# Patient Record
Sex: MALE | Race: WHITE | NOT HISPANIC OR LATINO | Employment: OTHER | URBAN - METROPOLITAN AREA
[De-identification: names, ages, dates, MRNs, and addresses within clinical notes are randomized per-mention and may not be internally consistent; named-entity substitution may affect disease eponyms.]

---

## 2017-01-31 DIAGNOSIS — I10 ESSENTIAL HYPERTENSION: ICD-10-CM

## 2017-02-07 ENCOUNTER — ANTICOAGULATION VISIT (OUTPATIENT)
Dept: VASCULAR LAB | Facility: MEDICAL CENTER | Age: 72
End: 2017-02-07
Attending: NURSE PRACTITIONER
Payer: MEDICARE

## 2017-02-07 DIAGNOSIS — Z79.01 CHRONIC ANTICOAGULATION: ICD-10-CM

## 2017-02-07 DIAGNOSIS — I48.0 PAROXYSMAL ATRIAL FIBRILLATION (HCC): ICD-10-CM

## 2017-02-07 LAB
INR BLD: 2.7 (ref 0.9–1.2)
INR PPP: 2.7 (ref 2–3.5)

## 2017-02-07 PROCEDURE — 99211 OFF/OP EST MAY X REQ PHY/QHP: CPT | Performed by: NURSE PRACTITIONER

## 2017-02-07 PROCEDURE — 85610 PROTHROMBIN TIME: CPT

## 2017-02-07 NOTE — MR AVS SNAPSHOT
Cheikh Perez   2017 10:00 AM   Anticoagulation Visit   MRN: [id number]    Department:  Vascular Medicine   Dept Phone:  [phone]    Description:  Male : 1945   Provider:  [facility] EXAM 4           Allergies as of 2017     Allergen Noted Reactions    Nkda [No Known Drug Allergy] 2009         You were diagnosed with     Chronic anticoagulation   [768904]       Paroxysmal atrial fibrillation (CMS-HCC)   [075173]         Vital Signs     Smoking Status                   Never Smoker            Basic Information     Date Of Birth Sex Race Ethnicity Preferred Language    1945 Male White Non- English      Your appointments     Mar 13, 2017 10:00 AM   ANNUAL WELLNESS with REY Chappell, [employer]    [facility] [address]    [address]  [address]  [place] [address]   [phone]            2017 10:00 AM   Established Patient with [facility] EXAM 5   [facility]  (--)    [address]  [place] [address]   [phone]              Problem List              ICD-10-CM Priority Class Noted - Resolved    Paroxysmal atrial fibrillation (CMS-HCC) I48.0 High  2013 - Present    Essential hypertension I10 High  2013 - Present    Chronic anticoagulation Z79.01 High  2013 - Present    SHANNAN (obstructive sleep apnea) G47.33   2016 - Present    Controlled type 2 diabetes mellitus with diabetic polyneuropathy, without long-term current use of insulin (CMS-HCC) E11.42   2016 - Present    Dyslipidemia E78.5   2016 - Present    Left foot drop M21.372   2016 - Present      Health Maintenance        Date Due Completion Dates    RETINAL SCREENING 3/17/1963 ---    IMM DTaP/Tdap/Td Vaccine (1 - Tdap) 3/17/1964 ---    COLONOSCOPY 3/17/1995 ---    IMM ZOSTER VACCINE 3/17/2005 ---    A1C SCREENING 2016, 2016, 10/16/2015, 10/6/2014,  4/4/2014, 12/5/2013, 6/6/2013, 10/31/2012, 10/12/2011    FASTING LIPID PROFILE 10/16/2016 10/16/2015, 10/20/2014, 12/5/2013, 10/3/2013, 10/31/2012, 10/12/2011    URINE ACR / MICROALBUMIN 10/16/2016 10/16/2015, 10/20/2014, 12/5/2013, 10/31/2012    SERUM CREATININE 2/25/2017 2/25/2016, 2/23/2016, 10/16/2015, 10/20/2014, 12/18/2013, 12/5/2013, 8/8/2013, 7/10/2013, 6/6/2013, 1/24/2013, 10/31/2012, 10/12/2011, 11/23/2009, 6/6/2006, 12/2/2005    DIABETES MONOFILAMENT / LE EXAM 12/12/2017 12/12/2016            Results     POCT Protime      Component    INR    2.7                        Current Immunizations     13-VALENT PCV PREVNAR 2/26/2016 11:52 AM    Influenza TIV (IM) 10/6/2012    Influenza Vaccine Adult HD 12/12/2016, 10/1/2015    Pneumococcal Vaccine (UF)Historical Data 10/6/2012    Pneumococcal polysaccharide vaccine (PPSV-23) 10/1/2011      Below and/or attached are the medications your provider expects you to take. Review all of your home medications and newly ordered medications with your provider and/or pharmacist. Follow medication instructions as directed by your provider and/or pharmacist. Please keep your medication list with you and share with your provider. Update the information when medications are discontinued, doses are changed, or new medications (including over-the-counter products) are added; and carry medication information at all times in the event of emergency situations     Allergies:  NKDA - (reactions not documented)               Medications  Valid as of: February 07, 2017 - 10:10 AM    Generic Name Brand Name Tablet Size Instructions for use    Alpha-Lipoic Acid   Take 200 mg by mouth 2 Times a Day.        Cholecalciferol   Take  by mouth.        Enalapril Maleate (Tab) VASOTEC 10 MG Take 10 mg by mouth every day.        Lecithin (Cap) Lecithin 1200 MG Take 1 Cap by mouth 2 Times a Day.        MetFORMIN HCl (Tab) GLUCOPHAGE 850 MG Take 850 mg by mouth 2 times a day, with meals. Indications:  Type 2 Diabetes        Metoprolol Tartrate (Tab) LOPRESSOR 25 MG TAKE ONE TABLET BY MOUTH TWICE DAILY        Warfarin Sodium (Tab) COUMADIN 5 MG Take 1 Tab by mouth every day.        .                 Medicines prescribed today were sent to:     [id number] - [place], [place] - [address] [place] [address]    Phone: [phone] Fax: [phone]    Open 24 Hours?: No      Medication refill instructions:       If your prescription bottle indicates you have medication refills left, it is not necessary to call your provider’s office. Please contact your pharmacy and they will refill your medication.    If your prescription bottle indicates you do not have any refills left, you may request refills at any time through one of the following ways: The online SpaceClaim system (except Urgent Care), by calling your provider’s office, or by asking your pharmacy to contact your provider’s office with a refill request. Medication refills are processed only during regular business hours and may not be available until the next business day. Your provider may request additional information or to have a follow-up visit with you prior to refilling your medication.   *Please Note: Medication refills are assigned a new Rx number when refilled electronically. Your pharmacy may indicate that no refills were authorized even though a new prescription for the same medication is available at the pharmacy. Please request the medicine by name with the pharmacy before contacting your provider for a refill.        Warfarin Dosing Calendar   February 2017 Details    Sun Mon Tue Wed Thu Fri Sat        1               2               3               4                 5               6               7   2.7   7.5 mg   See details      8      5 mg         9      7.5 mg         10      5 mg         11      7.5 mg           12      5 mg         13      5 mg         14      7.5 mg         15      5 mg         16      7.5 mg          17      5 mg         18      7.5 mg           19      5 mg         20      5 mg         21      7.5 mg         22      5 mg         23      7.5 mg         24      5 mg         25      7.5 mg           26      5 mg         27      5 mg         28      7.5 mg              Date Details   02/07 This INR check   INR: 2.7               How to take your warfarin dose     To take:  5 mg Take 1 of the 5 mg tablets.    To take:  7.5 mg Take 1.5 of the 5 mg tablets.           Warfarin Dosing Calendar   March 2017 Details    Sun Mon Tue Wed Thu Fri Sat        1      5 mg         2      7.5 mg         3      5 mg         4      7.5 mg           5      5 mg         6      5 mg         7      7.5 mg         8      5 mg         9      7.5 mg         10      5 mg         11      7.5 mg           12      5 mg         13      5 mg         14      7.5 mg         15      5 mg         16      7.5 mg         17      5 mg         18      7.5 mg           19      5 mg         20      5 mg         21      7.5 mg         22      5 mg         23      7.5 mg         24      5 mg         25      7.5 mg           26      5 mg         27      5 mg         28      7.5 mg         29      5 mg         30      7.5 mg         31      5 mg           Date Details   No additional details            How to take your warfarin dose     To take:  5 mg Take 1 of the 5 mg tablets.    To take:  7.5 mg Take 1.5 of the 5 mg tablets.           Warfarin Dosing Calendar   April 2017 Details    Sun Mon Tue Wed Thu Fri Sat           1      7.5 mg           2      5 mg         3      5 mg         4      7.5 mg         5               6               7               8                 9               10               11               12               13               14               15                 16               17               18               19               20               21               22                 23               24               25                 26               27               28               29                 30                      Date Details   No additional details    Date of next INR:  4/4/2017         How to take your warfarin dose     To take:  5 mg Take 1 of the 5 mg tablets.    To take:  7.5 mg Take 1.5 of the 5 mg tablets.              Offerpop Access Code: [email or web address]  Expires: 3/9/2017 10:10 AM    Offerpop  A secure, online tool to manage your health information     [employer]s Offerpop® is a secure, online tool that connects you to your personalized health information from the privacy of your home -- day or night - making it very easy for you to manage your healthcare. Once the activation process is completed, you can even access your medical information using the Offerpop lisset, which is available for free in the Apple Lisset store or Google Play store.     Offerpop provides the following levels of access (as shown below):   My Chart Features   [facility] Primary [facility] Doctor [facility]  Specialists [facility]  Urgent  Care Non-[facility]  Primary Care  Doctor   Email your healthcare team securely and privately 24/7 X X X    Manage appointments: schedule your next appointment; view details of past/upcoming appointments X      Request prescription refills. X      View recent personal medical records, including lab and immunizations X X X X   View health record, including health history, allergies, medications X X X X   Read reports about your outpatient visits, procedures, consult and ER notes X X X X   See your discharge summary, which is a recap of your hospital and/or ER visit that includes your diagnosis, lab results, and care plan. X X       How to register for Offerpop:  1. Go to  [email or web address].  2. Click on the Sign Up Now box, which takes you to the New Member Sign Up page. You will need to provide the following information:  a. Enter your Offerpop Access Code exactly as it appears at the top of this page. (You will not need to  use this code after you’ve completed the sign-up process. If you do not sign up before the expiration date, you must request a new code.)   b. Enter your date of birth.   c. Enter your home email address.   d. Click Submit, and follow the next screen’s instructions.  3. Create a Oris4t ID. This will be your Oris4t login ID and cannot be changed, so think of one that is secure and easy to remember.  4. Create a e-Go aeroplanes password. You can change your password at any time.  5. Enter your Password Reset Question and Answer. This can be used at a later time if you forget your password.   6. Enter your e-mail address. This allows you to receive e-mail notifications when new information is available in e-Go aeroplanes.  7. Click Sign Up. You can now view your health information.    For assistance activating your e-[employer] account, call [phone]

## 2017-02-07 NOTE — PROGRESS NOTES
OP Anticoagulation Service Note    Date: 2/7/2017       Plan:  Pt is therapeutic.  Denies any s/s of bleeding or clotting.  Denies any changes in medications or diet. Will continue warfarin dosing as follows.  Follow up appointment in 8 week(s).       Anticoagulation Summary as of 2/7/2017     INR goal 2.0-3.0   Selected INR 2.7 (2/7/2017)   Maintenance plan 7.5 mg (5 mg x 1.5) on Tue, Thu, Sat; 5 mg (5 mg x 1) all other days   Weekly total 42.5 mg   Plan last modified Ilda [facility], A.P.N. (6/28/2016)   Next INR check 4/4/2017   Target end date Indefinite    Indications   Paroxysmal atrial fibrillation (CMS-HCC) [I48.0]  Chronic anticoagulation [Z79.01]         Anticoagulation Episode Summary     INR check location [facility]    Preferred lab     Send INR reminders to     Comments       Anticoagulation Care Providers     Provider Role Specialty Phone number    Cheikh Turner M.D. Referring Cardiology [phone]    [facility] Anticoagulation Services Responsible  [phone]            LEXY Oliva  [facility]

## 2017-03-06 ENCOUNTER — HOSPITAL ENCOUNTER (OUTPATIENT)
Dept: LAB | Facility: MEDICAL CENTER | Age: 72
End: 2017-03-06
Attending: NURSE PRACTITIONER
Payer: MEDICARE

## 2017-03-06 DIAGNOSIS — E11.42 CONTROLLED TYPE 2 DIABETES MELLITUS WITH DIABETIC POLYNEUROPATHY, WITHOUT LONG-TERM CURRENT USE OF INSULIN (HCC): ICD-10-CM

## 2017-03-06 DIAGNOSIS — E78.5 DYSLIPIDEMIA: ICD-10-CM

## 2017-03-06 DIAGNOSIS — I10 ESSENTIAL HYPERTENSION: ICD-10-CM

## 2017-03-06 LAB
ALBUMIN SERPL BCP-MCNC: 4.2 G/DL (ref 3.2–4.9)
ALBUMIN/GLOB SERPL: 1.3 G/DL
ALP SERPL-CCNC: 64 U/L (ref 30–99)
ALT SERPL-CCNC: 16 U/L (ref 2–50)
ANION GAP SERPL CALC-SCNC: 8 MMOL/L (ref 0–11.9)
AST SERPL-CCNC: 16 U/L (ref 12–45)
BILIRUB SERPL-MCNC: 0.8 MG/DL (ref 0.1–1.5)
BUN SERPL-MCNC: 16 MG/DL (ref 8–22)
CALCIUM SERPL-MCNC: 9.6 MG/DL (ref 8.5–10.5)
CHLORIDE SERPL-SCNC: 103 MMOL/L (ref 96–112)
CHOLEST SERPL-MCNC: 166 MG/DL (ref 100–199)
CO2 SERPL-SCNC: 27 MMOL/L (ref 20–33)
CREAT SERPL-MCNC: 0.78 MG/DL (ref 0.5–1.4)
CREAT UR-MCNC: 88.5 MG/DL
EST. AVERAGE GLUCOSE BLD GHB EST-MCNC: 203 MG/DL
GLOBULIN SER CALC-MCNC: 3.3 G/DL (ref 1.9–3.5)
GLUCOSE SERPL-MCNC: 169 MG/DL (ref 65–99)
HBA1C MFR BLD: 8.7 % (ref 0–5.6)
HDLC SERPL-MCNC: 50 MG/DL
LDLC SERPL CALC-MCNC: 100 MG/DL
MICROALBUMIN UR-MCNC: 2.4 MG/DL
MICROALBUMIN/CREAT UR: 27 MG/G (ref 0–30)
POTASSIUM SERPL-SCNC: 3.9 MMOL/L (ref 3.6–5.5)
PROT SERPL-MCNC: 7.5 G/DL (ref 6–8.2)
SODIUM SERPL-SCNC: 138 MMOL/L (ref 135–145)
TRIGL SERPL-MCNC: 79 MG/DL (ref 0–149)

## 2017-03-06 PROCEDURE — 83036 HEMOGLOBIN GLYCOSYLATED A1C: CPT

## 2017-03-06 PROCEDURE — 80061 LIPID PANEL: CPT

## 2017-03-06 PROCEDURE — 82043 UR ALBUMIN QUANTITATIVE: CPT

## 2017-03-06 PROCEDURE — 36415 COLL VENOUS BLD VENIPUNCTURE: CPT

## 2017-03-06 PROCEDURE — 80053 COMPREHEN METABOLIC PANEL: CPT

## 2017-03-06 PROCEDURE — 82570 ASSAY OF URINE CREATININE: CPT

## 2017-03-08 ENCOUNTER — TELEPHONE (OUTPATIENT)
Dept: MEDICAL GROUP | Facility: MEDICAL CENTER | Age: 72
End: 2017-03-08

## 2017-03-08 NOTE — TELEPHONE ENCOUNTER
Future Appointments       Provider Department Center    3/13/2017 10:00 AM REY Chappell; [facility]  [facility] [address]    4/4/2017 10:00 AM [facility] EXAM 5 [facility]        ANNUAL WELLNESS VISIT PRE-VISIT PLANNING     1.  Reviewed last PCP office visit assessment and plan notes: Yes    2.  If any orders were placed last visit do we have Results/Consult Notes?        •  Labs? Yes 3/17       •  Imaging? Yes other doctor       •  Referrals? No no order    3.  Patient Care Coordination Note was updated with diagnosis information:  No    4.  Patient is due for these Health Maintenance Topics:   Health Maintenance Due   Topic Date Due   • Annual Wellness Visit  1945   • RETINAL SCREENING  03/17/1963   • COLONOSCOPY  03/17/1995   • IMM ZOSTER VACCINE  03/17/2005   • IMM DTaP/Tdap/Td Vaccine (1 - Tdap) 12/02/2009             5.  Immunizations were updated in [facility] using WebIZ?: Yes       •  Web Iz Recommendations:  Shingles, tdap       •  Is patient due for Tdap/Shingles? Yes.  If yes, was patient alerted of copay? Yes    6.  Patient has:       •   Diabetes: labs done           7.  Updated Care Team with DME Companies and all specialists?        •   Gait devices, O2, CPAP, etc: no        •   Eye professional: yes       •   Other specialists (GYN, cardiology, endo, etc): yes    8.  Is patient in need of any refills prior to office visit? No       •    Separate refill encounter created?: N\A    9.  Patient was informed to arrive 15 min prior to their scheduled appointment and bring in their medication bottles? yes    10.  Patient was advised: “This is a free wellness visit. The provider will screen for medical conditions to help you stay healthy. If you have other concerns to address you may be asked to discuss these at a separate visit or there may be an additional fee.”  Yes

## 2017-03-08 NOTE — TELEPHONE ENCOUNTER
Left message for patient to call back regarding AWV pre-visit planning. Please transfer call to [phone].

## 2017-03-13 ENCOUNTER — OFFICE VISIT (OUTPATIENT)
Dept: MEDICAL GROUP | Facility: MEDICAL CENTER | Age: 72
End: 2017-03-13
Payer: MEDICARE

## 2017-03-13 VITALS
HEART RATE: 60 BPM | SYSTOLIC BLOOD PRESSURE: 100 MMHG | DIASTOLIC BLOOD PRESSURE: 70 MMHG | BODY MASS INDEX: 29.05 KG/M2 | WEIGHT: 246.03 LBS | TEMPERATURE: 98.9 F | RESPIRATION RATE: 14 BRPM | OXYGEN SATURATION: 97 % | HEIGHT: 77 IN

## 2017-03-13 DIAGNOSIS — I10 ESSENTIAL HYPERTENSION: ICD-10-CM

## 2017-03-13 DIAGNOSIS — Z79.01 CHRONIC ANTICOAGULATION: ICD-10-CM

## 2017-03-13 DIAGNOSIS — G47.33 OSA (OBSTRUCTIVE SLEEP APNEA): ICD-10-CM

## 2017-03-13 DIAGNOSIS — M21.372 LEFT FOOT DROP: ICD-10-CM

## 2017-03-13 DIAGNOSIS — Z00.00 MEDICARE ANNUAL WELLNESS VISIT, SUBSEQUENT: ICD-10-CM

## 2017-03-13 DIAGNOSIS — E78.5 DYSLIPIDEMIA: ICD-10-CM

## 2017-03-13 DIAGNOSIS — Z91.81 RISK FOR FALLS: ICD-10-CM

## 2017-03-13 DIAGNOSIS — I48.0 PAROXYSMAL ATRIAL FIBRILLATION (HCC): ICD-10-CM

## 2017-03-13 PROCEDURE — G0439 PPPS, SUBSEQ VISIT: HCPCS | Performed by: NURSE PRACTITIONER

## 2017-03-13 PROCEDURE — 3017F COLORECTAL CA SCREEN DOC REV: CPT | Performed by: NURSE PRACTITIONER

## 2017-03-13 PROCEDURE — 3288F FALL RISK ASSESSMENT DOCD: CPT | Mod: 8P | Performed by: NURSE PRACTITIONER

## 2017-03-13 PROCEDURE — G8482 FLU IMMUNIZE ORDER/ADMIN: HCPCS | Performed by: NURSE PRACTITIONER

## 2017-03-13 PROCEDURE — 0518F FALL PLAN OF CARE DOCD: CPT | Mod: 8P | Performed by: NURSE PRACTITIONER

## 2017-03-13 PROCEDURE — G8420 CALC BMI NORM PARAMETERS: HCPCS | Performed by: NURSE PRACTITIONER

## 2017-03-13 PROCEDURE — 1036F TOBACCO NON-USER: CPT | Performed by: NURSE PRACTITIONER

## 2017-03-13 PROCEDURE — G8510 SCR DEP NEG, NO PLAN REQD: HCPCS | Performed by: NURSE PRACTITIONER

## 2017-03-13 PROCEDURE — 1100F PTFALLS ASSESS-DOCD GE2>/YR: CPT | Performed by: NURSE PRACTITIONER

## 2017-03-13 PROCEDURE — 3045F PR MOST RECENT HEMOGLOBIN A1C LEVEL 7.0-9.0%: CPT | Performed by: NURSE PRACTITIONER

## 2017-03-13 PROCEDURE — 4040F PNEUMOC VAC/ADMIN/RCVD: CPT | Performed by: NURSE PRACTITIONER

## 2017-03-13 PROCEDURE — 99213 OFFICE O/P EST LOW 20 MIN: CPT | Mod: 25 | Performed by: NURSE PRACTITIONER

## 2017-03-13 RX ORDER — LOVASTATIN 10 MG/1
10 TABLET ORAL DAILY
Qty: 30 TAB | Refills: 11 | Status: SHIPPED | OUTPATIENT
Start: 2017-03-13 | End: 2018-01-11 | Stop reason: SDUPTHER

## 2017-03-13 ASSESSMENT — PAIN SCALES - GENERAL: PAINLEVEL: NO PAIN

## 2017-03-13 ASSESSMENT — PATIENT HEALTH QUESTIONNAIRE - PHQ9: CLINICAL INTERPRETATION OF PHQ2 SCORE: 0

## 2017-03-13 NOTE — PROGRESS NOTES
Chief Complaint   Patient presents with   • Annual Exam     AWV         HPI:  Cheikh is a 71 y.o. male here for Medicare Annual Wellness Visit    1. Medicare annual wellness visit, subsequent  Screening performed below.    2. Uncontrolled type 2 diabetes mellitus without complication, without long-term current use of insulin (CMS-HCC)  Patient has been using metformin 850 mg twice a day which he came to this office on. His last hemoglobin A1c from year ago was mildly elevated in the low 7 range. It is now at 8.7. He states he is taking the medicine as prescribed but is not exercising as much as he used to. His BMI is slightly elevated at 29. He denies polyuria, polydipsia, or polyphagia.    3. Dyslipidemia  Patient states he has not been on a statin in the past. His LDL is at 100 which is slightly high for a diabetic.    4. Essential hypertension  Blood pressure has remained well controlled on his metoprolol and lisinopril.    5. Paroxysmal atrial fibrillation (CMS-HCC)  Patient goes to the Coumadin clinic which is providing his Coumadin and INR testing. He does have a cardiologist and admits he received a recent letter regarding yearly follow-up. He denies unilateral weakness, chest pain, shortness of breath or dizziness.    6. Chronic anticoagulation  This is being followed through the Coumadin clinic.    7. SHANNAN (obstructive sleep apnea)  Patient continues to not wear his CPAP regularly stating he does not like how it fits and does not want to go to pulmonology.    8. Left foot drop  He states he currently was through therapy and is trying to get better movement of his left foot so he can be more active. He has an appointment with his orthopedic surgeon next week.    9. Risk for falls  Patient at risk for falls but uses a foot brace and cane if needed.      Patient Active Problem List    Diagnosis Date Noted   • Chronic anticoagulation 09/20/2013     Priority: High   • Essential hypertension 07/09/2013      Priority: High   • Paroxysmal atrial fibrillation (CMS-HCC) 06/06/2013     Priority: High   • SHANNAN (obstructive sleep apnea) 12/12/2016   • Controlled type 2 diabetes mellitus with diabetic polyneuropathy, without long-term current use of insulin (CMS-HCC) 12/12/2016   • Dyslipidemia 12/12/2016   • Left foot drop 12/12/2016       Current Outpatient Prescriptions   Medication Sig Dispense Refill   • metoprolol (LOPRESSOR) 25 MG Tab TAKE ONE TABLET BY MOUTH TWICE DAILY 180 Tab 2   • VITAMIN D, CHOLECALCIFEROL, PO Take  by mouth.     • warfarin (COUMADIN) 5 MG Tab Take 1 Tab by mouth every day. 100 Tab 3   • enalapril (VASOTEC) 10 MG TABS Take 10 mg by mouth every day.     • metformin (GLUCOPHAGE) 850 MG TABS Take 850 mg by mouth 2 times a day, with meals. Indications: Type 2 Diabetes     • ALPHA LIPOIC ACID PO Take 200 mg by mouth 2 Times a Day.     • Lecithin 1200 MG CAPS Take 1 Cap by mouth 2 Times a Day.       No current facility-administered medications for this visit.          Current supplements as per medication list.   Chronic narcotic pain medicines: no    Allergies: Nkda    Current social contact/activities: service club, golf     Is patient current with immunizations?  no   If no, due for tdap, shingles    He  reports that he has never smoked. He has never used smokeless tobacco. He reports that he drinks about 6.0 oz of alcohol per week. He reports that he does not use illicit drugs.  Counseling given: Not Answered        DPA/Advanced Directive:  Patient does not have an advanced directive.  If not on file, instructed to bring in a copy to scan into his chart. If no advanced directive exists, a packet and workshop information was provided    ROS:    Gait: Uses a cane   Ostomy: no   Other tubes: no   Amputations: no   Chronic oxygen use no   Last eye exam last year   : Denies incontinence.       Depression Screening    Little interest or pleasure in doing things?  0 - not at all  Feeling down, depressed,  or hopeless?  0 - not at all  Patient Health Questionnaire Score: 0    If depressive symptoms identified deferred to follow up visit unless specifically addressed in assessment and plan.    Screening for Cognitive Impairment    Three Minute Recall (banana, sunrise, fence)  3/3    Draw clock face with all 12 numbers set to the hand to show 10 minutes past 11 o'clock  1 5/5  Cognitive concerns identified deferred for follow up unless specifically addressed in assessment and plan.    Fall Risk Assessment    Has the patient had two or more falls in the last year or any fall with injury in the last year?  Yes    Safety Assessment    Throw rugs on floor.  Yes  Handrails on all stairs.  Yes  Good lighting in all hallways.  Yes  Difficulty hearing.  No  Patient counseled about all safety risks that were identified.    Functional Assessment ADLs    Are there any barriers preventing you from cooking for yourself or meeting nutritional needs?  No.    Are there any barriers preventing you from driving safely or obtaining transportation?  No.    Are there any barriers preventing you from using a telephone or calling for help?  No.    Are there any barriers preventing you from shopping?  No.    Are there any barriers preventing you from taking care of your own finances?  No.    Are there any barriers preventing you from managing your medications?  No.    Are currently engaging any exercise or physical activity?  Yes.  stationary bike 20 mins 3-4 times a week, 10 push ups 3 times a week    Health Maintenance Summary                Annual Wellness Visit Overdue 1945     RETINAL SCREENING Overdue 3/17/1963     COLONOSCOPY Overdue 3/17/1995     IMM ZOSTER VACCINE Overdue 3/17/2005     IMM DTaP/Tdap/Td Vaccine Overdue 12/2/2009      Done 12/1/2009 Imm Admin: TD Vaccine    A1C SCREENING Next Due 9/6/2017      Done 3/6/2017 HEMOGLOBIN A1C (A)     Patient has more history with this topic...    DIABETES MONOFILAMENT / LE EXAM Next  "Due 12/12/2017      Done 12/12/2016 AMB DIABETIC MONOFILAMENT LOWER EXTREMITY EXAM    FASTING LIPID PROFILE Next Due 3/6/2018      Done 3/6/2017 LIPID PROFILE (A)     Patient has more history with this topic...    URINE ACR / MICROALBUMIN Next Due 3/6/2018      Done 3/6/2017 MICROALBUMIN CREAT RATIO URINE     Patient has more history with this topic...    SERUM CREATININE Next Due 3/6/2018      Done 3/6/2017 COMP METABOLIC PANEL (A)     Patient has more history with this topic...          Patient Care Team:  REY Chappell as PCP - General (Family Medicine)  Ion Terrell M.D. as Consulting Physician (Ophthalmology)  Main Batista M.D. as Consulting Physician (Urology)  Bran Brar M.D. as Consulting Physician (Cardiology)  Montrell Eddy M.D. as Consulting Physician (Orthopaedics)    Social History   Substance Use Topics   • Smoking status: Never Smoker    • Smokeless tobacco: Never Used   • Alcohol Use: 6.0 oz/week     10 Glasses of wine per week     Family History   Problem Relation Age of Onset   • Heart Attack Father    • Arrythmia Brother    • Other Mother 92     :natural causes\"     He  has a past medical history of Arthritis; Sleep apnea; Snoring; Diabetes; Hypertension; Arrhythmia (July 2013); Pain ( ); Hypertension; and Paroxysmal atrial fibrillation (CMS-[facility]) (6/6/2013).   Past Surgical History   Procedure Laterality Date   • Lumbar laminectomy diskectomy  7/2006     L1-L5/ residual left foot weaknesss    • Acl repair  12/2005     Right   • Knee arthroplasty total  3/2009     Left   • Knee arthroplasty total  12/1/2009     Performed by GIACOMO PETTIT at SURGERY [facility] ORS   • Recovery  7/11/2013     Performed by Cath-Recovery Surgery at SURGERY SAME DAY [facility] ORS   • Other       Surgery for kidney stones   • Cystoscopy stent placement  1/13/2014     Performed by Main Batista M.D. at SURGERY [facility]   • Ureteroscopy  1/13/2014     Performed by Main Batista M.D. " "at SURGERY [facility]   • Lasertripsy  1/13/2014     Performed by Main Batista M.D. at SURGERY [facility]   • Femur orif Left 2/24/2016     Procedure: DISTAL FEMUR ORIF;  Surgeon: Montrell Eddy M.D.;  Location: SURGERY [facility];  Service:    • Femur orif             Exam:   Gen.: Patient alert, talkative and in no distress.  HEENT: TMs show no erythema, neck supple negative lymphadenopathy or thyromegaly.  Chest: Clear bilaterally to A&P and without wheeze or rhonchi.  Heart: Irregularly irregular. Negative murmur.  Abdomen: Soft, nontender, bowel sounds present, negative for bruit or masses.  Extremities: Warm, without cyanosis or edema, left foot drop.  Skin: Negative for rash or lesions.  Blood pressure 100/70, pulse 60, temperature 37.2 °C (98.9 °F), resp. rate 14, height 1.95 m (6' 4.77\"), weight 111.6 kg (246 lb 0.5 oz), SpO2 97 %. Body mass index is 29.35 kg/(m^2).        Assessment and Plan. The following treatment and monitoring plan is recommended:    1. Medicare annual wellness visit, subsequent  Annual wellness topics discussed, review of chronic medical problems completed    - Initial Wellness Visit - Includes PPPS ()    2. Uncontrolled type 2 diabetes mellitus without complication, without long-term current use of insulin (CMS-[facility])  I discussed options with patient including adding additional diabetic medication but he would prefer to first try going to the top dose of metformin 1000 mg twice a day. He states he is going to be more active with the spring weather and I advised him to try to bring his hemoglobin A1c closer to 7.0. I explained he still needs to watch his carbohydrates.  - metformin (GLUCOPHAGE) 1000 MG tablet; Take 1 Tab by mouth 2 times a day, with meals.  Dispense: 60 Tab; Refill: 11  - HEMOGLOBIN A1C; Future    3. Dyslipidemia  I reviewed with patient that most cardiologist would like to see an LDL closer to 70 because of his heart history and diabetes. I " reviewed with him the pros and cons of statins. I will start him on a low dose of one of the week or statins and recheck cholesterol in 3 months and increase if necessary but he does not have very far to go to get his LDL close to 70. He will stop the medicine if he develops myalgias or other side effects.  - lovastatin (MEVACOR) 10 MG tablet; Take 1 Tab by mouth every day.  Dispense: 30 Tab; Refill: 11  - LIPID PROFILE; Future    4. Essential hypertension  Blood pressure currently well controlled and he is not bradycardic on his beta blocker. I explained about the lisinopril with his diabetes.    5. Paroxysmal atrial fibrillation (CMS-HCC)  Patient to follow-up with cardiology and continue to get his medications through the Coumadin clinic.    6. Chronic anticoagulation  Patient currently on medication.    7. SHANNAN (obstructive sleep apnea)  I again reminded patient he should wear his CPAP at night. If he is having trouble with his mask he should see his [employer] company for new mask.    8. Left foot drop  Patient recently through therapy and now will be following with his foot surgeon.    9. Risk for falls    - Patient identified as fall risk.  Appropriate orders and counseling given.    Services needed: No services needed at this time  Health Care Screening recommendations as per orders if indicated.  Referrals offered: PT/OT/Nutrition counseling/Behavioral Health/Smoking cessation as per orders if indicated.    Discussion today about general wellness and lifestyle habits:    · Prevent falls and reduce trip hazards; Cautioned about securing or removing rugs.  · Have a working fire alarm and carbon monoxide detector;   · Engage in regular physical activity and social activities   ·       Follow-up: No Follow-up on file.

## 2017-03-13 NOTE — MR AVS SNAPSHOT
"        Cheikh Perez   3/13/2017 10:00 AM   Office Visit   MRN: [id number]    Department:  [address] [place] [address]   Dept Phone:  [phone]    Description:  Male : 1945   Provider:  REY Chappell; HEALTH            Reason for Visit     Annual Exam AWV      Allergies as of 3/13/2017     Allergen Noted Reactions    Nkda [No Known Drug Allergy] 2009         You were diagnosed with     Medicare annual wellness visit, subsequent   [980305]       Uncontrolled type 2 diabetes mellitus without complication, without long-term current use of insulin (CMS-HCC)   [7930741]       Dyslipidemia   [418202]       Essential hypertension   [4852182]       Paroxysmal atrial fibrillation (CMS-HCC)   [351418]       Chronic anticoagulation   [379151]       SHANNAN (obstructive sleep apnea)   [211331]       Left foot drop   [183793]         Vital Signs     Blood Pressure Pulse Temperature Respirations Height Weight    100/70 mmHg 60 37.2 °C (98.9 °F) 14 1.95 m (6' 4.77\") 111.6 kg (246 lb 0.5 oz)    Body Mass Index Oxygen Saturation Smoking Status             29.35 kg/m2 97% Never Smoker          Basic Information     Date Of Birth Sex Race Ethnicity Preferred Language    1945 Male White Non- English      Your appointments     2017 10:00 AM   Established Patient with [facility] EXAM 5   [facility]  (--)    [address] [place] [id number]   [phone]              Problem List              ICD-10-CM Priority Class Noted - Resolved    Paroxysmal atrial fibrillation (CMS-HCC) I48.0 High  2013 - Present    Essential hypertension I10 High  2013 - Present    Chronic anticoagulation Z79.01 High  2013 - Present    SHANNAN (obstructive sleep apnea) G47.33   2016 - Present    Dyslipidemia E78.5   2016 - Present    Left foot drop M21.372   2016 - Present    Uncontrolled type 2 diabetes mellitus without " complication, without long-term current use of insulin (CMS-HCC) E11.65   3/13/2017 - Present      Health Maintenance        Date Due Completion Dates    RETINAL SCREENING 3/17/1963 ---    IMM DTaP/Tdap/Td Vaccine (1 - Tdap) 12/2/2009 12/1/2009    IMM ZOSTER VACCINE 3/8/2019 (Originally 3/17/2005) ---    A1C SCREENING 9/6/2017 3/6/2017, 2/27/2016, 1/21/2016, 10/16/2015, 10/6/2014, 4/4/2014, 12/5/2013, 6/6/2013, 10/31/2012, 10/12/2011    DIABETES MONOFILAMENT / LE EXAM 12/12/2017 12/12/2016    FASTING LIPID PROFILE 3/6/2018 3/6/2017, 10/16/2015, 10/20/2014, 12/5/2013, 10/3/2013, 10/31/2012, 10/12/2011    URINE ACR / MICROALBUMIN 3/6/2018 3/6/2017, 10/16/2015, 10/20/2014, 12/5/2013, 10/31/2012    SERUM CREATININE 3/6/2018 3/6/2017, 2/25/2016, 2/23/2016, 10/16/2015, 10/20/2014, 12/18/2013, 12/5/2013, 8/8/2013, 7/10/2013, 6/6/2013, 1/24/2013, 10/31/2012, 10/12/2011, 11/23/2009, 6/6/2006, 12/2/2005    COLONOSCOPY 3/6/2024 3/6/2014 (Done)    Override on 3/6/2014: Done            Current Immunizations     13-VALENT PCV PREVNAR 2/26/2016 11:52 AM    Influenza TIV (IM) 10/6/2012    Influenza Vaccine Adult HD 12/12/2016, 10/1/2015    Pneumococcal Vaccine (UF)Historical Data 10/6/2012    Pneumococcal polysaccharide vaccine (PPSV-23) 10/1/2011    TD Vaccine 12/1/2009      Below and/or attached are the medications your provider expects you to take. Review all of your home medications and newly ordered medications with your provider and/or pharmacist. Follow medication instructions as directed by your provider and/or pharmacist. Please keep your medication list with you and share with your provider. Update the information when medications are discontinued, doses are changed, or new medications (including over-the-counter products) are added; and carry medication information at all times in the event of emergency situations     Allergies:  NKDA - (reactions not documented)               Medications  Valid as of: March 13, 2017 -  10:45 AM    Generic Name Brand Name Tablet Size Instructions for use    Alpha-Lipoic Acid   Take 200 mg by mouth 2 Times a Day.        Cholecalciferol   Take  by mouth.        Enalapril Maleate (Tab) VASOTEC 10 MG Take 10 mg by mouth every day.        Lecithin (Cap) Lecithin 1200 MG Take 1 Cap by mouth 2 Times a Day.        Lovastatin (Tab) MEVACOR 10 MG Take 1 Tab by mouth every day.        MetFORMIN HCl (Tab) GLUCOPHAGE 1000 MG Take 1 Tab by mouth 2 times a day, with meals.        Metoprolol Tartrate (Tab) LOPRESSOR 25 MG TAKE ONE TABLET BY MOUTH TWICE DAILY        Warfarin Sodium (Tab) COUMADIN 5 MG Take 1 Tab by mouth every day.        .                 Medicines prescribed today were sent to:     [id number] - [place], [place] - [address] [address]    Phone: [phone] Fax: [phone]    Open 24 Hours?: No      Medication refill instructions:       If your prescription bottle indicates you have medication refills left, it is not necessary to call your provider’s office. Please contact your pharmacy and they will refill your medication.    If your prescription bottle indicates you do not have any refills left, you may request refills at any time through one of the following ways: The online [employer] system (except Urgent Care), by calling your provider’s office, or by asking your pharmacy to contact your provider’s office with a refill request. Medication refills are processed only during regular business hours and may not be available until the next business day. Your provider may request additional information or to have a follow-up visit with you prior to refilling your medication.   *Please Note: Medication refills are assigned a new Rx number when refilled electronically. Your pharmacy may indicate that no refills were authorized even though a new prescription for the same medication is available at the pharmacy. Please request the medicine by name with the  pharmacy before contacting your provider for a refill.        Your To Do List     Future Labs/Procedures Complete By Expires    HEMOGLOBIN A1C  As directed 3/14/2018    LIPID PROFILE  As directed 3/14/2018         Nanosys Access Code: [email or web address]  Expires: 4/12/2017 10:45 AM    Nanosys  A secure, online tool to manage your health information     [employer]’s Nanosys® is a secure, online tool that connects you to your personalized health information from the privacy of your home -- day or night - making it very easy for you to manage your healthcare. Once the activation process is completed, you can even access your medical information using the Nanosys lisset, which is available for free in the Apple Lisset store or Google Play store.     Nanosys provides the following levels of access (as shown below):   My Chart Features   [facility] Doctor [facility]  Specialists [facility]  Urgent  Care Non-[facility]  Primary Care  Doctor   Email your healthcare team securely and privately 24/7 X X X    Manage appointments: schedule your next appointment; view details of past/upcoming appointments X      Request prescription refills. X      View recent personal medical records, including lab and immunizations X X X X   View health record, including health history, allergies, medications X X X X   Read reports about your outpatient visits, procedures, consult and ER notes X X X X   See your discharge summary, which is a recap of your hospital and/or ER visit that includes your diagnosis, lab results, and care plan. X X       How to register for Nanosys:  1. Go to  [email or web address].  2. Click on the Sign Up Now box, which takes you to the New Member Sign Up page. You will need to provide the following information:  a. Enter your Nanosys Access Code exactly as it appears at the top of this page. (You will not need to use this code after you’ve completed the sign-up process. If you do not sign up before the expiration  date, you must request a new code.)   b. Enter your date of birth.   c. Enter your home email address.   d. Click Submit, and follow the next screen’s instructions.  3. Create a brands4friends ID. This will be your brands4friends login ID and cannot be changed, so think of one that is secure and easy to remember.  4. Create a [employer] password. You can change your password at any time.  5. Enter your Password Reset Question and Answer. This can be used at a later time if you forget your password.   6. Enter your e-mail address. This allows you to receive e-mail notifications when new information is available in brands4friends.  7. Click Sign Up. You can now view your health information.    For assistance activating your brands4friends account, call [phone]

## 2017-03-13 NOTE — Clinical Note
[facility]  MARTI ChappellPDenzelNDenzel  [address]  [place] [address]  Fax: [phone]   Authorization for Release/Disclosure of   Protected Health Information   Name: TOBI PEREZ : 1945 SSN: [id number]   Address: [address] [place] [address]  [place] [address] Phone:    [phone] (home)    I authorize the entity listed below to release/disclose the PHI below to:   [facility]/MARTI ChappellP.IRIS and REY Chappell   Provider or Entity Name:     Address   City, State, Zip   Phone:      Fax:     Reason for request: continuity of care   Information to be released:    [x  ] LAST COLONOSCOPY,  including any PATH REPORT and follow-up  [  ] LAST FIT/COLOGUARD RESULT [  ] LAST DEXA  [  ] LAST MAMMOGRAM  [  ] LAST PAP  [  ] LAST LABS [  ] RETINA EXAM REPORT  [  ] IMMUNIZATION RECORDS  [  ] Release all info      [  ] Check here and initial the line next to each item to release ALL health information INCLUDING  _____ Care and treatment for drug and / or alcohol abuse  _____ HIV testing, infection status, or AIDS  _____ Genetic Testing    DATES OF SERVICE OR TIME PERIOD TO BE DISCLOSED: _____________  I understand and acknowledge that:  * This Authorization may be revoked at any time by you in writing, except if your health information has already been used or disclosed.  * Your health information that will be used or disclosed as a result of you signing this authorization could be re-disclosed by the recipient. If this occurs, your re-disclosed health information may no longer be protected by State or Federal laws.  * You may refuse to sign this Authorization. Your refusal will not affect your ability to obtain treatment.  * This Authorization becomes effective upon signing and will  on (date) __________.      If no date is indicated, this Authorization will  one (1) year from the signature date.    Name: Tobi Perez    Signature:   Date:     3/13/2017          PLEASE FAX REQUESTED RECORDS BACK TO: [phone]

## 2017-04-04 ENCOUNTER — ANTICOAGULATION VISIT (OUTPATIENT)
Dept: VASCULAR LAB | Facility: MEDICAL CENTER | Age: 72
End: 2017-04-04
Attending: INTERNAL MEDICINE
Payer: MEDICARE

## 2017-04-04 DIAGNOSIS — Z79.01 CHRONIC ANTICOAGULATION: ICD-10-CM

## 2017-04-04 DIAGNOSIS — I48.0 PAROXYSMAL ATRIAL FIBRILLATION (HCC): ICD-10-CM

## 2017-04-04 LAB — INR PPP: 2 (ref 2–3.5)

## 2017-04-04 PROCEDURE — 85610 PROTHROMBIN TIME: CPT

## 2017-04-04 PROCEDURE — 99211 OFF/OP EST MAY X REQ PHY/QHP: CPT

## 2017-04-04 NOTE — MR AVS SNAPSHOT
Cheikh Perez   2017 10:00 AM   Anticoagulation Visit   MRN: [id number]    Department:  Vascular Medicine   Dept Phone:  [phone]    Description:  Male : 1945   Provider:  V EXAM 5           Allergies as of 2017     Allergen Noted Reactions    Nkda [No Known Drug Allergy] 2009         You were diagnosed with     Chronic anticoagulation   [101260]       Paroxysmal atrial fibrillation (CMS-[facility])   [733368]         Vital Signs     Smoking Status                   Never Smoker            Basic Information     Date Of Birth Sex Race Ethnicity Preferred Language    1945 Male White Non- English      Your appointments     2017 10:00 AM   Established Patient with IHV EXAM 5   [facility]  (--)    [address]  [place] [address]   [phone]            May 12, 2017  1:15 PM   FOLLOW UP with Cheikh Turner M.D.   [facility] (--)    [address]  [place] [address]   [phone]            May 30, 2017 10:00 AM   Established Patient with IHV EXAM 4   [facility]  (--)    [address]  [place] [address]   [phone]              Problem List              ICD-10-CM Priority Class Noted - Resolved    Paroxysmal atrial fibrillation (CMS-[facility]) I48.0 High  2013 - Present    Essential hypertension I10 High  2013 - Present    Chronic anticoagulation Z79.01 High  2013 - Present    SHANNAN (obstructive sleep apnea) G47.33   2016 - Present    Dyslipidemia E78.5   2016 - Present    Left foot drop M21.372   2016 - Present    Uncontrolled type 2 diabetes mellitus without complication, without long-term current use of insulin (CMS-HCC) E11.65   3/13/2017 - Present    Risk for falls Z91.81   3/13/2017 - Present      Health Maintenance        Date Due Completion Dates       IMM DTaP/Tdap/Td Vaccine (1 - Tdap) 12/2/2009 12/1/2009    IMM ZOSTER VACCINE 3/8/2019 (Originally 3/17/2005) ---    A1C SCREENING 9/6/2017 3/6/2017, 2/27/2016, 1/21/2016, 10/16/2015, 10/6/2014, 4/4/2014, 12/5/2013, 6/6/2013, 10/31/2012, 10/12/2011    DIABETES MONOFILAMENT / LE EXAM 12/12/2017 12/12/2016    RETINAL SCREENING 12/19/2017 12/19/2016    FASTING LIPID PROFILE 3/6/2018 3/6/2017, 10/16/2015, 10/20/2014, 12/5/2013, 10/3/2013, 10/31/2012, 10/12/2011    URINE ACR / MICROALBUMIN 3/6/2018 3/6/2017, 10/16/2015, 10/20/2014, 12/5/2013, 10/31/2012    SERUM CREATININE 3/6/2018 3/6/2017, 2/25/2016, 2/23/2016, 10/16/2015, 10/20/2014, 12/18/2013, 12/5/2013, 8/8/2013, 7/10/2013, 6/6/2013, 1/24/2013, 10/31/2012, 10/12/2011, 11/23/2009, 6/6/2006, 12/2/2005    COLONOSCOPY 3/6/2024 3/6/2014 (Done)    Override on 3/6/2014: Done            Results     POCT Protime      Component    INR    2.0                        Current Immunizations     13-VALENT PCV PREVNAR 2/26/2016 11:52 AM    Influenza TIV (IM) 10/6/2012    Influenza Vaccine Adult HD 12/12/2016, 10/1/2015    Pneumococcal Vaccine (UF)Historical Data 10/6/2012    Pneumococcal polysaccharide vaccine (PPSV-23) 10/1/2011    TD Vaccine 12/1/2009      Below and/or attached are the medications your provider expects you to take. Review all of your home medications and newly ordered medications with your provider and/or pharmacist. Follow medication instructions as directed by your provider and/or pharmacist. Please keep your medication list with you and share with your provider. Update the information when medications are discontinued, doses are changed, or new medications (including over-the-counter products) are added; and carry medication information at all times in the event of emergency situations     Allergies:  NKDA - (reactions not documented)               Medications  Valid as of: April 04, 2017 -  9:56 AM    Generic Name Brand Name Tablet Size Instructions for  use    Alpha-Lipoic Acid   Take 200 mg by mouth 2 Times a Day.        Cholecalciferol   Take  by mouth.        Enalapril Maleate (Tab) VASOTEC 10 MG Take 10 mg by mouth every day.        Lecithin (Cap) Lecithin 1200 MG Take 1 Cap by mouth 2 Times a Day.        Lovastatin (Tab) MEVACOR 10 MG Take 1 Tab by mouth every day.        MetFORMIN HCl (Tab) GLUCOPHAGE 1000 MG Take 1 Tab by mouth 2 times a day, with meals.        Metoprolol Tartrate (Tab) LOPRESSOR 25 MG TAKE ONE TABLET BY MOUTH TWICE DAILY        Warfarin Sodium (Tab) COUMADIN 5 MG Take 1 Tab by mouth every day.        .                 Medicines prescribed today were sent to:     [id number] - [place], [place] - [address] [address]    Phone: [phone] Fax: [phone]    Open 24 Hours?: No      Medication refill instructions:       If your prescription bottle indicates you have medication refills left, it is not necessary to call your provider’s office. Please contact your pharmacy and they will refill your medication.    If your prescription bottle indicates you do not have any refills left, you may request refills at any time through one of the following ways: The online Ium system (except Urgent Care), by calling your provider’s office, or by asking your pharmacy to contact your provider’s office with a refill request. Medication refills are processed only during regular business hours and may not be available until the next business day. Your provider may request additional information or to have a follow-up visit with you prior to refilling your medication.   *Please Note: Medication refills are assigned a new Rx number when refilled electronically. Your pharmacy may indicate that no refills were authorized even though a new prescription for the same medication is available at the pharmacy. Please request the medicine by name with the pharmacy before contacting your provider for a refill.        Warfarin  Dosing Calendar   April 2017 Details    Sun Mon Tue Wed Thu Fri Sat           1                 2               3               4   2.0   7.5 mg   See details      5      5 mg         6      7.5 mg         7      5 mg         8      7.5 mg           9      5 mg         10      5 mg         11      7.5 mg         12      5 mg         13      7.5 mg         14      5 mg         15      7.5 mg           16      5 mg         17      5 mg         18      7.5 mg         19      5 mg         20      7.5 mg         21      5 mg         22      7.5 mg           23      5 mg         24      5 mg         25      7.5 mg         26      5 mg         27      7.5 mg         28      5 mg         29      7.5 mg           30      5 mg                Date Details   04/04 This INR check   INR: 2.0               How to take your warfarin dose     To take:  5 mg Take 1 of the 5 mg tablets.    To take:  7.5 mg Take 1.5 of the 5 mg tablets.           Warfarin Dosing Calendar   May 2017 Details    Sun Mon Tue Wed Thu Fri Sat      1      5 mg         2      7.5 mg         3      5 mg         4      7.5 mg         5      5 mg         6      7.5 mg           7      5 mg         8      5 mg         9      7.5 mg         10      5 mg         11      7.5 mg         12      5 mg         13      7.5 mg           14      5 mg         15      5 mg         16      7.5 mg         17      5 mg         18      7.5 mg         19      5 mg         20      7.5 mg           21      5 mg         22      5 mg         23      7.5 mg         24      5 mg         25      7.5 mg         26      5 mg         27      7.5 mg           28      5 mg         29      5 mg         30      7.5 mg         31      5 mg             Date Details   No additional details            How to take your warfarin dose     To take:  5 mg Take 1 of the 5 mg tablets.    To take:  7.5 mg Take 1.5 of the 5 mg tablets.           Warfarin Dosing Calendar   June 2017 Details    Sun Mon Tue  Wed Thu Fri Sat         1      7.5 mg         2      5 mg         3      7.5 mg           4      5 mg         5      5 mg         6      7.5 mg         7      5 mg         8      7.5 mg         9      5 mg         10      7.5 mg           11      5 mg         12      5 mg         13      7.5 mg         14               15               16               17                 18               19               20               21               22               23               24                 25               26               27               28               29               30                 Date Details   No additional details    Date of next INR:  6/13/2017         How to take your warfarin dose     To take:  5 mg Take 1 of the 5 mg tablets.    To take:  7.5 mg Take 1.5 of the 5 mg tablets.              Activation Life Access Code: [email or web address]  Expires: 4/12/2017 10:45 AM    Activation Life  A secure, online tool to manage your health information     [employer]’s Activation Life® is a secure, online tool that connects you to your personalized health information from the privacy of your home -- day or night - making it very easy for you to manage your healthcare. Once the activation process is completed, you can even access your medical information using the Activation Life lisset, which is available for free in the Apple Lisset store or Google Play store.     Activation Life provides the following levels of access (as shown below):   My Chart Features   [facility] Doctor [facility]  Urgent  Care Non-[facility]  Primary Care  Doctor   Email your healthcare team securely and privately 24/7 X X X    Manage appointments: schedule your next appointment; view details of past/upcoming appointments X      Request prescription refills. X      View recent personal medical records, including lab and immunizations X X X X   View health record, including health history, allergies, medications X X X X   Read reports about your outpatient  visits, procedures, consult and ER notes X X X X   See your discharge summary, which is a recap of your hospital and/or ER visit that includes your diagnosis, lab results, and care plan. X X       How to register for Trunkbow:  1. Go to  [email or web address].  2. Click on the Sign Up Now box, which takes you to the New Member Sign Up page. You will need to provide the following information:  a. Enter your Trunkbow Access Code exactly as it appears at the top of this page. (You will not need to use this code after you’ve completed the sign-up process. If you do not sign up before the expiration date, you must request a new code.)   b. Enter your date of birth.   c. Enter your home email address.   d. Click Submit, and follow the next screen’s instructions.  3. Create a Trunkbow ID. This will be your internetstorest login ID and cannot be changed, so think of one that is secure and easy to remember.  4. Create a Trunkbow password. You can change your password at any time.  5. Enter your Password Reset Question and Answer. This can be used at a later time if you forget your password.   6. Enter your e-mail address. This allows you to receive e-mail notifications when new information is available in Trunkbow.  7. Click Sign Up. You can now view your health information.    For assistance activating your Trunkbow account, call [phone]

## 2017-04-04 NOTE — PROGRESS NOTES
Anticoagulation Summary as of 4/4/2017     INR goal 2.0-3.0   Selected INR 2.0 (4/4/2017)   Maintenance plan 7.5 mg (5 mg x 1.5) on Tue, Thu, Sat; 5 mg (5 mg x 1) all other days   Weekly total 42.5 mg   Plan last modified Ilda Carson A.P.NDenzel (6/28/2016)   Next INR check 6/13/2017   Target end date Indefinite    Indications   Paroxysmal atrial fibrillation (CMS-[facility]) [I48.0]  Chronic anticoagulation [Z79.01]         Anticoagulation Episode Summary     INR check location [facility]    Preferred lab     Send INR reminders to     Comments       Anticoagulation Care Providers     Provider Role Specialty Phone number    Cheikh Turner M.D. Referring Cardiology [phone]    [facility] Responsible  [phone]        Anticoagulation Patient Findings      Cheikh Perez seen in clinic today  INR  therapeutic.    Denies signs/symptoms of bleeding and/or thrombosis.    Denies changes to diet or medications.   Follow up appointment in 7 week(s).    Continue weekly warfarin dose as noted    Myakel Begum, PHARMD

## 2017-04-07 LAB — INR BLD: 2 (ref 0.9–1.2)

## 2017-05-12 ENCOUNTER — OFFICE VISIT (OUTPATIENT)
Dept: CARDIOLOGY | Facility: MEDICAL CENTER | Age: 72
End: 2017-05-12
Payer: MEDICARE

## 2017-05-12 VITALS
OXYGEN SATURATION: 94 % | DIASTOLIC BLOOD PRESSURE: 80 MMHG | SYSTOLIC BLOOD PRESSURE: 130 MMHG | HEART RATE: 84 BPM | HEIGHT: 76 IN | BODY MASS INDEX: 29.47 KG/M2 | WEIGHT: 242 LBS

## 2017-05-12 DIAGNOSIS — E78.5 DYSLIPIDEMIA: ICD-10-CM

## 2017-05-12 DIAGNOSIS — I10 ESSENTIAL HYPERTENSION: ICD-10-CM

## 2017-05-12 DIAGNOSIS — Z79.01 CHRONIC ANTICOAGULATION: ICD-10-CM

## 2017-05-12 DIAGNOSIS — I48.0 PAROXYSMAL ATRIAL FIBRILLATION (HCC): ICD-10-CM

## 2017-05-12 PROCEDURE — 99214 OFFICE O/P EST MOD 30 MIN: CPT | Performed by: INTERNAL MEDICINE

## 2017-05-12 PROCEDURE — 3017F COLORECTAL CA SCREEN DOC REV: CPT | Performed by: INTERNAL MEDICINE

## 2017-05-12 PROCEDURE — 0518F FALL PLAN OF CARE DOCD: CPT | Mod: 8P | Performed by: INTERNAL MEDICINE

## 2017-05-12 PROCEDURE — 4040F PNEUMOC VAC/ADMIN/RCVD: CPT | Performed by: INTERNAL MEDICINE

## 2017-05-12 PROCEDURE — 1036F TOBACCO NON-USER: CPT | Performed by: INTERNAL MEDICINE

## 2017-05-12 PROCEDURE — G8417 CALC BMI ABV UP PARAM F/U: HCPCS | Performed by: INTERNAL MEDICINE

## 2017-05-12 PROCEDURE — 3288F FALL RISK ASSESSMENT DOCD: CPT | Mod: 8P | Performed by: INTERNAL MEDICINE

## 2017-05-12 PROCEDURE — G8432 DEP SCR NOT DOC, RNG: HCPCS | Performed by: INTERNAL MEDICINE

## 2017-05-12 PROCEDURE — 1100F PTFALLS ASSESS-DOCD GE2>/YR: CPT | Performed by: INTERNAL MEDICINE

## 2017-05-12 NOTE — MR AVS SNAPSHOT
"        Georgebj Michael Perez   2017 1:15 PM   Office Visit   MRN: [id number]    Department:  [facility]   Dept Phone:  [phone]    Description:  Male : 1945   Provider:  Cheikh Turner M.D.           Reason for Visit     Follow-Up           Allergies as of 2017     Allergen Noted Reactions    Nkda [No Known Drug Allergy] 2009         You were diagnosed with     Dyslipidemia   [912215]       Essential hypertension   [2455892]       Paroxysmal atrial fibrillation (CMS-HCC)   [510700]       Chronic anticoagulation   [706383]         Vital Signs     Blood Pressure Pulse Height Weight Body Mass Index Oxygen Saturation    130/80 mmHg 84 1.94 m (6' 4.38\") 109.77 kg (242 lb) 29.17 kg/m2 94%    Smoking Status                   Never Smoker            Basic Information     Date Of Birth Sex Race Ethnicity Preferred Language    1945 Male White Non- English      Your appointments     May 30, 2017 10:00 AM   Established Patient with [facility] EXAM 4   [facility]  (--)    [address] [place] [id number]   [phone]              Problem List              ICD-10-CM Priority Class Noted - Resolved    Paroxysmal atrial fibrillation (CMS-[facility]) I48.0 High  2013 - Present    Essential hypertension I10 High  2013 - Present    Chronic anticoagulation Z79.01 High  2013 - Present    SHANNAN (obstructive sleep apnea) G47.33   2016 - Present    Dyslipidemia E78.5   2016 - Present    Left foot drop M21.372   2016 - Present    Uncontrolled type 2 diabetes mellitus without complication, without long-term current use of insulin (CMS-[facility]) E11.65   3/13/2017 - Present    Risk for falls Z91.81   3/13/2017 - Present      Health Maintenance        Date Due Completion Dates    IMM DTaP/Tdap/Td Vaccine (1 - Tdap) 2009    IMM ZOSTER VACCINE 3/8/2019 (Originally 3/17/2005) ---    A1C SCREENING " 9/6/2017 3/6/2017, 2/27/2016, 1/21/2016, 10/16/2015, 10/6/2014, 4/4/2014, 12/5/2013, 6/6/2013, 10/31/2012, 10/12/2011    DIABETES MONOFILAMENT / LE EXAM 12/12/2017 12/12/2016    RETINAL SCREENING 12/19/2017 12/19/2016    FASTING LIPID PROFILE 3/6/2018 3/6/2017, 10/16/2015, 10/20/2014, 12/5/2013, 10/3/2013, 10/31/2012, 10/12/2011    URINE ACR / MICROALBUMIN 3/6/2018 3/6/2017, 10/16/2015, 10/20/2014, 12/5/2013, 10/31/2012    SERUM CREATININE 3/6/2018 3/6/2017, 2/25/2016, 2/23/2016, 10/16/2015, 10/20/2014, 12/18/2013, 12/5/2013, 8/8/2013, 7/10/2013, 6/6/2013, 1/24/2013, 10/31/2012, 10/12/2011, 11/23/2009, 6/6/2006, 12/2/2005    COLONOSCOPY 3/6/2024 3/6/2014 (Done)    Override on 3/6/2014: Done            Current Immunizations     13-VALENT PCV PREVNAR 2/26/2016 11:52 AM    Influenza TIV (IM) 10/6/2012    Influenza Vaccine Adult HD 12/12/2016, 10/1/2015    Pneumococcal Vaccine (UF)Historical Data 10/6/2012    Pneumococcal polysaccharide vaccine (PPSV-23) 10/1/2011    TD Vaccine 12/1/2009      Below and/or attached are the medications your provider expects you to take. Review all of your home medications and newly ordered medications with your provider and/or pharmacist. Follow medication instructions as directed by your provider and/or pharmacist. Please keep your medication list with you and share with your provider. Update the information when medications are discontinued, doses are changed, or new medications (including over-the-counter products) are added; and carry medication information at all times in the event of emergency situations     Allergies:  NKDA - (reactions not documented)               Medications  Valid as of: May 12, 2017 -  2:17 PM    Generic Name Brand Name Tablet Size Instructions for use    Alpha-Lipoic Acid   Take 200 mg by mouth 2 Times a Day.        Cholecalciferol   Take  by mouth.        Enalapril Maleate (Tab) VASOTEC 10 MG Take 10 mg by mouth every day.        Lecithin (Cap) Lecithin 1200  MG Take 1 Cap by mouth 2 Times a Day.        Lovastatin (Tab) MEVACOR 10 MG Take 1 Tab by mouth every day.        MetFORMIN HCl (Tab) GLUCOPHAGE 1000 MG Take 1 Tab by mouth 2 times a day, with meals.        Metoprolol Tartrate (Tab) LOPRESSOR 25 MG TAKE ONE TABLET BY MOUTH TWICE DAILY        Warfarin Sodium (Tab) COUMADIN 5 MG Take 1 Tab by mouth every day.        .                 Medicines prescribed today were sent to:     [id number] - [place], [place] - [address] [address]    Phone: [phone] Fax: [phone]    Open 24 Hours?: No      Medication refill instructions:       If your prescription bottle indicates you have medication refills left, it is not necessary to call your provider’s office. Please contact your pharmacy and they will refill your medication.    If your prescription bottle indicates you do not have any refills left, you may request refills at any time through one of the following ways: The online Clickable system (except Urgent Care), by calling your provider’s office, or by asking your pharmacy to contact your provider’s office with a refill request. Medication refills are processed only during regular business hours and may not be available until the next business day. Your provider may request additional information or to have a follow-up visit with you prior to refilling your medication.   *Please Note: Medication refills are assigned a new Rx number when refilled electronically. Your pharmacy may indicate that no refills were authorized even though a new prescription for the same medication is available at the pharmacy. Please request the medicine by name with the pharmacy before contacting your provider for a refill.           Clickable Access Code: Activation code not generated  Current Clickable Status: Active

## 2017-05-13 ENCOUNTER — OFFICE VISIT (OUTPATIENT)
Dept: URGENT CARE | Facility: CLINIC | Age: 72
End: 2017-05-13
Payer: MEDICARE

## 2017-05-13 ENCOUNTER — APPOINTMENT (OUTPATIENT)
Dept: RADIOLOGY | Facility: IMAGING CENTER | Age: 72
End: 2017-05-13
Attending: NURSE PRACTITIONER
Payer: MEDICARE

## 2017-05-13 VITALS
RESPIRATION RATE: 16 BRPM | DIASTOLIC BLOOD PRESSURE: 68 MMHG | HEIGHT: 76 IN | OXYGEN SATURATION: 97 % | TEMPERATURE: 97.6 F | BODY MASS INDEX: 29.47 KG/M2 | WEIGHT: 242 LBS | HEART RATE: 78 BPM | SYSTOLIC BLOOD PRESSURE: 126 MMHG
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
## 2017-05-13 DIAGNOSIS — S46.911A STRAIN OF SHOULDER, RIGHT, INITIAL ENCOUNTER: ICD-10-CM

## 2017-05-13 PROCEDURE — 3288F FALL RISK ASSESSMENT DOCD: CPT | Mod: 8P | Performed by: NURSE PRACTITIONER

## 2017-05-13 PROCEDURE — 0518F FALL PLAN OF CARE DOCD: CPT | Mod: 8P | Performed by: NURSE PRACTITIONER

## 2017-05-13 PROCEDURE — G8432 DEP SCR NOT DOC, RNG: HCPCS | Performed by: NURSE PRACTITIONER

## 2017-05-13 PROCEDURE — 1036F TOBACCO NON-USER: CPT | Performed by: NURSE PRACTITIONER

## 2017-05-13 PROCEDURE — 3017F COLORECTAL CA SCREEN DOC REV: CPT | Performed by: NURSE PRACTITIONER

## 2017-05-13 PROCEDURE — 99214 OFFICE O/P EST MOD 30 MIN: CPT | Performed by: NURSE PRACTITIONER

## 2017-05-13 PROCEDURE — 1100F PTFALLS ASSESS-DOCD GE2>/YR: CPT | Performed by: NURSE PRACTITIONER

## 2017-05-13 PROCEDURE — G8417 CALC BMI ABV UP PARAM F/U: HCPCS | Performed by: NURSE PRACTITIONER

## 2017-05-13 PROCEDURE — 73030 X-RAY EXAM OF SHOULDER: CPT | Mod: TC,RT | Performed by: NURSE PRACTITIONER

## 2017-05-13 PROCEDURE — 4040F PNEUMOC VAC/ADMIN/RCVD: CPT | Performed by: NURSE PRACTITIONER

## 2017-05-13 ASSESSMENT — ENCOUNTER SYMPTOMS: TINGLING: 0

## 2017-05-13 NOTE — PROGRESS NOTES
"Subjective:      Cheikh Perez is a 72 y.o. male who presents with Shoulder Pain      Denies past medical, surgical or family history that is significant to today's problem.   RX or OTC medications reviewed with patient today.   Allergies   Allergen Reactions   • Nkda [No Known Drug Allergy]              Shoulder Pain  This is a new problem. The current episode started today (today s/p fall into the wall when he stumbled for a mechanical fall. He felt pain when he tried to push himself up. His legs are weak and he uses a walker. Wears a brace on his left foot for foot drop. ). The problem occurs intermittently. The problem has been unchanged. Associated symptoms comments: Pain with movement. He has tried nothing for the symptoms.       Review of Systems   Neurological: Negative for tingling.          Objective:     /68 mmHg  Pulse 78  Temp(Src) 36.4 °C (97.6 °F)  Resp 16  Ht 1.93 m (6' 3.98\")  Wt 109.77 kg (242 lb)  BMI 29.47 kg/m2  SpO2 97%     Physical Exam   Constitutional: He appears well-developed and well-nourished.   Cardiovascular: Normal rate and regular rhythm.    Pulmonary/Chest: Effort normal. No respiratory distress.   Musculoskeletal:        Right shoulder: He exhibits decreased range of motion, tenderness, bony tenderness and decreased strength. He exhibits no swelling, no effusion, no crepitus, no deformity, no laceration, no spasm and normal pulse.        Arms:  Neurological: He is alert. He has normal reflexes. No cranial nerve deficit. Coordination normal.   Skin: Skin is warm and dry.   Psychiatric: He has a normal mood and affect. His speech is normal.   Nursing note and vitals reviewed.        xray:      5/13/2017 1:45 PM    HISTORY/REASON FOR EXAM:  Right shoulder pain. Ground-level fall.    TECHNIQUE/EXAM DESCRIPTION AND NUMBER OF VIEWS:  3 views of the RIGHT shoulder.    COMPARISON: None    FINDINGS:  Bone mineralization is normal.  There is no evidence of " fracture or dislocation.  There is mild degenerative change of the glenohumeral and acromioclavicular joints characterized by small osteophytes.  Soft tissues are normal.         Impression        Mild degenerative change. No evidence of fracture.         Reading Provider Reading Date     Daniel Slater M.D. May 13, 2017          Assessment/Plan:     1. Fall against object  DX-SHOULDER 2+ RIGHT    REFERRAL TO ORTHOPEDICS   2. Strain of shoulder, right, initial encounter  REFERRAL TO ORTHOPEDICS     OTC acetaminophen for breakthrough pain. Dosage and directions per . Do not exceed 3000 mg in 24 hours.   Sling for shoulder - use prn - try to rest shoulder as much as possible keeping elbow close to body  Fu with orthopedic.

## 2017-05-13 NOTE — MR AVS SNAPSHOT
"        Cheikh Perez   2017 1:30 PM   Office Visit   MRN: [id number]    Department:  [facility]   Dept Phone:  [phone]    Description:  Male : 1945   Provider:  MARTI SaucedoPLUCAS           Reason for Visit     Shoulder Pain right shoulder pain s/p GLF, did not fall onto shoulder though, just felt pain getting up from falling on knees x 2 hrs ago      Allergies as of 2017     Allergen Noted Reactions    Nkda [No Known Drug Allergy] 2009         You were diagnosed with     Fall against object   [293663]       Strain of shoulder, right, initial encounter   [198055]         Vital Signs     Blood Pressure Pulse Temperature Respirations Height Weight    126/68 mmHg 78 36.4 °C (97.6 °F) 16 1.93 m (6' 3.98\") 109.77 kg (242 lb)    Body Mass Index Oxygen Saturation Smoking Status             29.47 kg/m2 97% Never Smoker          Basic Information     Date Of Birth Sex Race Ethnicity Preferred Language    1945 Male White Non- English      Your appointments     May 30, 2017 10:00 AM   Established Patient with [facility] EXAM 4   [facility]  (--)    [address] [id number]   [phone]              Problem List              ICD-10-CM Priority Class Noted - Resolved    Paroxysmal atrial fibrillation (CMS-HCC) I48.0 High  2013 - Present    Essential hypertension I10 High  2013 - Present    Chronic anticoagulation Z79.01 High  2013 - Present    SHANNAN (obstructive sleep apnea) G47.33   2016 - Present    Dyslipidemia E78.5   2016 - Present    Left foot drop M21.372   2016 - Present    Uncontrolled type 2 diabetes mellitus without complication, without long-term current use of insulin (CMS-HCC) E11.65   3/13/2017 - Present    Risk for falls Z91.81   3/13/2017 - Present      Health Maintenance        Date Due Completion Dates    IMM DTaP/Tdap/Td Vaccine (1 - " Tdap) 12/2/2009 12/1/2009    IMM ZOSTER VACCINE 3/8/2019 (Originally 3/17/2005) ---    A1C SCREENING 9/6/2017 3/6/2017, 2/27/2016, 1/21/2016, 10/16/2015, 10/6/2014, 4/4/2014, 12/5/2013, 6/6/2013, 10/31/2012, 10/12/2011    DIABETES MONOFILAMENT / LE EXAM 12/12/2017 12/12/2016    RETINAL SCREENING 12/19/2017 12/19/2016    FASTING LIPID PROFILE 3/6/2018 3/6/2017, 10/16/2015, 10/20/2014, 12/5/2013, 10/3/2013, 10/31/2012, 10/12/2011    URINE ACR / MICROALBUMIN 3/6/2018 3/6/2017, 10/16/2015, 10/20/2014, 12/5/2013, 10/31/2012    SERUM CREATININE 3/6/2018 3/6/2017, 2/25/2016, 2/23/2016, 10/16/2015, 10/20/2014, 12/18/2013, 12/5/2013, 8/8/2013, 7/10/2013, 6/6/2013, 1/24/2013, 10/31/2012, 10/12/2011, 11/23/2009, 6/6/2006, 12/2/2005    COLONOSCOPY 3/6/2024 3/6/2014 (Done)    Override on 3/6/2014: Done            Current Immunizations     13-VALENT PCV PREVNAR 2/26/2016 11:52 AM    Influenza TIV (IM) 10/6/2012    Influenza Vaccine Adult HD 12/12/2016, 10/1/2015    Pneumococcal Vaccine (UF)Historical Data 10/6/2012    Pneumococcal polysaccharide vaccine (PPSV-23) 10/1/2011    TD Vaccine 12/1/2009      Below and/or attached are the medications your provider expects you to take. Review all of your home medications and newly ordered medications with your provider and/or pharmacist. Follow medication instructions as directed by your provider and/or pharmacist. Please keep your medication list with you and share with your provider. Update the information when medications are discontinued, doses are changed, or new medications (including over-the-counter products) are added; and carry medication information at all times in the event of emergency situations     Allergies:  NKDA - (reactions not documented)               Medications  Valid as of: May 13, 2017 -  2:44 PM    Generic Name Brand Name Tablet Size Instructions for use    Alpha-Lipoic Acid   Take 200 mg by mouth 2 Times a Day.        Cholecalciferol   Take  by mouth.         Enalapril Maleate (Tab) VASOTEC 10 MG Take 10 mg by mouth every day.        Lecithin (Cap) Lecithin 1200 MG Take 1 Cap by mouth 2 Times a Day.        Lovastatin (Tab) MEVACOR 10 MG Take 1 Tab by mouth every day.        MetFORMIN HCl (Tab) GLUCOPHAGE 1000 MG Take 1 Tab by mouth 2 times a day, with meals.        Metoprolol Tartrate (Tab) LOPRESSOR 25 MG TAKE ONE TABLET BY MOUTH TWICE DAILY        Warfarin Sodium (Tab) COUMADIN 5 MG Take 1 Tab by mouth every day.        .                 Medicines prescribed today were sent to:     [id number] - [place], [place] - [address] [place] [address]    Phone: [phone] Fax: [phone]    Open 24 Hours?: No      Medication refill instructions:       If your prescription bottle indicates you have medication refills left, it is not necessary to call your provider’s office. Please contact your pharmacy and they will refill your medication.    If your prescription bottle indicates you do not have any refills left, you may request refills at any time through one of the following ways: The online BoostUp system (except Urgent Care), by calling your provider’s office, or by asking your pharmacy to contact your provider’s office with a refill request. Medication refills are processed only during regular business hours and may not be available until the next business day. Your provider may request additional information or to have a follow-up visit with you prior to refilling your medication.   *Please Note: Medication refills are assigned a new Rx number when refilled electronically. Your pharmacy may indicate that no refills were authorized even though a new prescription for the same medication is available at the pharmacy. Please request the medicine by name with the pharmacy before contacting your provider for a refill.        Your To Do List     Future Labs/Procedures Complete By Expires    DX-SHOULDER 2+ RIGHT  As directed 5/13/2018         Referral     A referral request has been sent to our patient care coordination department. Please allow 3-5 business days for us to process this request and contact you either by phone or mail. If you do not hear from us by the 5th business day, please call us at [phone].           Blue Perch Access Code: Activation code not generated  Current Blue Perch Status: Active

## 2017-05-18 ASSESSMENT — ENCOUNTER SYMPTOMS
COUGH: 0
FOCAL WEAKNESS: 0
PALPITATIONS: 0
ABDOMINAL PAIN: 0
CLAUDICATION: 0
FALLS: 0
FEVER: 0
PND: 0
BLURRED VISION: 0
WEAKNESS: 0
SORE THROAT: 0
DIZZINESS: 0
LOSS OF CONSCIOUSNESS: 0
BRUISES/BLEEDS EASILY: 0
CHILLS: 0
HEADACHES: 0
SHORTNESS OF BREATH: 0
NAUSEA: 0

## 2017-05-18 NOTE — PROGRESS NOTES
"Subjective:   Cheikh Perez is a 72 y.o. male who presents today for follow-up of his history of paroxysmal atrial fibrillation history of cardioversion sleep apnea on CPAP    Unstable, we also stable    Tolerating anticoagulation well      Past Medical History   Diagnosis Date   • Arthritis    • Sleep apnea      CPAP   • Snoring    • Diabetes      Diet,oral meds   • Hypertension    • Arrhythmia July 2013     AFIB, cardioversion   • Pain       \"stiff\" from arthritis, 2/10   • Hypertension    • Paroxysmal atrial fibrillation (CMS-HCC) 6/6/2013     Past Surgical History   Procedure Laterality Date   • Lumbar laminectomy diskectomy  7/2006     L1-L5/ residual left foot weaknesss    • Acl repair  12/2005     Right   • Knee arthroplasty total  3/2009     Left   • Knee arthroplasty total  12/1/2009     Performed by GIACOMO PETTIT at SURGERY [facility]   • Recovery  7/11/2013     Performed by Cath-Recovery Surgery at SURGERY SAME DAY [facility]   • Other       Surgery for kidney stones   • Cystoscopy stent placement  1/13/2014     Performed by Main Batista M.D. at SURGERY [facility]   • Ureteroscopy  1/13/2014     Performed by Main Batista M.D. at SURGERY [facility]   • Lasertripsy  1/13/2014     Performed by Main Batista M.D. at SURGERY [facility]   • Femur orif Left 2/24/2016     Procedure: DISTAL FEMUR ORIF;  Surgeon: Montrell Eddy M.D.;  Location: SURGERY [facility];  Service:    • Femur orif       Family History   Problem Relation Age of Onset   • Heart Attack Father    • Arrythmia Brother    • Other Mother 92     :natural causes\"     History   Smoking status   • Never Smoker    Smokeless tobacco   • Never Used     Allergies   Allergen Reactions   • Nkda [No Known Drug Allergy]      Outpatient Encounter Prescriptions as of 5/12/2017   Medication Sig Dispense Refill   • metformin (GLUCOPHAGE) 1000 MG tablet Take 1 Tab by mouth 2 times a day, with meals. 60 Tab " "11   • lovastatin (MEVACOR) 10 MG tablet Take 1 Tab by mouth every day. 30 Tab 11   • metoprolol (LOPRESSOR) 25 MG Tab TAKE ONE TABLET BY MOUTH TWICE DAILY 180 Tab 2   • VITAMIN D, CHOLECALCIFEROL, PO Take  by mouth.     • warfarin (COUMADIN) 5 MG Tab Take 1 Tab by mouth every day. 100 Tab 3   • enalapril (VASOTEC) 10 MG TABS Take 10 mg by mouth every day.     • ALPHA LIPOIC ACID PO Take 200 mg by mouth 2 Times a Day.     • Lecithin 1200 MG CAPS Take 1 Cap by mouth 2 Times a Day.       No facility-administered encounter medications on file as of 5/12/2017.     Review of Systems   Constitutional: Negative for fever and chills.   HENT: Negative for sore throat.    Eyes: Negative for blurred vision.   Respiratory: Negative for cough and shortness of breath.    Cardiovascular: Negative for chest pain, palpitations, claudication, leg swelling and PND.   Gastrointestinal: Negative for nausea and abdominal pain.   Musculoskeletal: Negative for falls.   Skin: Negative for rash.   Neurological: Negative for dizziness, focal weakness, loss of consciousness, weakness and headaches.   Endo/Heme/Allergies: Does not bruise/bleed easily.        Objective:   /80 mmHg  Pulse 84  Ht 1.94 m (6' 4.38\")  Wt 109.77 kg (242 lb)  BMI 29.17 kg/m2  SpO2 94%    Physical Exam   Constitutional: No distress.   HENT:   Mouth/Throat: Oropharynx is clear and moist.   Eyes: No scleral icterus.   Neck: Neck supple. No JVD present.   Cardiovascular: Normal rate, normal heart sounds and intact distal pulses.  An irregularly irregular rhythm present. Exam reveals no gallop and no friction rub.    No murmur heard.  Pulmonary/Chest: Effort normal. He has no rales.   Abdominal: Soft. Bowel sounds are normal. There is no tenderness.   Musculoskeletal: He exhibits no edema.   Neurological: He is alert.   Skin: No rash noted. He is not diaphoretic.   Psychiatric: He has a normal mood and affect.       Assessment:     1. Dyslipidemia     2. " Essential hypertension     3. Paroxysmal atrial fibrillation (CMS-HCC)     4. Chronic anticoagulation         Medical Decision Making:  Today's Assessment / Status / Plan:     It was my pleasure to meet with Mr. Perez.    He is doing well with chronic atrial fibrillation weight overall has been stable    I will see Mr. Perez back in 1 year time and encouraged him to follow up with us over the phone or e-mail using my MyChart as issues arise.    It is my pleasure to participate in the care of Mr. ePrez.  Please do not hesitate to contact me with questions or concerns.    Cheikh Turner MD PhD FACC  Cardiologist [facility]

## 2017-05-30 ENCOUNTER — ANTICOAGULATION VISIT (OUTPATIENT)
Dept: VASCULAR LAB | Facility: MEDICAL CENTER | Age: 72
End: 2017-05-30
Attending: INTERNAL MEDICINE
Payer: MEDICARE

## 2017-05-30 DIAGNOSIS — I48.0 PAROXYSMAL ATRIAL FIBRILLATION (HCC): ICD-10-CM

## 2017-05-30 DIAGNOSIS — Z79.01 CHRONIC ANTICOAGULATION: ICD-10-CM

## 2017-05-30 LAB — INR PPP: 2 (ref 2–3.5)

## 2017-05-30 PROCEDURE — 85610 PROTHROMBIN TIME: CPT

## 2017-05-30 PROCEDURE — 99211 OFF/OP EST MAY X REQ PHY/QHP: CPT

## 2017-05-30 NOTE — MR AVS SNAPSHOT
Cheikh Wallsisiah   2017 2:30 PM   Anticoagulation Visit   MRN: [id number]    Department:  Vascular Medicine   Dept Phone:  [phone]    Description:  Male : 1945   Provider:  [facility] EXAM 5           Allergies as of 2017     Allergen Noted Reactions    Nkda [No Known Drug Allergy] 2009         You were diagnosed with     Chronic anticoagulation   [425655]       Paroxysmal atrial fibrillation (CMS-HCC)   [973123]         Vital Signs     Smoking Status                   Never Smoker            Basic Information     Date Of Birth Sex Race Ethnicity Preferred Language    1945 Male White Non- English      Your appointments     May 30, 2017  2:30 PM   Established Patient with [facility] EXAM 5   [facility]  (--)    [address] [place] [address]   [phone]            Aug 01, 2017 10:30 AM   Established Patient with [facility] EXAM 5   [facility]  (--)    [address] [place] [address]   [phone]              Problem List              ICD-10-CM Priority Class Noted - Resolved    Paroxysmal atrial fibrillation (CMS-HCC) I48.0 High  2013 - Present    Essential hypertension I10 High  2013 - Present    Chronic anticoagulation Z79.01 High  2013 - Present    SHANNAN (obstructive sleep apnea) G47.33   2016 - Present    Dyslipidemia E78.5   2016 - Present    Left foot drop M21.372   2016 - Present    Uncontrolled type 2 diabetes mellitus without complication, without long-term current use of insulin (CMS-HCC) E11.65   3/13/2017 - Present    Risk for falls Z91.81   3/13/2017 - Present      Health Maintenance        Date Due Completion Dates    IMM DTaP/Tdap/Td Vaccine (1 - Tdap) 2009    IMM ZOSTER VACCINE 3/8/2019 (Originally 3/17/2005) ---    A1C SCREENING 2017 3/6/2017, 2016, 2016, 10/16/2015, 10/6/2014,  4/4/2014, 12/5/2013, 6/6/2013, 10/31/2012, 10/12/2011    DIABETES MONOFILAMENT / LE EXAM 12/12/2017 12/12/2016    RETINAL SCREENING 12/19/2017 12/19/2016    FASTING LIPID PROFILE 3/6/2018 3/6/2017, 10/16/2015, 10/20/2014, 12/5/2013, 10/3/2013, 10/31/2012, 10/12/2011    URINE ACR / MICROALBUMIN 3/6/2018 3/6/2017, 10/16/2015, 10/20/2014, 12/5/2013, 10/31/2012    SERUM CREATININE 3/6/2018 3/6/2017, 2/25/2016, 2/23/2016, 10/16/2015, 10/20/2014, 12/18/2013, 12/5/2013, 8/8/2013, 7/10/2013, 6/6/2013, 1/24/2013, 10/31/2012, 10/12/2011, 11/23/2009, 6/6/2006, 12/2/2005    COLONOSCOPY 3/6/2024 3/6/2014 (Done)    Override on 3/6/2014: Done            Results     POCT Protime      Component    INR    2.0                        Current Immunizations     13-VALENT PCV PREVNAR 2/26/2016 11:52 AM    Influenza TIV (IM) 10/6/2012    Influenza Vaccine Adult HD 12/12/2016, 10/1/2015    Pneumococcal Vaccine (UF)Historical Data 10/6/2012    Pneumococcal polysaccharide vaccine (PPSV-23) 10/1/2011    TD Vaccine 12/1/2009      Below and/or attached are the medications your provider expects you to take. Review all of your home medications and newly ordered medications with your provider and/or pharmacist. Follow medication instructions as directed by your provider and/or pharmacist. Please keep your medication list with you and share with your provider. Update the information when medications are discontinued, doses are changed, or new medications (including over-the-counter products) are added; and carry medication information at all times in the event of emergency situations     Allergies:  NKDA - (reactions not documented)               Medications  Valid as of: May 30, 2017 -  2:25 PM    Generic Name Brand Name Tablet Size Instructions for use    Alpha-Lipoic Acid   Take 200 mg by mouth 2 Times a Day.        Cholecalciferol   Take  by mouth.        Enalapril Maleate (Tab) VASOTEC 10 MG Take 10 mg by mouth every day.        Lecithin (Cap)  Lecithin 1200 MG Take 1 Cap by mouth 2 Times a Day.        Lovastatin (Tab) MEVACOR 10 MG Take 1 Tab by mouth every day.        MetFORMIN HCl (Tab) GLUCOPHAGE 1000 MG Take 1 Tab by mouth 2 times a day, with meals.        Metoprolol Tartrate (Tab) LOPRESSOR 25 MG TAKE ONE TABLET BY MOUTH TWICE DAILY        Warfarin Sodium (Tab) COUMADIN 5 MG Take 1 Tab by mouth every day.        .                 Medicines prescribed today were sent to:     [id number] - [place], [place] - [address] [place] [address]    Phone: [phone] Fax: [phone]    Open 24 Hours?: No      Medication refill instructions:       If your prescription bottle indicates you have medication refills left, it is not necessary to call your provider’s office. Please contact your pharmacy and they will refill your medication.    If your prescription bottle indicates you do not have any refills left, you may request refills at any time through one of the following ways: The online [employer] system (except Urgent Care), by calling your provider’s office, or by asking your pharmacy to contact your provider’s office with a refill request. Medication refills are processed only during regular business hours and may not be available until the next business day. Your provider may request additional information or to have a follow-up visit with you prior to refilling your medication.   *Please Note: Medication refills are assigned a new Rx number when refilled electronically. Your pharmacy may indicate that no refills were authorized even though a new prescription for the same medication is available at the pharmacy. Please request the medicine by name with the pharmacy before contacting your provider for a refill.        Warfarin Dosing Calendar   May 2017 Details    Sun Mon Tue Wed Thu Fri Sat      1               2               3               4               5               6                 7               8               9                10               11               12               13                 14               15               16               17               18               19               20                 21               22               23               24               25               26               27                 28               29               30   2.0   7.5 mg   See details      31      5 mg             Date Details   05/30 This INR check   INR: 2.0               How to take your warfarin dose     To take:  5 mg Take 1 of the 5 mg tablets.    To take:  7.5 mg Take 1.5 of the 5 mg tablets.           Warfarin Dosing Calendar   June 2017 Details    Sun Mon Tue Wed Thu Fri Sat         1      7.5 mg         2      5 mg         3      7.5 mg           4      5 mg         5      5 mg         6      7.5 mg         7      5 mg         8      7.5 mg         9      5 mg         10      7.5 mg           11      5 mg         12      5 mg         13      7.5 mg         14      5 mg         15      7.5 mg         16      5 mg         17      7.5 mg           18      5 mg         19      5 mg         20      7.5 mg         21      5 mg         22      7.5 mg         23      5 mg         24      7.5 mg           25      5 mg         26      5 mg         27      7.5 mg         28      5 mg         29      7.5 mg         30      5 mg           Date Details   No additional details            How to take your warfarin dose     To take:  5 mg Take 1 of the 5 mg tablets.    To take:  7.5 mg Take 1.5 of the 5 mg tablets.           Warfarin Dosing Calendar   July 2017 Details    Sun Mon Tue Wed Thu Fri Sat           1      7.5 mg           2      5 mg         3      5 mg         4      7.5 mg         5      5 mg         6      7.5 mg         7      5 mg         8      7.5 mg           9      5 mg         10      5 mg         11      7.5 mg         12      5 mg         13      7.5 mg         14      5 mg         15      7.5 mg            16      5 mg         17      5 mg         18      7.5 mg         19      5 mg         20      7.5 mg         21      5 mg         22      7.5 mg           23      5 mg         24      5 mg         25      7.5 mg         26      5 mg         27      7.5 mg         28      5 mg         29      7.5 mg           30      5 mg         31      5 mg               Date Details   No additional details            How to take your warfarin dose     To take:  5 mg Take 1 of the 5 mg tablets.    To take:  7.5 mg Take 1.5 of the 5 mg tablets.           Warfarin Dosing Calendar   August 2017 Details    Sun Mon Tue Wed Thu Fri Sat       1      7.5 mg         2               3               4               5                 6               7               8               9               10               11               12                 13               14               15               16               17               18               19                 20               21               22               23               24               25               26                 27               28               29               30               31                  Date Details   No additional details    Date of next INR:  8/1/2017         How to take your warfarin dose     To take:  7.5 mg Take 1.5 of the 5 mg tablets.              PopJam Access Code: Activation code not generated  Current PopJam Status: Active

## 2017-05-30 NOTE — PROGRESS NOTES
Anticoagulation Summary as of 5/30/2017     INR goal 2.0-3.0   Selected INR 2.0 (5/30/2017)   Maintenance plan 7.5 mg (5 mg x 1.5) on Tue, Thu, Sat; 5 mg (5 mg x 1) all other days   Weekly total 42.5 mg   Plan last modified MARTI [email or web address] (6/28/2016)   Next INR check 8/8/2017   Target end date Indefinite    Indications   Paroxysmal atrial fibrillation (CMS-[facility]) [I48.0]  Chronic anticoagulation [Z79.01]         Anticoagulation Episode Summary     INR check location [facility]    Preferred lab     Send INR reminders to     Comments       Anticoagulation Care Providers     Provider Role Specialty Phone number    Cheikh Turner M.D. Referring Cardiology [phone]    [facility] Responsible  [phone]        Anticoagulation Patient Findings   Negatives Missed Doses, Extra Doses, Medication Changes, Antibiotic Use, Diet Changes, Dental/Other Procedures, Hospitalization, Bleeding Gums, Nose Bleeds, Blood in Urine, Blood in Stool, Any Bruising, Other Complaints          Current Outpatient Prescriptions on File Prior to Visit   Medication Sig Dispense Refill   • metformin (GLUCOPHAGE) 1000 MG tablet Take 1 Tab by mouth 2 times a day, with meals. 60 Tab 11   • lovastatin (MEVACOR) 10 MG tablet Take 1 Tab by mouth every day. 30 Tab 11   • metoprolol (LOPRESSOR) 25 MG Tab TAKE ONE TABLET BY MOUTH TWICE DAILY 180 Tab 2   • VITAMIN D, CHOLECALCIFEROL, PO Take  by mouth.     • warfarin (COUMADIN) 5 MG Tab Take 1 Tab by mouth every day. 100 Tab 3   • enalapril (VASOTEC) 10 MG TABS Take 10 mg by mouth every day.     • ALPHA LIPOIC ACID PO Take 200 mg by mouth 2 Times a Day.     • Lecithin 1200 MG CAPS Take 1 Cap by mouth 2 Times a Day.       No current facility-administered medications on file prior to visit.       Lab Results   Component Value Date/Time    SODIUM 138 03/06/2017 07:06 AM    POTASSIUM 3.9 03/06/2017 07:06 AM    CHLORIDE 103 03/06/2017 07:06 AM    CO2 27 03/06/2017  07:06 AM    GLUCOSE 169* 03/06/2017 07:06 AM    BUN 16 03/06/2017 07:06 AM    CREATININE 0.78 03/06/2017 07:06 AM          Cheikh Perez seen in clinic today  INR  therapeutic.    Denies signs/symptoms of bleeding and/or thrombosis.    Denies changes to diet or medications.   Follow up appointment in 9 week(s).    Continue weekly warfarin dose as noted     Maykel Begum, PHARMD

## 2017-05-31 LAB — INR BLD: 2 (ref 0.9–1.2)

## 2017-07-03 DIAGNOSIS — I48.0 PAROXYSMAL ATRIAL FIBRILLATION (HCC): ICD-10-CM

## 2017-07-03 RX ORDER — WARFARIN SODIUM 5 MG/1
TABLET ORAL
Qty: 135 TAB | Refills: 1 | Status: SHIPPED | OUTPATIENT
Start: 2017-07-03 | End: 2018-02-09 | Stop reason: SDUPTHER

## 2017-07-06 ENCOUNTER — HOSPITAL ENCOUNTER (OUTPATIENT)
Dept: LAB | Facility: MEDICAL CENTER | Age: 72
End: 2017-07-06
Attending: NURSE PRACTITIONER
Payer: MEDICARE

## 2017-07-06 DIAGNOSIS — E78.5 DYSLIPIDEMIA: ICD-10-CM

## 2017-07-06 LAB
CHOLEST SERPL-MCNC: 139 MG/DL (ref 100–199)
EST. AVERAGE GLUCOSE BLD GHB EST-MCNC: 180 MG/DL
HBA1C MFR BLD: 7.9 % (ref 0–5.6)
HDLC SERPL-MCNC: 44 MG/DL
LDLC SERPL CALC-MCNC: 77 MG/DL
TRIGL SERPL-MCNC: 91 MG/DL (ref 0–149)

## 2017-07-06 PROCEDURE — 83036 HEMOGLOBIN GLYCOSYLATED A1C: CPT

## 2017-07-06 PROCEDURE — 80061 LIPID PANEL: CPT

## 2017-07-06 PROCEDURE — 36415 COLL VENOUS BLD VENIPUNCTURE: CPT

## 2017-07-28 DIAGNOSIS — I48.0 PAROXYSMAL ATRIAL FIBRILLATION (HCC): ICD-10-CM

## 2017-08-01 ENCOUNTER — ANTICOAGULATION VISIT (OUTPATIENT)
Dept: VASCULAR LAB | Facility: MEDICAL CENTER | Age: 72
End: 2017-08-01
Attending: INTERNAL MEDICINE
Payer: MEDICARE

## 2017-08-01 DIAGNOSIS — Z79.01 CHRONIC ANTICOAGULATION: ICD-10-CM

## 2017-08-01 DIAGNOSIS — I48.0 PAROXYSMAL ATRIAL FIBRILLATION (HCC): ICD-10-CM

## 2017-08-01 LAB — INR PPP: 3.2 (ref 2–3.5)

## 2017-08-01 PROCEDURE — 85610 PROTHROMBIN TIME: CPT

## 2017-08-01 PROCEDURE — 99212 OFFICE O/P EST SF 10 MIN: CPT

## 2017-08-01 NOTE — PROGRESS NOTES
Anticoagulation Summary as of 8/1/2017     INR goal 2.0-3.0   Selected INR 3.2! (8/1/2017)   Maintenance plan 7.5 mg (5 mg x 1.5) on Tue, Thu, Sat; 5 mg (5 mg x 1) all other days   Weekly total 42.5 mg   Plan last modified MARTI BrandonPLUCAS (6/28/2016)   Next INR check 10/3/2017   Target end date Indefinite    Indications   Paroxysmal atrial fibrillation (CMS-HCC) [I48.0]  Chronic anticoagulation [Z79.01]         Anticoagulation Episode Summary     INR check location [facility]    Preferred lab     Send INR reminders to     Comments       Anticoagulation Care Providers     Provider Role Specialty Phone number    Cheikh Turner M.D. Referring Cardiology [phone]    [facility] Responsible  [phone]        Anticoagulation Patient Findings      Current Outpatient Prescriptions on File Prior to Visit   Medication Sig Dispense Refill   • warfarin (COUMADIN) 5 MG Tab Take one to one and one-half (1-1.5) tablets daily as directed by [facility] 135 Tab 1   • metformin (GLUCOPHAGE) 1000 MG tablet Take 1 Tab by mouth 2 times a day, with meals. 60 Tab 11   • lovastatin (MEVACOR) 10 MG tablet Take 1 Tab by mouth every day. 30 Tab 11   • metoprolol (LOPRESSOR) 25 MG Tab TAKE ONE TABLET BY MOUTH TWICE DAILY 180 Tab 2   • VITAMIN D, CHOLECALCIFEROL, PO Take  by mouth.     • enalapril (VASOTEC) 10 MG TABS Take 10 mg by mouth every day.     • ALPHA LIPOIC ACID PO Take 200 mg by mouth 2 Times a Day.     • Lecithin 1200 MG CAPS Take 1 Cap by mouth 2 Times a Day.       No current facility-administered medications on file prior to visit.       Lab Results   Component Value Date/Time    SODIUM 138 03/06/2017 07:06 AM    POTASSIUM 3.9 03/06/2017 07:06 AM    CHLORIDE 103 03/06/2017 07:06 AM    CO2 27 03/06/2017 07:06 AM    GLUCOSE 169* 03/06/2017 07:06 AM    BUN 16 03/06/2017 07:06 AM    CREATININE 0.78 03/06/2017 07:06 AM        Cheikh Perez seen in  clinic today  INR  supra-therapeutic.    Denies signs/symptoms of bleeding and/or thrombosis.    Denies changes to diet or medications.   Follow up appointment in 8 week(s).      5mg today then continue weekly warfarin dose as noted       Maykel Begum, PHARMD

## 2017-08-01 NOTE — MR AVS SNAPSHOT
Cheikh Wallsisiah   2017 10:30 AM   Anticoagulation Visit   MRN: [id number]    Department:  Vascular Medicine   Dept Phone:  [phone]    Description:  Male : 1945   Provider:  [facility]AM 5           Allergies as of 2017     Allergen Noted Reactions    Nkda [No Known Drug Allergy] 2009         You were diagnosed with     Chronic anticoagulation   [482322]       Paroxysmal atrial fibrillation (CMS-HCC)   [155301]         Vital Signs     Smoking Status                   Never Smoker            Basic Information     Date Of Birth Sex Race Ethnicity Preferred Language    1945 Male White Non- English      Your appointments     Oct 03, 2017 10:30 AM   Established Patient with [facility] 5   [facility]  (--)    [address] [address] [address]   [phone]              Problem List              ICD-10-CM Priority Class Noted - Resolved    Paroxysmal atrial fibrillation (CMS-HCC) I48.0 High  2013 - Present    Essential hypertension I10 High  2013 - Present    Chronic anticoagulation Z79.01 High  2013 - Present    SHANNAN (obstructive sleep apnea) G47.33   2016 - Present    Dyslipidemia E78.5   2016 - Present    Left foot drop M21.372   2016 - Present    Uncontrolled type 2 diabetes mellitus without complication, without long-term current use of insulin (CMS-HCC) E11.65   3/13/2017 - Present    Risk for falls Z91.81   3/13/2017 - Present      Health Maintenance        Date Due Completion Dates    IMM DTaP/Tdap/Td Vaccine (1 - Tdap) 2009    IMM ZOSTER VACCINE 3/8/2019 (Originally 3/17/2005) ---    IMM INFLUENZA (1) 2016, 10/1/2015, 10/6/2012    DIABETES MONOFILAMENT / LE EXAM 2017    RETINAL SCREENING 2017    A1C SCREENING 2018, 3/6/2017, 2016, 2016, 10/16/2015, 10/6/2014, 2014, 2013,  6/6/2013, 10/31/2012, 10/12/2011    URINE ACR / MICROALBUMIN 3/6/2018 3/6/2017, 10/16/2015, 10/20/2014, 12/5/2013, 10/31/2012    SERUM CREATININE 3/6/2018 3/6/2017, 2/25/2016, 2/23/2016, 10/16/2015, 10/20/2014, 12/18/2013, 12/5/2013, 8/8/2013, 7/10/2013, 6/6/2013, 1/24/2013, 10/31/2012, 10/12/2011, 11/23/2009, 6/6/2006, 12/2/2005    FASTING LIPID PROFILE 7/6/2018 7/6/2017, 3/6/2017, 10/16/2015, 10/20/2014, 12/5/2013, 10/3/2013, 10/31/2012, 10/12/2011    COLONOSCOPY 3/6/2024 3/6/2014 (Done)    Override on 3/6/2014: Done            Results     POCT Protime      Component    INR    3.2                        Current Immunizations     13-VALENT PCV PREVNAR 2/26/2016 11:52 AM    Influenza TIV (IM) 10/6/2012    Influenza Vaccine Adult HD 12/12/2016, 10/1/2015    Pneumococcal Vaccine (UF)Historical Data 10/6/2012    Pneumococcal polysaccharide vaccine (PPSV-23) 10/1/2011    TD Vaccine 12/1/2009      Below and/or attached are the medications your provider expects you to take. Review all of your home medications and newly ordered medications with your provider and/or pharmacist. Follow medication instructions as directed by your provider and/or pharmacist. Please keep your medication list with you and share with your provider. Update the information when medications are discontinued, doses are changed, or new medications (including over-the-counter products) are added; and carry medication information at all times in the event of emergency situations     Allergies:  NKDA - (reactions not documented)               Medications  Valid as of: August 01, 2017 - 10:47 AM    Generic Name Brand Name Tablet Size Instructions for use    Alpha-Lipoic Acid   Take 200 mg by mouth 2 Times a Day.        Cholecalciferol   Take  by mouth.        Enalapril Maleate (Tab) VASOTEC 10 MG Take 10 mg by mouth every day.        Lecithin (Cap) Lecithin 1200 MG Take 1 Cap by mouth 2 Times a Day.        Lovastatin (Tab) MEVACOR 10 MG Take 1 Tab by  mouth every day.        MetFORMIN HCl (Tab) GLUCOPHAGE 1000 MG Take 1 Tab by mouth 2 times a day, with meals.        Metoprolol Tartrate (Tab) LOPRESSOR 25 MG TAKE ONE TABLET BY MOUTH TWICE DAILY        Warfarin Sodium (Tab) COUMADIN 5 MG Take one to one and one-half (1-1.5) tablets daily as directed by [facility]tico[facility]        .                 Medicines prescribed today were sent to:     [id number] - [place], [place] - [address] [place] [address]    Phone: [phone] Fax: [phone]    Open 24 Hours?: No      Medication refill instructions:       If your prescription bottle indicates you have medication refills left, it is not necessary to call your provider’s office. Please contact your pharmacy and they will refill your medication.    If your prescription bottle indicates you do not have any refills left, you may request refills at any time through one of the following ways: The online [employer] system (except Urgent Care), by calling your provider’s office, or by asking your pharmacy to contact your provider’s office with a refill request. Medication refills are processed only during regular business hours and may not be available until the next business day. Your provider may request additional information or to have a follow-up visit with you prior to refilling your medication.   *Please Note: Medication refills are assigned a new Rx number when refilled electronically. Your pharmacy may indicate that no refills were authorized even though a new prescription for the same medication is available at the pharmacy. Please request the medicine by name with the pharmacy before contacting your provider for a refill.        Warfarin Dosing Calendar   August 2017 Details    Sun Mon Tue Wed Thu Fri Sat       1   3.2   5 mg   See details      2      5 mg         3      7.5 mg         4      5 mg         5      7.5 mg           6      5 mg         7      5 mg         8       7.5 mg         9      5 mg         10      7.5 mg         11      5 mg         12      7.5 mg           13      5 mg         14      5 mg         15      7.5 mg         16      5 mg         17      7.5 mg         18      5 mg         19      7.5 mg           20      5 mg         21      5 mg         22      7.5 mg         23      5 mg         24      7.5 mg         25      5 mg         26      7.5 mg           27      5 mg         28      5 mg         29      7.5 mg         30      5 mg         31      7.5 mg            Date Details   08/01 This INR check   INR: 3.2               How to take your warfarin dose     To take:  5 mg Take 1 of the 5 mg tablets.    To take:  7.5 mg Take 1.5 of the 5 mg tablets.           Warfarin Dosing Calendar   September 2017 Details    Sun Mon Tue Wed Thu Fri Sat          1      5 mg         2      7.5 mg           3      5 mg         4      5 mg         5      7.5 mg         6      5 mg         7      7.5 mg         8      5 mg         9      7.5 mg           10      5 mg         11      5 mg         12      7.5 mg         13      5 mg         14      7.5 mg         15      5 mg         16      7.5 mg           17      5 mg         18      5 mg         19      7.5 mg         20      5 mg         21      7.5 mg         22      5 mg         23      7.5 mg           24      5 mg         25      5 mg         26      7.5 mg         27      5 mg         28      7.5 mg         29      5 mg         30      7.5 mg          Date Details   No additional details            How to take your warfarin dose     To take:  5 mg Take 1 of the 5 mg tablets.    To take:  7.5 mg Take 1.5 of the 5 mg tablets.           Warfarin Dosing Calendar   October 2017 Details    Sun Mon Tue Wed Thu Fri Sat     1      5 mg         2      5 mg         3      7.5 mg         4               5               6               7                 8               9               10               11               12                  13               14                 15               16               17               18               19               20               21                 22               23               24               25               26               27               28                 29               30               31                    Date Details   No additional details    Date of next INR:  10/3/2017         How to take your warfarin dose     To take:  5 mg Take 1 of the 5 mg tablets.    To take:  7.5 mg Take 1.5 of the 5 mg tablets.              [employer] Access Code: Activation code not generated  Current [employer] Status: Active

## 2017-10-03 ENCOUNTER — ANTICOAGULATION VISIT (OUTPATIENT)
Dept: VASCULAR LAB | Facility: MEDICAL CENTER | Age: 72
End: 2017-10-03
Attending: INTERNAL MEDICINE
Payer: MEDICARE

## 2017-10-03 VITALS — HEART RATE: 73 BPM | DIASTOLIC BLOOD PRESSURE: 82 MMHG | SYSTOLIC BLOOD PRESSURE: 113 MMHG

## 2017-10-03 DIAGNOSIS — Z79.01 CHRONIC ANTICOAGULATION: ICD-10-CM

## 2017-10-03 DIAGNOSIS — I48.0 PAROXYSMAL ATRIAL FIBRILLATION (HCC): ICD-10-CM

## 2017-10-03 LAB — INR PPP: 2.7 (ref 2–3.5)

## 2017-10-03 PROCEDURE — 99211 OFF/OP EST MAY X REQ PHY/QHP: CPT

## 2017-10-03 PROCEDURE — 85610 PROTHROMBIN TIME: CPT

## 2017-10-03 NOTE — PROGRESS NOTES
Anticoagulation Summary  As of 10/3/2017    INR goal:   2.0-3.0   TTR:   86.6 % (1.3 y)   Today's INR:   2.7   Maintenance plan:   7.5 mg (5 mg x 1.5) on Tue, Thu, Sat; 5 mg (5 mg x 1) all other days   Weekly total:   42.5 mg   Plan last modified:   REY Brandon (6/28/2016)   Next INR check:   11/28/2017   Target end date:   Indefinite    Indications    Paroxysmal atrial fibrillation (CMS-HCC) [I48.0]  Chronic anticoagulation [Z79.01]             Anticoagulation Episode Summary     [facility]    Preferred lab:       Send INR reminders to:       Comments:         Anticoagulation Care Providers     Provider Role Specialty Phone number    Cheikh Turner M.D. Referring Cardiology [phone]    [facility] Anticoagulation Services Responsible  [phone]        Anticoagulation Patient Findings      HPI:  Cheikh Perez seen in clinic today, on anticoagulation therapy with warfarin for afib.  Changes to current medical/health status since last appt: None  Denies signs/symptoms of bleeding and/or thrombosis since the last appt.    Denies any interval changes to diet  Denies any interval changes to medications since last appt.   Denies any complications or cost restrictions with current therapy.   BP recorded in vitals.  Confirmed dosing regimen.     A/P   INR  therapeutic.   Pt is to continue with current warfarin dosing regimen.    Follow up appointment in 8 week(s).    Maximo Salvador, MatthewD

## 2017-10-04 LAB — INR BLD: 2.7 (ref 0.9–1.2)

## 2017-10-11 RX ORDER — ENALAPRIL MALEATE 10 MG/1
10 TABLET ORAL DAILY
Qty: 30 TAB | Refills: 3 | Status: SHIPPED | OUTPATIENT
Start: 2017-10-11 | End: 2018-01-11 | Stop reason: SDUPTHER

## 2017-11-05 DIAGNOSIS — I10 ESSENTIAL HYPERTENSION: ICD-10-CM

## 2017-11-28 ENCOUNTER — ANTICOAGULATION VISIT (OUTPATIENT)
Dept: VASCULAR LAB | Facility: MEDICAL CENTER | Age: 72
End: 2017-11-28
Attending: INTERNAL MEDICINE
Payer: MEDICARE

## 2017-11-28 VITALS
HEART RATE: 72 BPM | DIASTOLIC BLOOD PRESSURE: 92 MMHG | WEIGHT: 235 LBS | BODY MASS INDEX: 28.62 KG/M2 | SYSTOLIC BLOOD PRESSURE: 123 MMHG

## 2017-11-28 DIAGNOSIS — I48.0 PAROXYSMAL ATRIAL FIBRILLATION (HCC): ICD-10-CM

## 2017-11-28 DIAGNOSIS — Z79.01 CHRONIC ANTICOAGULATION: ICD-10-CM

## 2017-11-28 LAB
INR BLD: 2.8 (ref 0.9–1.2)
INR PPP: 2.8 (ref 2–3.5)

## 2017-11-28 PROCEDURE — 85610 PROTHROMBIN TIME: CPT

## 2017-11-28 PROCEDURE — 99211 OFF/OP EST MAY X REQ PHY/QHP: CPT

## 2017-11-28 NOTE — PROGRESS NOTES
OP Anticoagulation Service Note    Date: 11/28/2017  Vitals:    11/28/17 0959   BP: 123/92   Pulse: 72   Weight: 106.6 kg (235 lb)       Anticoagulation Summary  As of 11/28/2017    INR goal:   2.0-3.0   TTR:   88.0 % (1.5 y)   Today's INR:   2.8   Maintenance plan:   7.5 mg (5 mg x 1.5) on Tue, Thu, Sat; 5 mg (5 mg x 1) all other days   Weekly total:   42.5 mg   Plan last modified:   Ilda Carson ADenzelPLUCAS (6/28/2016)   Next INR check:   2/6/2018   Target end date:   Indefinite    Indications    Paroxysmal atrial fibrillation (CMS-HCC) [I48.0]  Chronic anticoagulation [Z79.01]             Anticoagulation Episode Summary     [facility]   [facility]    Preferred lab:       Send INR reminders to:       Comments:         Anticoagulation Care Providers     Provider Role Specialty Phone number    Cheikh Turner M.D. Referring Cardiology [phone]    [facility] Antico[facility] Responsible  [phone]        Anticoagulation Patient Findings      HPI:   Georgebj Rodriguez Ana seen in clinic today, they are here today for a INR check on anticoagulation therapy with warfarin because they have afib     The reason for today's visit is to prevent morbidity and mortality from a  stroke and to reduce the risk of bleeding while on a anticoagulant.       Stroke risk moderate, as seen below        [id number]   SCORE PATIENTS (g=[id number]) ADJUSTED STROKE RATE (% year)   0 1 0%   1 422 1,3%   2 1230 2,2%   3 1730 3,2%   4 1718 4,0%   5 1159 6,7%   6 679 9,8%   7 294 9,6%   8 82 6,7%   9 14 15,2%       Confirmed warfarin dosing regimen  Interval Changes with foods rich in vitamin K:No  Interval Changes in ETOH:  No  Interval Changes in smoking status: No  Interval Changes in medication: No   Cost restriction: No    S/S of bleeding or bruising:  No  Signs/symptoms  thrombosis since the last appt: No  Bleed risk is: low,     3 vitals included with today's appt:Yes  (BP, HR, weight, ht, RR)     A/P    INR  therapeutic.     Intervention required today to prevent:    No change in dose needed today, they will need to continue with the same dose and diet to prevent adverse events while on a anticoagulant.      Continue weekly warfarin dose as noted      Additional education provided today regarding reducing bleed risk and dietary constraints: No      Pt educated to contact our clinic with any changes in medications or s/s of bleeding or thrombosis    Follow up appointment in 10 week(s)    Follow up interval reason: per our collaborative practice policy as their last INR was 2.7 on 10/3/2017    Justify length:   They have a TTR of 88 which is not at target (TTR target/goal is 100%) and requires close follow up to prevent a adverse event (the lower the TTR the higher risk of clots, strokes, or bleeding).     CHEST guidelines recommend frequent INR monitoring at regular intervals (a few days up to a max of 12 weeks) to ensure they are on the proper dose of warfarin and not having any complications from therapy.  INRs can dramatically change over a short time period due to diet, medications, and medical conditions.

## 2018-01-11 DIAGNOSIS — E78.5 DYSLIPIDEMIA: ICD-10-CM

## 2018-01-11 RX ORDER — LOVASTATIN 10 MG/1
10 TABLET ORAL DAILY
Qty: 90 TAB | Refills: 0 | Status: SHIPPED | OUTPATIENT
Start: 2018-01-11 | End: 2018-04-05 | Stop reason: SDUPTHER

## 2018-01-11 RX ORDER — ENALAPRIL MALEATE 10 MG/1
10 TABLET ORAL DAILY
Qty: 90 TAB | Refills: 0 | Status: SHIPPED | OUTPATIENT
Start: 2018-01-11 | End: 2018-03-08 | Stop reason: SDUPTHER

## 2018-02-06 ENCOUNTER — ANTICOAGULATION VISIT (OUTPATIENT)
Dept: VASCULAR LAB | Facility: MEDICAL CENTER | Age: 73
End: 2018-02-06
Attending: INTERNAL MEDICINE
Payer: MEDICARE

## 2018-02-06 DIAGNOSIS — Z79.01 CHRONIC ANTICOAGULATION: ICD-10-CM

## 2018-02-06 DIAGNOSIS — I48.0 PAROXYSMAL ATRIAL FIBRILLATION (HCC): ICD-10-CM

## 2018-02-06 LAB — INR PPP: 2.3 (ref 2–3.5)

## 2018-02-06 PROCEDURE — 99211 OFF/OP EST MAY X REQ PHY/QHP: CPT

## 2018-02-06 PROCEDURE — 85610 PROTHROMBIN TIME: CPT

## 2018-02-06 NOTE — PROGRESS NOTES
OP Anticoagulation Service Note    Date: 2/6/2018  There were no vitals filed for this visit.    Anticoagulation Summary  As of 2/6/2018    INR goal:   2.0-3.0   TTR:   89.3 % (1.7 y)   Today's INR:   2.3   Maintenance plan:   7.5 mg (5 mg x 1.5) on Tue, Thu, Sat; 5 mg (5 mg x 1) all other days   Weekly total:   42.5 mg   Plan last modified:   REY Brandon (6/28/2016)   Next INR check:   5/1/2018   Target end date:   Indefinite    Indications    Paroxysmal atrial fibrillation (CMS-HCC) [I48.0]  Chronic anticoagulation [Z79.01]             Anticoagulation Episode Summary     [facility]   [facility]    Preferred lab:       Send INR reminders to:       Comments:         Anticoagulation Care Providers     Provider Role Specialty Phone number    Cheikh Turner M.D. Referring Cardiology [phone]    [facility] Responsible  [phone]        Anticoagulation Patient Findings      HPI:   Cheikh Perez seen in clinic today, they are here today for a INR check on anticoagulation therapy with warfarin because they have afib    The reason for today's visit is to prevent morbidity and mortality from a  stroke and to reduce the risk of bleeding while on a anticoagulant.     Reason for today's visit (per our collaborative practice policy) is because their last INR was 2.3 on 11/28.  Intervention at the last visit:none needed at that time.    Additional education provided today regarding reducing bleed risk and dietary constraints: Yes, we talked about his diet and about missed doses. He did forget a dose about two weeks ago.     Any upcoming  procedures: none    Confirmed warfarin dosing regimen  Interval Changes with foods rich in vitamin K:No  Interval Changes in ETOH:  No  Interval Changes in smoking status: No  Interval Changes in medication: No   Cost restriction: No    S/S of bleeding or bruising:  No  Signs/symptoms  thrombosis since the last appt:  No  Bleed risk is: moderate,     3 vitals included with today's appt:No  None today due to clothes    Assessment:   INR  therapeutic.     Intervention required today to prevent:    No change in dose needed today, they will need to continue with the same dose and diet to prevent adverse events while on a anticoagulant.     They have a TTR of 89.3 which is not at target (TTR target/goal is 100%) and requires close follow up to prevent a adverse event (the lower the TTR the higher risk of clots, strokes, or bleeding).       Plan:  Continue weekly warfarin dose as noted      Follow up:  Follow up appointment in 12 week(s)       Other info:  Pt educated to contact our clinic with any changes in medications or s/s of bleeding or thrombosis    CHEST guidelines recommend frequent INR monitoring at regular intervals (a few days up to a max of 12 weeks) to ensure they are on the proper dose of warfarin and not having any complications from therapy.  INRs can dramatically change over a short time period due to diet, medications, and medical conditions.

## 2018-02-09 DIAGNOSIS — I48.0 PAROXYSMAL ATRIAL FIBRILLATION (HCC): ICD-10-CM

## 2018-02-13 RX ORDER — WARFARIN SODIUM 5 MG/1
TABLET ORAL
Qty: 135 TAB | Refills: 1 | Status: SHIPPED | OUTPATIENT
Start: 2018-02-13 | End: 2018-10-01 | Stop reason: SDUPTHER

## 2018-02-27 ENCOUNTER — PATIENT OUTREACH (OUTPATIENT)
Dept: HEALTH INFORMATION MANAGEMENT | Facility: OTHER | Age: 73
End: 2018-02-27

## 2018-02-28 NOTE — PROGRESS NOTES
Outcome: Left Message    Please transfer to Patient Outreach Team at [phone] when patient returns call.      Attempt # 1

## 2018-03-01 ENCOUNTER — TELEPHONE (OUTPATIENT)
Dept: MEDICAL GROUP | Facility: MEDICAL CENTER | Age: 73
End: 2018-03-01

## 2018-03-01 NOTE — TELEPHONE ENCOUNTER
Future Appointments       Provider Department Center    3/27/2018 11:00 AM REY Chappell; KAREN [facility] [address]    5/1/2018 10:00 AM [facility]      6/13/2018 2:45 PM Cheikh Turner M.D. [facility]         FULL PVP ANNUAL WELLNESS VISIT PRE-VISIT PLANNING WITHOUT OUTREACH    1.  Reviewed note from last office visit with PCP: YES Last office visit 03/13/17    2.  If any orders were placed at last visit, do we have Results/Consult Notes?        •  Labs - Labs ordered, completed on 02/06/18 and results are in chart.       •  Imaging - Imaging was not ordered at last office visit.        •  Referrals - Referral ordered, patient has NOT been seen.     3.  Immunizations were updated in [employer] using [employer]?: Yes       •  [employer] Recommendations: FLU, TDAP, PNEUMOVAX (PPSV23) and ZOSTAVAX (Shingles)        •  Is patient due for Tdap? NO       •  Is patient due for Shingles? YES. Patient was not notified of copay/out of pocket cost.     4.  Patient is due for the following Health Maintenance Topics:   Health Maintenance Due   Topic Date Due   • IMM TDAP DUE DURING VISIT   • IMM PPSV23 DUE DURING VISIT   • DIABETES MONOFILAMENT / LE EXAM  ORDER PENDED   • A1C SCREENING  ORDER PENED   • URINE ACR / MICROALBUMIN  ORDER PENED   • SERUM CREATININE  ORDER PENED   • Annual Wellness Visit  SCHEDULED FOR 03/27/18     5.  Reviewed/Updated the following with patient:       •   Preferred Pharmacy? YES       •   Preferred Lab? YES       •   Preferred Communication? YES       •   Allergies? YES       •   Medications? YES. Was Abstract Encounter opened and chart updated? YES       •   Social History? YES. Was Abstract Encounter opened and chart updated? YES       •   Family History (document living status of immediate family members and if + hx of  cancer, diabetes, hypertension,  hyperlipidemia, heart attack, stroke) YES. Was Abstract Encounter opened and chart updated? YES    6.  Care Team Updated:       •   DME Company (gait device, O2, CPAP, etc.): YES       •   Other Specialists (eye doctor, derm, GYN, cardiology, endo, etc): YES    7.  Patient has the following Care Path diagnoses on Problem List:  DIABETES    Has patient ever had diabetes education? Yes, and patient is interested in more. Referral pended.      8.  MDX printed and highlighted for Provider? YES    9.  Patient was advised: “This is a free wellness visit. The provider will screen for medical conditions to help you stay healthy. If you have other concerns to address you may be asked to discuss these at a separate visit or there may be an additional fee.”     10.  Patient was informed to arrive 15 min prior to their scheduled appointment and bring in their medication bottles.

## 2018-03-02 ENCOUNTER — APPOINTMENT (OUTPATIENT)
Dept: MEDICAL GROUP | Facility: MEDICAL CENTER | Age: 73
End: 2018-03-02
Payer: MEDICARE

## 2018-03-08 RX ORDER — ENALAPRIL MALEATE 10 MG/1
10 TABLET ORAL DAILY
Qty: 30 TAB | Refills: 0 | Status: SHIPPED | OUTPATIENT
Start: 2018-03-08 | End: 2018-05-05 | Stop reason: SDUPTHER

## 2018-03-27 ENCOUNTER — OFFICE VISIT (OUTPATIENT)
Dept: MEDICAL GROUP | Facility: MEDICAL CENTER | Age: 73
End: 2018-03-27
Payer: MEDICARE

## 2018-03-27 VITALS
HEIGHT: 75 IN | HEART RATE: 65 BPM | OXYGEN SATURATION: 98 % | BODY MASS INDEX: 30.46 KG/M2 | TEMPERATURE: 96.6 F | WEIGHT: 245 LBS | RESPIRATION RATE: 16 BRPM | SYSTOLIC BLOOD PRESSURE: 120 MMHG | DIASTOLIC BLOOD PRESSURE: 86 MMHG

## 2018-03-27 DIAGNOSIS — Z91.81 RISK FOR FALLS: ICD-10-CM

## 2018-03-27 DIAGNOSIS — G47.33 OSA (OBSTRUCTIVE SLEEP APNEA): ICD-10-CM

## 2018-03-27 DIAGNOSIS — Z79.01 CHRONIC ANTICOAGULATION: ICD-10-CM

## 2018-03-27 DIAGNOSIS — E66.9 OBESITY (BMI 30-39.9): ICD-10-CM

## 2018-03-27 DIAGNOSIS — Z00.00 MEDICARE ANNUAL WELLNESS VISIT, SUBSEQUENT: ICD-10-CM

## 2018-03-27 DIAGNOSIS — M21.372 LEFT FOOT DROP: ICD-10-CM

## 2018-03-27 DIAGNOSIS — I48.0 PAROXYSMAL ATRIAL FIBRILLATION (HCC): ICD-10-CM

## 2018-03-27 DIAGNOSIS — E78.5 DYSLIPIDEMIA: ICD-10-CM

## 2018-03-27 DIAGNOSIS — I10 ESSENTIAL HYPERTENSION: ICD-10-CM

## 2018-03-27 LAB
HBA1C MFR BLD: 9.9 % (ref ?–5.8)
INT CON NEG: NEGATIVE
INT CON POS: POSITIVE

## 2018-03-27 PROCEDURE — G0439 PPPS, SUBSEQ VISIT: HCPCS | Performed by: NURSE PRACTITIONER

## 2018-03-27 PROCEDURE — 99213 OFFICE O/P EST LOW 20 MIN: CPT | Mod: 25 | Performed by: NURSE PRACTITIONER

## 2018-03-27 PROCEDURE — 83036 HEMOGLOBIN GLYCOSYLATED A1C: CPT | Performed by: NURSE PRACTITIONER

## 2018-03-27 ASSESSMENT — PATIENT HEALTH QUESTIONNAIRE - PHQ9: CLINICAL INTERPRETATION OF PHQ2 SCORE: 0

## 2018-03-27 ASSESSMENT — PAIN SCALES - GENERAL: PAINLEVEL: NO PAIN

## 2018-03-27 ASSESSMENT — ACTIVITIES OF DAILY LIVING (ADL): BATHING_REQUIRES_ASSISTANCE: 0

## 2018-03-27 NOTE — PROGRESS NOTES
Chief Complaint   Patient presents with   • Annual Wellness Visit         HPI:  Cheikh is a 73 y.o. here for Medicare Annual Wellness Visit as well as for needed changes in his diabetic regime.    1. Medicare annual wellness visit, subsequent  Screening performed below.    2. Uncontrolled type 2 diabetes mellitus without complication, without long-term current use of insulin (CMS-HCC)  Patient's last hemoglobin A1c was slightly high but he had it checked again today at the office and it is 9.9. He states he is taking his metformin as prescribed but also may have been eating more carbohydrates and exercising less than usual. He also would like to see a podiatrist for his feet because of his foot drop and diabetes.    3. Chronic anticoagulation  Patient continues to follow with the diabetic clinic for his Coumadin.    4. Essential hypertension  Blood pressure has been controlled on his low-dose enalapril and metoprolol.    5. Paroxysmal atrial fibrillation (CMS-HCC)  Patient reports no palpitations and appears rate controlled and is on Coumadin through the anticoagulation clinic.    6. Risk for falls  Patient uses a cane for mobility.    7. SHANNAN (obstructive sleep apnea)  Patient is supposed to be using CPAP but he admits he has not been using it and does not like to wear the mask.    8. Dyslipidemia  Cholesterol levels have been good on lovastatin and he would like to get off medication.    9. Left foot drop  Patient continues to wear a brace on his left foot and would like to see podiatry.    10. Obesity (BMI 30-39.9)  Weight continues to be mildly elevated.    Patient Active Problem List    Diagnosis Date Noted   • Chronic anticoagulation 09/20/2013     Priority: High   • Essential hypertension 07/09/2013     Priority: High   • Paroxysmal atrial fibrillation (CMS-HCC) 06/06/2013     Priority: High   • Uncontrolled type 2 diabetes mellitus without complication, without long-term current use of insulin (CMS-HCC)  03/13/2017   • Risk for falls 03/13/2017   • SHANNAN (obstructive sleep apnea) 12/12/2016   • Dyslipidemia 12/12/2016   • Left foot drop 12/12/2016       Current Outpatient Prescriptions   Medication Sig Dispense Refill   • enalapril (VASOTEC) 10 MG Tab Take 1 Tab by mouth every day. 30 Tab 0   • metformin (GLUCOPHAGE) 1000 MG tablet Take 1 Tab by mouth 2 times a day, with meals. 60 Tab 0   • warfarin (COUMADIN) 5 MG Tab Take one to one and one-half (1-1.5) tablets daily as directed by [facility] 135 Tab 1   • lovastatin (MEVACOR) 10 MG tablet Take 1 Tab by mouth every day. 90 Tab 0   • metoprolol (LOPRESSOR) 25 MG Tab TAKE ONE TABLET BY MOUTH TWICE DAILY 180 Tab 2   • VITAMIN D, CHOLECALCIFEROL, PO Take 5,000 Units by mouth.     • ALPHA LIPOIC ACID PO Take 200 mg by mouth 2 Times a Day.     • Lecithin 1200 MG CAPS Take 1 Cap by mouth 2 Times a Day.       No current facility-administered medications for this visit.         Patient is taking medications as noted in medication list.  Current supplements as per medication list.     Allergies: Nkda [no known drug allergy]    Current social contact/activities: Visit with family and friends, golf, coffee with friends 3x per wk, service club breakfast 1x per wk     Patient's perception of their health: fair    Is patient current with immunizations? No, due for PREVNAR (PCV13)  and TDAP. Patient is interested in receiving TDAP and ZOSTAVAX (Shingles) today.    He  reports that he has never smoked. He has never used smokeless tobacco. He reports that he drinks about 6.0 oz of alcohol per week . He reports that he does not use drugs.  Counseling given: Not Answered        DPA/Advanced directive: Patient does not have an Advanced Directive.  A packet and workshop information was given on Advanced Directives.    ROS:    Gait: Uses a cane   Ostomy: no   Other tubes: no   Amputations: no   Chronic oxygen use no   Last eye exam 12/2017   Wears hearing aids: no    : Denies any urinary leakage during the last 6 months    Screening:    DIABETES     Has patient ever had diabetes education? Yes, and patient is interested in more. Referral pended.    Depression Screening    Little interest or pleasure in doing things?  0 - not at all  Feeling down, depressed, or hopeless? 0 - not at all  Patient Health Questionnaire Score: 0    If depressive symptoms identified deferred to follow up visit unless specifically addressed in assessment and plan.    Interpretation of PHQ-9 Total Score   Score Severity   1-4 No Depression   5-9 Mild Depression   10-14 Moderate Depression   15-19 Moderately Severe Depression   20-27 Severe Depression    Screening for Cognitive Impairment    Three Minute Recall (apple, watch, juancarlos)  3/3 Village, Kitchen, Baby  Draw clock face with all 12 numbers set to the hand to show 10 minutes past 11 o'clock  1 4/5 Time 3:40  If cognitive concerns identified, deferred for follow up unless specifically addressed in assessment and plan.    Fall Risk Assessment    Has the patient had two or more falls in the last year or any fall with injury in the last year?  Yes  If fall risk identified, deferred for follow up unless specifically addressed in assessment and plan.    Safety Assessment    Throw rugs on floor.  No  Handrails on all stairs.  Yes  Good lighting in all hallways.  Yes  Difficulty hearing.  No  Patient counseled about all safety risks that were identified.    Functional Assessment ADLs    Are there any barriers preventing you from cooking for yourself or meeting nutritional needs?  No.    Are there any barriers preventing you from driving safely or obtaining transportation?  No.    Are there any barriers preventing you from using a telephone or calling for help?  No.    Are there any barriers preventing you from shopping?  No.    Are there any barriers preventing you from taking care of your own finances?  No.    Are there any barriers preventing you from  managing your medications?  No.    Are there any barriers preventing you from showering, bathing or dressing yourself?  No.    Are you currently engaging any exercise or physical activity?  Yes.  PT reports play golf 1x per wk, stationary bike occasionally    Health Maintenance Summary                IMM DTaP/Tdap/Td Vaccine Overdue 12/2/2009      Done 12/1/2009 Imm Admin: TD Vaccine    IMM PNEUMOCOCCAL 65+ (ADULT) LOW/MEDIUM RISK SERIES Overdue 2/26/2017      Done 2/26/2016 Imm Admin: Pneumococcal Conjugate Vaccine (Prevnar/PCV-13)    IMM INFLUENZA Overdue 9/1/2017      Done 12/12/2016 Imm Admin: Influenza Vaccine Adult HD    DIABETES MONOFILAMENT / LE EXAM Overdue 12/12/2017      Done 12/12/2016 AMB DIABETIC MONOFILAMENT LOWER EXTREMITY EXAM     Patient has more history with this topic...    A1C SCREENING Overdue 1/6/2018      Done 7/6/2017 HEMOGLOBIN A1C      Patient has more history with this topic...    URINE ACR / MICROALBUMIN Overdue 3/6/2018      Done 3/6/2017 MICROALBUMIN CREAT RATIO URINE     Patient has more history with this topic...    SERUM CREATININE Overdue 3/6/2018      Done 3/6/2017 COMP METABOLIC PANEL      Patient has more history with this topic...    Annual Wellness Visit Overdue 3/14/2018      Done 3/13/2017 Visit Dx: Medicare annual wellness visit, subsequent     Patient has more history with this topic...    IMM ZOSTER VACCINE Postponed 3/8/2019 Originally 3/17/2005. Patient Refused    FASTING LIPID PROFILE Next Due 7/6/2018      Done 7/6/2017 LIPID PROFILE     Patient has more history with this topic...    RETINAL SCREENING Next Due 12/18/2018      Done 12/18/2017 REFERRAL FOR RETINAL SCREENING EXAM     Patient has more history with this topic...    COLONOSCOPY Next Due 3/6/2024      Done 3/6/2014           Patient Care Team:  REY Chappell as PCP - General (Family Medicine)  Ion Terrell M.D. as Consulting Physician (Ophthalmology)  Main Batista M.D. as Consulting Physician  (Urology)  Montrell Eddy M.D. as Consulting Physician (Orthopaedics)  [facility] as Consulting Physician (Gastroenterology)  Cheikh Turner M.D. as Consulting Physician (Cardiology)  [facility] as Consulting Physician    Social History   Substance Use Topics   • Smoking status: Never Smoker   • Smokeless tobacco: Never Used   • Alcohol use 6.0 oz/week     10 Glasses of wine per week     Family History   Problem Relation Age of Onset   • Heart Attack Father 59   • Heart Disease Father    • Hypertension Brother    • Diabetes Brother    • Arthritis Brother    • Thyroid Mother 50   • Breast Cancer Sister    • No Known Problems Maternal Grandmother    • No Known Problems Maternal Grandfather    • No Known Problems Paternal Grandmother    • No Known Problems Paternal Grandfather    • Arrythmia Brother    • No Known Problems Brother    • No Known Problems Daughter      He  has a past medical history of Arrhythmia (July 2013); Arthritis; Diabetes; Hypertension; Hypertension; Pain ( ); Paroxysmal atrial fibrillation (CMS-HCC) (6/6/2013); Sleep apnea; and Snoring.   Past Surgical History:   Procedure Laterality Date   • FEMUR ORIF Left 2/24/2016    Procedure: DISTAL FEMUR ORIF;  Surgeon: Montrell Eddy M.D.;  Location: [facility];  Service:    • CYSTOSCOPY STENT PLACEMENT  1/13/2014    Performed by Main Batista M.D. at [facility]   • URETEROSCOPY  1/13/2014    Performed by Main Batista M.D. at [facility]   • LASERTRIPSY  1/13/2014    Performed by Main Batista M.D. at [facility]   • OTHER      Surgery for kidney stones   • RECOVERY  7/11/2013    Performed by Cath-Recovery Surgery at [facility]   • KNEE ARTHROPLASTY TOTAL  12/1/2009    Performed by GIACOMO PETTIT at [facility]   • KNEE ARTHROPLASTY TOTAL  3/2009    Left   • LUMBAR LAMINECTOMY DISKECTOMY  7/2006    L1-L5/ residual  "left foot weaknesss    • ACL REPAIR  12/2005    Right   • FEMUR ORIF             Exam:     Blood pressure 120/86, pulse 65, temperature 35.9 °C (96.6 °F), resp. rate 16, height 1.905 m (6' 3\"), weight 111.1 kg (245 lb), SpO2 98 %. Body mass index is 30.62 kg/m².    Gen.: Patient is pleasant, alert and talkative and appears in no distress.  HEENT: Bilateral cerumen impaction. Neck is supple negative for lymphadenopathy, thyromegaly or bruit. Pharynx benign.  Heart:: Regular rate and rhythm without murmur.  Pulmonary: Clear bilaterally to A&P without wheeze or rhonchi.  Extremities: Patient only able to feel monofilament on 6 out of 9 areas of the feet and he has foot drop of the left foot. No ulcers present.  Abdomen: No tenderness. Negative for bruit or masses.  Skin: No suspicious lesions noted on the skin.      Assessment and Plan. The following treatment and monitoring plan is recommended:          1. Medicare annual wellness visit, subsequent  Annual wellness topics discussed, review of chronic medical problems completed    - Annual Wellness Visit - Includes PPPS Subsequent ()    2. Uncontrolled type 2 diabetes mellitus without complication, without long-term current use of insulin (CMS-[facility])  Patient advised that his hemoglobin A1c should be closer to 7.0 or less and today it is at 9.9. I discussed options with patient and we'll have him continue on his metformin but add Jardiance 10 mg daily. He will stop the medicine if he develops any side effects. I explained we can increase the medicine to 25 mg if necessary on his next visit. He was referred to podiatry and he has lab work to do before his next visit. Side effects of medication discussed.  - Diabetic Monofilament LE Exam  - POCT A1C  - MICROALBUMIN CREAT RATIO URINE; Future  - BASIC METABOLIC PANEL; Future  - REFERRAL TO PODIATRY  - Empagliflozin (JARDIANCE) 10 MG Tab; Take 1 Each by mouth every day.  Dispense: 30 Tab; Refill: 11    3. Chronic " anticoagulation  Patient will continue with anticoagulation clinic.    4. Essential hypertension  Patient will continue with his beta blocker and ACE inhibitor.    5. Paroxysmal atrial fibrillation (CMS-HCC)  Patient appears rate controlled and is on Coumadin. He reports no palpitations.    6. Risk for falls    - Patient identified as fall risk.  Appropriate orders and counseling given.    7. SHANNAN (obstructive sleep apnea)  Patient advised on risks of not using his CPAP but he does not wish to do another sleep study or use the machine.    8. Dyslipidemia  I advised patient that he should not stop his statin because of his risk factors with diabetes.  - LIPID PROFILE; Future    9. Left foot drop  Patient will continue with his foot brace and follow with podiatry.    10. Obesity (BMI 30-39.9)    - Patient identified as having weight management issue.  Appropriate orders and counseling given.    Services suggested: No services needed at this time  Health Care Screening: Age-appropriate preventive services recommended by USPTF and ACIP covered by Medicare were discussed today. Services ordered if indicated and agreed upon by the patient.  Referrals offered: PT/OT/Nutrition counseling/Behavioral Health/Smoking cessation as per orders if indicated.    Discussion today about general wellness and lifestyle habits:    · Prevent falls and reduce trip hazards; Cautioned about securing or removing rugs.  · Have a working fire alarm and carbon monoxide detector;   · Engage in regular physical activity and social activities       Follow-up: No Follow-up on file.

## 2018-04-05 DIAGNOSIS — E78.5 DYSLIPIDEMIA: ICD-10-CM

## 2018-04-06 ENCOUNTER — HOSPITAL ENCOUNTER (OUTPATIENT)
Dept: LAB | Facility: MEDICAL CENTER | Age: 73
End: 2018-04-06
Attending: NURSE PRACTITIONER
Payer: MEDICARE

## 2018-04-06 DIAGNOSIS — E78.5 DYSLIPIDEMIA: ICD-10-CM

## 2018-04-06 LAB
ANION GAP SERPL CALC-SCNC: 10 MMOL/L (ref 0–11.9)
BUN SERPL-MCNC: 17 MG/DL (ref 8–22)
CALCIUM SERPL-MCNC: 9.1 MG/DL (ref 8.5–10.5)
CHLORIDE SERPL-SCNC: 105 MMOL/L (ref 96–112)
CHOLEST SERPL-MCNC: 144 MG/DL (ref 100–199)
CO2 SERPL-SCNC: 24 MMOL/L (ref 20–33)
CREAT SERPL-MCNC: 0.81 MG/DL (ref 0.5–1.4)
CREAT UR-MCNC: 146.9 MG/DL
GLUCOSE SERPL-MCNC: 228 MG/DL (ref 65–99)
HDLC SERPL-MCNC: 48 MG/DL
LDLC SERPL CALC-MCNC: 79 MG/DL
MICROALBUMIN UR-MCNC: 3.9 MG/DL
MICROALBUMIN/CREAT UR: 27 MG/G (ref 0–30)
POTASSIUM SERPL-SCNC: 4 MMOL/L (ref 3.6–5.5)
SODIUM SERPL-SCNC: 139 MMOL/L (ref 135–145)
TRIGL SERPL-MCNC: 87 MG/DL (ref 0–149)

## 2018-04-06 PROCEDURE — 36415 COLL VENOUS BLD VENIPUNCTURE: CPT

## 2018-04-06 PROCEDURE — 82043 UR ALBUMIN QUANTITATIVE: CPT

## 2018-04-06 PROCEDURE — 80061 LIPID PANEL: CPT

## 2018-04-06 PROCEDURE — 80048 BASIC METABOLIC PNL TOTAL CA: CPT

## 2018-04-06 PROCEDURE — 82570 ASSAY OF URINE CREATININE: CPT

## 2018-04-06 RX ORDER — LOVASTATIN 10 MG/1
10 TABLET ORAL DAILY
Qty: 90 TAB | Refills: 0 | Status: SHIPPED | OUTPATIENT
Start: 2018-04-06 | End: 2018-06-13

## 2018-05-01 ENCOUNTER — ANTICOAGULATION VISIT (OUTPATIENT)
Dept: VASCULAR LAB | Facility: MEDICAL CENTER | Age: 73
End: 2018-05-01
Attending: INTERNAL MEDICINE
Payer: MEDICARE

## 2018-05-01 VITALS
HEIGHT: 76 IN | WEIGHT: 235 LBS | HEART RATE: 59 BPM | SYSTOLIC BLOOD PRESSURE: 137 MMHG | BODY MASS INDEX: 28.62 KG/M2 | DIASTOLIC BLOOD PRESSURE: 72 MMHG

## 2018-05-01 DIAGNOSIS — I48.0 PAROXYSMAL ATRIAL FIBRILLATION (HCC): ICD-10-CM

## 2018-05-01 DIAGNOSIS — Z79.01 CHRONIC ANTICOAGULATION: ICD-10-CM

## 2018-05-01 LAB — INR PPP: 2.4 (ref 2–3.5)

## 2018-05-01 PROCEDURE — 99211 OFF/OP EST MAY X REQ PHY/QHP: CPT

## 2018-05-01 PROCEDURE — 85610 PROTHROMBIN TIME: CPT

## 2018-05-01 NOTE — PROGRESS NOTES
"OP Anticoagulation Service Note    Date: 5/1/2018  Vitals:    05/01/18 1002 05/01/18 1003   BP: 137/72    Pulse: (!) 59    Weight: 106.6 kg (235 lb)    Height:  1.94 m (6' 4.38\")       Anticoagulation Summary  As of 5/1/2018    INR goal:   2.0-3.0   TTR:   90.6 % (1.9 y)   Today's INR:   2.4   Warfarin maintenance plan:   7.5 mg (5 mg x 1.5) on Tue, Thu, Sat; 5 mg (5 mg x 1) all other days   Weekly warfarin total:   42.5 mg   Plan last modified:   REY Brandon (6/28/2016)   Next INR check:   7/24/2018   Target end date:   Indefinite    Indications    Paroxysmal atrial fibrillation (HCC) [I48.0]  Chronic anticoagulation [Z79.01]             Anticoagulation Episode Summary     INR check location:   [facility]    Preferred lab:       Send INR reminders to:       Comments:         Anticoagulation Care Providers     Provider Role Specialty Phone number    Cheikh Turner M.D. Referring Cardiology [phone]    [facility] Responsible  [phone]        Anticoagulation Patient Findings      HPI:   Georgebj Michael Perez seen in clinic today, they are here today for a INR check on anticoagulation therapy with warfarin because they have atrial fib    The reason for today's visit is to prevent morbidity and mortality from a stroke  and to reduce the risk of bleeding while on a anticoagulant.     Reason for today's visit (per our collaborative practice policy) is because their last INR was 2.3  on  2/6.   Intervention at the last visit:  None needed     Additional education provided today regarding reducing bleed risk and dietary constraints:  About diet    Any upcoming procedures:   none    Confirmed warfarin dosing regimen  Interval Changes with foods rich in vitamin K: No  Interval Changes in ETOH:   No  Interval Changes in smoking status:  No  Interval Changes in medication:  No   Cost restriction:  No    S/S of bleeding or bruising:  No  Signs/symptoms  thrombosis " since the last appt:  No  Bleed risk is:  moderate,     3 vitals included with today's appt :No  (BP, HR, weight, ht, RR)     Assessment:   INR  therapeutic.     No change in dose needed today, they will need to continue with the same dose and diet to prevent adverse events while on a anticoagulant.     They have a TTR of 90.6  which is not at target (TTR target/goal is 100%) and requires close follow up to prevent a adverse event (the lower the TTR the higher risk of clots, strokes, or bleeding).       Plan:  Continue weekly warfarin dose as noted      Follow up:  Follow up appointment in 12 week(s)       Other info:  Pt educated to contact our clinic with any changes in medications or s/s of bleeding or thrombosis    CHEST guidelines recommend frequent INR monitoring at regular intervals (a few days up to a max of 12 weeks) to ensure they are on the proper dose of warfarin and not having any complications from therapy.  INRs can dramatically change over a short time period due to diet, medications, and medical conditions.

## 2018-05-07 RX ORDER — ENALAPRIL MALEATE 10 MG/1
TABLET ORAL
Qty: 90 TAB | Refills: 3 | Status: SHIPPED | OUTPATIENT
Start: 2018-05-07 | End: 2019-05-06 | Stop reason: SDUPTHER

## 2018-06-13 ENCOUNTER — OFFICE VISIT (OUTPATIENT)
Dept: CARDIOLOGY | Facility: MEDICAL CENTER | Age: 73
End: 2018-06-13
Payer: MEDICARE

## 2018-06-13 VITALS
BODY MASS INDEX: 28.1 KG/M2 | OXYGEN SATURATION: 97 % | HEART RATE: 81 BPM | SYSTOLIC BLOOD PRESSURE: 116 MMHG | DIASTOLIC BLOOD PRESSURE: 64 MMHG | HEIGHT: 77 IN | WEIGHT: 238 LBS

## 2018-06-13 DIAGNOSIS — I10 ESSENTIAL HYPERTENSION: ICD-10-CM

## 2018-06-13 DIAGNOSIS — I48.0 PAROXYSMAL ATRIAL FIBRILLATION (HCC): ICD-10-CM

## 2018-06-13 DIAGNOSIS — E78.5 DYSLIPIDEMIA: ICD-10-CM

## 2018-06-13 DIAGNOSIS — Z79.01 CHRONIC ANTICOAGULATION: ICD-10-CM

## 2018-06-13 PROCEDURE — 99214 OFFICE O/P EST MOD 30 MIN: CPT | Performed by: INTERNAL MEDICINE

## 2018-06-13 RX ORDER — ATORVASTATIN CALCIUM 10 MG/1
10 TABLET, FILM COATED ORAL DAILY
Qty: 90 TAB | Refills: 3 | Status: SHIPPED | OUTPATIENT
Start: 2018-06-13 | End: 2019-04-02 | Stop reason: SDUPTHER

## 2018-06-15 ASSESSMENT — ENCOUNTER SYMPTOMS
FALLS: 0
CHILLS: 0
PND: 0
BRUISES/BLEEDS EASILY: 0
ABDOMINAL PAIN: 0
PALPITATIONS: 0
DIZZINESS: 0
COUGH: 0
FEVER: 0
FOCAL WEAKNESS: 0
BLURRED VISION: 0
SORE THROAT: 0
SHORTNESS OF BREATH: 0
NAUSEA: 0
WEAKNESS: 0
CLAUDICATION: 0

## 2018-06-15 NOTE — PROGRESS NOTES
"Chief Complaint   Patient presents with   • Loss Of Balance     annual        Subjective:   Cheikh Perez is a 73 y.o. male who presents today For follow-up of his history of A. fib and cardioversion    He has been doing well tolerating his anticoagulation well no major issues since his follow-up last year    Blood pressure has been normal          Past Medical History:   Diagnosis Date   • Arrhythmia July 2013    AFIB, cardioversion   • Arthritis    • Diabetes     Diet,oral meds   • Hypertension    • Hypertension    • Pain      \"stiff\" from arthritis, 2/10   • Paroxysmal atrial fibrillation (HCC) 6/6/2013   • Sleep apnea     CPAP   • Snoring      Past Surgical History:   Procedure Laterality Date   • FEMUR ORIF Left 2/24/2016    Procedure: DISTAL FEMUR ORIF;  Surgeon: Montrell Eddy M.D.;  Location: [facility];  Service:    • CYSTOSCOPY STENT PLACEMENT  1/13/2014    Performed by Main Batista M.D. at [facility]   • URETEROSCOPY  1/13/2014    Performed by Main Batista M.D. at SUR[facility]ERY [facility]   • LASERTRIPSY  1/13/2014    Performed by Main Batista M.D. at SUR[facility]ERY [facility]   • OTHER      Surgery for kidney stones   • RECOVERY  7/11/2013    Performed by Cath-Recovery Surgery at [facility]   • KNEE ARTHROPLASTY TOTAL  12/1/2009    Performed by GIACOMO PETTIT at SUR[facility]ERY [facility]   • KNEE ARTHROPLASTY TOTAL  3/2009    Left   • LUMBAR LAMINECTOMY DISKECTOMY  7/2006    L1-L5/ residual left foot weaknesss    • ACL REPAIR  12/2005    Right   • FEMUR ORIF       Family History   Problem Relation Age of Onset   • Heart Attack Father 59   • Heart Disease Father    • Hypertension Brother    • Diabetes Brother    • Arthritis Brother    • Thyroid Mother 50   • Breast Cancer Sister    • No Known Problems Maternal Grandmother    • No Known Problems Maternal Grandfather    • No Known Problems Paternal Grandmother    • No Known Problems " Paternal Grandfather    • Arrythmia Brother    • No Known Problems Brother    • No Known Problems Daughter      Social History     Social History   • Marital status:      Spouse name: N/A   • Number of children: N/A   • Years of education: N/A     Occupational History   • Not on file.     Social History Main Topics   • Smoking status: Never Smoker   • Smokeless tobacco: Never Used   • Alcohol use 6.0 oz/week     10 Glasses of wine per week   • Drug use: No   • Sexual activity: No     Other Topics Concern   • Not on file     Social History Narrative   • No narrative on file     Allergies   Allergen Reactions   • Nkda [No Known Drug Allergy]      Outpatient Encounter Prescriptions as of 6/13/2018   Medication Sig Dispense Refill   • atorvastatin (LIPITOR) 10 MG Tab Take 1 Tab by mouth every day. 90 Tab 3   • metformin (GLUCOPHAGE) 1000 MG tablet TAKE ONE TABLET BY MOUTH TWICE DAILY WITH MEALS 180 Tab 3   • enalapril (VASOTEC) 10 MG Tab TAKE ONE TABLET BY MOUTH ONCE A DAY 90 Tab 3   • Empagliflozin (JARDIANCE) 10 MG Tab Take 1 Each by mouth every day. 30 Tab 11   • warfarin (COUMADIN) 5 MG Tab Take one to one and one-half (1-1.5) tablets daily as directed by [facility] (Patient taking differently: Take one to one and one-half (1-1.5) tablets daily as directed by [facility]  TU, WED, THURS: TAKE 7.5MG, 5MG FOR THE REST OF THE DAYS OF THE WEEK.) 135 Tab 1   • metoprolol (LOPRESSOR) 25 MG Tab TAKE ONE TABLET BY MOUTH TWICE DAILY 180 Tab 2   • VITAMIN D, CHOLECALCIFEROL, PO Take 5,000 Units by mouth.     • Lecithin 1200 MG CAPS Take 1 Cap by mouth 2 Times a Day.     • [DISCONTINUED] lovastatin (MEVACOR) 10 MG tablet Take 1 Tab by mouth every day. 90 Tab 0   • Misc. Devices Misc TDaP IM at pharmacy 1 Each 0   • ALPHA LIPOIC ACID PO Take 200 mg by mouth 2 Times a Day.       No facility-administered encounter medications on file as of 6/13/2018.      Review of Systems  "  Constitutional: Negative for chills and fever.   HENT: Negative for sore throat.    Eyes: Negative for blurred vision.   Respiratory: Negative for cough and shortness of breath.    Cardiovascular: Negative for chest pain, palpitations, claudication, leg swelling and PND.   Gastrointestinal: Negative for abdominal pain and nausea.   Musculoskeletal: Negative for falls and joint pain.   Skin: Negative for rash.   Neurological: Negative for dizziness, focal weakness and weakness.   Endo/Heme/Allergies: Does not bruise/bleed easily.        Objective:   /64   Pulse 81   Ht 1.956 m (6' 5\")   Wt 108 kg (238 lb)   SpO2 97%   BMI 28.22 kg/m²     Physical Exam   Constitutional: No distress.   HENT:   Mouth/Throat: Oropharynx is clear and moist. No oropharyngeal exudate.   Eyes: No scleral icterus.   Neck: No JVD present.   Cardiovascular: Normal rate and normal heart sounds.  Exam reveals no gallop and no friction rub.    No murmur heard.  Pulmonary/Chest: No respiratory distress. He has no wheezes. He has no rales.   Abdominal: Soft. Bowel sounds are normal.   Musculoskeletal: He exhibits no edema.   Neurological: He is alert.   Skin: No rash noted. He is not diaphoretic.   Psychiatric: He has a normal mood and affect.       Assessment:     1. Paroxysmal atrial fibrillation (HCC)     2. Essential hypertension     3. Chronic anticoagulation     4. Dyslipidemia         Medical Decision Making:  Today's Assessment / Status / Plan:     It was my pleasure to meet with Mr. Perez.      He is doing well on his current therapy    Encouraged him to work on some weight loss  I will see Mr. Perez back in 1 year time and encouraged him to follow up with us over the phone or e-mail using my MyChart as issues arise.    It is my pleasure to participate in the care of Mr. Perez.  Please do not hesitate to contact me with questions or concerns.    Cheikh Turner MD PhD FAC  Cardiologist [facility]

## 2018-07-03 ENCOUNTER — OFFICE VISIT (OUTPATIENT)
Dept: MEDICAL GROUP | Facility: MEDICAL CENTER | Age: 73
End: 2018-07-03
Payer: MEDICARE

## 2018-07-03 VITALS
WEIGHT: 236 LBS | DIASTOLIC BLOOD PRESSURE: 72 MMHG | RESPIRATION RATE: 16 BRPM | HEIGHT: 76 IN | TEMPERATURE: 98.2 F | HEART RATE: 79 BPM | SYSTOLIC BLOOD PRESSURE: 112 MMHG | OXYGEN SATURATION: 96 % | BODY MASS INDEX: 28.74 KG/M2

## 2018-07-03 DIAGNOSIS — E78.5 DYSLIPIDEMIA: ICD-10-CM

## 2018-07-03 DIAGNOSIS — Z79.01 CHRONIC ANTICOAGULATION: ICD-10-CM

## 2018-07-03 DIAGNOSIS — M79.602 PAIN OF LEFT UPPER EXTREMITY: ICD-10-CM

## 2018-07-03 DIAGNOSIS — I10 ESSENTIAL HYPERTENSION: ICD-10-CM

## 2018-07-03 DIAGNOSIS — I48.0 PAROXYSMAL ATRIAL FIBRILLATION (HCC): ICD-10-CM

## 2018-07-03 LAB
HBA1C MFR BLD: 8.3 % (ref ?–5.8)
INT CON NEG: NEGATIVE
INT CON POS: POSITIVE

## 2018-07-03 PROCEDURE — 99214 OFFICE O/P EST MOD 30 MIN: CPT | Performed by: NURSE PRACTITIONER

## 2018-07-03 PROCEDURE — 83036 HEMOGLOBIN GLYCOSYLATED A1C: CPT | Performed by: NURSE PRACTITIONER

## 2018-07-03 ASSESSMENT — ENCOUNTER SYMPTOMS: MYALGIAS: 1

## 2018-07-03 NOTE — PROGRESS NOTES
Subjective:      Cheikh Perez is a 73 y.o. male who presents with Follow-Up (3 month)        CC: Patient is here today for follow-up on diabetes, hypertension, dyslipidemia and atrial fibrillation as well as pain in the left bicep.    HPI Cheikh Perez      1. Uncontrolled type 2 diabetes mellitus without complication, without long-term current use of insulin (HCC)  Patient's previous hemoglobin A1c from 3 months ago was elevated at 9.9 despite high-dose metformin. Low dose Jardiance was added and patient states he is tolerating it well and his hemoglobin A1c has improved but is still elevated at 8.3.    2. Pain of left upper extremity  Patient reports that about 2 months ago he was lifting something with his left arm and since then he has been having some pain in his left bicep area. The pain only occurs with movement of the left arm and with lifting. It is not radiating into his hands or wrist. He denies shoulder pain.    3. Essential hypertension  Patient continues on metoprolol and blood pressure is been well controlled.    4. Paroxysmal atrial fibrillation (HCC)  Patient on Coumadin and metoprolol and appears rate controlled.    5. Dyslipidemia  Patient on low-dose Lipitor 10 mg which was changed not too long ago from simvastatin for potential interaction concerns. He states he has no problems with the new medicine but needs lab work.    6. Chronic anticoagulation  Patient follows with the Coumadin clinic for this.  Current Outpatient Prescriptions   Medication Sig Dispense Refill   • Empagliflozin 25 MG Tab Take 1 Each by mouth every day. 30 Tab 11   • atorvastatin (LIPITOR) 10 MG Tab Take 1 Tab by mouth every day. 90 Tab 3   • metformin (GLUCOPHAGE) 1000 MG tablet TAKE ONE TABLET BY MOUTH TWICE DAILY WITH MEALS 180 Tab 3   • enalapril (VASOTEC) 10 MG Tab TAKE ONE TABLET BY MOUTH ONCE A DAY 90 Tab 3   • Misc. Devices Misc TDaP IM at pharmacy 1 Each 0   • warfarin (COUMADIN) 5  "MG Tab Take one to one and one-half (1-1.5) tablets daily as directed by [facility] (Patient taking differently: Take one to one and one-half (1-1.5) tablets daily as directed by [facility]  TU, WED, THURS: TAKE 7.5MG, 5MG FOR THE REST OF THE DAYS OF THE WEEK.) 135 Tab 1   • metoprolol (LOPRESSOR) 25 MG Tab TAKE ONE TABLET BY MOUTH TWICE DAILY 180 Tab 2   • VITAMIN D, CHOLECALCIFEROL, PO Take 5,000 Units by mouth.     • ALPHA LIPOIC ACID PO Take 200 mg by mouth 2 Times a Day.     • Lecithin 1200 MG CAPS Take 1 Cap by mouth 2 Times a Day.       No current facility-administered medications for this visit.      Social History   Substance Use Topics   • Smoking status: Never Smoker   • Smokeless tobacco: Never Used   • Alcohol use 6.0 oz/week     10 Glasses of wine per week     Family History   Problem Relation Age of Onset   • Heart Attack Father 59   • Heart Disease Father    • Hypertension Brother    • Diabetes Brother    • Arthritis Brother    • Thyroid Mother 50   • Breast Cancer Sister    • No Known Problems Maternal Grandmother    • No Known Problems Maternal Grandfather    • No Known Problems Paternal Grandmother    • No Known Problems Paternal Grandfather    • Arrythmia Brother    • No Known Problems Brother    • No Known Problems Daughter      Past Medical History:   Diagnosis Date   • Arrhythmia July 2013    AFIB, cardioversion   • Arthritis    • Diabetes     Diet,oral meds   • Hypertension    • Hypertension    • Pain      \"stiff\" from arthritis, 2/10   • Paroxysmal atrial fibrillation (HCC) 6/6/2013   • Sleep apnea     CPAP   • Snoring        Review of Systems   Musculoskeletal: Positive for myalgias.   All other systems reviewed and are negative.         Objective:     /72   Pulse 79   Temp 36.8 °C (98.2 °F)   Resp 16   Ht 1.93 m (6' 4\")   Wt 107 kg (236 lb)   SpO2 96%   BMI 28.73 kg/m²      Physical Exam   Constitutional: He is oriented to person, " place, and time. He appears well-developed and well-nourished. No distress.   HENT:   Head: Normocephalic and atraumatic.   Right Ear: External ear normal.   Left Ear: External ear normal.   Nose: Nose normal.   Mouth/Throat: Oropharynx is clear and moist.   Eyes: Conjunctivae are normal. Right eye exhibits no discharge. Left eye exhibits no discharge.   Neck: Normal range of motion. Neck supple. No tracheal deviation present. No thyromegaly present.   Cardiovascular: Normal rate, regular rhythm and normal heart sounds.    No murmur heard.  Pulmonary/Chest: Effort normal and breath sounds normal. No respiratory distress. He has no wheezes. He has no rales.   Musculoskeletal:   Good range of motion of left arm and shoulder.   Lymphadenopathy:     He has no cervical adenopathy.   Neurological: He is alert and oriented to person, place, and time. Coordination normal.   Skin: Skin is warm and dry. No rash noted. He is not diaphoretic. No erythema.   Psychiatric: He has a normal mood and affect. His behavior is normal. Judgment and thought content normal.   Nursing note and vitals reviewed.              Assessment/Plan:     1. Uncontrolled type 2 diabetes mellitus without complication, without long-term current use of insulin ([facility])  Patient advised that his hemoglobin A1c has improved but is still not at goal. I will increase his Jardiance to 25 mg a day and have him do lab work in 3 months and then follow-up. He will continue to monitor his carbohydrate intake.  - POCT  A1C  - COMP METABOLIC PANEL; Future  - HEMOGLOBIN A1C; Future  - Empagliflozin 25 MG Tab; Take 1 Each by mouth every day.  Dispense: 30 Tab; Refill: 11    2. Pain of left upper extremity  I offered to refer patient to physical therapy but he declined. He states if symptoms do not improve or worsen he will contact me.    3. Essential hypertension  Blood pressure currently well controlled on medication.    4. Paroxysmal atrial fibrillation (HCC)  Patient  will continue to follow with the Coumadin clinic and cardiology. He reports no chest pain.    5. Dyslipidemia  I will have patient do a lipid panel before his next visit to see if his Lipitor dosage needs to be increased.  - LIPID PROFILE; Future    6. Chronic anticoagulation  Patient will continue to follow with the Coumadin clinic.

## 2018-07-24 ENCOUNTER — APPOINTMENT (OUTPATIENT)
Dept: VASCULAR LAB | Facility: MEDICAL CENTER | Age: 73
End: 2018-07-24
Payer: MEDICARE

## 2018-07-31 ENCOUNTER — ANTICOAGULATION VISIT (OUTPATIENT)
Dept: VASCULAR LAB | Facility: MEDICAL CENTER | Age: 73
End: 2018-07-31
Attending: INTERNAL MEDICINE
Payer: MEDICARE

## 2018-07-31 VITALS — HEART RATE: 63 BPM | SYSTOLIC BLOOD PRESSURE: 106 MMHG | DIASTOLIC BLOOD PRESSURE: 81 MMHG

## 2018-07-31 DIAGNOSIS — Z79.01 CHRONIC ANTICOAGULATION: ICD-10-CM

## 2018-07-31 DIAGNOSIS — I48.0 PAROXYSMAL ATRIAL FIBRILLATION (HCC): ICD-10-CM

## 2018-07-31 LAB — INR PPP: 2 (ref 2–3.5)

## 2018-07-31 PROCEDURE — 85610 PROTHROMBIN TIME: CPT

## 2018-07-31 PROCEDURE — 99211 OFF/OP EST MAY X REQ PHY/QHP: CPT

## 2018-07-31 NOTE — PROGRESS NOTES
Anticoagulation Summary  As of 7/31/2018    INR goal:   2.0-3.0   TTR:   91.7 % (2.2 y)   Today's INR:   2.0   Warfarin maintenance plan:   7.5 mg (5 mg x 1.5) on Tue, Thu, Sat; 5 mg (5 mg x 1) all other days   Weekly warfarin total:   42.5 mg   Plan last modified:   REY Brandon (6/28/2016)   Next INR check:   10/30/2018   Target end date:   Indefinite    Indications    Paroxysmal atrial fibrillation (HCC) [I48.0]  Chronic anticoagulation [Z79.01]             Anticoagulation Episode Summary     INR check location[facility]   [facility]    Preferred lab:       Send INR reminders to:       Comments:         Anticoagulation Care Providers     Provider Role Specialty Phone number    Cheikh Turner M.D. Referring Cardiology [phone]    [facility] Responsible  [phone]        Anticoagulation Patient Findings  Patient Findings     Negatives:   Signs/symptoms of thrombosis, Signs/symptoms of bleeding, Laboratory test error suspected, Change in health, Change in alcohol use, Change in activity, Upcoming invasive procedure, Emergency department visit, Upcoming dental procedure, Missed doses, Extra doses, Change in medications, Change in diet/appetite, Hospital admission, Bruising, Other complaints        History of Present Illness: follow up appointment for chronic anticoagulation with the high risk medication, warfarin for atrial fibrillation.  Pt remains therapeutic today. Pt is to continue with current warfarin dosing regimen.  Follow up in 12 weeks, to reduce risk of adverse events related to this high risk medication,  Warfarin.    Lydia Pierce, PharmD

## 2018-08-01 LAB — INR BLD: 2 (ref 0.9–1.2)

## 2018-08-11 DIAGNOSIS — I10 ESSENTIAL HYPERTENSION: ICD-10-CM

## 2018-09-11 ENCOUNTER — OFFICE VISIT (OUTPATIENT)
Dept: URGENT CARE | Facility: CLINIC | Age: 73
End: 2018-09-11
Payer: MEDICARE

## 2018-09-11 VITALS
RESPIRATION RATE: 18 BRPM | TEMPERATURE: 98 F | HEART RATE: 84 BPM | SYSTOLIC BLOOD PRESSURE: 116 MMHG | DIASTOLIC BLOOD PRESSURE: 72 MMHG | OXYGEN SATURATION: 94 %

## 2018-09-11 DIAGNOSIS — S63.502A SPRAIN OF LEFT WRIST, INITIAL ENCOUNTER: ICD-10-CM

## 2018-09-11 PROCEDURE — 99214 OFFICE O/P EST MOD 30 MIN: CPT | Performed by: FAMILY MEDICINE

## 2018-09-11 RX ORDER — DEXAMETHASONE SODIUM PHOSPHATE 4 MG/ML
4 INJECTION, SOLUTION INTRA-ARTICULAR; INTRALESIONAL; INTRAMUSCULAR; INTRAVENOUS; SOFT TISSUE ONCE
Status: COMPLETED | OUTPATIENT
Start: 2018-09-11 | End: 2018-09-11

## 2018-09-11 RX ORDER — PREDNISONE 10 MG/1
30 TABLET ORAL EVERY MORNING
Qty: 21 TAB | Refills: 0 | Status: SHIPPED | OUTPATIENT
Start: 2018-09-11 | End: 2018-09-18

## 2018-09-11 RX ADMIN — DEXAMETHASONE SODIUM PHOSPHATE 4 MG: 4 INJECTION, SOLUTION INTRA-ARTICULAR; INTRALESIONAL; INTRAMUSCULAR; INTRAVENOUS; SOFT TISSUE at 13:30

## 2018-09-11 NOTE — PROGRESS NOTES
"Subjective:      Cheikh Perez is a 73 y.o. male who presents with Wrist Pain (Lt wrist pain, redness and swollen keeping him up at night time. no injury )    Chief Complaint   Patient presents with   • Wrist Pain     Lt wrist pain, redness and swollen keeping him up at night time. no injury         - This is a very pleasant 73 y.o. male with complaints of pain swelling Lt wrist x 3-4 days. No trauma. Woke up with it.           ALLERGIES:  Nkda [no known drug allergy]     PMH:  Past Medical History:   Diagnosis Date   • Arrhythmia July 2013    AFIB, cardioversion   • Arthritis    • Diabetes     Diet,oral meds   • Hypertension    • Hypertension    • Pain      \"stiff\" from arthritis, 2/10   • Paroxysmal atrial fibrillation (HCC) 6/6/2013   • Sleep apnea     CPAP   • Snoring         MEDS:    Current Outpatient Prescriptions:   •  predniSONE (DELTASONE) 10 MG Tab, Take 3 Tabs by mouth every morning for 7 days., Disp: 21 Tab, Rfl: 0  •  metoprolol (LOPRESSOR) 25 MG Tab, TAKE ONE TABLET BY MOUTH TWICE DAILY, Disp: 180 Tab, Rfl: 2  •  Empagliflozin 25 MG Tab, Take 1 Each by mouth every day., Disp: 30 Tab, Rfl: 11  •  atorvastatin (LIPITOR) 10 MG Tab, Take 1 Tab by mouth every day., Disp: 90 Tab, Rfl: 3  •  metformin (GLUCOPHAGE) 1000 MG tablet, TAKE ONE TABLET BY MOUTH TWICE DAILY WITH MEALS, Disp: 180 Tab, Rfl: 3  •  enalapril (VASOTEC) 10 MG Tab, TAKE ONE TABLET BY MOUTH ONCE A DAY, Disp: 90 Tab, Rfl: 3  •  warfarin (COUMADIN) 5 MG Tab, Take one to one and one-half (1-1.5) tablets daily as directed by [facility] (Patient taking differently: Take one to one and one-half (1-1.5) tablets daily as directed by [facility] TU, WED, THURS: TAKE 7.5MG, 5MG FOR THE REST OF THE DAYS OF THE WEEK.), Disp: 135 Tab, Rfl: 1  •  VITAMIN D, CHOLECALCIFEROL, PO, Take 5,000 Units by mouth., Disp: , Rfl:   •  Misc. Devices Misc, TDaP IM at pharmacy, Disp: 1 Each, Rfl: 0  •  ALPHA " LIPOIC ACID PO, Take 200 mg by mouth 2 Times a Day., Disp: , Rfl:   •  Lecithin 1200 MG CAPS, Take 1 Cap by mouth 2 Times a Day., Disp: , Rfl:     Current Facility-Administered Medications:   •  dexamethasone (DECADRON) injection 4 mg, 4 mg, Oral, Once, Simone Melton M.D.    ** I have documented what I find to be significant in regards to past medical, social, family and surgical history  in my HPI or under PMH/PSH/FH review section, otherwise it is contributory **           HPI    Review of Systems   All other systems reviewed and are negative.         Objective:     /72   Pulse 84   Temp 36.7 °C (98 °F)   Resp 18   SpO2 94%      Physical Exam   Constitutional: He appears well-developed. No distress.   HENT:   Head: Normocephalic and atraumatic.   Cardiovascular: Regular rhythm.    No murmur heard.  Pulmonary/Chest: Effort normal. No respiratory distress.   Neurological: He is alert. He exhibits normal muscle tone.   Skin: Skin is warm and dry.   Psychiatric: He has a normal mood and affect. Judgment normal.   Nursing note and vitals reviewed.  Lt wrist w/ edema erythema and TTP and pain w/ ROM.             Assessment/Plan:         1. Gout  dexamethasone (DECADRON) injection 4 mg    predniSONE (DELTASONE) 10 MG Tab       - rest  - wrist splint  - 2-3 day recheck prn         Dx & d/c instructions discussed w/ patient and/or family members.     ER precautions (worsening signs symptoms and when to go to ER) discussed.    Follow up w/ PCP in 2-3 days to make sure symptoms improving and no further intervention/treatment and/or work-up needed was advised, ER if feeling worse or not improving in 2 days.    Possible side effects (i.e. Rash, GI upset/constipation, sedation, elevation of BP or sugars) of any medications given discussed.

## 2018-10-01 DIAGNOSIS — I48.0 PAROXYSMAL ATRIAL FIBRILLATION (HCC): ICD-10-CM

## 2018-10-02 RX ORDER — WARFARIN SODIUM 5 MG/1
TABLET ORAL
Qty: 135 TAB | Refills: 0 | Status: SHIPPED | OUTPATIENT
Start: 2018-10-02 | End: 2019-01-18 | Stop reason: SDUPTHER

## 2018-10-08 ENCOUNTER — HOSPITAL ENCOUNTER (OUTPATIENT)
Dept: LAB | Facility: MEDICAL CENTER | Age: 73
End: 2018-10-08
Attending: NURSE PRACTITIONER
Payer: MEDICARE

## 2018-10-08 DIAGNOSIS — E78.5 DYSLIPIDEMIA: ICD-10-CM

## 2018-10-08 LAB
ALBUMIN SERPL BCP-MCNC: 4.2 G/DL (ref 3.2–4.9)
ALBUMIN/GLOB SERPL: 1.4 G/DL
ALP SERPL-CCNC: 65 U/L (ref 30–99)
ALT SERPL-CCNC: 20 U/L (ref 2–50)
ANION GAP SERPL CALC-SCNC: 11 MMOL/L (ref 0–11.9)
AST SERPL-CCNC: 17 U/L (ref 12–45)
BILIRUB SERPL-MCNC: 1 MG/DL (ref 0.1–1.5)
BUN SERPL-MCNC: 17 MG/DL (ref 8–22)
CALCIUM SERPL-MCNC: 9.8 MG/DL (ref 8.5–10.5)
CHLORIDE SERPL-SCNC: 104 MMOL/L (ref 96–112)
CHOLEST SERPL-MCNC: 138 MG/DL (ref 100–199)
CO2 SERPL-SCNC: 27 MMOL/L (ref 20–33)
CREAT SERPL-MCNC: 0.81 MG/DL (ref 0.5–1.4)
EST. AVERAGE GLUCOSE BLD GHB EST-MCNC: 200 MG/DL
FASTING STATUS PATIENT QL REPORTED: NORMAL
GLOBULIN SER CALC-MCNC: 3.1 G/DL (ref 1.9–3.5)
GLUCOSE SERPL-MCNC: 156 MG/DL (ref 65–99)
HBA1C MFR BLD: 8.6 % (ref 0–5.6)
HDLC SERPL-MCNC: 57 MG/DL
LDLC SERPL CALC-MCNC: 66 MG/DL
POTASSIUM SERPL-SCNC: 4.6 MMOL/L (ref 3.6–5.5)
PROT SERPL-MCNC: 7.3 G/DL (ref 6–8.2)
SODIUM SERPL-SCNC: 142 MMOL/L (ref 135–145)
TRIGL SERPL-MCNC: 76 MG/DL (ref 0–149)

## 2018-10-08 PROCEDURE — 80061 LIPID PANEL: CPT

## 2018-10-08 PROCEDURE — 80053 COMPREHEN METABOLIC PANEL: CPT

## 2018-10-08 PROCEDURE — 83036 HEMOGLOBIN GLYCOSYLATED A1C: CPT

## 2018-10-08 PROCEDURE — 36415 COLL VENOUS BLD VENIPUNCTURE: CPT

## 2018-10-17 ENCOUNTER — OFFICE VISIT (OUTPATIENT)
Dept: MEDICAL GROUP | Facility: MEDICAL CENTER | Age: 73
End: 2018-10-17
Payer: MEDICARE

## 2018-10-17 VITALS
OXYGEN SATURATION: 98 % | HEIGHT: 76 IN | RESPIRATION RATE: 16 BRPM | WEIGHT: 233 LBS | DIASTOLIC BLOOD PRESSURE: 76 MMHG | SYSTOLIC BLOOD PRESSURE: 124 MMHG | BODY MASS INDEX: 28.37 KG/M2 | HEART RATE: 75 BPM

## 2018-10-17 DIAGNOSIS — Z87.39 H/O: GOUT: ICD-10-CM

## 2018-10-17 DIAGNOSIS — Z23 INFLUENZA VACCINE NEEDED: ICD-10-CM

## 2018-10-17 DIAGNOSIS — R35.0 URINARY FREQUENCY: ICD-10-CM

## 2018-10-17 PROCEDURE — 90662 IIV NO PRSV INCREASED AG IM: CPT | Performed by: NURSE PRACTITIONER

## 2018-10-17 PROCEDURE — G0008 ADMIN INFLUENZA VIRUS VAC: HCPCS | Performed by: NURSE PRACTITIONER

## 2018-10-17 PROCEDURE — 99214 OFFICE O/P EST MOD 30 MIN: CPT | Mod: 25 | Performed by: NURSE PRACTITIONER

## 2018-10-17 RX ORDER — GLIMEPIRIDE 1 MG/1
1 TABLET ORAL EVERY MORNING
Qty: 30 TAB | Refills: 11 | Status: SHIPPED | OUTPATIENT
Start: 2018-10-17 | End: 2019-04-09 | Stop reason: SDUPTHER

## 2018-10-17 NOTE — PROGRESS NOTES
Subjective:      Cheikh Perez is a 73 y.o. male who presents with Follow-Up (3 month)        CC: Patient here today for follow-up on diabetes as well as recent gout and problems with urinary frequency.      HPI Cheikh Perez      1. Uncontrolled type 2 diabetes mellitus without complication, without long-term current use of insulin (HCC)  Patient was last seen 3 months ago and hemoglobin A1c was high so Jardiance was added to his metformin.  Despite this, his hemoglobin A1c continues to be high at 8.6.  He is trying to watch his carbohydrates but review of his diet with him today shows he eats granola and toaster waffles for breakfast.  He also has 1-2 glasses of wine per night.    2. Urinary frequency  Patient reports he finds that he is urinating more at night.  He does have a urologist who follows with yearly and is not currently on medications for this.  He also started on Jardiance a few months ago.     3. H/O: gout  Patient was seen at urgent care last month for wrist pain which was felt might have been gout related.  He is not on a diuretic but does drink alcohol daily.  He is not having any further issues but needs uric acid checked.      Social History   Substance Use Topics   • Smoking status: Never Smoker   • Smokeless tobacco: Never Used   • Alcohol use 6.0 oz/week     10 Glasses of wine per week     Current Outpatient Prescriptions   Medication Sig Dispense Refill   • glimepiride (AMARYL) 1 MG tablet Take 1 Tab by mouth every morning. 30 Tab 11   • warfarin (COUMADIN) 5 MG Tab TAKE ONE TO ONE AND ONE-HALF TABLETS DAILY AS DIRECTED BY [facility] 135 Tab 0   • metoprolol (LOPRESSOR) 25 MG Tab TAKE ONE TABLET BY MOUTH TWICE DAILY 180 Tab 2   • Empagliflozin 25 MG Tab Take 1 Each by mouth every day. 30 Tab 11   • atorvastatin (LIPITOR) 10 MG Tab Take 1 Tab by mouth every day. 90 Tab 3   • metformin (GLUCOPHAGE) 1000 MG tablet TAKE ONE TABLET BY MOUTH  "TWICE DAILY WITH MEALS 180 Tab 3   • enalapril (VASOTEC) 10 MG Tab TAKE ONE TABLET BY MOUTH ONCE A DAY 90 Tab 3   • Misc. Devices Misc TDaP IM at pharmacy 1 Each 0   • VITAMIN D, CHOLECALCIFEROL, PO Take 5,000 Units by mouth.     • ALPHA LIPOIC ACID PO Take 200 mg by mouth 2 Times a Day.     • Lecithin 1200 MG CAPS Take 1 Cap by mouth 2 Times a Day.       No current facility-administered medications for this visit.      Family History   Problem Relation Age of Onset   • Heart Attack Father 59   • Heart Disease Father    • Hypertension Brother    • Diabetes Brother    • Arthritis Brother    • Thyroid Mother 50   • Breast Cancer Sister    • No Known Problems Maternal Grandmother    • No Known Problems Maternal Grandfather    • No Known Problems Paternal Grandmother    • No Known Problems Paternal Grandfather    • Arrythmia Brother    • No Known Problems Brother    • No Known Problems Daughter      Past Medical History:   Diagnosis Date   • Arrhythmia July 2013    AFIB, cardioversion   • Arthritis    • Diabetes     Diet,oral meds   • Hypertension    • Hypertension    • Pain      \"stiff\" from arthritis, 2/10   • Paroxysmal atrial fibrillation (HCC) 6/6/2013   • Sleep apnea     CPAP   • Snoring        Review of Systems   Genitourinary: Positive for frequency.   All other systems reviewed and are negative.         Objective:     /76 (BP Location: Left arm, Patient Position: Sitting, BP Cuff Size: Adult)   Pulse 75   Resp 16   Ht 1.93 m (6' 4\")   Wt 105.7 kg (233 lb)   SpO2 98%   BMI 28.36 kg/m²      Physical Exam   Constitutional: He is oriented to person, place, and time. He appears well-developed and well-nourished. No distress.   HENT:   Head: Normocephalic and atraumatic.   Right Ear: External ear normal.   Left Ear: External ear normal.   Nose: Nose normal.   Mouth/Throat: Oropharynx is clear and moist.   Eyes: Conjunctivae are normal. Right eye exhibits no discharge. Left eye exhibits no discharge. "   Neck: Normal range of motion. Neck supple. No tracheal deviation present. No thyromegaly present.   Cardiovascular: Normal rate, regular rhythm and normal heart sounds.    No murmur heard.  Pulmonary/Chest: Effort normal and breath sounds normal. No respiratory distress. He has no wheezes. He has no rales.   Lymphadenopathy:     He has no cervical adenopathy.   Neurological: He is alert and oriented to person, place, and time. Coordination normal.   Skin: Skin is warm and dry. No rash noted. He is not diaphoretic. No erythema.   Psychiatric: He has a normal mood and affect. His behavior is normal. Judgment and thought content normal.   Nursing note and vitals reviewed.              Assessment/Plan:     1. Uncontrolled type 2 diabetes mellitus without complication, without long-term current use of insulin (HCC)  Patient's hemoglobin A1c is still high despite the increased Jardiance dosage and top dosage of metformin.  He does not want anything that might be expensive so I will start him on low-dose Amaryl with the understanding he will also try to stop eating cereals in the morning and decrease his alcohol consumption.  I will have him follow back in 3 months with myself and the diabetic nurse.  - HEMOGLOBIN A1C; Future  - COMP METABOLIC PANEL; Future    2. Urinary frequency  This could be BPH related but also the Jardiance may be a contributing factor and I told him once his blood sugars improve, he will urinate less frequently.  I offered to try him on Flomax but he states he has an appointment with his urologist and will discuss this with him.    3. H/O: gout  Patient is not on a diuretic but does drink alcohol daily and does not wish to quit doing this.  I will check uric acid levels to see if he might need allopurinol.  - URIC ACID; Future    4. Influenza vaccine needed  I have placed the below orders and discussed them with an approved delegating provider. The MA is performing the below orders under the  direction of Dr. Núñez    - INFLUENZA VACCINE, HIGH DOSE (65+ ONLY)

## 2018-10-25 DIAGNOSIS — I48.91 ATRIAL FIBRILLATION, UNSPECIFIED TYPE (HCC): ICD-10-CM

## 2018-10-30 ENCOUNTER — ANTICOAGULATION VISIT (OUTPATIENT)
Dept: VASCULAR LAB | Facility: MEDICAL CENTER | Age: 73
End: 2018-10-30
Attending: INTERNAL MEDICINE
Payer: MEDICARE

## 2018-10-30 DIAGNOSIS — I48.0 PAROXYSMAL ATRIAL FIBRILLATION (HCC): ICD-10-CM

## 2018-10-30 DIAGNOSIS — Z79.01 CHRONIC ANTICOAGULATION: ICD-10-CM

## 2018-10-30 LAB
INR BLD: 1.7 (ref 0.9–1.2)
INR PPP: 1.7 (ref 2–3.5)

## 2018-10-30 PROCEDURE — 99212 OFFICE O/P EST SF 10 MIN: CPT | Performed by: PHARMACIST

## 2018-10-30 PROCEDURE — 85610 PROTHROMBIN TIME: CPT

## 2018-10-30 NOTE — PROGRESS NOTES
Anticoagulation Summary  As of 10/30/2018    INR goal:   2.0-3.0   TTR:   82.2 % (2.4 y)   Today's INR:   1.7!   Warfarin maintenance plan:   5 mg (5 mg x 1) on Mon, Wed, Fri; 7.5 mg (5 mg x 1.5) all other days   Weekly warfarin total:   45 mg   Plan last modified:   Kandice Mack, PharmD (10/30/2018)   Next INR check:   11/27/2018   Target end date:   Indefinite    Indications    Paroxysmal atrial fibrillation (HCC) [I48.0]  Chronic anticoagulation [Z79.01]             Anticoagulation Episode Summary     [facility]   [facility]    Preferred lab:       Send INR reminders to:       Comments:         Anticoagulation Care Providers     Provider Role Specialty Phone number    Cheikh Turner M.D. Referring Cardiology [phone]    [facility] Responsible  [phone]        Anticoagulation Patient Findings      HPI:  Cheikh Perez seen in clinic today, on anticoagulation therapy with warfarin for PAF  Changes to current medical/health status since last appt: pt has lost 12 lbs  Denies signs/symptoms of bleeding and/or thrombosis since the last appt.    Denies any interval changes to diet  Denies any interval changes to medications since last appt.   Denies any complications or cost restrictions with current therapy.   BP declined      A/P   INR  is sub-therapeutic.   Most likely due to weight loss, will have pt take a boost dose of 10mg x1 today and then start a 6% increased weekly dose.     Follow up appointment in 4 week(s) per pt preference.    Kandice Mack, MatthewD

## 2018-11-27 ENCOUNTER — ANTICOAGULATION VISIT (OUTPATIENT)
Dept: VASCULAR LAB | Facility: MEDICAL CENTER | Age: 73
End: 2018-11-27
Attending: INTERNAL MEDICINE
Payer: MEDICARE

## 2018-11-27 VITALS — HEART RATE: 61 BPM | DIASTOLIC BLOOD PRESSURE: 85 MMHG | SYSTOLIC BLOOD PRESSURE: 123 MMHG

## 2018-11-27 DIAGNOSIS — I48.0 PAROXYSMAL ATRIAL FIBRILLATION (HCC): ICD-10-CM

## 2018-11-27 DIAGNOSIS — Z79.01 CHRONIC ANTICOAGULATION: ICD-10-CM

## 2018-11-27 LAB
INR BLD: 2.1 (ref 0.9–1.2)
INR PPP: 2.1 (ref 2–3.5)

## 2018-11-27 PROCEDURE — 85610 PROTHROMBIN TIME: CPT

## 2018-11-27 PROCEDURE — 99211 OFF/OP EST MAY X REQ PHY/QHP: CPT

## 2018-11-27 NOTE — PROGRESS NOTES
Anticoagulation Summary  As of 11/27/2018    INR goal:   2.0-3.0   TTR:   80.4 % (2.5 y)   Today's INR:   2.1   Warfarin maintenance plan:   5 mg (5 mg x 1) on Mon, Wed, Fri; 7.5 mg (5 mg x 1.5) all other days   Weekly warfarin total:   45 mg   Plan last modified:   Kandice Mack, PharmD (10/30/2018)   Next INR check:   12/27/2018   Target end date:   Indefinite    Indications    Paroxysmal atrial fibrillation (HCC) [I48.0]  Chronic anticoagulation [Z79.01]             Anticoagulation Episode Summary     INR check location[facility]   [facility]    Preferred lab:       Send INR reminders to:       Comments:         Anticoagulation Care Providers     Provider Role Specialty Phone number    Cheikh Turner M.D. Referring Cardiology [phone]    [facility] Responsible  [phone]        Anticoagulation Patient Findings      HPI:  Cheikh Perez seen in clinic today, on anticoagulation therapy with warfarin for PAF  Changes to current medical/health status since last appt: none  Denies signs/symptoms of bleeding and/or thrombosis since the last appt.    Denies any interval changes to diet  Denies any interval changes to medications since last appt.   Denies any complications or cost restrictions with current therapy.   BP recorded in vitals.      A/P   INR therapeutic.   Continue current warfarin regimen of 5 mg MWF and 7.5 mg all other days.       Follow up appointment in 4 week(s).    Ervin Pickard, Pharmacy Intern    Maximo Salvador, PharmD

## 2018-12-06 ENCOUNTER — PATIENT OUTREACH (OUTPATIENT)
Dept: HEALTH INFORMATION MANAGEMENT | Facility: OTHER | Age: 73
End: 2018-12-06

## 2018-12-06 NOTE — PROGRESS NOTES
Outcome:Requested colonoscopy records     Please transfer to Patient Outreach Team at [phone] when patient returns call.    Web[employer] Checked & Epic Updated:  yes    [facility] Verified: yes    Attempt # 1

## 2018-12-27 ENCOUNTER — ANTICOAGULATION VISIT (OUTPATIENT)
Dept: VASCULAR LAB | Facility: MEDICAL CENTER | Age: 73
End: 2018-12-27
Attending: INTERNAL MEDICINE
Payer: MEDICARE

## 2018-12-27 DIAGNOSIS — I48.0 PAROXYSMAL ATRIAL FIBRILLATION (HCC): ICD-10-CM

## 2018-12-27 DIAGNOSIS — Z79.01 CHRONIC ANTICOAGULATION: ICD-10-CM

## 2018-12-27 LAB — INR PPP: 2.5 (ref 2–3.5)

## 2018-12-27 PROCEDURE — 85610 PROTHROMBIN TIME: CPT

## 2018-12-27 PROCEDURE — 99211 OFF/OP EST MAY X REQ PHY/QHP: CPT | Performed by: PHARMACIST

## 2018-12-27 NOTE — PROGRESS NOTES
Anticoagulation Summary  As of 12/27/2018    INR goal:   2.0-3.0   TTR:   81.1 % (2.6 y)   Today's INR:   2.5   Warfarin maintenance plan:   5 mg (5 mg x 1) on Mon, Wed, Fri; 7.5 mg (5 mg x 1.5) all other days   Weekly warfarin total:   45 mg   Plan last modified:   Kandice Mack, PharmD (10/30/2018)   Next INR check:   2/14/2019   Target end date:   Indefinite    Indications    Paroxysmal atrial fibrillation (HCC) [I48.0]  Chronic anticoagulation [Z79.01]             Anticoagulation Episode Summary     INR check location[facility]   [facility]    Preferred lab:       Send INR reminders to:       Comments:         Anticoagulation Care Providers     Provider Role Specialty Phone number    Cheikh Turner M.D. Referring Cardiology [phone]    [facility] Responsible  [phone]        Anticoagulation Patient Findings  Patient Findings     Negatives:   Signs/symptoms of thrombosis, Signs/symptoms of bleeding, Laboratory test error suspected, Change in health, Change in alcohol use, Change in activity, Upcoming invasive procedure, Emergency department visit, Upcoming dental procedure, Missed doses, Extra doses, Change in medications, Change in diet/appetite, Hospital admission, Bruising, Other complaints          HPI:  Cheikh Perez seen in clinic today, on anticoagulation therapy with warfarin for stroke prevention due to hx of a-fib.  Changes to current medical/health status since last appt: NONE  Denies signs/symptoms of bleeding and/or thrombosis since the last appt.    Denies any interval changes to diet  Denies any interval changes to medications since last appt.   Denies any complications or cost restrictions with current therapy.   BP recorded in vitals.      A/P   INR  remains-therapeutic.   Pt is to continue with current warfarin dosing regimen.    Follow up appointment in 7 week(s).    Vladimir Young, MatthewD

## 2019-01-14 ENCOUNTER — HOSPITAL ENCOUNTER (OUTPATIENT)
Dept: LAB | Facility: MEDICAL CENTER | Age: 74
End: 2019-01-14
Attending: NURSE PRACTITIONER
Payer: MEDICARE

## 2019-01-14 DIAGNOSIS — Z87.39 H/O: GOUT: ICD-10-CM

## 2019-01-14 LAB
ALBUMIN SERPL BCP-MCNC: 4.4 G/DL (ref 3.2–4.9)
ALBUMIN/GLOB SERPL: 1.4 G/DL
ALP SERPL-CCNC: 64 U/L (ref 30–99)
ALT SERPL-CCNC: 17 U/L (ref 2–50)
ANION GAP SERPL CALC-SCNC: 8 MMOL/L (ref 0–11.9)
AST SERPL-CCNC: 22 U/L (ref 12–45)
BILIRUB SERPL-MCNC: 0.8 MG/DL (ref 0.1–1.5)
BUN SERPL-MCNC: 15 MG/DL (ref 8–22)
CALCIUM SERPL-MCNC: 10 MG/DL (ref 8.5–10.5)
CHLORIDE SERPL-SCNC: 104 MMOL/L (ref 96–112)
CO2 SERPL-SCNC: 26 MMOL/L (ref 20–33)
CREAT SERPL-MCNC: 0.88 MG/DL (ref 0.5–1.4)
EST. AVERAGE GLUCOSE BLD GHB EST-MCNC: 157 MG/DL
GLOBULIN SER CALC-MCNC: 3.2 G/DL (ref 1.9–3.5)
GLUCOSE SERPL-MCNC: 115 MG/DL (ref 65–99)
HBA1C MFR BLD: 7.1 % (ref 0–5.6)
POTASSIUM SERPL-SCNC: 4.7 MMOL/L (ref 3.6–5.5)
PROT SERPL-MCNC: 7.6 G/DL (ref 6–8.2)
SODIUM SERPL-SCNC: 138 MMOL/L (ref 135–145)
URATE SERPL-MCNC: 4.7 MG/DL (ref 2.5–8.3)

## 2019-01-14 PROCEDURE — 80053 COMPREHEN METABOLIC PANEL: CPT

## 2019-01-14 PROCEDURE — 83036 HEMOGLOBIN GLYCOSYLATED A1C: CPT

## 2019-01-14 PROCEDURE — 84550 ASSAY OF BLOOD/URIC ACID: CPT

## 2019-01-14 PROCEDURE — 36415 COLL VENOUS BLD VENIPUNCTURE: CPT

## 2019-01-17 ENCOUNTER — OFFICE VISIT (OUTPATIENT)
Dept: MEDICAL GROUP | Facility: MEDICAL CENTER | Age: 74
End: 2019-01-17
Payer: MEDICARE

## 2019-01-17 VITALS
TEMPERATURE: 97.5 F | HEIGHT: 76 IN | DIASTOLIC BLOOD PRESSURE: 66 MMHG | WEIGHT: 229 LBS | BODY MASS INDEX: 27.89 KG/M2 | HEART RATE: 100 BPM | OXYGEN SATURATION: 95 % | SYSTOLIC BLOOD PRESSURE: 110 MMHG

## 2019-01-17 DIAGNOSIS — Z79.01 CHRONIC ANTICOAGULATION: ICD-10-CM

## 2019-01-17 DIAGNOSIS — E78.5 DYSLIPIDEMIA: ICD-10-CM

## 2019-01-17 DIAGNOSIS — I48.0 PAROXYSMAL ATRIAL FIBRILLATION (HCC): ICD-10-CM

## 2019-01-17 DIAGNOSIS — I10 ESSENTIAL HYPERTENSION: ICD-10-CM

## 2019-01-17 PROCEDURE — 99214 OFFICE O/P EST MOD 30 MIN: CPT | Performed by: NURSE PRACTITIONER

## 2019-01-17 PROCEDURE — 8041 PR SCP AHA: Performed by: NURSE PRACTITIONER

## 2019-01-17 ASSESSMENT — ENCOUNTER SYMPTOMS: DIZZINESS: 1

## 2019-01-17 NOTE — PROGRESS NOTES
Annual Health Assessment Questions:    1.  Are you currently engaging in any exercise or physical activity? Yes,biking 20 minutes daily, golfing when the weather is nice, and push ups.    2.  How would you describe your mood or emotional well-being today? Very good    3.  Have you had any falls in the last year? No    4.  Have you noticed any problems with your balance or had difficulty walking? Yes, he wears braces and walks with a cane to help with his balance.    5.  In the last six months have you experienced any leakage of urine? No    6. DPA/Advanced Directive: Patient does not have an Advanced Directive.  A packet and workshop information was given on Advanced Directives.RN-CDE Note    Subjective:     Health changes since last visit/interval Hx: He gets light headed when getting up fast.  He was encouraged to increase his water intake.    Medications (including changes made today)  Current Outpatient Prescriptions   Medication Sig Dispense Refill   • glimepiride (AMARYL) 1 MG tablet Take 1 Tab by mouth every morning. 30 Tab 11   • warfarin (COUMADIN) 5 MG Tab TAKE ONE TO ONE AND ONE-HALF TABLETS DAILY AS DIRECTED BY [facility] 135 Tab 0   • metoprolol (LOPRESSOR) 25 MG Tab TAKE ONE TABLET BY MOUTH TWICE DAILY 180 Tab 2   • Empagliflozin 25 MG Tab Take 1 Each by mouth every day. 30 Tab 11   • atorvastatin (LIPITOR) 10 MG Tab Take 1 Tab by mouth every day. 90 Tab 3   • metformin (GLUCOPHAGE) 1000 MG tablet TAKE ONE TABLET BY MOUTH TWICE DAILY WITH MEALS 180 Tab 3   • enalapril (VASOTEC) 10 MG Tab TAKE ONE TABLET BY MOUTH ONCE A DAY 90 Tab 3   • VITAMIN D, CHOLECALCIFEROL, PO Take 5,000 Units by mouth.     • ALPHA LIPOIC ACID PO Take 200 mg by mouth 2 Times a Day.     • Misc. Devices Misc TDaP IM at pharmacy 1 Each 0     No current facility-administered medications for this visit.        Taking daily ASA: Not Indicated on coumadin  Taking above medications as prescribed: yes  SIDE EFFECTS:  Patient denies side effects to medications    Exercise: golfing, walking, biking, and push ups.  Diet: Eggs or cereal in the morning.  Lunch is sandwich.  Dinner meat, vegetable, and starch.  Patient's body mass index is 27.87 kg/m². Exercise and nutrition counseling were performed at this visit.      Health Maintenance:   Health Maintenance Due   Topic Date Due   • IMM HEP B VACCINE (1 of 3 - Risk 3-dose series) 03/17/1964       Immunizations:   PPSV23: Due  [id number]: Up-to-date  Tdap: Up-to-date  Flu: Up-to-date  Hep B: unknown    DM:   Last A1c:   Lab Results   Component Value Date/Time    HBA1C 7.1 (H) 01/14/2019 07:25 AM      A1C GOAL: < 7    Glucose monitoring frequency: Not testing blood sugars    Hypoglycemic episodes: no    Last Retinal Exam: on file and up-to-date  Daily Foot Exam: Yes   Routine Dental Exams: Yes    Lab Results   Component Value Date/Time    MALBCRT 27 04/06/2018 07:54 AM    MICROALBUR 3.9 04/06/2018 07:54 AM        ACR Albumin/Creatinine Ratio goal <30     HTN:   Blood pressure goal <140/<80 .   Currently Rx ACE/ARB: Yes    Dyslipidemia:    Lab Results   Component Value Date/Time    CHOLSTRLTOT 138 10/08/2018 07:20 AM    LDL 66 10/08/2018 07:20 AM    HDL 57 10/08/2018 07:20 AM    TRIGLYCERIDE 76 10/08/2018 07:20 AM       Lab Results   Component Value Date/Time    SODIUM 138 01/14/2019 07:25 AM    POTASSIUM 4.7 01/14/2019 07:25 AM    CHLORIDE 104 01/14/2019 07:25 AM    CO2 26 01/14/2019 07:25 AM    GLUCOSE 115 (H) 01/14/2019 07:25 AM    BUN 15 01/14/2019 07:25 AM    CREATININE 0.88 01/14/2019 07:25 AM    CREATININE 1.0 06/06/2006 04:10 PM     Lab Results   Component Value Date/Time    ALKPHOSPHAT 64 01/14/2019 07:25 AM    ASTSGOT 22 01/14/2019 07:25 AM    ALTSGPT 17 01/14/2019 07:25 AM    TBILIRUBIN 0.8 01/14/2019 07:25 AM        Currently Rx Statin: No    He  reports that he has never smoked. He has never used smokeless tobacco.    Objective:     Exam:  Monofilament: done   Monofilament  testing with a 10 gram force: sensation intact: intact bilaterally  Visual Inspection: Feet without maceration, ulcers, fissures.  Third right toe has a small sore where his shoe is rubbing.  He will watch it to make sure it is healing up.  He has foot drop from spinal stenosis and wears braces on both feet and walks with a cane.  Pedal pulses: intact bilaterally    Plan:     Discussed and educated on:   - All medications, side effects and compliance (discussed carefully)  - Annual eye examinations at Ophthalmology  - Home glucose monitoring emphasized  - Weight control and daily exercise.  He will follow the plate method of eating.    Recommended medication changes: He will increase his water intake.  He is worried about being unable to afford the jardiance when he goes into the doughnut hole.  He will call if that happens and we will send him to the sample pharmacy.

## 2019-01-17 NOTE — PROGRESS NOTES
Subjective:      Cheikh Perez is a 73 y.o. male who presents with Diabetes        CC: Patient here today to meet with myself and the diabetic nurse for his diabetes as well as for follow-up on atrial fibrillation, hypertension, dyslipidemia and for [facility].    HPI Cheikh Perez      1. Uncontrolled type 2 diabetes mellitus without complication (HCC)  Patient's previous hemoglobin A1c was high at 8.6 and Jardiance was added.  He takes this with metformin and glimepiride.  His hemoglobin A1c has subsequently decreased down to 7.1 and he reports no side effects from the new medicine.    2. Paroxysmal atrial fibrillation (HCC)  Patient continues on Coumadin and Lopressor and has had no TIAs or chest pain.    3. Essential hypertension  Blood pressure remains well controlled on his medication.  He has had some episodes of slight dizziness when he gets out of bed at night but it has improved with getting up slowly.    4. Chronic anticoagulation  Patient on Coumadin through the Coumadin clinic.    5. Dyslipidemia  Patient on Lipitor 10 mg and his lipid panel in October showed good LDL at 66.  Social History   Substance Use Topics   • Smoking status: Never Smoker   • Smokeless tobacco: Never Used   • Alcohol use 6.0 oz/week     10 Glasses of wine per week     Current Outpatient Prescriptions   Medication Sig Dispense Refill   • glimepiride (AMARYL) 1 MG tablet Take 1 Tab by mouth every morning. 30 Tab 11   • warfarin (COUMADIN) 5 MG Tab TAKE ONE TO ONE AND ONE-HALF TABLETS DAILY AS DIRECTED BY [facility] 135 Tab 0   • metoprolol (LOPRESSOR) 25 MG Tab TAKE ONE TABLET BY MOUTH TWICE DAILY 180 Tab 2   • Empagliflozin 25 MG Tab Take 1 Each by mouth every day. 30 Tab 11   • atorvastatin (LIPITOR) 10 MG Tab Take 1 Tab by mouth every day. 90 Tab 3   • metformin (GLUCOPHAGE) 1000 MG tablet TAKE ONE TABLET BY MOUTH TWICE DAILY WITH MEALS 180 Tab 3   • enalapril (VASOTEC) 10 MG Tab  "TAKE ONE TABLET BY MOUTH ONCE A DAY 90 Tab 3   • VITAMIN D, CHOLECALCIFEROL, PO Take 5,000 Units by mouth.     • ALPHA LIPOIC ACID PO Take 200 mg by mouth 2 Times a Day.     • Misc. Devices Misc TDaP IM at pharmacy 1 Each 0     No current facility-administered medications for this visit.      Past Medical History:   Diagnosis Date   • Arrhythmia July 2013    AFIB, cardioversion   • Arthritis    • Diabetes     Diet,oral meds   • Hypertension    • Hypertension    • Pain      \"stiff\" from arthritis, 2/10   • Paroxysmal atrial fibrillation (HCC) 6/6/2013   • Sleep apnea     CPAP   • Snoring      Family History   Problem Relation Age of Onset   • Heart Attack Father 59   • Heart Disease Father    • Hypertension Brother    • Diabetes Brother    • Arthritis Brother    • Thyroid Mother 50   • Breast Cancer Sister    • No Known Problems Maternal Grandmother    • No Known Problems Maternal Grandfather    • No Known Problems Paternal Grandmother    • No Known Problems Paternal Grandfather    • Arrythmia Brother    • No Known Problems Brother    • No Known Problems Daughter        Review of Systems   Neurological: Positive for dizziness.   All other systems reviewed and are negative.         Objective:     /66   Pulse 100   Temp 36.4 °C (97.5 °F)   Ht 1.93 m (6' 4\")   Wt 103.9 kg (229 lb)   SpO2 95%   BMI 27.87 kg/m²      Physical Exam   Constitutional: He is oriented to person, place, and time. He appears well-developed and well-nourished. No distress.   HENT:   Head: Normocephalic and atraumatic.   Right Ear: External ear normal.   Left Ear: External ear normal.   Nose: Nose normal.   Mouth/Throat: Oropharynx is clear and moist.   Eyes: Conjunctivae are normal. Right eye exhibits no discharge. Left eye exhibits no discharge.   Neck: Normal range of motion. Neck supple. No tracheal deviation present. No thyromegaly present.   Cardiovascular: Normal rate, regular rhythm and normal heart sounds.    No murmur " heard.  Pulmonary/Chest: Effort normal and breath sounds normal. No respiratory distress. He has no wheezes. He has no rales.   Lymphadenopathy:     He has no cervical adenopathy.   Neurological: He is alert and oriented to person, place, and time. Coordination normal.   Skin: Skin is warm and dry. No rash noted. He is not diaphoretic. No erythema.   Psychiatric: He has a normal mood and affect. His behavior is normal. Judgment and thought content normal.   Nursing note and vitals reviewed.            Annual Health Assessment Questions:    1.  Are you currently engaging in any exercise or physical activity? Yes,biking 20 minutes daily, golfing when the weather is nice, and push ups.    2.  How would you describe your mood or emotional well-being today? Very good    3.  Have you had any falls in the last year? No    4.  Have you noticed any problems with your balance or had difficulty walking? Yes, he wears braces and walks with a cane to help with his balance.    5.  In the last six months have you experienced any leakage of urine? No    6. DPA/Advanced Directive: Patient does not have an Advanced Directive.  A packet and workshop information was given on Advanced Directives.RN-CDE Note    Assessment/Plan:     1. Uncontrolled type 2 diabetes mellitus without complication (HCC)  Hemoglobin A1c is much improved at 7.1 so he will continue his current medicines.  I told him should his blood sugars go too low he will stop his glimepiride he will continue on his statin and anticoagulation and ACE inhibitor.  - Diabetic Monofilament LE Exam    2. Paroxysmal atrial fibrillation (HCC)  Patient will continue to follow with the vascular clinic which is prescribing his medication and monitoring this.  He is rate controlled.    3. Essential hypertension  I advised patient that he should continue to get up slowly from bed or chair but if the dizziness should worsen I will decrease his lisinopril down to 5 mg.    4. Chronic  anticoagulation  Patient will continue to follow with the Coumadin clinic.    5. Dyslipidemia  Cholesterol was good in October and he will not be due for lab work for many months.  He reports no myalgias.

## 2019-01-18 DIAGNOSIS — I48.0 PAROXYSMAL ATRIAL FIBRILLATION (HCC): ICD-10-CM

## 2019-01-22 RX ORDER — WARFARIN SODIUM 5 MG/1
TABLET ORAL
Qty: 135 TAB | Refills: 0 | Status: SHIPPED | OUTPATIENT
Start: 2019-01-22 | End: 2019-05-06 | Stop reason: SDUPTHER

## 2019-02-15 ENCOUNTER — OFFICE VISIT (OUTPATIENT)
Dept: URGENT CARE | Facility: CLINIC | Age: 74
End: 2019-02-15
Payer: MEDICARE

## 2019-02-15 VITALS
DIASTOLIC BLOOD PRESSURE: 84 MMHG | SYSTOLIC BLOOD PRESSURE: 124 MMHG | RESPIRATION RATE: 16 BRPM | HEIGHT: 76 IN | BODY MASS INDEX: 27.4 KG/M2 | HEART RATE: 88 BPM | OXYGEN SATURATION: 94 % | WEIGHT: 225 LBS | TEMPERATURE: 97.8 F

## 2019-02-15 DIAGNOSIS — M25.531 RIGHT WRIST PAIN: ICD-10-CM

## 2019-02-15 DIAGNOSIS — I10 ESSENTIAL HYPERTENSION: ICD-10-CM

## 2019-02-15 DIAGNOSIS — M10.00 ACUTE IDIOPATHIC GOUT, UNSPECIFIED SITE: ICD-10-CM

## 2019-02-15 DIAGNOSIS — I48.0 PAROXYSMAL ATRIAL FIBRILLATION (HCC): ICD-10-CM

## 2019-02-15 DIAGNOSIS — Z79.01 CHRONIC ANTICOAGULATION: ICD-10-CM

## 2019-02-15 PROCEDURE — 99214 OFFICE O/P EST MOD 30 MIN: CPT | Performed by: FAMILY MEDICINE

## 2019-02-15 RX ORDER — PREDNISONE 10 MG/1
TABLET ORAL
Qty: 12 TAB | Refills: 0 | Status: SHIPPED | OUTPATIENT
Start: 2019-02-15 | End: 2019-05-06

## 2019-02-15 ASSESSMENT — ENCOUNTER SYMPTOMS
FEVER: 0
CHILLS: 0

## 2019-02-15 NOTE — PROGRESS NOTES
Subjective:      Cheikh Perez is a 73 y.o. male who presents with Gout (swollen right  wrist, irriattion this morning  x 1 day )    Patient presents to urgent care with acute onset of a new problem.  He has had swelling and pain of his right wrist some mild redness denies any known trauma he does state that he was in urgent care about 6 months ago with something similar on his left wrist that was diagnosed as gout and he was treated with some steroid medication with resolution of symptoms.  He denies being on any new diuretic.  He does have atrial fibrillation is anticoagulated on Coumadin.  He has hypertension as well.  Both stable on medications.  Denies any chest pain or shortness of breath.  No fevers or chills no body aches.    HPI  Review of Systems   Constitutional: Negative for chills and fever.   All other systems reviewed and are negative.    PMH:  has a past medical history of Arrhythmia (July 2013); Arthritis; Diabetes; Hypertension; Hypertension; Pain ( ); Paroxysmal atrial fibrillation (HCC) (6/6/2013); Sleep apnea; and Snoring.  MEDS:   Current Outpatient Prescriptions:   •  warfarin (COUMADIN) 5 MG Tab, TAKE ONE TO ONE AND ONE-HALF TABLETS DAILY AS DIRECTED BY [facility], Disp: 135 Tab, Rfl: 0  •  glimepiride (AMARYL) 1 MG tablet, Take 1 Tab by mouth every morning., Disp: 30 Tab, Rfl: 11  •  metoprolol (LOPRESSOR) 25 MG Tab, TAKE ONE TABLET BY MOUTH TWICE DAILY, Disp: 180 Tab, Rfl: 2  •  Empagliflozin 25 MG Tab, Take 1 Each by mouth every day., Disp: 30 Tab, Rfl: 11  •  atorvastatin (LIPITOR) 10 MG Tab, Take 1 Tab by mouth every day., Disp: 90 Tab, Rfl: 3  •  metformin (GLUCOPHAGE) 1000 MG tablet, TAKE ONE TABLET BY MOUTH TWICE DAILY WITH MEALS, Disp: 180 Tab, Rfl: 3  •  enalapril (VASOTEC) 10 MG Tab, TAKE ONE TABLET BY MOUTH ONCE A DAY, Disp: 90 Tab, Rfl: 3  •  Misc. Devices Misc, TDaP IM at pharmacy, Disp: 1 Each, Rfl: 0  •  VITAMIN D, CHOLECALCIFEROL, PO, Take  "5,000 Units by mouth., Disp: , Rfl:   •  ALPHA LIPOIC ACID PO, Take 200 mg by mouth 2 Times a Day., Disp: , Rfl:   ALLERGIES:   Allergies   Allergen Reactions   • Nkda [No Known Drug Allergy]      SURGHX:   Past Surgical History:   Procedure Laterality Date   • FEMUR ORIF Left 2/24/2016    Procedure: DISTAL FEMUR ORIF;  Surgeon: Montrell Eddy M.D.;  Location: SURGERY [facility];  Service:    • CYSTOSCOPY STENT PLACEMENT  1/13/2014    Performed by Main Batista M.D. at SURGERY [facility]   • URETEROSCOPY  1/13/2014    Performed by Main Batista M.D. at SURGERY [facility]   • LASERTRIPSY  1/13/2014    Performed by Main Batista M.D. at SURGERY [facility]   • OTHER      Surgery for kidney stones   • RECOVERY  7/11/2013    Performed by Cath-Recovery Surgery at SURGERY [facility]   • KNEE ARTHROPLASTY TOTAL  12/1/2009    Performed by GIACOMO PETTIT at SURGERY [facility]   • KNEE ARTHROPLASTY TOTAL  3/2009    Left   • LUMBAR LAMINECTOMY DISKECTOMY  7/2006    L1-L5/ residual left foot weaknesss    • ACL REPAIR  12/2005    Right   • FEMUR ORIF       SOCHX:  reports that he has never smoked. He has never used smokeless tobacco. He reports that he drinks about 6.0 oz of alcohol per week . He reports that he does not use drugs.  FH: Family history was reviewed, no pertinent findings to report     Objective:     /84   Pulse 88   Temp 36.6 °C (97.8 °F) (Temporal)   Resp 16   Ht 1.93 m (6' 4\")   Wt 102.1 kg (225 lb)   SpO2 94%   BMI 27.39 kg/m²      Physical Exam   Constitutional: He is oriented to person, place, and time. He appears well-developed and well-nourished. No distress.   HENT:   Mouth/Throat: Oropharynx is clear and moist.   Eyes: Conjunctivae are normal.   Neck: Normal range of motion.   Cardiovascular: Intact distal pulses.  Exam reveals no gallop and no friction rub.    No murmur heard.  Irregularly irregular   Pulmonary/Chest: Effort normal and breath " sounds normal. No respiratory distress. He has no wheezes. He has no rales. He exhibits no tenderness.   Musculoskeletal: Normal range of motion.        Right wrist: He exhibits tenderness, bony tenderness and swelling. He exhibits normal range of motion.        Arms:  Neurological: He is alert and oriented to person, place, and time.   Skin: Skin is warm and dry. He is not diaphoretic. There is erythema.   Psychiatric: He has a normal mood and affect. His behavior is normal. Judgment and thought content normal.          Assessment/Plan:     1. Acute idiopathic gout, unspecified site  predniSONE (DELTASONE) 10 MG Tab   2. Right wrist pain  predniSONE (DELTASONE) 10 MG Tab   3. Chronic anticoagulation     4. Paroxysmal atrial fibrillation (HCC)     5. Essential hypertension       Differential diagnosis, natural history, supportive care discussed. Follow-up with primary care provider within 7-10 days, emergency room precautions discussed.  Patient and/or family appears understanding of information.    Chronic conditions of diabetes and hypertension that may potentially impact the patient's ability to recover from this condition appear stable. The patient will f/u with their pcp and continue with current chronic medications as directed.

## 2019-02-21 ENCOUNTER — ANTICOAGULATION VISIT (OUTPATIENT)
Dept: VASCULAR LAB | Facility: MEDICAL CENTER | Age: 74
End: 2019-02-21
Attending: INTERNAL MEDICINE
Payer: MEDICARE

## 2019-02-21 VITALS — SYSTOLIC BLOOD PRESSURE: 116 MMHG | HEART RATE: 83 BPM | DIASTOLIC BLOOD PRESSURE: 76 MMHG

## 2019-02-21 DIAGNOSIS — Z79.01 CHRONIC ANTICOAGULATION: ICD-10-CM

## 2019-02-21 DIAGNOSIS — I48.0 PAROXYSMAL ATRIAL FIBRILLATION (HCC): ICD-10-CM

## 2019-02-21 LAB
INR BLD: 1.9 (ref 0.9–1.2)
INR PPP: 1.9 (ref 2–3.5)

## 2019-02-21 PROCEDURE — 85610 PROTHROMBIN TIME: CPT

## 2019-02-21 PROCEDURE — 99212 OFFICE O/P EST SF 10 MIN: CPT | Performed by: PHARMACIST

## 2019-02-21 NOTE — PROGRESS NOTES
Anticoagulation Summary  As of 2019    INR goal:   2.0-3.0   TTR:   81.2 % (2.7 y)   INR used for dosin.9! (2019)   Warfarin maintenance plan:   5 mg (5 mg x 1) every Mon, Wed, Fri; 7.5 mg (5 mg x 1.5) all other days   Weekly warfarin total:   45 mg   Plan last modified:   Kandice Mack, PharmD (10/30/2018)   Next INR check:   3/21/2019   Target end date:   Indefinite    Indications    Paroxysmal atrial fibrillation (HCC) [I48.0]  Chronic anticoagulation [Z79.01]             Anticoagulation Episode Summary     INR check [facility]   [facility]    Preferred lab:       Send INR reminders to:       Comments:         Anticoagulation Care Providers     Provider Role Specialty Phone number    Cheikh Turner M.D. Referring Cardiology [phone]    [facility] Responsible  [phone]        Anticoagulation Patient Findings  Patient Findings     Negatives:   Signs/symptoms of thrombosis, Signs/symptoms of bleeding, Laboratory test error suspected, Change in health, Change in alcohol use, Change in activity, Upcoming invasive procedure, Emergency department visit, Upcoming dental procedure, Missed doses, Extra doses, Change in medications, Change in diet/appetite, Hospital admission, Bruising, Other complaints          HPI:  Cheikh Perez seen in clinic today, on anticoagulation therapy with warfarin for stroke prevention due to hx of PAF.  Changes to current medical/health status since last appt: NONE  Denies signs/symptoms of bleeding and/or thrombosis since the last appt.    Denies any interval changes to diet  Denies any interval changes to medications since last appt.   Denies any complications or cost restrictions with current therapy.   BP recorded in vitals.      A/P   INR  slightly sub-therapeutic at 1.9.   Instructed patient to bolus with 10mg X 1, then resume current warfarin regimen.    Follow up appointment in 4 week(s).    Vladimir Young,  PharmD

## 2019-03-21 ENCOUNTER — ANTICOAGULATION VISIT (OUTPATIENT)
Dept: VASCULAR LAB | Facility: MEDICAL CENTER | Age: 74
End: 2019-03-21
Attending: INTERNAL MEDICINE
Payer: MEDICARE

## 2019-03-21 VITALS — DIASTOLIC BLOOD PRESSURE: 82 MMHG | HEART RATE: 59 BPM | SYSTOLIC BLOOD PRESSURE: 122 MMHG

## 2019-03-21 DIAGNOSIS — I48.0 PAROXYSMAL ATRIAL FIBRILLATION (HCC): ICD-10-CM

## 2019-03-21 DIAGNOSIS — Z79.01 CHRONIC ANTICOAGULATION: ICD-10-CM

## 2019-03-21 LAB — INR PPP: 2.1 (ref 2–3.5)

## 2019-03-21 PROCEDURE — 85610 PROTHROMBIN TIME: CPT

## 2019-03-21 PROCEDURE — 99211 OFF/OP EST MAY X REQ PHY/QHP: CPT | Performed by: NURSE PRACTITIONER

## 2019-03-21 NOTE — PROGRESS NOTES
Anticoagulation Summary  As of 3/21/2019    INR goal:   2.0-3.0   TTR:   80.3 % (2.8 y)   INR used for dosin.1 (3/21/2019)   Warfarin maintenance plan:   5 mg (5 mg x 1) every Mon, Wed, Fri; 7.5 mg (5 mg x 1.5) all other days   Weekly warfarin total:   45 mg   Plan last modified:   Kandice Mack, PharmD (10/30/2018)   Next INR check:   2019   Target end date:   Indefinite    Indications    Paroxysmal atrial fibrillation (HCC) [I48.0]  Chronic anticoagulation [Z79.01]             Anticoagulation Episode Summary     INR check location[facility]   [facility]    Preferred lab:       Send INR reminders to:       Comments:         Anticoagulation Care Providers     Provider Role Specialty Phone number    Cheikh Turner M.D. Referring Cardiology [phone]    [facility] Responsible  [phone]        Anticoagulation Patient Findings      HPI:  Cheikh Perez seen in clinic today for follow up on anticoagulation therapy in the presence of AF. Denies any changes to current medical/health status since last appointment. Denies any medication or diet changes. No current symptoms of bleeding or thrombosis reported.    A/P:   INR therapeutic. Continue current regimen. BP recorded in vitals.    Follow up appointment in 4 week(s).    Next Appointment:  at 10:45 am.     Amanda PUGH

## 2019-03-22 LAB — INR BLD: 2.1 (ref 0.9–1.2)

## 2019-04-02 DIAGNOSIS — E78.5 HYPERLIPIDEMIA, UNSPECIFIED HYPERLIPIDEMIA TYPE: Primary | ICD-10-CM

## 2019-04-02 RX ORDER — ATORVASTATIN CALCIUM 10 MG/1
10 TABLET, FILM COATED ORAL DAILY
Qty: 100 TAB | Refills: 0 | Status: SHIPPED | OUTPATIENT
Start: 2019-04-02 | End: 2019-10-10 | Stop reason: SDUPTHER

## 2019-04-09 RX ORDER — GLIMEPIRIDE 1 MG/1
1 TABLET ORAL EVERY MORNING
Qty: 100 TAB | Refills: 3 | Status: SHIPPED | OUTPATIENT
Start: 2019-04-09 | End: 2019-05-06 | Stop reason: SDUPTHER

## 2019-04-18 ENCOUNTER — ANTICOAGULATION VISIT (OUTPATIENT)
Dept: VASCULAR LAB | Facility: MEDICAL CENTER | Age: 74
End: 2019-04-18
Attending: INTERNAL MEDICINE
Payer: MEDICARE

## 2019-04-18 VITALS — HEART RATE: 86 BPM | DIASTOLIC BLOOD PRESSURE: 82 MMHG | SYSTOLIC BLOOD PRESSURE: 132 MMHG

## 2019-04-18 DIAGNOSIS — Z79.01 CHRONIC ANTICOAGULATION: ICD-10-CM

## 2019-04-18 DIAGNOSIS — I48.0 PAROXYSMAL ATRIAL FIBRILLATION (HCC): ICD-10-CM

## 2019-04-18 LAB
INR BLD: 2.1 (ref 0.9–1.2)
INR PPP: 2.1 (ref 2–3.5)

## 2019-04-18 PROCEDURE — 99211 OFF/OP EST MAY X REQ PHY/QHP: CPT | Performed by: NURSE PRACTITIONER

## 2019-04-18 PROCEDURE — 85610 PROTHROMBIN TIME: CPT

## 2019-04-18 NOTE — PROGRESS NOTES
Anticoagulation Summary  As of 2019    INR goal:   2.0-3.0   TTR:   80.9 % (2.9 y)   INR used for dosin.1 (2019)   Warfarin maintenance plan:   5 mg (5 mg x 1) every Mon, Wed, Fri; 7.5 mg (5 mg x 1.5) all other days   Weekly warfarin total:   45 mg   Plan last modified:   Kandice Mack, PharmD (10/30/2018)   Next INR check:   2019   Target end date:   Indefinite    Indications    Paroxysmal atrial fibrillation (HCC) [I48.0]  Chronic anticoagulation [Z79.01]             Anticoagulation Episode Summary     INR check location[facility]   [facility]    Preferred lab:       Send INR reminders to:       Comments:         Anticoagulation Care Providers     Provider Role Specialty Phone number    Cheikh Turner M.D. Referring Cardiology [phone]    [facility] Responsible  [phone]        Anticoagulation Patient Findings      HPI:  Cheikh Perez seen in clinic today for follow up on anticoagulation therapy in the presence of AF. Denies any changes to current medical/health status since last appointment. Denies any medication or diet changes. No current symptoms of bleeding or thrombosis reported.    A/P:   INR therapeutic. Continue current regimen. BP recorded in vitals.    Follow up appointment in 5 week(s).    Next Appointment: Thursday, May 23, 2019 at 10:45 am.     Amanda PUGH

## 2019-04-24 ENCOUNTER — TELEPHONE (OUTPATIENT)
Dept: MEDICAL GROUP | Facility: MEDICAL CENTER | Age: 74
End: 2019-04-24

## 2019-04-24 NOTE — TELEPHONE ENCOUNTER
1. Caller Name: Georgestephaniemaricarmen Lux                                          Call Back Number: [phone] (home)       Pt called stating he will be having an appt on 5/6 and would anderson labwork to be ordered if needed          Patient approves a detailed voicemail message: N\A

## 2019-04-29 ENCOUNTER — HOSPITAL ENCOUNTER (OUTPATIENT)
Dept: LAB | Facility: MEDICAL CENTER | Age: 74
End: 2019-04-29
Attending: NURSE PRACTITIONER
Payer: MEDICARE

## 2019-04-29 LAB
CREAT UR-MCNC: 104.5 MG/DL
ERYTHROCYTE [DISTWIDTH] IN BLOOD BY AUTOMATED COUNT: 44.6 FL (ref 35.9–50)
HCT VFR BLD AUTO: 50.2 % (ref 42–52)
HGB BLD-MCNC: 16.4 G/DL (ref 14–18)
MCH RBC QN AUTO: 29.7 PG (ref 27–33)
MCHC RBC AUTO-ENTMCNC: 32.7 G/DL (ref 33.7–35.3)
MCV RBC AUTO: 90.9 FL (ref 81.4–97.8)
MICROALBUMIN UR-MCNC: 2.2 MG/DL
MICROALBUMIN/CREAT UR: 21 MG/G (ref 0–30)
PLATELET # BLD AUTO: 174 K/UL (ref 164–446)
PMV BLD AUTO: 12.5 FL (ref 9–12.9)
RBC # BLD AUTO: 5.52 M/UL (ref 4.7–6.1)
WBC # BLD AUTO: 9 K/UL (ref 4.8–10.8)

## 2019-04-29 PROCEDURE — 83036 HEMOGLOBIN GLYCOSYLATED A1C: CPT

## 2019-04-29 PROCEDURE — 82570 ASSAY OF URINE CREATININE: CPT

## 2019-04-29 PROCEDURE — 85027 COMPLETE CBC AUTOMATED: CPT

## 2019-04-29 PROCEDURE — 36415 COLL VENOUS BLD VENIPUNCTURE: CPT

## 2019-04-29 PROCEDURE — 82043 UR ALBUMIN QUANTITATIVE: CPT

## 2019-04-30 LAB
EST. AVERAGE GLUCOSE BLD GHB EST-MCNC: 160 MG/DL
HBA1C MFR BLD: 7.2 % (ref 0–5.6)

## 2019-05-06 ENCOUNTER — OFFICE VISIT (OUTPATIENT)
Dept: MEDICAL GROUP | Facility: MEDICAL CENTER | Age: 74
End: 2019-05-06
Payer: MEDICARE

## 2019-05-06 ENCOUNTER — TELEPHONE (OUTPATIENT)
Dept: MEDICAL GROUP | Facility: MEDICAL CENTER | Age: 74
End: 2019-05-06

## 2019-05-06 VITALS
WEIGHT: 233 LBS | OXYGEN SATURATION: 97 % | BODY MASS INDEX: 28.37 KG/M2 | HEIGHT: 76 IN | HEART RATE: 83 BPM | RESPIRATION RATE: 16 BRPM | DIASTOLIC BLOOD PRESSURE: 78 MMHG | SYSTOLIC BLOOD PRESSURE: 134 MMHG

## 2019-05-06 DIAGNOSIS — I10 ESSENTIAL HYPERTENSION: ICD-10-CM

## 2019-05-06 DIAGNOSIS — I48.0 PAROXYSMAL ATRIAL FIBRILLATION (HCC): ICD-10-CM

## 2019-05-06 DIAGNOSIS — G47.33 OSA (OBSTRUCTIVE SLEEP APNEA): ICD-10-CM

## 2019-05-06 DIAGNOSIS — M21.372 LEFT FOOT DROP: ICD-10-CM

## 2019-05-06 DIAGNOSIS — Z00.00 MEDICARE ANNUAL WELLNESS VISIT, SUBSEQUENT: ICD-10-CM

## 2019-05-06 DIAGNOSIS — Z79.01 CHRONIC ANTICOAGULATION: ICD-10-CM

## 2019-05-06 DIAGNOSIS — E78.5 DYSLIPIDEMIA: ICD-10-CM

## 2019-05-06 DIAGNOSIS — Z87.39 H/O: GOUT: ICD-10-CM

## 2019-05-06 PROCEDURE — G0439 PPPS, SUBSEQ VISIT: HCPCS | Performed by: NURSE PRACTITIONER

## 2019-05-06 PROCEDURE — 99213 OFFICE O/P EST LOW 20 MIN: CPT | Mod: 25 | Performed by: NURSE PRACTITIONER

## 2019-05-06 RX ORDER — GLIMEPIRIDE 1 MG/1
2 TABLET ORAL EVERY MORNING
Qty: 200 TAB | Refills: 3 | Status: SHIPPED | OUTPATIENT
Start: 2019-05-06 | End: 2019-05-06

## 2019-05-06 RX ORDER — GLIMEPIRIDE 2 MG/1
2 TABLET ORAL EVERY MORNING
Qty: 100 TAB | Refills: 3 | Status: SHIPPED | OUTPATIENT
Start: 2019-05-06 | End: 2020-08-19

## 2019-05-06 RX ORDER — WARFARIN SODIUM 5 MG/1
TABLET ORAL
Qty: 135 TAB | Refills: 1 | Status: SHIPPED | OUTPATIENT
Start: 2019-05-06 | End: 2019-11-27 | Stop reason: SDUPTHER

## 2019-05-06 RX ORDER — ENALAPRIL MALEATE 10 MG/1
TABLET ORAL
Qty: 100 TAB | Refills: 3 | Status: SHIPPED | OUTPATIENT
Start: 2019-05-06 | End: 2020-07-23

## 2019-05-06 RX ORDER — METHYLPREDNISOLONE 4 MG/1
TABLET ORAL
Qty: 21 TAB | Refills: 0 | Status: SHIPPED | OUTPATIENT
Start: 2019-05-06 | End: 2019-11-06

## 2019-05-06 ASSESSMENT — PATIENT HEALTH QUESTIONNAIRE - PHQ9: CLINICAL INTERPRETATION OF PHQ2 SCORE: 0

## 2019-05-06 ASSESSMENT — ACTIVITIES OF DAILY LIVING (ADL): BATHING_REQUIRES_ASSISTANCE: 0

## 2019-05-06 ASSESSMENT — ENCOUNTER SYMPTOMS: GENERAL WELL-BEING: GOOD

## 2019-05-06 NOTE — PROGRESS NOTES
CC: Patient here for annual Medicare wellness visit as well as to discuss medication for gout attacks and his recent hemoglobin A1c.    HPI:   Cheikh presents today with the following.    1. Medicare annual wellness visit, subsequent  Screening performed below.    2. Uncontrolled type 2 diabetes mellitus without complication, without long-term current use of insulin (HCC)  Patient's most recent hemoglobin A1c is higher than before at 7.2.  He is currently taking  Metformin 1000 mg twice a day and Amaryl 1 mg daily.  He is on Jardiance but it is unclear if he is taking 10 mg or 25 mg.    3. H/O: gout  Patient states he gets a gout attack at least 3 times per year.  His last treatments were at urgent care.  He states steroids works well for this.  He does have a history of chronic arthritis and back issues for which he uses a cane for mobility.  His last uric acid levels were actually normal    4. Paroxysmal atrial fibrillation (HCC)  Patient is on Coumadin and appears rate controlled and heart rhythm appears normal to auscultation in the office.  He was last seen by cardiology 11 months ago.    5. Essential hypertension  Blood pressure controlled today in the office on his enalapril and metoprolol.    6. Chronic anticoagulation  Patient on Coumadin through the [facility]madin clinic for his atrial fibrillation.    7. SHANNAN (obstructive sleep apnea)  Patient is supposed to be using CPAP at night but he states he only uses it on occasion and does not wear through the night because he does not like how it fits.    8. Dyslipidemia  Patient states he is taking his atorvastatin 10 mg.    9. Left foot drop  Patient wears a brace on the left foot and reports no change in status.      Depression Screening    Little interest or pleasure in doing things?  0 - not at all  Feeling down, depressed , or hopeless? 0 - not at all  Patient Health Questionnaire Score: 0     If depressive symptoms identified deferred to follow up visit  unless specifically addressed in assessment and plan.    Interpretation of PHQ-9 Total Score   Score Severity   1-4 No Depression   5-9 Mild Depression   10-14 Moderate Depression   15-19 Moderately Severe Depression   20-27 Severe Depression    Screening for Cognitive Impairment    Three Minute Recall (village, kitchen, baby) 3/3    Zay clock face with all 12 numbers and set the hands to show 10 past 10.  Yes    Cognitive concerns identified deferred for follow up unless specifically addressed in assessment and plan.    Fall Risk Assessment    Has the patient had two or more falls in the last year or any fall with injury in the last year?  No    Safety Assessment    Throw rugs on floor.  Yes  Handrails on all stairs.  Yes  Good lighting in all hallways.  Yes  Difficulty hearing.  No  Patient counseled about all safety risks that were identified.    Functional Assessment ADLs    Are there any barriers preventing you from cooking for yourself or meeting nutritional needs?  No.    Are there any barriers preventing you from driving safely or obtaining transportation?  No.    Are there any barriers preventing you from using a telephone or calling for help?  No.    Are there any barriers preventing you from shopping?  No.    Are there any barriers preventing you from taking care of your own finances?  No.    Are there any barriers preventing you from managing your medications?  No.    Are there any barriers preventing you from showering, bathing or dressing yourself?  No.    Are you currently engaging in any exercise or physical activity?  Yes.     What is your perception of your health?  Good.      Health Maintenance Summary                Annual Wellness Visit Overdue 3/28/2019      Done 3/27/2018 Visit Dx: Medicare annual wellness visit, subsequent     Patient has more history with this topic...    IMM HEP B VACCINE Postponed 1/12/2023 Originally 3/17/1964. Insurance/Financial    IMM PNEUMOCOCCAL 65+ (ADULT)  LOW/MEDIUM RISK SERIES Postponed 7/6/2023 Originally 2/26/2017. Patient Refused     Done 2/26/2016 Imm Admin: Pneumococcal Conjugate Vaccine (Prevnar/PCV-13)     Patient has more history with this topic...    IMM ZOSTER VACCINES Postponed 10/12/2023 Originally 3/17/1995. Patient Refused    FASTING LIPID PROFILE Next Due 10/8/2019      Done 10/8/2018 LIPID PROFILE     Patient has more history with this topic...    A1C SCREENING Next Due 10/29/2019      Done 4/29/2019 HEMOGLOBIN A1C      Patient has more history with this topic...    RETINAL SCREENING Next Due 12/19/2019      Done 12/19/2018 REFERRAL FOR RETINAL SCREENING EXAM     Patient has more history with this topic...    SERUM CREATININE Next Due 1/14/2020      Done 1/14/2019 COMP METABOLIC PANEL      Patient has more history with this topic...    DIABETES MONOFILAMENT / LE EXAM Next Due 1/17/2020      Done 1/17/2019 AMB DIABETIC MONOFILAMENT LOWER EXTREMITY EXAM     Patient has more history with this topic...    URINE ACR / MICROALBUMIN Next Due 4/29/2020      Done 4/29/2019 MICROALBUMIN CREAT RATIO URINE     Patient has more history with this topic...    COLONOSCOPY Next Due 3/6/2024      Done 3/6/2014     IMM DTaP/Tdap/Td Vaccine Next Due 5/11/2028      Done 5/11/2018 Imm Admin: Tdap Vaccine     Patient has more history with this topic...          Patient Care Team:  REY Chappell as PCP - General (Family Medicine)  Ion Terrell M.D. as Consulting Physician (Ophthalmology)  Main Batista M.D. as Consulting Physician (Urology)  Montrell Eddy M.D. as Consulting Physician (Orthopaedics)  [facility] as Consulting Physician (Gastroenterology)  Cheikh Turner M.D. as Consulting Physician (Cardiology)  [facility] as Consulting Physician          Patient Active Problem List    Diagnosis Date Noted   • Chronic anticoagulation 09/20/2013     Priority: High   • Essential hypertension 07/09/2013     Priority:  "High   • Paroxysmal atrial fibrillation (HCC) 06/06/2013     Priority: High   • Obesity (BMI 30-39.9) 03/27/2018   • Uncontrolled type 2 diabetes mellitus without complication, without long-term current use of insulin (HCC) 03/13/2017   • Risk for falls 03/13/2017   • SHANNAN (obstructive sleep apnea) 12/12/2016   • Dyslipidemia 12/12/2016   • Left foot drop 12/12/2016       Current Outpatient Prescriptions   Medication Sig Dispense Refill   • glimepiride (AMARYL) 2 MG Tab Take 1 Tab by mouth every morning. 100 Tab 3   • MethylPREDNISolone (MEDROL DOSEPAK) 4 MG Tablet Therapy Pack As directed on the packaging label. 21 Tab 0   • metformin (GLUCOPHAGE) 1000 MG tablet Take 1 Tab by mouth 2 times a day, with meals. 200 Tab 3   • enalapril (VASOTEC) 10 MG Tab TAKE ONE TABLET BY MOUTH ONCE A  Tab 3   • metoprolol (LOPRESSOR) 25 MG Tab Take 1 Tab by mouth 2 times a day. TWICE DAILY 200 Tab 3   • atorvastatin (LIPITOR) 10 MG Tab Take 1 Tab by mouth every day. 100 Tab 0   • Empagliflozin (JARDIANCE) 10 MG Tab Take 1 Each by mouth every day. 100 Tab 11   • warfarin (COUMADIN) 5 MG Tab TAKE ONE TO ONE AND ONE-HALF TABLETS DAILY AS DIRECTED BY [facility] 135 Tab 0   • Empagliflozin 25 MG Tab Take 1 Each by mouth every day. 30 Tab 11   • Misc. Devices Misc TDaP IM at pharmacy 1 Each 0   • VITAMIN D, CHOLECALCIFEROL, PO Take 5,000 Units by mouth.     • ALPHA LIPOIC ACID PO Take 200 mg by mouth 2 Times a Day.       No current facility-administered medications for this visit.          Allergies as of 05/06/2019 - Reviewed 05/06/2019   Allergen Reaction Noted   • Nkda [no known drug allergy]  07/31/2009        ROS: As per HPI.    /78 (BP Location: Right arm, Patient Position: Sitting, BP Cuff Size: Adult)   Pulse 83   Resp 16   Ht 1.93 m (6' 4\")   Wt 105.7 kg (233 lb)   SpO2 97%   BMI 28.36 kg/m²     Physical Exam:  Gen:         Alert and oriented, No apparent distress.  Neck:        No " Lymphadenopathy or Bruits.  Lungs:     Clear to auscultation bilaterally  CV:          Regular rate and rhythm. No murmurs, rubs or gallops.  Abd:         Soft non tender, non distended. Normal active bowel sounds.  No  Hepatosplenomegaly, No pulsatile masses.                   Ext:          No clubbing, cyanosis, edema.  Brace present on left foot due to foot drop      Assessment and Plan.   74 y.o. male with the following issues.    1. Medicare annual wellness visit, subsequent  Annual wellness topics discussed, review of chronic medical problems completed    - Subsequent Annual Wellness Visit - Includes PPPS ()    2. Uncontrolled type 2 diabetes mellitus without complication, without long-term current use of insulin (HCC)  I would like to see patient's hemoglobin A1c below 7.0 and I discussed options with patient and he would like to go to the higher dose of glimepiride at 2 mg a day.  It is unclear whether he is taking Jardiance at 10 mg or 25 mg and will contact the office with the understanding we can increase this in the future as well if needed.  - metformin (GLUCOPHAGE) 1000 MG tablet; Take 1 Tab by mouth 2 times a day, with meals.  Dispense: 200 Tab; Refill: 3  - HEMOGLOBIN A1C; Future  - Comp Metabolic Panel; Future    3. H/O: gout  Patient would like to have a Medrol Dosepak available when he has a gout attack.  I did talk to him about allopurinol but he states he would like to wait on this and I would like to check uric acid level beforehand as well.  - URIC ACID; Future  - MethylPREDNISolone (MEDROL DOSEPAK) 4 MG Tablet Therapy Pack; As directed on the packaging label.  Dispense: 21 Tab; Refill: 0    4. Paroxysmal atrial fibrillation (HCC)  Appears to be rate controlled and taking his metoprolol and Coumadin.    5. Essential hypertension  Blood pressure controlled on his 2 medications.  - metoprolol (LOPRESSOR) 25 MG Tab; Take 1 Tab by mouth 2 times a day. TWICE DAILY  Dispense: 200 Tab; Refill:  3    6. Chronic anticoagulation  Patient will continue to follow with the Coumadin clinic who is testing his INR and prescribing medicines.    7. SHANNAN (obstructive sleep apnea)  I stressed to patient the importance of using his CPAP at night and to try using it a few hours per night and gradually increasing the amount of time he uses it per night.    8. Dyslipidemia  Patient up-to-date on testing.    9. Left foot drop  No change in status and he wears his brace.

## 2019-05-06 NOTE — TELEPHONE ENCOUNTER
1. Caller Name: Cheikh Perez                                           Call Back Number: [phone] (home)     Pt called to let you know he is taking 25 mg of Jardiance once a day          Patient approves a detailed voicemail message: N\A

## 2019-05-23 ENCOUNTER — ANTICOAGULATION VISIT (OUTPATIENT)
Dept: VASCULAR LAB | Facility: MEDICAL CENTER | Age: 74
End: 2019-05-23
Attending: INTERNAL MEDICINE
Payer: MEDICARE

## 2019-05-23 VITALS — HEART RATE: 58 BPM | DIASTOLIC BLOOD PRESSURE: 85 MMHG | SYSTOLIC BLOOD PRESSURE: 127 MMHG

## 2019-05-23 DIAGNOSIS — I48.0 PAROXYSMAL ATRIAL FIBRILLATION (HCC): ICD-10-CM

## 2019-05-23 DIAGNOSIS — Z79.01 CHRONIC ANTICOAGULATION: ICD-10-CM

## 2019-05-23 LAB — INR PPP: 2.3 (ref 2–3.5)

## 2019-05-23 PROCEDURE — 85610 PROTHROMBIN TIME: CPT

## 2019-05-23 PROCEDURE — 99211 OFF/OP EST MAY X REQ PHY/QHP: CPT | Performed by: NURSE PRACTITIONER

## 2019-05-23 NOTE — PROGRESS NOTES
Anticoagulation Summary  As of 2019    INR goal:   2.0-3.0   TTR:   81.5 % (3 y)   INR used for dosin.30 (2019)   Warfarin maintenance plan:   5 mg (5 mg x 1) every Mon, Wed, Fri; 7.5 mg (5 mg x 1.5) all other days   Weekly warfarin total:   45 mg   Plan last modified:   Kandice Mack, PharmD (10/30/2018)   Next INR check:   7/3/2019   Target end date:   Indefinite    Indications    Paroxysmal atrial fibrillation (HCC) [I48.0]  Chronic anticoagulation [Z79.01]             Anticoagulation Episode Summary     INR check location:   [facility]    Preferred lab:       Send INR reminders to:       Comments:         Anticoagulation Care Providers     Provider Role Specialty Phone number    Cheikh Turner M.D. Referring Cardiology [phone]    [facility] Responsible  [phone]        Anticoagulation Patient Findings      HPI:  Cheikh Perez seen in clinic today for follow up on anticoagulation therapy in the presence of AF. Denies any changes to current medical/health status since last appointment. Denies any medication or diet changes. No current symptoms of bleeding or thrombosis reported.    A/P:   INR therapeutic. Continue current regimen. BP recorded in vitals.    Follow up appointment in 6 week(s).    Next Appointment: Wednesday, Nguyen 3, 2019 at 10:45 am.     Amanda PUGH

## 2019-05-28 LAB — INR BLD: 2.3 (ref 0.9–1.2)

## 2019-07-03 ENCOUNTER — ANTICOAGULATION VISIT (OUTPATIENT)
Dept: VASCULAR LAB | Facility: MEDICAL CENTER | Age: 74
End: 2019-07-03
Attending: INTERNAL MEDICINE
Payer: MEDICARE

## 2019-07-03 VITALS — SYSTOLIC BLOOD PRESSURE: 123 MMHG | HEART RATE: 55 BPM | DIASTOLIC BLOOD PRESSURE: 74 MMHG

## 2019-07-03 DIAGNOSIS — I48.0 PAROXYSMAL ATRIAL FIBRILLATION (HCC): ICD-10-CM

## 2019-07-03 DIAGNOSIS — Z79.01 CHRONIC ANTICOAGULATION: ICD-10-CM

## 2019-07-03 LAB
INR BLD: 2.7 (ref 0.9–1.2)
INR PPP: 2.7 (ref 2–3.5)

## 2019-07-03 PROCEDURE — 85610 PROTHROMBIN TIME: CPT

## 2019-07-03 PROCEDURE — 99211 OFF/OP EST MAY X REQ PHY/QHP: CPT | Performed by: NURSE PRACTITIONER

## 2019-07-03 NOTE — PROGRESS NOTES
Anticoagulation Summary  As of 7/3/2019    INR goal:   2.0-3.0   TTR:   82.2 % (3.1 y)   INR used for dosin.70 (7/3/2019)   Warfarin maintenance plan:   5 mg (5 mg x 1) every Mon, Wed, Fri; 7.5 mg (5 mg x 1.5) all other days   Weekly warfarin total:   45 mg   Plan last modified:   Kandice Mack, PharmD (10/30/2018)   Next INR check:   2019   Target end date:   Indefinite    Indications    Paroxysmal atrial fibrillation (HCC) [I48.0]  Chronic anticoagulation [Z79.01]             Anticoagulation Episode Summary     INR check location:   [facility]    Preferred lab:       Send INR reminders to:       Comments:         Anticoagulation Care Providers     Provider Role Specialty Phone number    Cheikh Turner M.D. Referring Cardiology [phone]    [facility] Responsible  [phone]        Anticoagulation Patient Findings      HPI:  Cheikh Perez seen in clinic today for follow up on anticoagulation therapy in the presence of AF.   Denies any changes to current medical/health status since last appointment.   Denies any medication or diet changes.   No current symptoms of bleeding or thrombosis reported.    A/P:   INR therapeutic.   Continue current regimen.   BP recorded in vitals.    Follow up appointment in 7 week(s).    Next Appointment: 2019 at 10:45 am.     Amanda PUGH

## 2019-08-05 RX ORDER — EMPAGLIFLOZIN 25 MG/1
TABLET, FILM COATED ORAL
Qty: 30 TAB | Refills: 9 | Status: SHIPPED | OUTPATIENT
Start: 2019-08-05 | End: 2019-10-16 | Stop reason: SDUPTHER

## 2019-08-21 ENCOUNTER — ANTICOAGULATION VISIT (OUTPATIENT)
Dept: VASCULAR LAB | Facility: MEDICAL CENTER | Age: 74
End: 2019-08-21
Attending: INTERNAL MEDICINE
Payer: MEDICARE

## 2019-08-21 VITALS — DIASTOLIC BLOOD PRESSURE: 68 MMHG | HEART RATE: 66 BPM | SYSTOLIC BLOOD PRESSURE: 117 MMHG

## 2019-08-21 DIAGNOSIS — I48.0 PAROXYSMAL ATRIAL FIBRILLATION (HCC): ICD-10-CM

## 2019-08-21 DIAGNOSIS — Z79.01 CHRONIC ANTICOAGULATION: ICD-10-CM

## 2019-08-21 LAB
INR BLD: 2.7 (ref 0.9–1.2)
INR PPP: 2.7 (ref 2–3.5)

## 2019-08-21 PROCEDURE — 85610 PROTHROMBIN TIME: CPT

## 2019-08-21 PROCEDURE — 99211 OFF/OP EST MAY X REQ PHY/QHP: CPT | Performed by: NURSE PRACTITIONER

## 2019-08-21 NOTE — PROGRESS NOTES
Anticoagulation Summary  As of 2019    INR goal:   2.0-3.0   TTR:   82.9 % (3.2 y)   INR used for dosin.70 (2019)   Warfarin maintenance plan:   5 mg (5 mg x 1) every Mon, Wed, Fri; 7.5 mg (5 mg x 1.5) all other days   Weekly warfarin total:   45 mg   Plan last modified:   Kandice Mack, PharmD (10/30/2018)   Next INR check:   10/16/2019   Target end date:   Indefinite    Indications    Paroxysmal atrial fibrillation (HCC) [I48.0]  Chronic anticoagulation [Z79.01]             Anticoagulation Episode Summary     INR check location:   [facility] Clinic    Preferred lab:       Send INR reminders to:       Comments:         Anticoagulation Care Providers     Provider Role Specialty Phone number    Cheikh Turner M.D. Referring Cardiology [phone]    [facility] Responsible  [phone]        Anticoagulation Patient Findings      HPI:  Cheikh Perez seen in clinic today for follow up on anticoagulation therapy in the presence of AF.   Denies any changes to current medical/health status since last appointment.   Denies any medication or diet changes.   No current symptoms of bleeding or thrombosis reported.    A/P:   INR therapeutic.   Continue current regimen.   BP recorded in vitals.    Follow up appointment in 8 week(s).    Next Appointment: 2019 at 10:45 am.    Amanda PUGH

## 2019-10-10 DIAGNOSIS — E78.5 HYPERLIPIDEMIA, UNSPECIFIED HYPERLIPIDEMIA TYPE: ICD-10-CM

## 2019-10-10 RX ORDER — ATORVASTATIN CALCIUM 10 MG/1
10 TABLET, FILM COATED ORAL DAILY
Qty: 30 TAB | Refills: 0 | Status: SHIPPED | OUTPATIENT
Start: 2019-10-10 | End: 2019-11-06 | Stop reason: SDUPTHER

## 2019-10-16 ENCOUNTER — ANTICOAGULATION VISIT (OUTPATIENT)
Dept: VASCULAR LAB | Facility: MEDICAL CENTER | Age: 74
End: 2019-10-16
Attending: INTERNAL MEDICINE
Payer: MEDICARE

## 2019-10-16 VITALS — SYSTOLIC BLOOD PRESSURE: 122 MMHG | HEART RATE: 67 BPM | DIASTOLIC BLOOD PRESSURE: 91 MMHG

## 2019-10-16 DIAGNOSIS — Z79.01 CHRONIC ANTICOAGULATION: ICD-10-CM

## 2019-10-16 DIAGNOSIS — I48.0 PAROXYSMAL ATRIAL FIBRILLATION (HCC): ICD-10-CM

## 2019-10-16 LAB
INR BLD: 2.2 (ref 0.9–1.2)
INR PPP: 2.2 (ref 2–3.5)

## 2019-10-16 PROCEDURE — 99211 OFF/OP EST MAY X REQ PHY/QHP: CPT | Performed by: NURSE PRACTITIONER

## 2019-10-16 PROCEDURE — 85610 PROTHROMBIN TIME: CPT

## 2019-10-16 NOTE — PROGRESS NOTES
Anticoagulation Summary  As of 10/16/2019    INR goal:   2.0-3.0   TTR:   83.7 % (3.4 y)   INR used for dosin.20 (10/16/2019)   Warfarin maintenance plan:   5 mg (5 mg x 1) every Mon, Wed, Fri; 7.5 mg (5 mg x 1.5) all other days   Weekly warfarin total:   45 mg   Plan last modified:   Kandice Mack, PharmD (10/30/2018)   Next INR check:   2019   Target end date:   Indefinite    Indications    Paroxysmal atrial fibrillation (HCC) [I48.0]  Chronic anticoagulation [Z79.01]             Anticoagulation Episode Summary     INR check location:   Anticoagulation Clinic    Preferred lab:       Send INR reminders to:       Comments:         Anticoagulation Care Providers     Provider Role Specialty Phone number    Cheikh Turner M.D. Referring Cardiology [phone]    [facility] Responsible  [phone]        Anticoagulation Patient Findings      HPI:  Cheikh Perez seen in clinic today for follow up on anticoagulation therapy in the presence of AF.   Denies any changes to current medical/health status since last appointment.   Denies any medication or diet changes.   No current symptoms of bleeding or thrombosis reported.    A/P:   INR therapeutic.   Continue current regimen.   BP recorded in vitals.    Follow up appointment in 9 week(s).    Next Appointment: Wednesday, Dec 18, 2019 at 11:00 am.    Amanda PUGH

## 2019-11-04 ENCOUNTER — HOSPITAL ENCOUNTER (OUTPATIENT)
Dept: LAB | Facility: MEDICAL CENTER | Age: 74
End: 2019-11-04
Attending: NURSE PRACTITIONER
Payer: MEDICARE

## 2019-11-04 DIAGNOSIS — Z87.39 H/O: GOUT: ICD-10-CM

## 2019-11-04 LAB
ALBUMIN SERPL BCP-MCNC: 4.7 G/DL (ref 3.2–4.9)
ALBUMIN/GLOB SERPL: 1.7 G/DL
ALP SERPL-CCNC: 65 U/L (ref 30–99)
ALT SERPL-CCNC: 22 U/L (ref 2–50)
ANION GAP SERPL CALC-SCNC: 9 MMOL/L (ref 0–11.9)
AST SERPL-CCNC: 21 U/L (ref 12–45)
BILIRUB SERPL-MCNC: 1.1 MG/DL (ref 0.1–1.5)
BUN SERPL-MCNC: 19 MG/DL (ref 8–22)
CALCIUM SERPL-MCNC: 9.9 MG/DL (ref 8.5–10.5)
CHLORIDE SERPL-SCNC: 106 MMOL/L (ref 96–112)
CO2 SERPL-SCNC: 27 MMOL/L (ref 20–33)
CREAT SERPL-MCNC: 0.96 MG/DL (ref 0.5–1.4)
GLOBULIN SER CALC-MCNC: 2.8 G/DL (ref 1.9–3.5)
GLUCOSE SERPL-MCNC: 125 MG/DL (ref 65–99)
POTASSIUM SERPL-SCNC: 4.7 MMOL/L (ref 3.6–5.5)
PROT SERPL-MCNC: 7.5 G/DL (ref 6–8.2)
SODIUM SERPL-SCNC: 142 MMOL/L (ref 135–145)
URATE SERPL-MCNC: 5.5 MG/DL (ref 2.5–8.3)

## 2019-11-04 PROCEDURE — 36415 COLL VENOUS BLD VENIPUNCTURE: CPT

## 2019-11-04 PROCEDURE — 80053 COMPREHEN METABOLIC PANEL: CPT

## 2019-11-04 PROCEDURE — 83036 HEMOGLOBIN GLYCOSYLATED A1C: CPT

## 2019-11-04 PROCEDURE — 84550 ASSAY OF BLOOD/URIC ACID: CPT

## 2019-11-05 DIAGNOSIS — E78.5 HYPERLIPIDEMIA, UNSPECIFIED HYPERLIPIDEMIA TYPE: ICD-10-CM

## 2019-11-05 LAB
EST. AVERAGE GLUCOSE BLD GHB EST-MCNC: 148 MG/DL
HBA1C MFR BLD: 6.8 % (ref 0–5.6)

## 2019-11-06 ENCOUNTER — OFFICE VISIT (OUTPATIENT)
Dept: MEDICAL GROUP | Facility: MEDICAL CENTER | Age: 74
End: 2019-11-06
Payer: MEDICARE

## 2019-11-06 VITALS
HEART RATE: 67 BPM | OXYGEN SATURATION: 97 % | RESPIRATION RATE: 16 BRPM | BODY MASS INDEX: 28.62 KG/M2 | DIASTOLIC BLOOD PRESSURE: 72 MMHG | HEIGHT: 76 IN | SYSTOLIC BLOOD PRESSURE: 124 MMHG | WEIGHT: 235 LBS

## 2019-11-06 DIAGNOSIS — I10 ESSENTIAL HYPERTENSION: ICD-10-CM

## 2019-11-06 DIAGNOSIS — I48.0 PAROXYSMAL ATRIAL FIBRILLATION (HCC): ICD-10-CM

## 2019-11-06 DIAGNOSIS — Z11.59 NEED FOR HEPATITIS C SCREENING TEST: ICD-10-CM

## 2019-11-06 DIAGNOSIS — Z23 INFLUENZA VACCINE NEEDED: ICD-10-CM

## 2019-11-06 DIAGNOSIS — Z23 NEED FOR PNEUMOCOCCAL VACCINATION: ICD-10-CM

## 2019-11-06 DIAGNOSIS — E78.5 DYSLIPIDEMIA: ICD-10-CM

## 2019-11-06 DIAGNOSIS — Z79.01 CHRONIC ANTICOAGULATION: ICD-10-CM

## 2019-11-06 PROCEDURE — 90732 PPSV23 VACC 2 YRS+ SUBQ/IM: CPT | Performed by: NURSE PRACTITIONER

## 2019-11-06 PROCEDURE — 99214 OFFICE O/P EST MOD 30 MIN: CPT | Mod: 25 | Performed by: NURSE PRACTITIONER

## 2019-11-06 PROCEDURE — G0009 ADMIN PNEUMOCOCCAL VACCINE: HCPCS | Performed by: NURSE PRACTITIONER

## 2019-11-06 PROCEDURE — G0008 ADMIN INFLUENZA VIRUS VAC: HCPCS | Performed by: NURSE PRACTITIONER

## 2019-11-06 PROCEDURE — 90662 IIV NO PRSV INCREASED AG IM: CPT | Performed by: NURSE PRACTITIONER

## 2019-11-06 RX ORDER — ATORVASTATIN CALCIUM 10 MG/1
10 TABLET, FILM COATED ORAL DAILY
Qty: 90 TAB | Refills: 3 | Status: SHIPPED | OUTPATIENT
Start: 2019-11-06 | End: 2019-12-16 | Stop reason: SDUPTHER

## 2019-11-06 RX ORDER — ATORVASTATIN CALCIUM 10 MG/1
TABLET, FILM COATED ORAL
Qty: 100 TAB | Refills: 0 | Status: SHIPPED
Start: 2019-11-06 | End: 2019-12-16

## 2019-11-06 NOTE — PROGRESS NOTES
"Subjective:      Cheikh Perez is a 74 y.o. male who presents with Follow-Up (6 month)        CC: Patient here today for six-month follow-up and review of lab work for his diabetes, hypertension and atrial fibrillation    HPI       1. Uncontrolled type 2 diabetes mellitus without complication, without long-term current use of insulin (HCC)  Patient's hemoglobin A1c was improved at 7.2 on his April visit and medication changes were made in his hemoglobin A1c comes back even better at 6.8.  He states he is taking his Jardiance, Amaryl and metformin without side effects.  He is on an ACE inhibitor and statin.    2. Paroxysmal atrial fibrillation (HCC)  Patient continues on Coumadin through the Coumadin clinic and is rate controlled on metoprolol.  His yearly follow-up with cardiology is next month.  He reports no palpitations or chest pain    3. Essential hypertension  Blood pressure appears controlled on his enalapril and metoprolol at 124/72    4. Chronic anticoagulation  Patient is on Coumadin through the Coumadin clinic with lab work done just recently and INR at 2.2.  He reports no excessive bleeding or bruising    5. Dyslipidemia  Patient reports no myalgias from his statin and will be due for lab work before his next visit    6. Need for pneumococcal vaccination  It appears patient has not had a recent Pneumo 23 vaccine    7. Influenza vaccine needed  Patient due for seasonal flu vaccine    8. Need for hepatitis C screening test  Patient due for screening  Past Medical History:   Diagnosis Date   • Arrhythmia July 2013    AFIB, cardioversion   • Arthritis    • Diabetes     Diet,oral meds   • Hypertension    • Hypertension    • Pain      \"stiff\" from arthritis, 2/10   • Paroxysmal atrial fibrillation (HCC) 6/6/2013   • Sleep apnea     CPAP   • Snoring      Social History     Socioeconomic History   • Marital status:      Spouse name: Not on file   • Number of children: Not on file   • Years " of education: Not on file   • Highest education level: Not on file   Occupational History   • Not on file   Social Needs   • Financial resource strain: Not on file   • Food insecurity:     Worry: Not on file     Inability: Not on file   • Transportation needs:     Medical: Not on file     Non-medical: Not on file   Tobacco Use   • Smoking status: Never Smoker   • Smokeless tobacco: Never Used   Substance and Sexual Activity   • Alcohol use: Yes     Alcohol/week: 6.0 oz     Types: 10 Glasses of wine per week   • Drug use: No   • Sexual activity: Never     Partners: Female   Lifestyle   • Physical activity:     Days per week: Not on file     Minutes per session: Not on file   • Stress: Not on file   Relationships   • Social connections:     Talks on phone: Not on file     Gets together: Not on file     Attends Faith service: Not on file     Active member of club or organization: Not on file     Attends meetings of clubs or organizations: Not on file     Relationship status: Not on file   • Intimate partner violence:     Fear of current or ex partner: Not on file     Emotionally abused: Not on file     Physically abused: Not on file     Forced sexual activity: Not on file   Other Topics Concern   • Not on file   Social History Narrative   • Not on file     Current Outpatient Medications   Medication Sig Dispense Refill   • atorvastatin (LIPITOR) 10 MG Tab Take 1 Tab by mouth every day. Needs to be seen for further refills. Thank you 90 Tab 3   • Empagliflozin (JARDIANCE) 25 MG Tab Take 1 Tab by mouth every day. 30 Tab 10   • warfarin (COUMADIN) 5 MG Tab Take 1 to 1 and 1/2 tablets by mouth daily as directed by Anticoagulation Program 135 Tab 1   • glimepiride (AMARYL) 2 MG Tab Take 1 Tab by mouth every morning. 100 Tab 3   • metformin (GLUCOPHAGE) 1000 MG tablet Take 1 Tab by mouth 2 times a day, with meals. 200 Tab 3   • enalapril (VASOTEC) 10 MG Tab TAKE ONE TABLET BY MOUTH ONCE A  Tab 3   • metoprolol  "(LOPRESSOR) 25 MG Tab Take 1 Tab by mouth 2 times a day. TWICE DAILY 200 Tab 3   • Misc. Devices Misc TDaP IM at pharmacy 1 Each 0   • VITAMIN D, CHOLECALCIFEROL, PO Take 5,000 Units by mouth.     • ALPHA LIPOIC ACID PO Take 200 mg by mouth 2 Times a Day.       No current facility-administered medications for this visit.      Family History   Problem Relation Age of Onset   • Heart Attack Father 59   • Heart Disease Father    • Hypertension Brother    • Diabetes Brother    • Arthritis Brother    • Thyroid Mother 50   • Breast Cancer Sister    • No Known Problems Maternal Grandmother    • No Known Problems Maternal Grandfather    • No Known Problems Paternal Grandmother    • No Known Problems Paternal Grandfather    • Arrythmia Brother    • No Known Problems Brother    • No Known Problems Daughter          Review of Systems   All other systems reviewed and are negative.         Objective:     /72 (BP Location: Right arm, Patient Position: Sitting, BP Cuff Size: Adult)   Pulse 67   Resp 16   Ht 1.93 m (6' 4\")   Wt 106.6 kg (235 lb)   SpO2 97%   BMI 28.61 kg/m²      Physical Exam  Vitals signs and nursing note reviewed.   Constitutional:       General: He is not in acute distress.     Appearance: He is well-developed. He is not diaphoretic.   HENT:      Head: Normocephalic and atraumatic.      Right Ear: External ear normal.      Left Ear: External ear normal.      Nose: Nose normal.   Eyes:      General:         Right eye: No discharge.         Left eye: No discharge.      Conjunctiva/sclera: Conjunctivae normal.   Neck:      Musculoskeletal: Normal range of motion and neck supple.      Thyroid: No thyromegaly.      Trachea: No tracheal deviation.   Cardiovascular:      Rate and Rhythm: Normal rate and regular rhythm.      Heart sounds: Normal heart sounds. No murmur.   Pulmonary:      Effort: Pulmonary effort is normal. No respiratory distress.      Breath sounds: Normal breath sounds. No wheezing or " rales.   Musculoskeletal:      Comments: Left foot drop with brace in place   Lymphadenopathy:      Cervical: No cervical adenopathy.   Skin:     General: Skin is warm and dry.      Findings: No erythema or rash.   Neurological:      Mental Status: He is alert and oriented to person, place, and time.      Coordination: Coordination normal.   Psychiatric:         Behavior: Behavior normal.         Thought Content: Thought content normal.         Judgment: Judgment normal.                 Assessment/Plan:       1. Uncontrolled type 2 diabetes mellitus without complication, without long-term current use of insulin ([facility])  Patient's hemoglobin A1c has dropped down below 7.0 as hoped.  He reports no hypoglycemia and is taking his statin and ACE inhibitor.  He is in the donut hole so hopefully can get Jardiance samples again at the [facility] care clinic  - HEMOGLOBIN A1C; Future  - MICROALBUMIN CREAT RATIO URINE; Future  - VITAMIN B12; Future  - Empagliflozin (JARDIANCE) 25 MG Tab; Take 1 Tab by mouth every day.  Dispense: 30 Tab; Refill: 10    2. Paroxysmal atrial fibrillation (HCC)  Heart rhythm appears stable in the office and he is rate controlled    3. Essential hypertension  Blood pressure currently controlled  - VITAMIN B12; Future    4. Chronic anticoagulation  Patient will continue on Coumadin and monitoring through the anticoagulation clinic    5. Dyslipidemia  Patient to continue on his statin and do lipid panel before his next visit.  He does have cardiology appointment next month  - Lipid Profile; Future  - atorvastatin (LIPITOR) 10 MG Tab; Take 1 Tab by mouth every day. Needs to be seen for further refills. Thank you  Dispense: 90 Tab; Refill: 3    6. Need for pneumococcal vaccination  I have placed the below orders and discussed them with an approved delegating provider. The MA is performing the below orders under the direction of Dr. Núñez  - Pneumococal Polysaccharide Vaccine 23-Valent =>3YO SQ/IM    7.  Influenza vaccine needed  I have placed the below orders and discussed them with an approved delegating provider. The MA is performing the below orders under the direction of Dr. Núñez    - INFLUENZA VACCINE, HIGH DOSE (65+ ONLY)    8. Need for hepatitis C screening test    - HEP C VIRUS ANTIBODY; Future

## 2019-11-22 ENCOUNTER — TELEPHONE (OUTPATIENT)
Dept: MEDICAL GROUP | Facility: MEDICAL CENTER | Age: 74
End: 2019-11-22

## 2019-11-27 DIAGNOSIS — I48.0 PAROXYSMAL ATRIAL FIBRILLATION (HCC): ICD-10-CM

## 2019-11-27 RX ORDER — WARFARIN SODIUM 5 MG/1
TABLET ORAL
Qty: 135 TAB | Refills: 1 | Status: SHIPPED | OUTPATIENT
Start: 2019-11-27 | End: 2020-07-06

## 2019-12-12 ENCOUNTER — TELEPHONE (OUTPATIENT)
Dept: MEDICAL GROUP | Facility: MEDICAL CENTER | Age: 74
End: 2019-12-12

## 2019-12-12 NOTE — TELEPHONE ENCOUNTER
1. Caller Name: Rajiv Montelongo Back Number: [phone]    2. Message: patient is requesting an order for a new brace for his leg. He states the one he has is wearing out.     3. Patient approves office to leave a detailed voicemail/MyChart message: yes

## 2019-12-13 NOTE — TELEPHONE ENCOUNTER
I will be more than willing to write for a new leg brace.  I do need additional information such as the type of brace, I assume it is for the left leg, and we are to send it to.

## 2019-12-16 ENCOUNTER — OFFICE VISIT (OUTPATIENT)
Dept: CARDIOLOGY | Facility: MEDICAL CENTER | Age: 74
End: 2019-12-16
Payer: MEDICARE

## 2019-12-16 VITALS
SYSTOLIC BLOOD PRESSURE: 124 MMHG | HEIGHT: 76 IN | HEART RATE: 90 BPM | WEIGHT: 238.8 LBS | BODY MASS INDEX: 29.08 KG/M2 | OXYGEN SATURATION: 98 % | DIASTOLIC BLOOD PRESSURE: 72 MMHG

## 2019-12-16 DIAGNOSIS — I10 ESSENTIAL HYPERTENSION: ICD-10-CM

## 2019-12-16 DIAGNOSIS — Z79.01 CHRONIC ANTICOAGULATION: ICD-10-CM

## 2019-12-16 DIAGNOSIS — I48.0 PAROXYSMAL ATRIAL FIBRILLATION (HCC): ICD-10-CM

## 2019-12-16 DIAGNOSIS — E78.5 DYSLIPIDEMIA: ICD-10-CM

## 2019-12-16 DIAGNOSIS — M21.372 LEFT FOOT DROP: ICD-10-CM

## 2019-12-16 PROCEDURE — 99214 OFFICE O/P EST MOD 30 MIN: CPT | Performed by: INTERNAL MEDICINE

## 2019-12-16 RX ORDER — ATORVASTATIN CALCIUM 10 MG/1
10 TABLET, FILM COATED ORAL DAILY
Qty: 90 TAB | Refills: 3 | Status: SHIPPED | OUTPATIENT
Start: 2019-12-16 | End: 2020-09-29 | Stop reason: SDUPTHER

## 2019-12-17 ASSESSMENT — ENCOUNTER SYMPTOMS
COUGH: 0
BRUISES/BLEEDS EASILY: 0
WEAKNESS: 0
PND: 0
NAUSEA: 0
SHORTNESS OF BREATH: 0
FEVER: 0
SORE THROAT: 0
PALPITATIONS: 0
FALLS: 0
CHILLS: 0
FOCAL WEAKNESS: 0
DIZZINESS: 0
BLURRED VISION: 0
ABDOMINAL PAIN: 0
CLAUDICATION: 0

## 2019-12-17 NOTE — PROGRESS NOTES
"Chief Complaint   Patient presents with   • Atrial Fibrillation       Subjective:   Cheikh Perez is a 74 y.o. male who presents today for follow-up of his history of atrial fibrillation with remote cardioversion    He is doing well on his medications no major arrhythmias in the past year    He is having issues getting hold with primary care for his bilateral foot drop orthotics    Past Medical History:   Diagnosis Date   • Arrhythmia July 2013    AFIB, cardioversion   • Arthritis    • Diabetes     Diet,oral meds   • Hypertension    • Hypertension    • Pain      \"stiff\" from arthritis, 2/10   • Paroxysmal atrial fibrillation (HCC) 6/6/2013   • Sleep apnea     CPAP   • Snoring      Past Surgical History:   Procedure Laterality Date   • FEMUR ORIF Left 2/24/2016    Procedure: DISTAL FEMUR ORIF;  Surgeon: Montrell Eddy M.D.;  Location: [facility];  Service:    • CYSTOSCOPY STENT PLACEMENT  1/13/2014    Performed by Main Batista M.D. at SURGERY [facility]   • URETEROSCOPY  1/13/2014    Performed by Main Batista M.D. at SURGERY [facility]   • LASERTRIPSY  1/13/2014    Performed by Main Batista M.D. at SURGERY [facility]   • OTHER      Surgery for kidney stones   • RECOVERY  7/11/2013    Performed by Cath-Recovery Surgery at [facility]ERY [facility]   • KNEE ARTHROPLASTY TOTAL  12/1/2009    Performed by GIACOMO PETTIT at SURGERY [facility]   • KNEE ARTHROPLASTY TOTAL  3/2009    Left   • LUMBAR LAMINECTOMY DISKECTOMY  7/2006    L1-L5/ residual left foot weaknesss    • ACL REPAIR  12/2005    Right   • FEMUR ORIF       Family History   Problem Relation Age of Onset   • Heart Attack Father 59   • Heart Disease Father    • Hypertension Brother    • Diabetes Brother    • Arthritis Brother    • Thyroid Mother 50   • Breast Cancer Sister    • No Known Problems Maternal Grandmother    • No Known Problems Maternal Grandfather    • No Known Problems Paternal " Grandmother    • No Known Problems Paternal Grandfather    • Arrythmia Brother    • No Known Problems Brother    • No Known Problems Daughter      Social History     Socioeconomic History   • Marital status:      Spouse name: Not on file   • Number of children: Not on file   • Years of education: Not on file   • Highest education level: Not on file   Occupational History   • Not on file   Social Needs   • Financial resource strain: Not on file   • Food insecurity:     Worry: Not on file     Inability: Not on file   • Transportation needs:     Medical: Not on file     Non-medical: Not on file   Tobacco Use   • Smoking status: Never Smoker   • Smokeless tobacco: Never Used   Substance and Sexual Activity   • Alcohol use: Yes     Alcohol/week: 6.0 oz     Types: 10 Glasses of wine per week   • Drug use: No   • Sexual activity: Never     Partners: Female   Lifestyle   • Physical activity:     Days per week: Not on file     Minutes per session: Not on file   • Stress: Not on file   Relationships   • Social connections:     Talks on phone: Not on file     Gets together: Not on file     Attends Sabianism service: Not on file     Active member of club or organization: Not on file     Attends meetings of clubs or organizations: Not on file     Relationship status: Not on file   • Intimate partner violence:     Fear of current or ex partner: Not on file     Emotionally abused: Not on file     Physically abused: Not on file     Forced sexual activity: Not on file   Other Topics Concern   • Not on file   Social History Narrative   • Not on file     Allergies   Allergen Reactions   • Nkda [No Known Drug Allergy]      Outpatient Encounter Medications as of 12/16/2019   Medication Sig Dispense Refill   • atorvastatin (LIPITOR) 10 MG Tab Take 1 Tab by mouth every day. Needs to be seen for further refills. Thank you 90 Tab 3   • Misc. Devices Misc AFO devices for foot drop 2 Device 1   • warfarin (COUMADIN) 5 MG Tab TAKE 1 TO  "1 AND 1/2 TABLETS BY MOUTH DAILY AS DIRECTED BY ANTICOAGULATION PROGRAM 135 Tab 1   • Empagliflozin (JARDIANCE) 25 MG Tab Take 1 Tab by mouth every day. 30 Tab 10   • glimepiride (AMARYL) 2 MG Tab Take 1 Tab by mouth every morning. 100 Tab 3   • metformin (GLUCOPHAGE) 1000 MG tablet Take 1 Tab by mouth 2 times a day, with meals. 200 Tab 3   • enalapril (VASOTEC) 10 MG Tab TAKE ONE TABLET BY MOUTH ONCE A  Tab 3   • metoprolol (LOPRESSOR) 25 MG Tab Take 1 Tab by mouth 2 times a day. TWICE DAILY 200 Tab 3   • Misc. Devices Misc TDaP IM at pharmacy 1 Each 0   • VITAMIN D, CHOLECALCIFEROL, PO Take 5,000 Units by mouth.     • [DISCONTINUED] atorvastatin (LIPITOR) 10 MG Tab TAKE ONE TABLET BY MOUTH ONCE A DAY (Patient not taking: Reported on 12/16/2019) 100 Tab 0   • [DISCONTINUED] atorvastatin (LIPITOR) 10 MG Tab Take 1 Tab by mouth every day. Needs to be seen for further refills. Thank you 90 Tab 3   • [DISCONTINUED] ALPHA LIPOIC ACID PO Take 200 mg by mouth 2 Times a Day.       No facility-administered encounter medications on file as of 12/16/2019.      Review of Systems   Constitutional: Negative for chills and fever.   HENT: Negative for sore throat.    Eyes: Negative for blurred vision.   Respiratory: Negative for cough and shortness of breath.    Cardiovascular: Negative for chest pain, palpitations, claudication, leg swelling and PND.   Gastrointestinal: Negative for abdominal pain and nausea.   Musculoskeletal: Negative for falls and joint pain.   Skin: Negative for rash.   Neurological: Negative for dizziness, focal weakness and weakness.   Endo/Heme/Allergies: Does not bruise/bleed easily.        Objective:   /72 (BP Location: Left arm, Patient Position: Sitting, BP Cuff Size: Adult)   Pulse 90   Ht 1.93 m (6' 4\")   Wt 108.3 kg (238 lb 12.8 oz)   SpO2 98%   BMI 29.07 kg/m²     Physical Exam   Constitutional: No distress.   HENT:   Mouth/Throat: Oropharynx is clear and moist. No oropharyngeal " exudate.   Eyes: No scleral icterus.   Neck: No JVD present.   Cardiovascular: Normal rate and normal heart sounds. Exam reveals no gallop and no friction rub.   No murmur heard.  Pulmonary/Chest: No respiratory distress. He has no wheezes. He has no rales.   Abdominal: Soft. Bowel sounds are normal.   Musculoskeletal:         General: No edema.   Neurological: He is alert.   Skin: No rash noted. He is not diaphoretic.   Psychiatric: He has a normal mood and affect.     We reviewed in person the most recent labs  Recent Results (from the past 4032 hour(s))   POCT Protime    Collection Time: 07/03/19 12:00 AM   Result Value Ref Range    INR 2.70    Point Of Care INR    Collection Time: 07/03/19 10:44 AM   Result Value Ref Range    POC INR 2.7 (H) 0.9 - 1.2   POCT Protime    Collection Time: 08/21/19 12:00 AM   Result Value Ref Range    INR 2.70    Point Of Care INR    Collection Time: 08/21/19 10:57 AM   Result Value Ref Range    POC INR 2.7 (H) 0.9 - 1.2   POCT Protime    Collection Time: 10/16/19 12:00 AM   Result Value Ref Range    INR 2.20    Point Of Care INR    Collection Time: 10/16/19 10:44 AM   Result Value Ref Range    POC INR 2.2 (H) 0.9 - 1.2   URIC ACID    Collection Time: 11/04/19  6:36 AM   Result Value Ref Range    Uric Acid 5.5 2.5 - 8.3 mg/dL   Comp Metabolic Panel    Collection Time: 11/04/19  6:36 AM   Result Value Ref Range    Sodium 142 135 - 145 mmol/L    Potassium 4.7 3.6 - 5.5 mmol/L    Chloride 106 96 - 112 mmol/L    Co2 27 20 - 33 mmol/L    Anion Gap 9.0 0.0 - 11.9    Glucose 125 (H) 65 - 99 mg/dL    Bun 19 8 - 22 mg/dL    Creatinine 0.96 0.50 - 1.40 mg/dL    Calcium 9.9 8.5 - 10.5 mg/dL    AST(SGOT) 21 12 - 45 U/L    ALT(SGPT) 22 2 - 50 U/L    Alkaline Phosphatase 65 30 - 99 U/L    Total Bilirubin 1.1 0.1 - 1.5 mg/dL    Albumin 4.7 3.2 - 4.9 g/dL    Total Protein 7.5 6.0 - 8.2 g/dL    Globulin 2.8 1.9 - 3.5 g/dL    A-G Ratio 1.7 g/dL   HEMOGLOBIN A1C    Collection Time: 11/04/19  6:36 AM    Result Value Ref Range    Glycohemoglobin 6.8 (H) 0.0 - 5.6 %    Est Avg Glucose 148 mg/dL   ESTIMATED GFR    Collection Time: 11/04/19  6:36 AM   Result Value Ref Range    GFR If African American >60 >60 mL/min/1.73 m 2    GFR If Non African American >60 >60 mL/min/1.73 m 2       Assessment:     1. Essential hypertension     2. Paroxysmal atrial fibrillation (HCC)     3. Dyslipidemia  atorvastatin (LIPITOR) 10 MG Tab   4. Chronic anticoagulation  CBC WITH DIFFERENTIAL    Comp Metabolic Panel   5. Left foot drop         Medical Decision Making:  Today's Assessment / Status / Plan:     It was my pleasure to meet with Mr. Perez.    Blood pressure is well controlled.      He is on appropriate statin.    He understands the risks and benefits of chronic anticoagulation    I will see Mr. Perez back in 1 year time and encouraged him to follow up with us over the phone or electronically using my MyChart as issues arise.    It is my pleasure to participate in the care of Mr. Perez.  Please do not hesitate to contact me with questions or concerns.    Cheikh Turner MD PhD [facility]  Cardiologist [facility]    Please note that this dictation was created using voice recognition software. I have worked with consultants from the vendor as well as technical experts from [facility] to optimize the interface. I have made every reasonable attempt to correct obvious errors, but I expect that there are errors of grammar and possibly content I did not discover before finalizing the note.

## 2019-12-18 ENCOUNTER — ANTICOAGULATION VISIT (OUTPATIENT)
Dept: VASCULAR LAB | Facility: MEDICAL CENTER | Age: 74
End: 2019-12-18
Attending: INTERNAL MEDICINE
Payer: MEDICARE

## 2019-12-18 VITALS — HEART RATE: 57 BPM | SYSTOLIC BLOOD PRESSURE: 120 MMHG | DIASTOLIC BLOOD PRESSURE: 71 MMHG

## 2019-12-18 DIAGNOSIS — Z79.01 CHRONIC ANTICOAGULATION: ICD-10-CM

## 2019-12-18 DIAGNOSIS — I48.0 PAROXYSMAL ATRIAL FIBRILLATION (HCC): ICD-10-CM

## 2019-12-18 LAB
INR BLD: 2.4 (ref 0.9–1.2)
INR PPP: 2.4 (ref 2–3.5)

## 2019-12-18 PROCEDURE — 99211 OFF/OP EST MAY X REQ PHY/QHP: CPT | Performed by: NURSE PRACTITIONER

## 2019-12-18 PROCEDURE — 85610 PROTHROMBIN TIME: CPT

## 2019-12-18 NOTE — PROGRESS NOTES
Anticoagulation Summary  As of 2019    INR goal:   2.0-3.0   TTR:   84.5 % (3.5 y)   INR used for dosin.40 (2019)   Warfarin maintenance plan:   5 mg (5 mg x 1) every Mon, Wed, Fri; 7.5 mg (5 mg x 1.5) all other days   Weekly warfarin total:   45 mg   Plan last modified:   Kandice Mack, PharmD (10/30/2018)   Next INR check:   2020   Target end date:   Indefinite    Indications    Paroxysmal atrial fibrillation (HCC) [I48.0]  Chronic anticoagulation [Z79.01]             Anticoagulation Episode Summary     INR check location:   Anticoagulation Clinic    Preferred lab:       Send INR reminders to:       Comments:         Anticoagulation Care Providers     Provider Role Specialty Phone number    Cheikh Turner M.D. Referring Cardiology [phone]    [facility] Anticoagulation Services Responsible  [phone]        Anticoagulation Patient Findings      HPI:  Cheikh Perez seen in clinic today for follow up on anticoagulation therapy in the presence of AF.   Denies any changes to current medical/health status since last appointment.   Denies any medication or diet changes.   No current symptoms of bleeding or thrombosis reported.    A/P:   INR therapeutic.   Continue current regimen.   BP recorded in vitals.    Follow up appointment in 10 week(s).    Next Appointment: Wednesday, 2020 at 11:00 am.    Amanda PUGH

## 2019-12-19 DIAGNOSIS — M21.372 LEFT FOOT DROP: ICD-10-CM

## 2019-12-19 DIAGNOSIS — Z91.81 RISK FOR FALLS: ICD-10-CM

## 2019-12-19 DIAGNOSIS — R29.898 WEAKNESS OF BOTH LOWER EXTREMITIES: ICD-10-CM

## 2020-01-28 ENCOUNTER — PATIENT OUTREACH (OUTPATIENT)
Dept: HEALTH INFORMATION MANAGEMENT | Facility: OTHER | Age: 75
End: 2020-01-28

## 2020-01-28 NOTE — PROGRESS NOTES
Outcome: Patient refused to verify demographics. I tried giving pt my contact information so he can verify I am calling from [facility]. Pt declined and hung up.     Please transfer to Patient Outreach Team at [phone] when patient returns call.    [facility] Verified: yes    Attempt # 1

## 2020-01-30 ENCOUNTER — TELEPHONE (OUTPATIENT)
Dept: CARDIOLOGY | Facility: MEDICAL CENTER | Age: 75
End: 2020-01-30

## 2020-01-30 NOTE — TELEPHONE ENCOUNTER
Received [facility] requesting for order to be signed by MD.      Relayed request to MD to sign order.    Received completed order from MD.    Faxed order back to [facility], [phone], completed status.    Fax sent to scanning for reference.

## 2020-02-26 ENCOUNTER — ANTICOAGULATION VISIT (OUTPATIENT)
Dept: VASCULAR LAB | Facility: MEDICAL CENTER | Age: 75
End: 2020-02-26
Attending: INTERNAL MEDICINE
Payer: MEDICARE

## 2020-02-26 DIAGNOSIS — I48.0 PAROXYSMAL ATRIAL FIBRILLATION (HCC): ICD-10-CM

## 2020-02-26 DIAGNOSIS — Z79.01 CHRONIC ANTICOAGULATION: ICD-10-CM

## 2020-02-26 LAB
INR BLD: 1.9 (ref 0.9–1.2)
INR PPP: 1.9 (ref 2–3.5)

## 2020-02-26 PROCEDURE — 99212 OFFICE O/P EST SF 10 MIN: CPT | Performed by: PHARMACIST

## 2020-02-26 PROCEDURE — 85610 PROTHROMBIN TIME: CPT

## 2020-02-26 NOTE — PROGRESS NOTES
Anticoagulation Summary  As of 2020    INR goal:   2.0-3.0   TTR:   84.2 % (3.7 y)   INR used for dosin.90! (2020)   Warfarin maintenance plan:   5 mg (5 mg x 1) every Mon, Wed, Fri; 7.5 mg (5 mg x 1.5) all other days   Weekly warfarin total:   45 mg   Plan last modified:   Kandice Mack, PharmD (10/30/2018)   Next INR check:   2020   Target end date:   Indefinite    Indications    Paroxysmal atrial fibrillation (HCC) [I48.0]  Chronic anticoagulation [Z79.01]             Anticoagulation Episode Summary     INR check location:   Anticoagulation Clinic    Preferred lab:       Send INR reminders to:       Comments:         Anticoagulation Care Providers     Provider Role Specialty Phone number    Cheikh Turner M.D. Referring Cardiology [phone]    [facility] Anticoagulation Services Responsible  [phone]          Anticoagulation Patient Findings  Patient Findings     Negatives:   Signs/symptoms of thrombosis, Signs/symptoms of bleeding, Laboratory test error suspected, Change in health, Change in alcohol use, Change in activity, Upcoming invasive procedure, Emergency department visit, Upcoming dental procedure, Missed doses, Extra doses, Change in medications, Change in diet/appetite, Hospital admission, Bruising, Other complaints          HPI:  Cheikh Perez seen in clinic today, on anticoagulation therapy with warfarin due to hx of PAF.  Changes to current medical/health status since last appt: NONE  Denies signs/symptoms of bleeding and/or thrombosis since the last appt.    Denies any interval changes to diet  Denies any interval changes to medications since last appt.   Denies any complications or cost restrictions with current therapy.   Verified current warfarin dosing schedule.       A/P   INR  slightly sub-therapeutic at 1.9.   Instructed patient to bolus with 7.5mg X 1, then resume current warfarin regimen.    Follow up appointment in 6 week(s).    Vladimir CARREON  Young, PharmD

## 2020-03-12 DIAGNOSIS — Z79.01 CHRONIC ANTICOAGULATION: ICD-10-CM

## 2020-04-08 ENCOUNTER — ANTICOAGULATION VISIT (OUTPATIENT)
Dept: VASCULAR LAB | Facility: MEDICAL CENTER | Age: 75
End: 2020-04-08
Attending: INTERNAL MEDICINE
Payer: MEDICARE

## 2020-04-08 VITALS — HEART RATE: 60 BPM | SYSTOLIC BLOOD PRESSURE: 121 MMHG | DIASTOLIC BLOOD PRESSURE: 75 MMHG

## 2020-04-08 DIAGNOSIS — I48.0 PAROXYSMAL ATRIAL FIBRILLATION (HCC): ICD-10-CM

## 2020-04-08 DIAGNOSIS — Z79.01 CHRONIC ANTICOAGULATION: ICD-10-CM

## 2020-04-08 LAB
INR BLD: 2.5 (ref 0.9–1.2)
INR PPP: 2.5 (ref 2–3.5)

## 2020-04-08 PROCEDURE — 85610 PROTHROMBIN TIME: CPT

## 2020-04-08 PROCEDURE — 99211 OFF/OP EST MAY X REQ PHY/QHP: CPT | Performed by: NURSE PRACTITIONER

## 2020-04-08 NOTE — PROGRESS NOTES
Anticoagulation Summary  As of 2020    INR goal:   2.0-3.0   TTR:   84.2 % (3.8 y)   INR used for dosin.50 (2020)   Warfarin maintenance plan:   5 mg (5 mg x 1) every Mon, Wed, Fri; 7.5 mg (5 mg x 1.5) all other days   Weekly warfarin total:   45 mg   Plan last modified:   Kandice Mack, PharmD (10/30/2018)   Next INR check:      Target end date:   Indefinite    Indications    Paroxysmal atrial fibrillation (HCC) [I48.0]  Chronic anticoagulation [Z79.01]             Anticoagulation Episode Summary     INR check location:   Anticoagulation Clinic    Preferred lab:       Send INR reminders to:       Comments:         Anticoagulation Care Providers     Provider Role Specialty Phone number    Cheikh Turner M.D. Referring Cardiology [phone]    [facility] Anticoagulation Services Responsible  [phone]        Anticoagulation Patient Findings      HPI:  Cheikh Perez seen in clinic today for follow up on anticoagulation therapy in the presence of AF.   Denies any changes to current medical/health status since last appointment.   Denies any medication or diet changes.   No current symptoms of bleeding or thrombosis reported.    A/P:   INR therapeutic.   Continue current regimen.   BP recorded in vitals.    Follow up appointment in 7 week(s).    Next Appointment: Wednesday, May 27, 2020 at 11:00 am.    Amanda PUGH

## 2020-05-01 ENCOUNTER — HOSPITAL ENCOUNTER (OUTPATIENT)
Dept: LAB | Facility: MEDICAL CENTER | Age: 75
End: 2020-05-01
Attending: NURSE PRACTITIONER
Payer: MEDICARE

## 2020-05-01 DIAGNOSIS — Z79.01 CHRONIC ANTICOAGULATION: ICD-10-CM

## 2020-05-01 DIAGNOSIS — Z11.59 NEED FOR HEPATITIS C SCREENING TEST: ICD-10-CM

## 2020-05-01 DIAGNOSIS — I10 ESSENTIAL HYPERTENSION: ICD-10-CM

## 2020-05-01 DIAGNOSIS — E78.5 DYSLIPIDEMIA: ICD-10-CM

## 2020-05-01 LAB
ALBUMIN SERPL BCP-MCNC: 4.3 G/DL (ref 3.2–4.9)
ALBUMIN/GLOB SERPL: 1.5 G/DL
ALP SERPL-CCNC: 65 U/L (ref 30–99)
ALT SERPL-CCNC: 24 U/L (ref 2–50)
ANION GAP SERPL CALC-SCNC: 14 MMOL/L (ref 7–16)
AST SERPL-CCNC: 24 U/L (ref 12–45)
BASOPHILS # BLD AUTO: 0.9 % (ref 0–1.8)
BASOPHILS # BLD: 0.06 K/UL (ref 0–0.12)
BILIRUB SERPL-MCNC: 1.1 MG/DL (ref 0.1–1.5)
BUN SERPL-MCNC: 16 MG/DL (ref 8–22)
CALCIUM SERPL-MCNC: 9 MG/DL (ref 8.5–10.5)
CHLORIDE SERPL-SCNC: 104 MMOL/L (ref 96–112)
CHOLEST SERPL-MCNC: 128 MG/DL (ref 100–199)
CO2 SERPL-SCNC: 21 MMOL/L (ref 20–33)
CREAT SERPL-MCNC: 0.84 MG/DL (ref 0.5–1.4)
CREAT UR-MCNC: 94.74 MG/DL
EOSINOPHIL # BLD AUTO: 0.21 K/UL (ref 0–0.51)
EOSINOPHIL NFR BLD: 3.2 % (ref 0–6.9)
ERYTHROCYTE [DISTWIDTH] IN BLOOD BY AUTOMATED COUNT: 44 FL (ref 35.9–50)
EST. AVERAGE GLUCOSE BLD GHB EST-MCNC: 148 MG/DL
GLOBULIN SER CALC-MCNC: 2.9 G/DL (ref 1.9–3.5)
GLUCOSE SERPL-MCNC: 117 MG/DL (ref 65–99)
HBA1C MFR BLD: 6.8 % (ref 0–5.6)
HCT VFR BLD AUTO: 52.8 % (ref 42–52)
HCV AB SER QL: NORMAL
HDLC SERPL-MCNC: 60 MG/DL
HGB BLD-MCNC: 17.4 G/DL (ref 14–18)
IMM GRANULOCYTES # BLD AUTO: 0.01 K/UL (ref 0–0.11)
IMM GRANULOCYTES NFR BLD AUTO: 0.2 % (ref 0–0.9)
LDLC SERPL CALC-MCNC: 54 MG/DL
LYMPHOCYTES # BLD AUTO: 1.63 K/UL (ref 1–4.8)
LYMPHOCYTES NFR BLD: 24.7 % (ref 22–41)
MCH RBC QN AUTO: 30.1 PG (ref 27–33)
MCHC RBC AUTO-ENTMCNC: 33 G/DL (ref 33.7–35.3)
MCV RBC AUTO: 91.2 FL (ref 81.4–97.8)
MICROALBUMIN UR-MCNC: 2.2 MG/DL
MICROALBUMIN/CREAT UR: 23 MG/G (ref 0–30)
MONOCYTES # BLD AUTO: 0.57 K/UL (ref 0–0.85)
MONOCYTES NFR BLD AUTO: 8.6 % (ref 0–13.4)
NEUTROPHILS # BLD AUTO: 4.11 K/UL (ref 1.82–7.42)
NEUTROPHILS NFR BLD: 62.4 % (ref 44–72)
NRBC # BLD AUTO: 0 K/UL
NRBC BLD-RTO: 0 /100 WBC
PLATELET # BLD AUTO: 175 K/UL (ref 164–446)
PMV BLD AUTO: 12 FL (ref 9–12.9)
POTASSIUM SERPL-SCNC: 4.3 MMOL/L (ref 3.6–5.5)
PROT SERPL-MCNC: 7.2 G/DL (ref 6–8.2)
RBC # BLD AUTO: 5.79 M/UL (ref 4.7–6.1)
SODIUM SERPL-SCNC: 139 MMOL/L (ref 135–145)
TRIGL SERPL-MCNC: 68 MG/DL (ref 0–149)
VIT B12 SERPL-MCNC: 374 PG/ML (ref 211–911)
WBC # BLD AUTO: 6.6 K/UL (ref 4.8–10.8)

## 2020-05-01 PROCEDURE — 82043 UR ALBUMIN QUANTITATIVE: CPT

## 2020-05-01 PROCEDURE — 36415 COLL VENOUS BLD VENIPUNCTURE: CPT

## 2020-05-01 PROCEDURE — 80053 COMPREHEN METABOLIC PANEL: CPT

## 2020-05-01 PROCEDURE — 85025 COMPLETE CBC W/AUTO DIFF WBC: CPT

## 2020-05-01 PROCEDURE — 80061 LIPID PANEL: CPT

## 2020-05-01 PROCEDURE — 86803 HEPATITIS C AB TEST: CPT

## 2020-05-01 PROCEDURE — 82607 VITAMIN B-12: CPT

## 2020-05-01 PROCEDURE — 82570 ASSAY OF URINE CREATININE: CPT

## 2020-05-01 PROCEDURE — 83036 HEMOGLOBIN GLYCOSYLATED A1C: CPT

## 2020-05-04 ENCOUNTER — TELEPHONE (OUTPATIENT)
Dept: CARDIOLOGY | Facility: MEDICAL CENTER | Age: 75
End: 2020-05-04

## 2020-05-04 NOTE — TELEPHONE ENCOUNTER
Associated Results   Result Notes for Comp Metabolic Panel   Result Notes for CBC WITH DIFFERENTIAL   Notes recorded by Cheikh Turner M.D. on 5/4/2020 at 12:03 PM PDT    Labs look good, please let him know     Thank you     Letter printed with MD recommendations and mailed to pt mailing address.

## 2020-05-07 ENCOUNTER — OFFICE VISIT (OUTPATIENT)
Dept: MEDICAL GROUP | Facility: MEDICAL CENTER | Age: 75
End: 2020-05-07
Payer: MEDICARE

## 2020-05-07 VITALS
OXYGEN SATURATION: 95 % | RESPIRATION RATE: 16 BRPM | HEART RATE: 82 BPM | DIASTOLIC BLOOD PRESSURE: 76 MMHG | BODY MASS INDEX: 27.63 KG/M2 | HEIGHT: 77 IN | SYSTOLIC BLOOD PRESSURE: 142 MMHG | WEIGHT: 234 LBS

## 2020-05-07 DIAGNOSIS — E11.9 CONTROLLED TYPE 2 DIABETES MELLITUS WITHOUT COMPLICATION, WITHOUT LONG-TERM CURRENT USE OF INSULIN (HCC): ICD-10-CM

## 2020-05-07 DIAGNOSIS — E53.8 B12 DEFICIENCY: ICD-10-CM

## 2020-05-07 DIAGNOSIS — I48.0 PAROXYSMAL ATRIAL FIBRILLATION (HCC): ICD-10-CM

## 2020-05-07 DIAGNOSIS — E78.5 DYSLIPIDEMIA: ICD-10-CM

## 2020-05-07 DIAGNOSIS — Z79.01 CHRONIC ANTICOAGULATION: ICD-10-CM

## 2020-05-07 PROCEDURE — 99213 OFFICE O/P EST LOW 20 MIN: CPT | Performed by: NURSE PRACTITIONER

## 2020-05-07 ASSESSMENT — ENCOUNTER SYMPTOMS: TINGLING: 1

## 2020-05-07 ASSESSMENT — FIBROSIS 4 INDEX: FIB4 SCORE: 2.1

## 2020-05-07 NOTE — PROGRESS NOTES
"Subjective:      Cheikh Perez is a 75 y.o. male who presents with Follow-Up (6 month)        CC: Patient is here today for 5-month follow-up on diabetes, dyslipidemia, atrial fibrillation and to review lab work.    HPI       .1. Controlled type 2 diabetes mellitus without complication, without long-term current use of insulin (HCC)  Patient's hemoglobin A1c comes back the same as it was 6 months ago at 6.8.  He states he continues with his Jardiance, glimepiride and metformin.  He is wondering if we have samples of Jardiance today because the price does jump up when he meets the [employer] hole although it is not very expensive throughout the rest of the year.  He continues with his enalapril and statin.    2. B12 deficiency  B12 levels are low normal at 374 and down from previous readings.  He is not currently on B12 replacement.  He does have neuropathy and bilateral foot drop    3. Dyslipidemia  Cholesterol levels come back good on his current statin dosage.    4. Paroxysmal atrial fibrillation (HCC)  Patient follows with cardiology and he reports he has not noticed any palpitations or tachycardia.  He is on anticoagulation and a beta-blocker.    5. Chronic anticoagulation  Patient follows the Coumadin clinic and is INR was therapeutic at 2.5 when checked 3 weeks ago.  He reports no excessive bruising or bleeding.  He is currently on Coumadin  Past Medical History:   Diagnosis Date   • Arrhythmia July 2013    AFIB, cardioversion   • Arthritis    • Diabetes     Diet,oral meds   • Hypertension    • Hypertension    • Pain      \"stiff\" from arthritis, 2/10   • Paroxysmal atrial fibrillation (HCC) 6/6/2013   • Sleep apnea     CPAP   • Snoring      Social History     Socioeconomic History   • Marital status:      Spouse name: Not on file   • Number of children: Not on file   • Years of education: Not on file   • Highest education level: Not on file   Occupational History   • Not on file   Social " Needs   • Financial resource strain: Not on file   • Food insecurity     Worry: Not on file     Inability: Not on file   • Transportation needs     Medical: Not on file     Non-medical: Not on file   Tobacco Use   • Smoking status: Never Smoker   • Smokeless tobacco: Never Used   Substance and Sexual Activity   • Alcohol use: Yes     Alcohol/week: 6.0 oz     Types: 10 Glasses of wine per week   • Drug use: No   • Sexual activity: Never     Partners: Female   Lifestyle   • Physical activity     Days per week: Not on file     Minutes per session: Not on file   • Stress: Not on file   Relationships   • Social connections     Talks on phone: Not on file     Gets together: Not on file     Attends Zoroastrianism service: Not on file     Active member of club or organization: Not on file     Attends meetings of clubs or organizations: Not on file     Relationship status: Not on file   • Intimate partner violence     Fear of current or ex partner: Not on file     Emotionally abused: Not on file     Physically abused: Not on file     Forced sexual activity: Not on file   Other Topics Concern   • Not on file   Social History Narrative   • Not on file     Current Outpatient Medications   Medication Sig Dispense Refill   • cyanocobalamin (VITAMIN B12) 1000 MCG Tab Take 1 Tab by mouth every day. 100 Tab 11   • Empagliflozin (JARDIANCE) 25 MG Tab Take 1 Tab by mouth every day. 30 Tab 10   • Misc. Devices Misc Bilateral AFO leg braces due to bilateral foot drop and weakness. 2 Each 11   • atorvastatin (LIPITOR) 10 MG Tab Take 1 Tab by mouth every day. Needs to be seen for further refills. Thank you 90 Tab 3   • Misc. Devices Misc AFO devices for foot drop 2 Device 1   • warfarin (COUMADIN) 5 MG Tab TAKE 1 TO 1 AND 1/2 TABLETS BY MOUTH DAILY AS DIRECTED BY ANTICOAGULATION PROGRAM 135 Tab 1   • glimepiride (AMARYL) 2 MG Tab Take 1 Tab by mouth every morning. 100 Tab 3   • metformin (GLUCOPHAGE) 1000 MG tablet Take 1 Tab by mouth 2 times  "a day, with meals. 200 Tab 3   • enalapril (VASOTEC) 10 MG Tab TAKE ONE TABLET BY MOUTH ONCE A  Tab 3   • metoprolol (LOPRESSOR) 25 MG Tab Take 1 Tab by mouth 2 times a day. TWICE DAILY 200 Tab 3   • Misc. Devices Misc TDaP IM at pharmacy 1 Each 0   • VITAMIN D, CHOLECALCIFEROL, PO Take 5,000 Units by mouth.       No current facility-administered medications for this visit.      Family History   Problem Relation Age of Onset   • Heart Attack Father 59   • Heart Disease Father    • Hypertension Brother    • Diabetes Brother    • Arthritis Brother    • Thyroid Mother 50   • Breast Cancer Sister    • No Known Problems Maternal Grandmother    • No Known Problems Maternal Grandfather    • No Known Problems Paternal Grandmother    • No Known Problems Paternal Grandfather    • Arrythmia Brother    • No Known Problems Brother    • No Known Problems Daughter          Review of Systems   Neurological: Positive for tingling.   All other systems reviewed and are negative.         Objective:     /76 (BP Location: Right arm, Patient Position: Sitting, BP Cuff Size: Adult)   Pulse 82   Resp 16   Ht 1.956 m (6' 5\")   Wt 106.1 kg (234 lb)   SpO2 95%   BMI 27.75 kg/m²      Physical Exam  Vitals signs and nursing note reviewed.   Constitutional:       General: He is not in acute distress.     Appearance: He is well-developed. He is not diaphoretic.   HENT:      Head: Normocephalic and atraumatic.      Right Ear: External ear normal.      Left Ear: External ear normal.      Nose: Nose normal.   Eyes:      General:         Right eye: No discharge.         Left eye: No discharge.      Conjunctiva/sclera: Conjunctivae normal.   Neck:      Musculoskeletal: Normal range of motion and neck supple.      Thyroid: No thyromegaly.      Trachea: No tracheal deviation.   Cardiovascular:      Rate and Rhythm: Normal rate and regular rhythm.      Heart sounds: Murmur present.      Comments: Heart rhythm auscultates is normal " rhythm.  Pulmonary:      Effort: Pulmonary effort is normal. No respiratory distress.      Breath sounds: Normal breath sounds. No wheezing or rales.   Feet:      Right foot:      Protective Sensation: 7 sites tested. 4 sites sensed.      Skin integrity: No ulcer, blister or skin breakdown.   Lymphadenopathy:      Cervical: No cervical adenopathy.   Skin:     General: Skin is warm and dry.      Findings: No erythema or rash.   Neurological:      Mental Status: He is alert and oriented to person, place, and time.      Coordination: Coordination normal.   Psychiatric:         Behavior: Behavior normal.         Thought Content: Thought content normal.         Judgment: Judgment normal.                 Assessment/Plan:       1. Controlled type 2 diabetes mellitus without complication, without long-term current use of insulin (HCC)  Patient's hemoglobin A1c is adequate at 6.8 for his age.  I have made no changes in his medicines.  I explained that we do not have samples of Jardiance and that the discount cards will not work for him since he is on [employer].  He will continue with the statin and ARB.  - Diabetic Monofilament LE Exam  - HEMOGLOBIN A1C; Future  - Comp Metabolic Panel; Future    2. B12 deficiency  Patient's B12 levels are low normal and decreasing so I will start him on low-dose B12 daily.  I will check levels in 6 months  - cyanocobalamin (VITAMIN B12) 1000 MCG Tab; Take 1 Tab by mouth every day.  Dispense: 100 Tab; Refill: 11  - VITAMIN B12; Future    3. Dyslipidemia  Cholesterol levels are doing well on a statin and he reports no myalgias    4. Paroxysmal atrial fibrillation (HCC)  Patient reports no palpitations or tachycardia and continues on his beta-blocker and anticoagulation    5. Chronic anticoagulation  This is followed by the Coumadin clinic and his INR was therapeutic at 2.5 on last check with Coumadin.

## 2020-05-19 DIAGNOSIS — I10 ESSENTIAL HYPERTENSION: ICD-10-CM

## 2020-05-27 ENCOUNTER — ANTICOAGULATION VISIT (OUTPATIENT)
Dept: VASCULAR LAB | Facility: MEDICAL CENTER | Age: 75
End: 2020-05-27
Attending: INTERNAL MEDICINE
Payer: MEDICARE

## 2020-05-27 DIAGNOSIS — I48.0 PAROXYSMAL ATRIAL FIBRILLATION (HCC): ICD-10-CM

## 2020-05-27 DIAGNOSIS — Z79.01 CHRONIC ANTICOAGULATION: ICD-10-CM

## 2020-05-27 LAB — INR PPP: 2.8 (ref 2–3.5)

## 2020-05-27 PROCEDURE — 85610 PROTHROMBIN TIME: CPT

## 2020-05-27 PROCEDURE — 99211 OFF/OP EST MAY X REQ PHY/QHP: CPT | Performed by: PHARMACIST

## 2020-05-27 NOTE — PROGRESS NOTES
Anticoagulation Summary  As of 2020    INR goal:   2.0-3.0   TTR:   84.8 % (4 y)   INR used for dosin.80 (2020)   Warfarin maintenance plan:   5 mg (5 mg x 1) every Mon, Wed, Fri; 7.5 mg (5 mg x 1.5) all other days   Weekly warfarin total:   45 mg   Plan last modified:   Kandice Mack, PharmD (10/30/2018)   Next INR check:   2020   Target end date:   Indefinite    Indications    Paroxysmal atrial fibrillation (HCC) [I48.0]  Chronic anticoagulation [Z79.01]             Anticoagulation Episode Summary     INR check location:   Anticoagulation Clinic    Preferred lab:       Send INR reminders to:       Comments:         Anticoagulation Care Providers     Provider Role Specialty Phone number    Cheikh Tunrer M.D. Referring Cardiology [phone]    [facility] Responsible  [phone]        Anticoagulation Patient Findings  Patient Findings     Negatives:   Signs/symptoms of thrombosis, Signs/symptoms of bleeding, Laboratory test error suspected, Change in health, Change in alcohol use, Change in activity, Upcoming invasive procedure, Emergency department visit, Upcoming dental procedure, Missed doses, Extra doses, Change in medications, Change in diet/appetite, Hospital admission, Bruising, Other complaints          HPI:  Cheikh Perez seen in clinic today, on anticoagulation therapy with warfarin due to hx of PAF  Changes to current medical/health status since last appt: NONE  Denies signs/symptoms of bleeding and/or thrombosis since the last appt.    Denies any interval changes to diet  Denies any interval changes to medications since last appt.   Denies any complications or cost restrictions with current therapy.   Verified current warfarin dosing schedule.   BP/vitals declined today.      A/P   INR  remains -therapeutic at 2.8.   Pt is to continue with current warfarin dosing regimen.    Follow up appointment in 8 week(s).    Vladimir Young,  PharmD

## 2020-07-06 DIAGNOSIS — I48.0 PAROXYSMAL ATRIAL FIBRILLATION (HCC): ICD-10-CM

## 2020-07-06 RX ORDER — WARFARIN SODIUM 5 MG/1
TABLET ORAL
Qty: 135 TAB | Refills: 1 | Status: SHIPPED | OUTPATIENT
Start: 2020-07-06 | End: 2021-02-02 | Stop reason: SDUPTHER

## 2020-07-07 ENCOUNTER — TELEPHONE (OUTPATIENT)
Dept: MEDICAL GROUP | Facility: MEDICAL CENTER | Age: 75
End: 2020-07-07

## 2020-07-07 DIAGNOSIS — E11.9 CONTROLLED TYPE 2 DIABETES MELLITUS WITHOUT COMPLICATION, WITHOUT LONG-TERM CURRENT USE OF INSULIN (HCC): ICD-10-CM

## 2020-07-07 RX ORDER — EMPAGLIFLOZIN 25 MG/1
1 TABLET, FILM COATED ORAL DAILY
Qty: 30 TAB | Refills: 10 | Status: SHIPPED | OUTPATIENT
Start: 2020-07-07 | End: 2020-12-07 | Stop reason: SDUPTHER

## 2020-07-22 ENCOUNTER — ANTICOAGULATION VISIT (OUTPATIENT)
Dept: VASCULAR LAB | Facility: MEDICAL CENTER | Age: 75
End: 2020-07-22
Attending: INTERNAL MEDICINE
Payer: MEDICARE

## 2020-07-22 VITALS — HEART RATE: 82 BPM | DIASTOLIC BLOOD PRESSURE: 89 MMHG | SYSTOLIC BLOOD PRESSURE: 139 MMHG

## 2020-07-22 DIAGNOSIS — I48.0 PAROXYSMAL ATRIAL FIBRILLATION (HCC): ICD-10-CM

## 2020-07-22 DIAGNOSIS — Z79.01 CHRONIC ANTICOAGULATION: ICD-10-CM

## 2020-07-22 LAB
INR BLD: 2.4 (ref 0.9–1.2)
INR PPP: 2.4 (ref 2–3.5)

## 2020-07-22 PROCEDURE — 85610 PROTHROMBIN TIME: CPT

## 2020-07-22 PROCEDURE — 99211 OFF/OP EST MAY X REQ PHY/QHP: CPT | Performed by: NURSE PRACTITIONER

## 2020-07-22 NOTE — PROGRESS NOTES
Anticoagulation Summary  As of 2020    INR goal:   2.0-3.0   TTR:   85.3 % (4.1 y)   INR used for dosin.40 (2020)   Warfarin maintenance plan:   5 mg (5 mg x 1) every Mon, Wed, Fri; 7.5 mg (5 mg x 1.5) all other days   Weekly warfarin total:   45 mg   Plan last modified:   Kandice Mack, PharmD (10/30/2018)   Next INR check:   2020   Target end date:   Indefinite    Indications    Paroxysmal atrial fibrillation (HCC) [I48.0]  Chronic anticoagulation [Z79.01]             Anticoagulation Episode Summary     INR check location:   Anticoagulation Clinic    Preferred lab:       Send INR reminders to:       Comments:         Anticoagulation Care Providers     Provider Role Specialty Phone number    Cheikh Turner M.D. Referring Cardiology [phone]    [facility]ticoagulation [facility] Responsible  [phone]        Anticoagulation Patient Findings      HPI:  Cheikh Perez seen in clinic today for follow up on anticoagulation therapy in the presence of AF.   Denies any changes to current medical/health status since last appointment.   Denies any medication or diet changes.   No current symptoms of bleeding or thrombosis reported.    A/P:   INR therapeutic.   Continue current regimen.   BP recorded in vitals.    Follow up appointment in 9 week(s).    Next Appointment: Wednesday, 2020 at 10:45 am.    Amanda PUGH

## 2020-07-23 RX ORDER — ENALAPRIL MALEATE 10 MG/1
TABLET ORAL
Qty: 100 TAB | Refills: 2 | Status: SHIPPED | OUTPATIENT
Start: 2020-07-23 | End: 2021-06-07 | Stop reason: SDUPTHER

## 2020-07-24 DIAGNOSIS — Z03.818 ENCNTR FOR OBS FOR SUSP EXPSR TO OTH BIOLG AGENTS RULED OUT: ICD-10-CM

## 2020-08-19 RX ORDER — GLIMEPIRIDE 2 MG/1
TABLET ORAL
Qty: 100 TAB | Refills: 2 | Status: SHIPPED | OUTPATIENT
Start: 2020-08-19 | End: 2021-07-08 | Stop reason: SDUPTHER

## 2020-09-23 ENCOUNTER — ANTICOAGULATION VISIT (OUTPATIENT)
Dept: VASCULAR LAB | Facility: MEDICAL CENTER | Age: 75
End: 2020-09-23
Attending: INTERNAL MEDICINE
Payer: MEDICARE

## 2020-09-23 DIAGNOSIS — I48.0 PAROXYSMAL ATRIAL FIBRILLATION (HCC): ICD-10-CM

## 2020-09-23 DIAGNOSIS — Z79.01 CHRONIC ANTICOAGULATION: ICD-10-CM

## 2020-09-23 DIAGNOSIS — Z23 NEED FOR VACCINATION: ICD-10-CM

## 2020-09-23 LAB
INR PPP: 2.5 (ref 2–3.5)
INR PPP: 2.5 (ref 2–3.5)

## 2020-09-23 PROCEDURE — 90662 IIV NO PRSV INCREASED AG IM: CPT

## 2020-09-23 PROCEDURE — 99211 OFF/OP EST MAY X REQ PHY/QHP: CPT

## 2020-09-23 PROCEDURE — 85610 PROTHROMBIN TIME: CPT

## 2020-09-23 NOTE — PROGRESS NOTES
Anticoagulation Summary  As of 2020    INR goal:  2.0-3.0   TTR:  85.9 % (4.3 y)   INR used for dosin.50 (2020)   Warfarin maintenance plan:  5 mg (5 mg x 1) every Mon, Wed, Fri; 7.5 mg (5 mg x 1.5) all other days   Weekly warfarin total:  45 mg   Plan last modified:  Kandice Mack, PharmD (10/30/2018)   Next INR check:  2020   Target end date:  Indefinite    Indications    Paroxysmal atrial fibrillation (HCC) [I48.0]  Chronic anticoagulation [Z79.01]             Anticoagulation Episode Summary     INR check location:  Anticoagulation Clinic    Preferred lab:      Send INR reminders to:      Comments:        Anticoagulation Care Providers     Provider Role Specialty Phone number    Cheikh Turner M.D. Referring Cardiology [phone]    [facility]ticoagulation [facility] Responsible  [phone]            Anticoagulation Patient Findings  Patient Findings     Negatives:  Signs/symptoms of thrombosis, Signs/symptoms of bleeding, Laboratory test error suspected, Change in health, Change in alcohol use, Change in activity, Upcoming invasive procedure, Emergency department visit, Upcoming dental procedure, Missed doses, Extra doses, Change in medications, Change in diet/appetite, Hospital admission, Bruising, Other complaints          HPI:  Cheikh Perez seen in clinic today, on anticoagulation therapy with warfarin for atrial fib  Changes to current medical/health status since last appt: none  Denies signs/symptoms of bleeding and/or thrombosis since the last appt.    Denies any interval changes to diet  Denies any interval changes to medications since last appt.   Denies any complications or cost restrictions with current therapy.   Verified current warfarin dosing schedule.   BP declined  Influenza HD vaccine administered today in clinic    A/P   INR  therapeutic.   Pt is to continue with current warfarin dosing regimen.    Follow up appointment in 10 week(s).    Miguelina DONIS  Betsy Meneses

## 2020-09-29 DIAGNOSIS — E78.5 DYSLIPIDEMIA: ICD-10-CM

## 2020-09-29 RX ORDER — ATORVASTATIN CALCIUM 10 MG/1
10 TABLET, FILM COATED ORAL DAILY
Qty: 90 TAB | Refills: 3 | Status: SHIPPED | OUTPATIENT
Start: 2020-09-29 | End: 2021-02-12 | Stop reason: SDUPTHER

## 2020-10-21 ENCOUNTER — OFFICE VISIT (OUTPATIENT)
Dept: URGENT CARE | Facility: CLINIC | Age: 75
End: 2020-10-21
Payer: MEDICARE

## 2020-10-21 VITALS
SYSTOLIC BLOOD PRESSURE: 132 MMHG | DIASTOLIC BLOOD PRESSURE: 94 MMHG | BODY MASS INDEX: 27.58 KG/M2 | WEIGHT: 233.6 LBS | TEMPERATURE: 97.4 F | HEIGHT: 77 IN | RESPIRATION RATE: 12 BRPM | OXYGEN SATURATION: 96 % | HEART RATE: 93 BPM

## 2020-10-21 DIAGNOSIS — S16.1XXA STRAIN OF NECK MUSCLE, INITIAL ENCOUNTER: ICD-10-CM

## 2020-10-21 PROCEDURE — 99214 OFFICE O/P EST MOD 30 MIN: CPT | Performed by: PHYSICIAN ASSISTANT

## 2020-10-21 RX ORDER — CYCLOBENZAPRINE HCL 5 MG
5 TABLET ORAL 3 TIMES DAILY PRN
Qty: 30 TAB | Refills: 0 | Status: SHIPPED
Start: 2020-10-21 | End: 2021-09-15

## 2020-10-21 ASSESSMENT — ENCOUNTER SYMPTOMS
VISUAL CHANGE: 0
SHORTNESS OF BREATH: 0
SENSORY CHANGE: 0
WEIGHT LOSS: 0
BRUISES/BLEEDS EASILY: 1
PALPITATIONS: 0
WEAKNESS: 0
HEADACHES: 0
CHILLS: 0
BLURRED VISION: 0
TROUBLE SWALLOWING: 0
PHOTOPHOBIA: 0
VOMITING: 0
SORE THROAT: 0
TINGLING: 0
NECK PAIN: 1
NAUSEA: 0
ABDOMINAL PAIN: 0
NUMBNESS: 0
FEVER: 0

## 2020-10-21 ASSESSMENT — FIBROSIS 4 INDEX: FIB4 SCORE: 2.1

## 2020-10-21 NOTE — PROGRESS NOTES
Subjective:      Cheikh Perez is a 75 y.o. male who presents with Neck Pain (x 1 day)      Neck Pain   This is a new problem. The current episode started today. The problem occurs constantly. The problem has been unchanged. The pain is associated with a sleep position (Patient fell asleep in the recliner last night in a different position than he normally sleeps then and woke up this morning with the neck stiffness/pain on the left side). The pain is present in the left side. The pain is moderate. The symptoms are aggravated by twisting, position and swallowing. The pain is same all the time. Associated symptoms include pain with swallowing (Patient feels pain in the left side of his neck when he swallows but has no throat pain.). Pertinent negatives include no chest pain, fever, headaches, numbness, photophobia, tingling, trouble swallowing, visual change, weakness or weight loss. He has tried acetaminophen for the symptoms. The treatment provided no relief.       Review of Systems   Constitutional: Negative for chills, fever and weight loss.   HENT: Negative for sore throat and trouble swallowing.    Eyes: Negative for blurred vision and photophobia.   Respiratory: Negative for shortness of breath.    Cardiovascular: Negative for chest pain and palpitations.   Gastrointestinal: Negative for abdominal pain, nausea and vomiting.   Musculoskeletal: Positive for neck pain.   Neurological: Negative for tingling, sensory change, weakness, numbness and headaches.   Endo/Heme/Allergies: Bruises/bleeds easily (Patient is on warfarin).          PMH:  has a past medical history of Arrhythmia (July 2013), Arthritis, Diabetes, Hypertension, Hypertension, Pain ( ), Paroxysmal atrial fibrillation (HCC) (6/6/2013), Sleep apnea, and Snoring.  MEDS:   Current Outpatient Medications:   •  cyclobenzaprine (FLEXERIL) 5 mg tablet, Take 1 Tab by mouth 3 times a day as needed., Disp: 30 Tab, Rfl: 0  •  atorvastatin  (LIPITOR) 10 MG Tab, Take 1 Tab by mouth every day. Needs to be seen for further refills. Thank you, Disp: 90 Tab, Rfl: 3  •  glimepiride (AMARYL) 2 MG Tab, TAKE  ONE TABLET BY MOUTH EVERY MORNING, Disp: 100 Tab, Rfl: 2  •  enalapril (VASOTEC) 10 MG Tab, TAKE ONE TABLET BY MOUTH ONCE A DAY, Disp: 100 Tab, Rfl: 2  •  Empagliflozin (JARDIANCE) 25 MG Tab, Take 1 Tab by mouth every day., Disp: 30 Tab, Rfl: 10  •  metformin (GLUCOPHAGE) 1000 MG tablet, TAKE ONE TABLET BY MOUTH TWICE DAILY WITH MEALS, Disp: 200 Tab, Rfl: 2  •  warfarin (COUMADIN) 5 MG Tab, TAKE 1 TO 1 AND 1/2 TABLETS BY MOUTH DAILY AS DIRECTED BY ANTICOAGULATION PROGRAM, Disp: 135 Tab, Rfl: 1  •  metoprolol (LOPRESSOR) 25 MG Tab, TAKE ONE TABLET BY MOUTH TWICE DAILY, Disp: 200 Tab, Rfl: 2  •  cyanocobalamin (VITAMIN B12) 1000 MCG Tab, Take 1 Tab by mouth every day., Disp: 100 Tab, Rfl: 11  •  Misc. Devices Misc, Bilateral AFO leg braces due to bilateral foot drop and weakness., Disp: 2 Each, Rfl: 11  •  Misc. Devices Misc, AFO devices for foot drop, Disp: 2 Device, Rfl: 1  •  Misc. Devices Misc, TDaP IM at pharmacy, Disp: 1 Each, Rfl: 0  •  VITAMIN D, CHOLECALCIFEROL, PO, Take 5,000 Units by mouth., Disp: , Rfl:   ALLERGIES:   Allergies   Allergen Reactions   • Nkda [No Known Drug Allergy]      SURGHX:   Past Surgical History:   Procedure Laterality Date   • FEMUR ORIF Left 2/24/2016    Procedure: DISTAL FEMUR ORIF;  Surgeon: Montrell Eddy M.D.;  Location: [facility];  Service:    • CYSTOSCOPY STENT PLACEMENT  1/13/2014    Performed by Main Batista M.D. at [facility]   • URETEROSCOPY  1/13/2014    Performed by Main Batista M.D. at [facility]   • LASERTRIPSY  1/13/2014    Performed by Main Batista M.D. at [facility]   • OTHER      Surgery for kidney stones   • RECOVERY  7/11/2013    Performed by [facility]Recovery Surgery at [facility]   • KNEE ARTHROPLASTY TOTAL  12/1/2009     "Performed by GIACOMO PETTIT at [facility]   • KNEE ARTHROPLASTY TOTAL  3/2009    Left   • LUMBAR LAMINECTOMY DISKECTOMY  7/2006    L1-L5/ residual left foot weaknesss    • ACL REPAIR  12/2005    Right   • FEMUR ORIF       SOCHX:  reports that he has never smoked. He has never used smokeless tobacco. He reports current alcohol use of about 6.0 oz of alcohol per week. He reports that he does not use drugs.  FH: Family history was reviewed, no pertinent findings to report       Objective:     /94 (BP Location: Left arm, Patient Position: Sitting, BP Cuff Size: Adult)   Pulse 93   Temp 36.3 °C (97.4 °F) (Temporal)   Resp 12   Ht 1.956 m (6' 5\")   Wt 106 kg (233 lb 9.6 oz)   SpO2 96%   BMI 27.70 kg/m²      Physical Exam  Constitutional:       Appearance: He is well-developed.   HENT:      Head: Normocephalic and atraumatic.      Right Ear: External ear normal.      Left Ear: External ear normal.   Eyes:      Conjunctiva/sclera: Conjunctivae normal.      Pupils: Pupils are equal, round, and reactive to light.   Cardiovascular:      Rate and Rhythm: Normal rate and regular rhythm.      Heart sounds: Normal heart sounds. No murmur.   Pulmonary:      Effort: Pulmonary effort is normal.      Breath sounds: Normal breath sounds. No wheezing.   Skin:     General: Skin is warm and dry.      Capillary Refill: Capillary refill takes less than 2 seconds.   Neurological:      Mental Status: He is alert and oriented to person, place, and time.   Psychiatric:         Behavior: Behavior normal.         Judgment: Judgment normal.            Assessment/Plan:       1. Strain of neck muscle, initial encounter  - cyclobenzaprine (FLEXERIL) 5 mg tablet; Take 1 Tab by mouth 3 times a day as needed.  Dispense: 30 Tab; Refill: 0    Physical exam most consistent with strain of the neck muscles in the left side and some mild torticollis.  Likely cause is the sleeping position that he was in last night while sleeping on " the recliner.  Patient is on warfarin so he is unable to take anti-inflammatories such as ibuprofen.  Discussed that we can try 5 mg of a muscle relaxer that he can use it once every 8 hours as needed but he was cautioned extensively these medications can sometimes cause some confusion, unsteadiness, in more severe sedation in his age group and he and his wife were counseled to look for these symptoms and stop the medication if he develops them.  Also recommended that he alternate ice/heat to the affected area multiple times per day.  ROM exercises also discussed as tolerated.  If no significant improvement in his symptoms by early next week he should follow-up with his primary care doctor or return to the urgent care for reevaluation.                Differential Diagnosis, natural history, and supportive care discussed. Return to the Urgent Care or follow up with your PCP if symptoms fail to resolve, or for any new or worsening symptoms. Emergency room precautions discussed. Patient and/or family appears understanding of information.

## 2020-11-09 ENCOUNTER — HOSPITAL ENCOUNTER (OUTPATIENT)
Dept: LAB | Facility: MEDICAL CENTER | Age: 75
End: 2020-11-09
Attending: NURSE PRACTITIONER
Payer: MEDICARE

## 2020-11-09 DIAGNOSIS — E53.8 B12 DEFICIENCY: ICD-10-CM

## 2020-11-09 DIAGNOSIS — E11.9 CONTROLLED TYPE 2 DIABETES MELLITUS WITHOUT COMPLICATION, WITHOUT LONG-TERM CURRENT USE OF INSULIN (HCC): ICD-10-CM

## 2020-11-09 LAB
ALBUMIN SERPL BCP-MCNC: 4.5 G/DL (ref 3.2–4.9)
ALBUMIN/GLOB SERPL: 1.6 G/DL
ALP SERPL-CCNC: 85 U/L (ref 30–99)
ALT SERPL-CCNC: 35 U/L (ref 2–50)
ANION GAP SERPL CALC-SCNC: 11 MMOL/L (ref 7–16)
AST SERPL-CCNC: 24 U/L (ref 12–45)
BILIRUB SERPL-MCNC: 0.7 MG/DL (ref 0.1–1.5)
BUN SERPL-MCNC: 17 MG/DL (ref 8–22)
CALCIUM SERPL-MCNC: 9.6 MG/DL (ref 8.5–10.5)
CHLORIDE SERPL-SCNC: 103 MMOL/L (ref 96–112)
CO2 SERPL-SCNC: 27 MMOL/L (ref 20–33)
CREAT SERPL-MCNC: 0.79 MG/DL (ref 0.5–1.4)
EST. AVERAGE GLUCOSE BLD GHB EST-MCNC: 151 MG/DL
GLOBULIN SER CALC-MCNC: 2.9 G/DL (ref 1.9–3.5)
GLUCOSE SERPL-MCNC: 152 MG/DL (ref 65–99)
HBA1C MFR BLD: 6.9 % (ref 0–5.6)
POTASSIUM SERPL-SCNC: 4.6 MMOL/L (ref 3.6–5.5)
PROT SERPL-MCNC: 7.4 G/DL (ref 6–8.2)
SODIUM SERPL-SCNC: 141 MMOL/L (ref 135–145)
VIT B12 SERPL-MCNC: 827 PG/ML (ref 211–911)

## 2020-11-09 PROCEDURE — 80053 COMPREHEN METABOLIC PANEL: CPT

## 2020-11-09 PROCEDURE — 36415 COLL VENOUS BLD VENIPUNCTURE: CPT

## 2020-11-09 PROCEDURE — 82607 VITAMIN B-12: CPT

## 2020-11-09 PROCEDURE — 83036 HEMOGLOBIN GLYCOSYLATED A1C: CPT

## 2020-11-12 PROBLEM — E53.8 B12 DEFICIENCY: Status: ACTIVE | Noted: 2020-11-12

## 2020-12-04 ENCOUNTER — APPOINTMENT (OUTPATIENT)
Dept: VASCULAR LAB | Facility: MEDICAL CENTER | Age: 75
End: 2020-12-04
Payer: MEDICARE

## 2020-12-07 DIAGNOSIS — E11.9 CONTROLLED TYPE 2 DIABETES MELLITUS WITHOUT COMPLICATION, WITHOUT LONG-TERM CURRENT USE OF INSULIN (HCC): ICD-10-CM

## 2020-12-07 RX ORDER — EMPAGLIFLOZIN 25 MG/1
1 TABLET, FILM COATED ORAL DAILY
Qty: 30 TAB | Refills: 5 | Status: SHIPPED | OUTPATIENT
Start: 2020-12-07 | End: 2021-09-15 | Stop reason: SDUPTHER

## 2020-12-21 ENCOUNTER — ANTICOAGULATION VISIT (OUTPATIENT)
Dept: VASCULAR LAB | Facility: MEDICAL CENTER | Age: 75
End: 2020-12-21
Attending: INTERNAL MEDICINE
Payer: MEDICARE

## 2020-12-21 DIAGNOSIS — I48.0 PAROXYSMAL ATRIAL FIBRILLATION (HCC): ICD-10-CM

## 2020-12-21 DIAGNOSIS — Z79.01 CHRONIC ANTICOAGULATION: ICD-10-CM

## 2020-12-21 LAB
INR BLD: 2.1 (ref 0.9–1.2)
INR PPP: 2.1 (ref 2–3.5)

## 2020-12-21 PROCEDURE — 99211 OFF/OP EST MAY X REQ PHY/QHP: CPT

## 2020-12-21 PROCEDURE — 85610 PROTHROMBIN TIME: CPT

## 2020-12-21 NOTE — PROGRESS NOTES
Anticoagulation Summary  As of 2020    INR goal:  2.0-3.0   TTR:  86.7 % (4.6 y)   INR used for dosin.10 (2020)   Warfarin maintenance plan:  5 mg (5 mg x 1) every Mon, Wed, Fri; 7.5 mg (5 mg x 1.5) all other days   Weekly warfarin total:  45 mg   Plan last modified:  Matthew RousseauD (10/30/2018)   Next INR check:  3/15/2021   Target end date:  Indefinite    Indications    Paroxysmal atrial fibrillation (HCC) [I48.0]  Chronic anticoagulation [Z79.01]             Anticoagulation Episode Summary     INR check location:  Anticoagulation Clinic    Preferred lab:      Send INR reminders to:      Comments:        Anticoagulation Care Providers     Provider Role Specialty Phone number    Cheikh Turner M.D. Referring Cardiology [phone]    [facility]ticoagulation [facility] Responsible  [phone]                Anticoagulation Patient Findings      HPI:  Cheikh Perez seen in clinic today, on anticoagulation therapy with warfarin for afib  Changes to current medical/health status since last appt: No  Denies signs/symptoms of bleeding and/or thrombosis since the last appt.    Denies any interval changes to diet  Denies any interval changes to medications since last appt.   Denies any complications or cost restrictions with current therapy.   Verified current warfarin dosing schedule.   Pt declined vitals  Medications reconciled    A/P   INR  remains therapeutic.   Continue current regimen.    Follow up appointment in 12 week(s).    Lena Garcia, PharmD

## 2021-01-04 ENCOUNTER — TELEPHONE (OUTPATIENT)
Dept: MEDICAL GROUP | Facility: MEDICAL CENTER | Age: 76
End: 2021-01-04

## 2021-01-04 NOTE — TELEPHONE ENCOUNTER
Left message for patient to call back regarding pre-visit planning. Please transfer call to [phone]  .

## 2021-01-06 ENCOUNTER — OFFICE VISIT (OUTPATIENT)
Dept: MEDICAL GROUP | Facility: MEDICAL CENTER | Age: 76
End: 2021-01-06
Payer: MEDICARE

## 2021-01-06 VITALS
DIASTOLIC BLOOD PRESSURE: 76 MMHG | WEIGHT: 232 LBS | OXYGEN SATURATION: 97 % | HEIGHT: 77 IN | SYSTOLIC BLOOD PRESSURE: 124 MMHG | TEMPERATURE: 97.1 F | HEART RATE: 100 BPM | BODY MASS INDEX: 27.39 KG/M2 | RESPIRATION RATE: 16 BRPM

## 2021-01-06 DIAGNOSIS — E53.8 B12 DEFICIENCY: ICD-10-CM

## 2021-01-06 DIAGNOSIS — E78.5 DYSLIPIDEMIA: ICD-10-CM

## 2021-01-06 DIAGNOSIS — E11.9 CONTROLLED TYPE 2 DIABETES MELLITUS WITHOUT COMPLICATION, WITHOUT LONG-TERM CURRENT USE OF INSULIN (HCC): ICD-10-CM

## 2021-01-06 DIAGNOSIS — G47.33 OSA (OBSTRUCTIVE SLEEP APNEA): ICD-10-CM

## 2021-01-06 DIAGNOSIS — I48.0 PAROXYSMAL ATRIAL FIBRILLATION (HCC): ICD-10-CM

## 2021-01-06 DIAGNOSIS — Z79.01 CHRONIC ANTICOAGULATION: ICD-10-CM

## 2021-01-06 DIAGNOSIS — S46.212A STRAIN OF LEFT BICEPS, INITIAL ENCOUNTER: ICD-10-CM

## 2021-01-06 DIAGNOSIS — I10 ESSENTIAL HYPERTENSION: ICD-10-CM

## 2021-01-06 PROCEDURE — 8041 PR SCP AHA: Performed by: NURSE PRACTITIONER

## 2021-01-06 PROCEDURE — 99214 OFFICE O/P EST MOD 30 MIN: CPT | Performed by: NURSE PRACTITIONER

## 2021-01-06 ASSESSMENT — PATIENT HEALTH QUESTIONNAIRE - PHQ9: CLINICAL INTERPRETATION OF PHQ2 SCORE: 0

## 2021-01-06 ASSESSMENT — ENCOUNTER SYMPTOMS: MYALGIAS: 1

## 2021-01-06 ASSESSMENT — FIBROSIS 4 INDEX: FIB4 SCORE: 1.74

## 2021-01-06 NOTE — PROGRESS NOTES
Subjective:      Cheikh Perez is a 75 y.o. male who presents with Follow-Up (6 month)        CC: Patient is here today for 8-month follow-up on diabetes, atrial fibrillation, dyslipidemia, sleep apnea, hypertension, B12 and complains of left bicep pain.    HPI         1. Controlled type 2 diabetes mellitus without complication, without long-term current use of insulin (HCC)  Chart review shows patient's last A1c was at 6.8 and today it is at 6.9 on his combination of Jardiance 25 mg, glimepiride 2 mg and Metformin 1000 mg.  His recent fasting blood sugar was 152 with a continued good GFR greater than 60 he is on a statin and ACE inhibitor    2. Paroxysmal atrial fibrillation (HCC)  Patient reports no palpitations or chest pain and is taking his metoprolol and is on Coumadin.  We last saw cardiology in December of last year.    3. Dyslipidemia  Cholesterol levels have been good with LDL of 54 on statin and reports no myalgias    4. SHANNAN (obstructive sleep apnea)  Patient is supposed to be on CPAP but continues to not want to use it    5. Essential hypertension  Blood pressure today 124/76 on his combination of enalapril and metoprolol.    6. B12 deficiency  Previous low B12 levels but with taking his 1000 mcg of B12 daily his levels are now up to 827    7. Chronic anticoagulation  Patient on chronic Coumadin therapy through the Coumadin clinic and has not had any recent DVT or PE    8. Strain of left biceps, initial encounter  Patient states he strained his left shoulder a few months ago and that improved but over the past 1 or 2 months he has had pain on and off in his left bicep area.  It usually bothers him with certain movement of the arm.  He admits he did get a flu shot about 3 months ago in the left arm.  He has noticed no loss of strength in the left arm or change in color or temperature of the extremities.  Past Medical History:   Diagnosis Date   • Arrhythmia July 2013    AFIB, cardioversion  "  • Arthritis    • Diabetes     Diet,oral meds   • Hypertension    • Hypertension    • Pain      \"stiff\" from arthritis, 2/10   • Paroxysmal atrial fibrillation (HCC) 6/6/2013   • Sleep apnea     CPAP   • Snoring      Social History     Socioeconomic History   • Marital status:      Spouse name: Not on file   • Number of children: Not on file   • Years of education: Not on file   • Highest education level: Not on file   Occupational History   • Not on file   Social Needs   • Financial resource strain: Not on file   • Food insecurity     Worry: Not on file     Inability: Not on file   • Transportation needs     Medical: Not on file     Non-medical: Not on file   Tobacco Use   • Smoking status: Never Smoker   • Smokeless tobacco: Never Used   Substance and Sexual Activity   • Alcohol use: Yes     Alcohol/week: 6.0 oz     Types: 10 Glasses of wine per week   • Drug use: No   • Sexual activity: Never     Partners: Female   Lifestyle   • Physical activity     Days per week: Not on file     Minutes per session: Not on file   • Stress: Not on file   Relationships   • Social connections     Talks on phone: Not on file     Gets together: Not on file     Attends Jain service: Not on file     Active member of club or organization: Not on file     Attends meetings of clubs or organizations: Not on file     Relationship status: Not on file   • Intimate partner violence     Fear of current or ex partner: Not on file     Emotionally abused: Not on file     Physically abused: Not on file     Forced sexual activity: Not on file   Other Topics Concern   • Not on file   Social History Narrative   • Not on file     Current Outpatient Medications   Medication Sig Dispense Refill   • Empagliflozin (JARDIANCE) 25 MG Tab Take 1 Tab by mouth every day. 30 Tab 5   • cyclobenzaprine (FLEXERIL) 5 mg tablet Take 1 Tab by mouth 3 times a day as needed. 30 Tab 0   • atorvastatin (LIPITOR) 10 MG Tab Take 1 Tab by mouth every day. Needs " "to be seen for further refills. Thank you 90 Tab 3   • glimepiride (AMARYL) 2 MG Tab TAKE  ONE TABLET BY MOUTH EVERY MORNING 100 Tab 2   • enalapril (VASOTEC) 10 MG Tab TAKE ONE TABLET BY MOUTH ONCE A  Tab 2   • metformin (GLUCOPHAGE) 1000 MG tablet TAKE ONE TABLET BY MOUTH TWICE DAILY WITH MEALS 200 Tab 2   • warfarin (COUMADIN) 5 MG Tab TAKE 1 TO 1 AND 1/2 TABLETS BY MOUTH DAILY AS DIRECTED BY ANTICOAGULATION PROGRAM 135 Tab 1   • metoprolol (LOPRESSOR) 25 MG Tab TAKE ONE TABLET BY MOUTH TWICE DAILY 200 Tab 2   • cyanocobalamin (VITAMIN B12) 1000 MCG Tab Take 1 Tab by mouth every day. 100 Tab 11   • Misc. Devices Misc Bilateral AFO leg braces due to bilateral foot drop and weakness. 2 Each 11   • Misc. Devices Misc AFO devices for foot drop 2 Device 1   • Misc. Devices Misc TDaP IM at pharmacy 1 Each 0   • VITAMIN D, CHOLECALCIFEROL, PO Take 5,000 Units by mouth.       No current facility-administered medications for this visit.      Family History   Problem Relation Age of Onset   • Heart Attack Father 59   • Heart Disease Father    • Hypertension Brother    • Diabetes Brother    • Arthritis Brother    • Thyroid Mother 50   • Breast Cancer Sister    • No Known Problems Maternal Grandmother    • No Known Problems Maternal Grandfather    • No Known Problems Paternal Grandmother    • No Known Problems Paternal Grandfather    • Arrythmia Brother    • No Known Problems Brother    • No Known Problems Daughter          Review of Systems   Musculoskeletal: Positive for myalgias.   All other systems reviewed and are negative.         Objective:     /76 (BP Location: Left arm, Patient Position: Sitting, BP Cuff Size: Adult)   Pulse 100   Temp 36.2 °C (97.1 °F) (Temporal)   Resp 16   Ht 1.956 m (6' 5\")   Wt 105.2 kg (232 lb)   SpO2 97%   BMI 27.51 kg/m²      Physical Exam  Vitals signs and nursing note reviewed.   Constitutional:       General: He is not in acute distress.     Appearance: He is " well-developed. He is not diaphoretic.   HENT:      Head: Normocephalic and atraumatic.      Right Ear: External ear normal.      Left Ear: External ear normal.      Nose: Nose normal.   Eyes:      General:         Right eye: No discharge.         Left eye: No discharge.      Conjunctiva/sclera: Conjunctivae normal.   Neck:      Musculoskeletal: Normal range of motion and neck supple.      Thyroid: No thyromegaly.      Trachea: No tracheal deviation.   Cardiovascular:      Rate and Rhythm: Normal rate and regular rhythm.      Heart sounds: Normal heart sounds. No murmur.   Pulmonary:      Effort: Pulmonary effort is normal. No respiratory distress.      Breath sounds: Normal breath sounds. No wheezing or rales.   Musculoskeletal:      Comments: Good range of motion of the upper extremities bilaterally.   Lymphadenopathy:      Cervical: No cervical adenopathy.   Skin:     General: Skin is warm and dry.      Findings: No erythema or rash.   Neurological:      Mental Status: He is alert and oriented to person, place, and time.      Coordination: Coordination normal.   Psychiatric:         Behavior: Behavior normal.         Thought Content: Thought content normal.         Judgment: Judgment normal.                 Assessment/Plan:        1. Controlled type 2 diabetes mellitus without complication, without long-term current use of insulin (HCC)  Patient's A1c is still at acceptable range at 6.9 for this 75-year-old male.  He will continue on his Jardiance for his diabetes and heart protection as well as his Metformin and glimepiride.  He has reported no hypoglycemia and his A1c has been greater than 60.  He will be due for urine microalbuminuria test before his next visit  - HEMOGLOBIN A1C; Future  - MICROALBUMIN CREAT RATIO URINE; Future    2. Paroxysmal atrial fibrillation (HCC)  Patient continues on his Coumadin and beta-blocker reports no chest pain or palpitations but is due to follow-up with cardiology    3.  Dyslipidemia  Patient using his statin and cholesterol has been good  - Lipid Profile; Future    4. SHANNAN (obstructive sleep apnea)  Patient again warned about potential for problems such as arrhythmias without using CPAP but he still declines.    5. Essential hypertension  Blood pressure well controlled on current medicines and no changes made    6. B12 deficiency  B12 levels are now adequate on his B12 replacement    7. Chronic anticoagulation  Patient following with the Coumadin clinic and his most recent INR was 2.1 through their office.    8. Strain of left biceps, initial encounter  I offered a referral to physical therapy to work on the left bicep but he declined.  He states if it continues to bother him he will consider this.  He is not currently having shoulder pain, redness or swelling of the affected area.

## 2021-01-06 NOTE — NON-PROVIDER
Annual Health Assessment Questions:    1.  Are you currently engaging in any exercise or physical activity? Yes    2.  How would you describe your mood or emotional well-being today? good    3.  Have you had any falls in the last year? No    4.  Have you noticed any problems with your balance or had difficulty walking? Yes    5.  In the last six months have you experienced any leakage of urine? No    6. DPA/Advanced Directive: Patient has Advanced Directive, but it is not on file. Instructed to bring in a copy to scan into their chart.

## 2021-01-11 DIAGNOSIS — Z23 NEED FOR VACCINATION: ICD-10-CM

## 2021-01-26 ENCOUNTER — IMMUNIZATION (OUTPATIENT)
Dept: FAMILY PLANNING/WOMEN'S HEALTH CLINIC | Facility: IMMUNIZATION CENTER | Age: 76
End: 2021-01-26
Payer: MEDICARE

## 2021-01-26 DIAGNOSIS — Z23 NEED FOR VACCINATION: ICD-10-CM

## 2021-01-26 DIAGNOSIS — Z23 ENCOUNTER FOR VACCINATION: Primary | ICD-10-CM

## 2021-01-26 PROCEDURE — 0001A PFIZER SARS-COV-2 VACCINE: CPT

## 2021-01-26 PROCEDURE — 91300 PFIZER SARS-COV-2 VACCINE: CPT

## 2021-01-29 ENCOUNTER — PHARMACY VISIT (OUTPATIENT)
Dept: PHARMACY | Facility: MEDICAL CENTER | Age: 76
End: 2021-01-29
Payer: COMMERCIAL

## 2021-01-29 PROCEDURE — RXMED WILLOW AMBULATORY MEDICATION CHARGE: Performed by: NURSE PRACTITIONER

## 2021-02-02 DIAGNOSIS — I48.0 PAROXYSMAL ATRIAL FIBRILLATION (HCC): ICD-10-CM

## 2021-02-02 PROCEDURE — RXMED WILLOW AMBULATORY MEDICATION CHARGE: Performed by: NURSE PRACTITIONER

## 2021-02-02 RX ORDER — WARFARIN SODIUM 5 MG/1
TABLET ORAL
Qty: 135 TAB | Refills: 1 | Status: SHIPPED | OUTPATIENT
Start: 2021-02-02 | End: 2021-08-30 | Stop reason: SDUPTHER

## 2021-02-03 ENCOUNTER — PHARMACY VISIT (OUTPATIENT)
Dept: PHARMACY | Facility: MEDICAL CENTER | Age: 76
End: 2021-02-03
Payer: COMMERCIAL

## 2021-02-12 DIAGNOSIS — E78.5 DYSLIPIDEMIA: ICD-10-CM

## 2021-02-12 RX ORDER — ATORVASTATIN CALCIUM 10 MG/1
10 TABLET, FILM COATED ORAL DAILY
Qty: 90 TABLET | Refills: 3 | Status: SHIPPED | OUTPATIENT
Start: 2021-02-12 | End: 2021-03-19 | Stop reason: SDUPTHER

## 2021-02-18 PROCEDURE — RXMED WILLOW AMBULATORY MEDICATION CHARGE: Performed by: NURSE PRACTITIONER

## 2021-02-19 ENCOUNTER — IMMUNIZATION (OUTPATIENT)
Dept: FAMILY PLANNING/WOMEN'S HEALTH CLINIC | Facility: IMMUNIZATION CENTER | Age: 76
End: 2021-02-19
Payer: MEDICARE

## 2021-02-19 DIAGNOSIS — Z23 ENCOUNTER FOR VACCINATION: Primary | ICD-10-CM

## 2021-02-19 PROCEDURE — 0002A PFIZER SARS-COV-2 VACCINE: CPT

## 2021-02-19 PROCEDURE — 91300 PFIZER SARS-COV-2 VACCINE: CPT

## 2021-03-04 ENCOUNTER — PHARMACY VISIT (OUTPATIENT)
Dept: PHARMACY | Facility: MEDICAL CENTER | Age: 76
End: 2021-03-04
Payer: COMMERCIAL

## 2021-03-04 DIAGNOSIS — Z79.01 CHRONIC ANTICOAGULATION: ICD-10-CM

## 2021-03-15 ENCOUNTER — ANTICOAGULATION VISIT (OUTPATIENT)
Dept: VASCULAR LAB | Facility: MEDICAL CENTER | Age: 76
End: 2021-03-15
Attending: INTERNAL MEDICINE
Payer: MEDICARE

## 2021-03-15 DIAGNOSIS — Z79.01 CHRONIC ANTICOAGULATION: ICD-10-CM

## 2021-03-15 DIAGNOSIS — I48.0 PAROXYSMAL ATRIAL FIBRILLATION (HCC): ICD-10-CM

## 2021-03-15 LAB — INR PPP: 2.6 (ref 2–3.5)

## 2021-03-15 PROCEDURE — 99211 OFF/OP EST MAY X REQ PHY/QHP: CPT

## 2021-03-15 PROCEDURE — 85610 PROTHROMBIN TIME: CPT

## 2021-03-15 NOTE — PROGRESS NOTES
Anticoagulation Summary  As of 3/15/2021    INR goal:  2.0-3.0   TTR:  87.3 % (4.8 y)   INR used for dosin.60 (3/15/2021)   Warfarin maintenance plan:  5 mg (5 mg x 1) every Mon, Wed, Fri; 7.5 mg (5 mg x 1.5) all other days   Weekly warfarin total:  45 mg   Plan last modified:  Kandice Mack PharmD (10/30/2018)   Next INR check:  2021   Target end date:  Indefinite    Indications    Paroxysmal atrial fibrillation (HCC) [I48.0]  Chronic anticoagulation [Z79.01]             Anticoagulation Episode Summary     INR check location:  Anticoagulation Clinic    Preferred lab:      Send INR reminders to:      Comments:        Anticoagulation Care Providers     Provider Role Specialty Phone number    Cheikh Turner M.D. Referring Cardiology [phone]    [facility] Responsible  [phone]        Anticoagulation Patient Findings      HPI:  Cheikh Perez seen in clinic today, on anticoagulation therapy with warfarin for PAF  Changes to current medical/health status since last appt: None  Denies signs/symptoms of bleeding and/or thrombosis since the last appt.    Denies any interval changes to diet.  Denies any interval changes to medications since last appt.   Denies any complications or cost restrictions with current therapy.   BP declined.    Pt is not on antiplatelet therapy.    A/P   INR  therapeutic.   Instructed pt to continue on with current regimen.    Follow up appointment in 12 week(s).    Alejandro Bautista, MatthewD

## 2021-03-16 LAB — INR BLD: 2.6 (ref 0.9–1.2)

## 2021-03-19 DIAGNOSIS — E78.5 DYSLIPIDEMIA: ICD-10-CM

## 2021-03-19 PROCEDURE — RXMED WILLOW AMBULATORY MEDICATION CHARGE: Performed by: NURSE PRACTITIONER

## 2021-03-22 ENCOUNTER — PHARMACY VISIT (OUTPATIENT)
Dept: PHARMACY | Facility: MEDICAL CENTER | Age: 76
End: 2021-03-22
Payer: COMMERCIAL

## 2021-03-22 PROCEDURE — RXMED WILLOW AMBULATORY MEDICATION CHARGE: Performed by: NURSE PRACTITIONER

## 2021-03-22 RX ORDER — ATORVASTATIN CALCIUM 10 MG/1
10 TABLET, FILM COATED ORAL DAILY
Qty: 90 TABLET | Refills: 3 | Status: SHIPPED | OUTPATIENT
Start: 2021-03-22 | End: 2021-06-28 | Stop reason: SDUPTHER

## 2021-04-02 DIAGNOSIS — I10 ESSENTIAL HYPERTENSION: ICD-10-CM

## 2021-04-04 PROCEDURE — RXMED WILLOW AMBULATORY MEDICATION CHARGE: Performed by: FAMILY MEDICINE

## 2021-04-05 ENCOUNTER — PHARMACY VISIT (OUTPATIENT)
Dept: PHARMACY | Facility: MEDICAL CENTER | Age: 76
End: 2021-04-05
Payer: COMMERCIAL

## 2021-04-14 PROCEDURE — RXMED WILLOW AMBULATORY MEDICATION CHARGE: Performed by: NURSE PRACTITIONER

## 2021-04-19 ENCOUNTER — PHARMACY VISIT (OUTPATIENT)
Dept: PHARMACY | Facility: MEDICAL CENTER | Age: 76
End: 2021-04-19
Payer: COMMERCIAL

## 2021-05-03 PROCEDURE — RXMED WILLOW AMBULATORY MEDICATION CHARGE: Performed by: NURSE PRACTITIONER

## 2021-05-04 ENCOUNTER — PHARMACY VISIT (OUTPATIENT)
Dept: PHARMACY | Facility: MEDICAL CENTER | Age: 76
End: 2021-05-04
Payer: COMMERCIAL

## 2021-05-04 DIAGNOSIS — E11.9 CONTROLLED TYPE 2 DIABETES MELLITUS WITHOUT COMPLICATION, WITHOUT LONG-TERM CURRENT USE OF INSULIN (HCC): ICD-10-CM

## 2021-05-04 RX ORDER — EMPAGLIFLOZIN 25 MG/1
1 TABLET, FILM COATED ORAL DAILY
Qty: 30 TABLET | Refills: 2 | Status: SHIPPED | OUTPATIENT
Start: 2021-05-04 | End: 2021-09-15 | Stop reason: SDUPTHER

## 2021-05-20 ENCOUNTER — PHARMACY VISIT (OUTPATIENT)
Dept: PHARMACY | Facility: MEDICAL CENTER | Age: 76
End: 2021-05-20
Payer: COMMERCIAL

## 2021-05-20 PROCEDURE — RXMED WILLOW AMBULATORY MEDICATION CHARGE: Performed by: NURSE PRACTITIONER

## 2021-06-07 ENCOUNTER — ANTICOAGULATION VISIT (OUTPATIENT)
Dept: VASCULAR LAB | Facility: MEDICAL CENTER | Age: 76
End: 2021-06-07
Attending: INTERNAL MEDICINE
Payer: MEDICARE

## 2021-06-07 DIAGNOSIS — I48.0 PAROXYSMAL ATRIAL FIBRILLATION (HCC): ICD-10-CM

## 2021-06-07 DIAGNOSIS — Z79.01 CHRONIC ANTICOAGULATION: ICD-10-CM

## 2021-06-07 LAB
INR BLD: 1.8 (ref 0.9–1.2)
INR PPP: 1.8 (ref 2–3.5)

## 2021-06-07 PROCEDURE — RXMED WILLOW AMBULATORY MEDICATION CHARGE: Performed by: NURSE PRACTITIONER

## 2021-06-07 PROCEDURE — 85610 PROTHROMBIN TIME: CPT

## 2021-06-07 PROCEDURE — 99211 OFF/OP EST MAY X REQ PHY/QHP: CPT

## 2021-06-07 NOTE — PROGRESS NOTES
Anticoagulation Summary  As of 2021    INR goal:  2.0-3.0   TTR:  86.7 % (5 y)   INR used for dosin.80 (2021)   Warfarin maintenance plan:  5 mg (5 mg x 1) every Mon, Wed, Fri; 7.5 mg (5 mg x 1.5) all other days   Weekly warfarin total:  45 mg   Plan last modified:  Matthew RousseauD (10/30/2018)   Next INR check:  2021   Target end date:  Indefinite    Indications    Paroxysmal atrial fibrillation (HCC) [I48.0]  Chronic anticoagulation [Z79.01]             Anticoagulation Episode Summary     INR check location:  Anticoagulation Clinic    Preferred lab:      Send INR reminders to:      Comments:        Anticoagulation Care Providers     Provider Role Specialty Phone number    Cheikh Turner M.D. Referring Cardiology [phone]    [facility] Anticoagulation [facility] Responsible  [phone]                Anticoagulation Patient Findings      HPI:  Cheikh Perez seen in clinic today, on anticoagulation therapy with warfarin for afib  Changes to current medical/health status since last appt: Patient believes he missed a dose last Thursday - updated dosing calendar.  Denies signs/symptoms of bleeding and/or thrombosis since the last appt.    Denies any interval changes to diet  Denies any interval changes to medications since last appt.   Denies any complications or cost restrictions with current therapy.   Verified current warfarin dosing schedule.   Pt declined vitals.  Medications reconciled    A/P   INR  is now slightly sub-therapeutic.   INR likely lower due to missed dose on Thursday last week. Given previous INR stability on current regimen, and that INR is close to therapeutic, will not give any bolus dose today and will have patient continue same regimen.    Follow up appointment in 12 week(s).    Lena Garcia, PharmD

## 2021-06-08 PROCEDURE — RXMED WILLOW AMBULATORY MEDICATION CHARGE: Performed by: FAMILY MEDICINE

## 2021-06-08 RX ORDER — ENALAPRIL MALEATE 10 MG/1
TABLET ORAL
Qty: 100 TABLET | Refills: 0 | Status: SHIPPED | OUTPATIENT
Start: 2021-06-08 | End: 2021-09-15 | Stop reason: SDUPTHER

## 2021-06-11 ENCOUNTER — PHARMACY VISIT (OUTPATIENT)
Dept: PHARMACY | Facility: MEDICAL CENTER | Age: 76
End: 2021-06-11
Payer: COMMERCIAL

## 2021-06-25 PROCEDURE — RXMED WILLOW AMBULATORY MEDICATION CHARGE: Performed by: NURSE PRACTITIONER

## 2021-06-28 ENCOUNTER — PATIENT OUTREACH (OUTPATIENT)
Dept: HEALTH INFORMATION MANAGEMENT | Facility: OTHER | Age: 76
End: 2021-06-28

## 2021-06-28 ENCOUNTER — PHARMACY VISIT (OUTPATIENT)
Dept: PHARMACY | Facility: MEDICAL CENTER | Age: 76
End: 2021-06-28
Payer: COMMERCIAL

## 2021-06-28 DIAGNOSIS — E78.5 DYSLIPIDEMIA: ICD-10-CM

## 2021-06-28 RX ORDER — ATORVASTATIN CALCIUM 10 MG/1
10 TABLET, FILM COATED ORAL DAILY
Qty: 100 TABLET | Refills: 0 | Status: SHIPPED | OUTPATIENT
Start: 2021-06-28 | End: 2021-09-15 | Stop reason: SDUPTHER

## 2021-06-28 NOTE — PROGRESS NOTES
Outcome: Self Referral scheduled Comprehensive Health Assessment on 6/30 at 4:00    Please transfer to Patient Outreach Team at [phone] when patient returns call.      Attempt # 1

## 2021-07-09 DIAGNOSIS — I10 ESSENTIAL HYPERTENSION: ICD-10-CM

## 2021-07-09 PROCEDURE — RXMED WILLOW AMBULATORY MEDICATION CHARGE: Performed by: FAMILY MEDICINE

## 2021-07-10 PROCEDURE — RXMED WILLOW AMBULATORY MEDICATION CHARGE: Performed by: FAMILY MEDICINE

## 2021-07-10 RX ORDER — GLIMEPIRIDE 2 MG/1
TABLET ORAL
Qty: 100 TABLET | Refills: 0 | Status: SHIPPED | OUTPATIENT
Start: 2021-07-10 | End: 2022-05-12 | Stop reason: SDUPTHER

## 2021-07-13 ENCOUNTER — PHARMACY VISIT (OUTPATIENT)
Dept: PHARMACY | Facility: MEDICAL CENTER | Age: 76
End: 2021-07-13
Payer: COMMERCIAL

## 2021-07-20 ENCOUNTER — PHARMACY VISIT (OUTPATIENT)
Dept: PHARMACY | Facility: MEDICAL CENTER | Age: 76
End: 2021-07-20
Payer: COMMERCIAL

## 2021-07-20 PROCEDURE — RXMED WILLOW AMBULATORY MEDICATION CHARGE: Performed by: NURSE PRACTITIONER

## 2021-07-23 ENCOUNTER — PHARMACY VISIT (OUTPATIENT)
Dept: PHARMACY | Facility: MEDICAL CENTER | Age: 76
End: 2021-07-23
Payer: COMMERCIAL

## 2021-07-23 PROCEDURE — RXMED WILLOW AMBULATORY MEDICATION CHARGE: Performed by: FAMILY MEDICINE

## 2021-08-27 ENCOUNTER — PHARMACY VISIT (OUTPATIENT)
Dept: PHARMACY | Facility: MEDICAL CENTER | Age: 76
End: 2021-08-27
Payer: COMMERCIAL

## 2021-08-27 PROCEDURE — RXMED WILLOW AMBULATORY MEDICATION CHARGE: Performed by: NURSE PRACTITIONER

## 2021-08-29 DIAGNOSIS — E53.8 B12 DEFICIENCY: ICD-10-CM

## 2021-08-30 ENCOUNTER — ANTICOAGULATION VISIT (OUTPATIENT)
Dept: VASCULAR LAB | Facility: MEDICAL CENTER | Age: 76
End: 2021-08-30
Attending: INTERNAL MEDICINE
Payer: MEDICARE

## 2021-08-30 DIAGNOSIS — Z79.01 CHRONIC ANTICOAGULATION: ICD-10-CM

## 2021-08-30 DIAGNOSIS — I48.0 PAROXYSMAL ATRIAL FIBRILLATION (HCC): ICD-10-CM

## 2021-08-30 LAB
INR BLD: 2.3 (ref 0.9–1.2)
INR PPP: 2.3 (ref 2–3.5)

## 2021-08-30 PROCEDURE — RXMED WILLOW AMBULATORY MEDICATION CHARGE: Performed by: FAMILY MEDICINE

## 2021-08-30 PROCEDURE — RXMED WILLOW AMBULATORY MEDICATION CHARGE: Performed by: NURSE PRACTITIONER

## 2021-08-30 PROCEDURE — 85610 PROTHROMBIN TIME: CPT

## 2021-08-30 PROCEDURE — 99211 OFF/OP EST MAY X REQ PHY/QHP: CPT | Performed by: NURSE PRACTITIONER

## 2021-08-30 RX ORDER — WARFARIN SODIUM 5 MG/1
TABLET ORAL
Qty: 135 TABLET | Refills: 1 | Status: SHIPPED | OUTPATIENT
Start: 2021-08-30 | End: 2022-03-29

## 2021-08-30 NOTE — PROGRESS NOTES
Anticoagulation Summary  As of 2021    INR goal:  2.0-3.0   TTR:  85.6 % (5.2 y)   INR used for dosin.30 (2021)   Warfarin maintenance plan:  5 mg (5 mg x 1) every Mon, Wed, Fri; 7.5 mg (5 mg x 1.5) all other days   Weekly warfarin total:  45 mg   Plan last modified:  Kandice Mack, PharmD (10/30/2018)   Next INR check:  2021   Target end date:  Indefinite    Indications    Paroxysmal atrial fibrillation (HCC) [I48.0]  Chronic anticoagulation [Z79.01]             Anticoagulation Episode Summary     INR check location:  Anticoagulation Clinic    Preferred lab:      Send INR reminders to:      Comments:        Anticoagulation Care Providers     Provider Role Specialty Phone number    Cheikh Turner M.D. Referring Cardiology [phone]    [facility] Responsible  [phone]                    Refer to Patient Findings for HPI:  Patient Findings     Negatives:  Signs/symptoms of thrombosis, Signs/symptoms of bleeding, Laboratory test error suspected, Change in health, Change in alcohol use, Change in activity, Upcoming invasive procedure, Emergency department visit, Upcoming dental procedure, Missed doses, Extra doses, Change in medications, Change in diet/appetite, Hospital admission, Bruising, Other complaints        Verified current warfarin dosing schedule.      Medications reconciled   Pt is not on antiplatelet therapy      A/P   INR  -therapeutic.     Warfarin dosing recommendation: Continue current regimen.    Pt educated to contact our clinic with any changes in medications or s/s of bleeding or thrombosis. Pt is aware to seek immediate medical attention for falls, head injury or deep cuts.    Follow up appointment in 12 week(s).    REY Bryan

## 2021-08-31 ENCOUNTER — PHARMACY VISIT (OUTPATIENT)
Dept: PHARMACY | Facility: MEDICAL CENTER | Age: 76
End: 2021-08-31
Payer: COMMERCIAL

## 2021-09-07 PROCEDURE — RXMED WILLOW AMBULATORY MEDICATION CHARGE: Performed by: NURSE PRACTITIONER

## 2021-09-08 ENCOUNTER — PHARMACY VISIT (OUTPATIENT)
Dept: PHARMACY | Facility: MEDICAL CENTER | Age: 76
End: 2021-09-08
Payer: COMMERCIAL

## 2021-09-15 ENCOUNTER — OFFICE VISIT (OUTPATIENT)
Dept: CARDIOLOGY | Facility: MEDICAL CENTER | Age: 76
End: 2021-09-15
Payer: MEDICARE

## 2021-09-15 VITALS
WEIGHT: 236 LBS | BODY MASS INDEX: 29.34 KG/M2 | HEART RATE: 91 BPM | DIASTOLIC BLOOD PRESSURE: 90 MMHG | OXYGEN SATURATION: 98 % | HEIGHT: 75 IN | SYSTOLIC BLOOD PRESSURE: 130 MMHG

## 2021-09-15 DIAGNOSIS — I48.0 PAROXYSMAL ATRIAL FIBRILLATION (HCC): ICD-10-CM

## 2021-09-15 DIAGNOSIS — I10 ESSENTIAL HYPERTENSION: ICD-10-CM

## 2021-09-15 DIAGNOSIS — Z79.01 CHRONIC ANTICOAGULATION: ICD-10-CM

## 2021-09-15 DIAGNOSIS — E11.9 CONTROLLED TYPE 2 DIABETES MELLITUS WITHOUT COMPLICATION, WITHOUT LONG-TERM CURRENT USE OF INSULIN (HCC): ICD-10-CM

## 2021-09-15 DIAGNOSIS — E78.5 DYSLIPIDEMIA: ICD-10-CM

## 2021-09-15 PROCEDURE — RXMED WILLOW AMBULATORY MEDICATION CHARGE: Performed by: INTERNAL MEDICINE

## 2021-09-15 PROCEDURE — 99214 OFFICE O/P EST MOD 30 MIN: CPT | Performed by: INTERNAL MEDICINE

## 2021-09-15 RX ORDER — ENALAPRIL MALEATE 10 MG/1
10 TABLET ORAL DAILY
Qty: 100 TABLET | Refills: 3 | Status: SHIPPED | OUTPATIENT
Start: 2021-09-15 | End: 2022-11-23

## 2021-09-15 RX ORDER — ATORVASTATIN CALCIUM 10 MG/1
10 TABLET, FILM COATED ORAL DAILY
Qty: 100 TABLET | Refills: 3 | Status: SHIPPED | OUTPATIENT
Start: 2021-09-15 | End: 2022-10-03

## 2021-09-15 RX ORDER — EMPAGLIFLOZIN 25 MG/1
1 TABLET, FILM COATED ORAL DAILY
Qty: 30 TABLET | Refills: 5 | Status: SHIPPED | OUTPATIENT
Start: 2021-09-15 | End: 2022-09-07 | Stop reason: SDUPTHER

## 2021-09-15 RX ORDER — EMPAGLIFLOZIN 25 MG/1
1 TABLET, FILM COATED ORAL DAILY
Qty: 30 TABLET | Refills: 11 | Status: SHIPPED | OUTPATIENT
Start: 2021-09-15 | End: 2021-09-22 | Stop reason: SDUPTHER

## 2021-09-15 ASSESSMENT — ENCOUNTER SYMPTOMS
SORE THROAT: 0
FEVER: 0
DIZZINESS: 0
PALPITATIONS: 0
SHORTNESS OF BREATH: 0
BLURRED VISION: 0
NAUSEA: 0
BRUISES/BLEEDS EASILY: 0
FALLS: 0
ABDOMINAL PAIN: 0
CLAUDICATION: 0
FOCAL WEAKNESS: 0
CHILLS: 0
COUGH: 0
WEAKNESS: 0
PND: 0

## 2021-09-15 ASSESSMENT — FIBROSIS 4 INDEX: FIB4 SCORE: 1.76

## 2021-09-15 NOTE — PROGRESS NOTES
"Chief Complaint   Patient presents with   • HTN (Controlled)   • Atrial Fibrillation   • Dyslipidemia       Subjective     Cheikh Perez is a 76 y.o. male who presents today for follow-up of his history of paroxysmal A. fib on chronic anticoagulation in the setting of diabetes he is doing well we last saw each other before the pandemic tolerating his medications well including chronic anticoagulation    Past Medical History:   Diagnosis Date   • Arrhythmia July 2013    AFIB, cardioversion   • Arthritis    • Diabetes     Diet,oral meds   • Hypertension    • Hypertension    • Pain      \"stiff\" from arthritis, 2/10   • Paroxysmal atrial fibrillation (HCC) 6/6/2013   • Sleep apnea     CPAP   • Snoring      Past Surgical History:   Procedure Laterality Date   • FEMUR ORIF Left 2/24/2016    Procedure: DISTAL FEMUR ORIF;  Surgeon: Montrell Eddy M.D.;  Location: [facility];  Service:    • CYSTOSCOPY STENT PLACEMENT  1/13/2014    Performed by Main Batista M.D. at SURGERY [facility]   • URETEROSCOPY  1/13/2014    Performed by Main Batista M.D. at SURGERY [facility]   • LASERTRIPSY  1/13/2014    Performed by Main Batista M.D. at SURGERY [facility]   • OTHER      Surgery for kidney stones   • RECOVERY  7/11/2013    Performed by Cath-Recovery Surgery at [facility]   • KNEE ARTHROPLASTY TOTAL  12/1/2009    Performed by GIACOMO PETTIT at SURGERY [facility]   • KNEE ARTHROPLASTY TOTAL  3/2009    Left   • LUMBAR LAMINECTOMY DISKECTOMY  7/2006    L1-L5/ residual left foot weaknesss    • ACL REPAIR  12/2005    Right   • FEMUR ORIF       Family History   Problem Relation Age of Onset   • Heart Attack Father 59   • Heart Disease Father    • Hypertension Brother    • Diabetes Brother    • Arthritis Brother    • Thyroid Mother 50   • Breast Cancer Sister    • No Known Problems Maternal Grandmother    • No Known Problems Maternal Grandfather    • No Known " Problems Paternal Grandmother    • No Known Problems Paternal Grandfather    • Arrythmia Brother    • No Known Problems Brother    • No Known Problems Daughter      Social History     Socioeconomic History   • Marital status:      Spouse name: Not on file   • Number of children: Not on file   • Years of education: Not on file   • Highest education level: Not on file   Occupational History   • Not on file   Tobacco Use   • Smoking status: Never Smoker   • Smokeless tobacco: Never Used   Vaping Use   • Vaping Use: Never used   Substance and Sexual Activity   • Alcohol use: Yes     Alcohol/week: 6.0 oz     Types: 10 Glasses of wine per week   • Drug use: No   • Sexual activity: Never     Partners: Female   Other Topics Concern   • Not on file   Social History Narrative   • Not on file     Social Determinants of Health     Financial Resource Strain:    • Difficulty of Paying Living Expenses:    Food Insecurity:    • Worried About Running Out of Food in the Last Year:    • Ran Out of Food in the Last Year:    Transportation Needs:    • Lack of Transportation (Medical):    • Lack of Transportation (Non-Medical):    Physical Activity:    • Days of Exercise per Week:    • Minutes of Exercise per Session:    Stress:    • Feeling of Stress :    Social Connections:    • Frequency of Communication with Friends and Family:    • Frequency of Social Gatherings with Friends and Family:    • Attends Anglican Services:    • Active Member of Clubs or Organizations:    • Attends Club or Organization Meetings:    • Marital Status:    Intimate Partner Violence:    • Fear of Current or Ex-Partner:    • Emotionally Abused:    • Physically Abused:    • Sexually Abused:      Allergies   Allergen Reactions   • Nkda [No Known Drug Allergy]      Outpatient Encounter Medications as of 9/15/2021   Medication Sig Dispense Refill   • warfarin (COUMADIN) 5 MG Tab TAKE 1 TO 1 AND 1/2 TABLETS BY MOUTH DAILY AS DIRECTED BY ANTICOAGULATION  PROGRAM 135 Tablet 1   • cyanocobalamin (VITAMIN B12) 1000 MCG Tab Take 1 tablet by mouth every day. 100 Tablet 0   • glimepiride (AMARYL) 2 MG Tab TAKE  ONE TABLET BY MOUTH EVERY MORNING 100 tablet 0   • atorvastatin (LIPITOR) 10 MG Tab Take 1 tablet by mouth every day. Needs to be seen for further refills. Thank you 100 tablet 0   • enalapril (VASOTEC) 10 MG Tab TAKE ONE TABLET BY MOUTH ONCE A  tablet 0   • metoprolol tartrate (LOPRESSOR) 25 MG Tab TAKE ONE TABLET BY MOUTH TWICE DAILY 200 tablet 0   • metformin (GLUCOPHAGE) 1000 MG tablet TAKE ONE TABLET BY MOUTH TWICE DAILY WITH MEALS 200 Tab 2   • Empagliflozin (JARDIANCE) 25 MG Tab Take 1 Tab by mouth every day. 30 Tab 5   • VITAMIN D, CHOLECALCIFEROL, PO Take 5,000 Units by mouth.     • metoprolol tartrate (LOPRESSOR) 25 MG Tab TAKE ONE TABLET BY MOUTH TWICE DAILY 200 tablet 0   • Empagliflozin (JARDIANCE) 25 MG Tab Take 1 Tab by mouth every day. 30 tablet 2   • [DISCONTINUED] cyclobenzaprine (FLEXERIL) 5 mg tablet Take 1 Tab by mouth 3 times a day as needed. 30 Tab 0   • Misc. Devices Misc Bilateral AFO leg braces due to bilateral foot drop and weakness. (Patient not taking: Reported on 9/15/2021) 2 Each 11   • [DISCONTINUED] Misc. Devices Misc AFO devices for foot drop (Patient not taking: Reported on 9/15/2021) 2 Device 1   • [DISCONTINUED] Misc. Devices Misc TDaP IM at pharmacy (Patient not taking: Reported on 9/15/2021) 1 Each 0     No facility-administered encounter medications on file as of 9/15/2021.     Review of Systems   Constitutional: Negative for chills and fever.   HENT: Negative for sore throat.    Eyes: Negative for blurred vision.   Respiratory: Negative for cough and shortness of breath.    Cardiovascular: Negative for chest pain, palpitations, claudication, leg swelling and PND.   Gastrointestinal: Negative for abdominal pain and nausea.   Musculoskeletal: Negative for falls and joint pain.   Skin: Negative for rash.   Neurological:  "Negative for dizziness, focal weakness and weakness.   Endo/Heme/Allergies: Does not bruise/bleed easily.              Objective     /90 (BP Location: Right arm, Patient Position: Sitting, BP Cuff Size: Adult)   Pulse 91   Ht 1.905 m (6' 3\")   Wt 107 kg (236 lb)   SpO2 98%   BMI 29.50 kg/m²     Physical Exam  Constitutional:       General: He is not in acute distress.     Appearance: He is not diaphoretic.   Eyes:      General: No scleral icterus.  Neck:      Vascular: No JVD.   Cardiovascular:      Rate and Rhythm: Normal rate.      Heart sounds: Normal heart sounds. No murmur heard.   No friction rub. No gallop.    Pulmonary:      Effort: No respiratory distress.      Breath sounds: No wheezing or rales.   Abdominal:      General: Bowel sounds are normal.      Palpations: Abdomen is soft.   Skin:     Findings: No rash.   Neurological:      Mental Status: He is alert.            We reviewed in person the most recent labs  Recent Results (from the past 5040 hour(s))   POCT Protime    Collection Time: 03/15/21 12:00 AM   Result Value Ref Range    INR 2.60 2 - 3.5   Point Of Care INR    Collection Time: 03/15/21  9:23 AM   Result Value Ref Range    POC INR 2.6 (H) 0.9 - 1.2   POCT Protime    Collection Time: 06/07/21 12:00 AM   Result Value Ref Range    INR 1.80 (A) 2 - 3.5   POCT INR device results    Collection Time: 06/07/21 10:37 AM   Result Value Ref Range    POC INR 1.8 (H) 0.9 - 1.2   POCT Protime    Collection Time: 08/30/21 12:00 AM   Result Value Ref Range    INR 2.30 2 - 3.5   POCT INR device results    Collection Time: 08/30/21 10:19 AM   Result Value Ref Range    POC INR 2.3 (H) 0.9 - 1.2         Assessment & Plan     1. Chronic anticoagulation  CBC WITHOUT DIFFERENTIAL   2. Essential hypertension  metoprolol tartrate (LOPRESSOR) 25 MG Tab    Comp Metabolic Panel   3. Paroxysmal atrial fibrillation (HCC)  TSH   4. Dyslipidemia  atorvastatin (LIPITOR) 10 MG Tab    Lipid Profile   5. Controlled " type 2 diabetes mellitus without complication, without long-term current use of insulin (HCC)  Empagliflozin (JARDIANCE) 25 MG Tab    Empagliflozin (JARDIANCE) 25 MG Tab    HEMOGLOBIN A1C       Medical Decision Making: Today's Assessment/Status/Plan:          It was my pleasure to meet with Mr. Perez.    We addressed the management of hypertension at today's visit. Blood pressure is well controlled.  We specifically assessed the labs on hypertension treatment    We addressed the management of chronic anticoagulation at today's visit. He understands the risks and benefits of chronic anticoagulation.  We reviewed and verified the results of annual testing for anemia and kidney function.      I will see Mr. Perez back in 1 year time and encouraged him to follow up with us over the phone or electronically using my MyChart as issues arise.    It is my pleasure to participate in the care of Mr. Perez.  Please do not hesitate to contact me with questions or concerns.    Cheikh Turner MD PhD [facility]  Cardiologist [facility]    Please note that this dictation was created using voice recognition software. There may be errors I did not discover before finalizing the note.

## 2021-09-17 ENCOUNTER — PHARMACY VISIT (OUTPATIENT)
Dept: PHARMACY | Facility: MEDICAL CENTER | Age: 76
End: 2021-09-17
Payer: COMMERCIAL

## 2021-09-22 DIAGNOSIS — E11.9 CONTROLLED TYPE 2 DIABETES MELLITUS WITHOUT COMPLICATION, WITHOUT LONG-TERM CURRENT USE OF INSULIN (HCC): ICD-10-CM

## 2021-09-22 RX ORDER — EMPAGLIFLOZIN 25 MG/1
1 TABLET, FILM COATED ORAL DAILY
Qty: 100 TABLET | Refills: 3 | Status: SHIPPED | OUTPATIENT
Start: 2021-09-22 | End: 2022-03-29

## 2021-10-14 ENCOUNTER — HOSPITAL ENCOUNTER (OUTPATIENT)
Dept: LAB | Facility: MEDICAL CENTER | Age: 76
End: 2021-10-14
Attending: INTERNAL MEDICINE
Payer: MEDICARE

## 2021-10-14 DIAGNOSIS — E11.9 CONTROLLED TYPE 2 DIABETES MELLITUS WITHOUT COMPLICATION, WITHOUT LONG-TERM CURRENT USE OF INSULIN (HCC): ICD-10-CM

## 2021-10-14 DIAGNOSIS — I48.0 PAROXYSMAL ATRIAL FIBRILLATION (HCC): ICD-10-CM

## 2021-10-14 DIAGNOSIS — E78.5 DYSLIPIDEMIA: ICD-10-CM

## 2021-10-14 DIAGNOSIS — I10 ESSENTIAL HYPERTENSION: ICD-10-CM

## 2021-10-14 DIAGNOSIS — Z79.01 CHRONIC ANTICOAGULATION: ICD-10-CM

## 2021-10-14 LAB
ALBUMIN SERPL BCP-MCNC: 4.8 G/DL (ref 3.2–4.9)
ALBUMIN/GLOB SERPL: 1.8 G/DL
ALP SERPL-CCNC: 69 U/L (ref 30–99)
ALT SERPL-CCNC: 25 U/L (ref 2–50)
ANION GAP SERPL CALC-SCNC: 11 MMOL/L (ref 7–16)
AST SERPL-CCNC: 19 U/L (ref 12–45)
BILIRUB SERPL-MCNC: 0.8 MG/DL (ref 0.1–1.5)
BUN SERPL-MCNC: 17 MG/DL (ref 8–22)
CALCIUM SERPL-MCNC: 9.7 MG/DL (ref 8.5–10.5)
CHLORIDE SERPL-SCNC: 102 MMOL/L (ref 96–112)
CHOLEST SERPL-MCNC: 137 MG/DL (ref 100–199)
CO2 SERPL-SCNC: 29 MMOL/L (ref 20–33)
CREAT SERPL-MCNC: 0.81 MG/DL (ref 0.5–1.4)
ERYTHROCYTE [DISTWIDTH] IN BLOOD BY AUTOMATED COUNT: 43.7 FL (ref 35.9–50)
EST. AVERAGE GLUCOSE BLD GHB EST-MCNC: 148 MG/DL
FASTING STATUS PATIENT QL REPORTED: NORMAL
GLOBULIN SER CALC-MCNC: 2.6 G/DL (ref 1.9–3.5)
GLUCOSE SERPL-MCNC: 148 MG/DL (ref 65–99)
HBA1C MFR BLD: 6.8 % (ref 4–5.6)
HCT VFR BLD AUTO: 51.5 % (ref 42–52)
HDLC SERPL-MCNC: 59 MG/DL
HGB BLD-MCNC: 17.4 G/DL (ref 14–18)
LDLC SERPL CALC-MCNC: 63 MG/DL
MCH RBC QN AUTO: 30.4 PG (ref 27–33)
MCHC RBC AUTO-ENTMCNC: 33.8 G/DL (ref 33.7–35.3)
MCV RBC AUTO: 90 FL (ref 81.4–97.8)
PLATELET # BLD AUTO: 178 K/UL (ref 164–446)
PMV BLD AUTO: 11.9 FL (ref 9–12.9)
POTASSIUM SERPL-SCNC: 4.7 MMOL/L (ref 3.6–5.5)
PROT SERPL-MCNC: 7.4 G/DL (ref 6–8.2)
RBC # BLD AUTO: 5.72 M/UL (ref 4.7–6.1)
SODIUM SERPL-SCNC: 142 MMOL/L (ref 135–145)
TRIGL SERPL-MCNC: 77 MG/DL (ref 0–149)
TSH SERPL DL<=0.005 MIU/L-ACNC: 4.39 UIU/ML (ref 0.38–5.33)
WBC # BLD AUTO: 6.8 K/UL (ref 4.8–10.8)

## 2021-10-14 PROCEDURE — 36415 COLL VENOUS BLD VENIPUNCTURE: CPT

## 2021-10-14 PROCEDURE — 80061 LIPID PANEL: CPT

## 2021-10-14 PROCEDURE — 84443 ASSAY THYROID STIM HORMONE: CPT

## 2021-10-14 PROCEDURE — 85027 COMPLETE CBC AUTOMATED: CPT

## 2021-10-14 PROCEDURE — 80053 COMPREHEN METABOLIC PANEL: CPT

## 2021-10-14 PROCEDURE — 83036 HEMOGLOBIN GLYCOSYLATED A1C: CPT

## 2021-10-18 ENCOUNTER — PATIENT MESSAGE (OUTPATIENT)
Dept: CARDIOLOGY | Facility: MEDICAL CENTER | Age: 76
End: 2021-10-18

## 2021-10-18 NOTE — TELEPHONE ENCOUNTER
----- Message from Cheikh Turner M.D. sent at 10/15/2021  8:18 PM PDT -----  Labs look good, please let him know     Thank you

## 2021-10-19 PROCEDURE — RXMED WILLOW AMBULATORY MEDICATION CHARGE: Performed by: INTERNAL MEDICINE

## 2021-10-20 ENCOUNTER — PHARMACY VISIT (OUTPATIENT)
Dept: PHARMACY | Facility: MEDICAL CENTER | Age: 76
End: 2021-10-20
Payer: COMMERCIAL

## 2021-10-29 RX ORDER — GLIMEPIRIDE 2 MG/1
TABLET ORAL
Qty: 100 TABLET | Refills: 0 | OUTPATIENT
Start: 2021-10-29

## 2021-10-29 NOTE — TELEPHONE ENCOUNTER
Patient needs to establish with a new provider as his current provider is no longer seeing patients.

## 2021-11-09 ENCOUNTER — PHARMACY VISIT (OUTPATIENT)
Dept: PHARMACY | Facility: MEDICAL CENTER | Age: 76
End: 2021-11-09
Payer: COMMERCIAL

## 2021-11-09 PROCEDURE — RXMED WILLOW AMBULATORY MEDICATION CHARGE: Performed by: INTERNAL MEDICINE

## 2021-12-10 ENCOUNTER — PHARMACY VISIT (OUTPATIENT)
Dept: PHARMACY | Facility: MEDICAL CENTER | Age: 76
End: 2021-12-10
Payer: COMMERCIAL

## 2021-12-10 DIAGNOSIS — E53.8 B12 DEFICIENCY: ICD-10-CM

## 2021-12-10 PROCEDURE — RXMED WILLOW AMBULATORY MEDICATION CHARGE: Performed by: INTERNAL MEDICINE

## 2021-12-10 PROCEDURE — RXMED WILLOW AMBULATORY MEDICATION CHARGE: Performed by: STUDENT IN AN ORGANIZED HEALTH CARE EDUCATION/TRAINING PROGRAM

## 2021-12-13 ENCOUNTER — ANTICOAGULATION VISIT (OUTPATIENT)
Dept: VASCULAR LAB | Facility: MEDICAL CENTER | Age: 76
End: 2021-12-13
Attending: INTERNAL MEDICINE
Payer: MEDICARE

## 2021-12-13 VITALS — DIASTOLIC BLOOD PRESSURE: 76 MMHG | HEART RATE: 104 BPM | SYSTOLIC BLOOD PRESSURE: 129 MMHG

## 2021-12-13 DIAGNOSIS — I48.0 PAROXYSMAL ATRIAL FIBRILLATION (HCC): ICD-10-CM

## 2021-12-13 DIAGNOSIS — Z79.01 CHRONIC ANTICOAGULATION: ICD-10-CM

## 2021-12-13 DIAGNOSIS — D68.69 SECONDARY HYPERCOAGULABLE STATE (HCC): ICD-10-CM

## 2021-12-13 LAB
INR BLD: 2.2 (ref 0.9–1.2)
INR PPP: 2.2 (ref 2–3.5)

## 2021-12-13 PROCEDURE — 85610 PROTHROMBIN TIME: CPT

## 2021-12-13 PROCEDURE — 99211 OFF/OP EST MAY X REQ PHY/QHP: CPT | Performed by: NURSE PRACTITIONER

## 2021-12-13 NOTE — PROGRESS NOTES
Anticoagulation Summary  As of 2021    INR goal:  2.0-3.0   TTR:  86.3 % (5.5 y)   INR used for dosin.20 (2021)   Warfarin maintenance plan:  5 mg (5 mg x 1) every Mon, Wed, Fri; 7.5 mg (5 mg x 1.5) all other days   Weekly warfarin total:  45 mg   Plan last modified:  Kandice Mack, PharmD (10/30/2018)   Next INR check:  3/7/2022   Target end date:  Indefinite    Indications    Paroxysmal atrial fibrillation (HCC) [I48.0]  Chronic anticoagulation [Z79.01]             Anticoagulation Episode Summary     INR check location:  Anticoagulation Clinic    Preferred lab:      Send INR reminders to:      Comments:        Anticoagulation Care Providers     Provider Role Specialty Phone number    Cheikh Turner M.D. Referring Cardiovascular Disease (Cardiology) [phone]    [facility] Anticoagulation Services Responsible  [phone]                Refer to Patient Findings for HPI:      Vitals:    21 1409   BP: 129/76   Pulse: (!) 104       Verified current warfarin dosing schedule.    Medications reconciled   Pt is not on antiplatelet therapy      A/P   INR  -therapeutic.     Warfarin dosing recommendation: Continue current regimen.    Pt educated to contact our clinic with any changes in medications or s/s of bleeding or thrombosis. Pt is aware to seek immediate medical attention for falls, head injury or deep cuts.    Follow up appointment in 12 week(s).    PAMELA Bryan.

## 2021-12-14 PROCEDURE — RXMED WILLOW AMBULATORY MEDICATION CHARGE: Performed by: NURSE PRACTITIONER

## 2021-12-15 ENCOUNTER — PHARMACY VISIT (OUTPATIENT)
Dept: PHARMACY | Facility: MEDICAL CENTER | Age: 76
End: 2021-12-15
Payer: COMMERCIAL

## 2021-12-27 PROCEDURE — RXMED WILLOW AMBULATORY MEDICATION CHARGE: Performed by: NURSE PRACTITIONER

## 2021-12-28 ENCOUNTER — PHARMACY VISIT (OUTPATIENT)
Dept: PHARMACY | Facility: MEDICAL CENTER | Age: 76
End: 2021-12-28
Payer: COMMERCIAL

## 2021-12-30 PROCEDURE — RXMED WILLOW AMBULATORY MEDICATION CHARGE: Performed by: INTERNAL MEDICINE

## 2022-01-10 ENCOUNTER — PHARMACY VISIT (OUTPATIENT)
Dept: PHARMACY | Facility: MEDICAL CENTER | Age: 77
End: 2022-01-10
Payer: COMMERCIAL

## 2022-01-12 PROCEDURE — RXMED WILLOW AMBULATORY MEDICATION CHARGE: Performed by: INTERNAL MEDICINE

## 2022-01-17 ENCOUNTER — PHARMACY VISIT (OUTPATIENT)
Dept: PHARMACY | Facility: MEDICAL CENTER | Age: 77
End: 2022-01-17
Payer: COMMERCIAL

## 2022-01-17 PROCEDURE — RXMED WILLOW AMBULATORY MEDICATION CHARGE: Performed by: INTERNAL MEDICINE

## 2022-01-19 ENCOUNTER — PHARMACY VISIT (OUTPATIENT)
Dept: PHARMACY | Facility: MEDICAL CENTER | Age: 77
End: 2022-01-19
Payer: COMMERCIAL

## 2022-01-20 ENCOUNTER — HOSPITAL ENCOUNTER (OUTPATIENT)
Facility: MEDICAL CENTER | Age: 77
End: 2022-01-20
Attending: UROLOGY
Payer: MEDICARE

## 2022-01-20 PROCEDURE — 87086 URINE CULTURE/COLONY COUNT: CPT

## 2022-01-23 LAB
BACTERIA UR CULT: NORMAL
SIGNIFICANT IND 70042: NORMAL
SITE SITE: NORMAL
SOURCE SOURCE: NORMAL

## 2022-01-24 PROCEDURE — RXMED WILLOW AMBULATORY MEDICATION CHARGE: Performed by: INTERNAL MEDICINE

## 2022-01-25 ENCOUNTER — PHARMACY VISIT (OUTPATIENT)
Dept: PHARMACY | Facility: MEDICAL CENTER | Age: 77
End: 2022-01-25
Payer: COMMERCIAL

## 2022-02-02 ENCOUNTER — HOSPITAL ENCOUNTER (OUTPATIENT)
Facility: MEDICAL CENTER | Age: 77
End: 2022-02-02
Attending: PHYSICIAN ASSISTANT
Payer: MEDICARE

## 2022-02-02 PROCEDURE — 87086 URINE CULTURE/COLONY COUNT: CPT

## 2022-02-02 PROCEDURE — RXMED WILLOW AMBULATORY MEDICATION CHARGE: Performed by: PHYSICIAN ASSISTANT

## 2022-02-03 ENCOUNTER — PHARMACY VISIT (OUTPATIENT)
Dept: PHARMACY | Facility: MEDICAL CENTER | Age: 77
End: 2022-02-03
Payer: COMMERCIAL

## 2022-02-08 LAB
BACTERIA UR CULT: ABNORMAL
BACTERIA UR CULT: ABNORMAL
SIGNIFICANT IND 70042: ABNORMAL
SITE SITE: ABNORMAL
SOURCE SOURCE: ABNORMAL

## 2022-02-09 ENCOUNTER — PHARMACY VISIT (OUTPATIENT)
Dept: PHARMACY | Facility: MEDICAL CENTER | Age: 77
End: 2022-02-09
Payer: COMMERCIAL

## 2022-02-09 PROCEDURE — RXMED WILLOW AMBULATORY MEDICATION CHARGE: Performed by: PHYSICIAN ASSISTANT

## 2022-02-09 RX ORDER — FLUCONAZOLE 200 MG/1
TABLET ORAL
Qty: 14 TABLET | Refills: 0 | Status: SHIPPED | OUTPATIENT
Start: 2022-02-09 | End: 2022-06-14

## 2022-02-17 PROBLEM — E11.40 CONTROLLED TYPE 2 DIABETES MELLITUS WITH DIABETIC NEUROPATHY, WITHOUT LONG-TERM CURRENT USE OF INSULIN (HCC): Status: ACTIVE | Noted: 2017-03-13

## 2022-02-17 PROBLEM — R35.1 BENIGN PROSTATIC HYPERPLASIA WITH NOCTURIA: Status: ACTIVE | Noted: 2022-02-17

## 2022-02-17 PROBLEM — N40.1 BENIGN PROSTATIC HYPERPLASIA WITH NOCTURIA: Status: ACTIVE | Noted: 2022-02-17

## 2022-02-18 DIAGNOSIS — Z79.01 CHRONIC ANTICOAGULATION: ICD-10-CM

## 2022-02-22 ENCOUNTER — PHARMACY VISIT (OUTPATIENT)
Dept: PHARMACY | Facility: MEDICAL CENTER | Age: 77
End: 2022-02-22
Payer: COMMERCIAL

## 2022-02-22 PROCEDURE — RXMED WILLOW AMBULATORY MEDICATION CHARGE: Performed by: INTERNAL MEDICINE

## 2022-02-28 ENCOUNTER — TELEPHONE (OUTPATIENT)
Dept: CARDIOLOGY | Facility: MEDICAL CENTER | Age: 77
End: 2022-02-28
Payer: MEDICARE

## 2022-02-28 NOTE — TELEPHONE ENCOUNTER
Received refill request for 100-day supply for Jardiance.  Upon chart review,     Noted duplicate request.

## 2022-03-05 ENCOUNTER — OFFICE VISIT (OUTPATIENT)
Dept: URGENT CARE | Facility: CLINIC | Age: 77
End: 2022-03-05
Payer: MEDICARE

## 2022-03-05 VITALS
DIASTOLIC BLOOD PRESSURE: 72 MMHG | BODY MASS INDEX: 27.96 KG/M2 | OXYGEN SATURATION: 96 % | HEART RATE: 78 BPM | HEIGHT: 76 IN | RESPIRATION RATE: 20 BRPM | SYSTOLIC BLOOD PRESSURE: 128 MMHG | TEMPERATURE: 98 F | WEIGHT: 229.6 LBS

## 2022-03-05 DIAGNOSIS — M10.9 ACUTE GOUT, UNSPECIFIED CAUSE, UNSPECIFIED SITE: ICD-10-CM

## 2022-03-05 PROCEDURE — 99214 OFFICE O/P EST MOD 30 MIN: CPT | Performed by: NURSE PRACTITIONER

## 2022-03-05 RX ORDER — METHYLPREDNISOLONE 4 MG/1
TABLET ORAL
Qty: 21 TABLET | Refills: 0 | Status: SHIPPED | OUTPATIENT
Start: 2022-03-05 | End: 2022-03-29

## 2022-03-05 ASSESSMENT — ENCOUNTER SYMPTOMS
PAIN: 1
SHORTNESS OF BREATH: 0
MYALGIAS: 0
WEAKNESS: 0
CHILLS: 0
DIZZINESS: 0
FEVER: 0
NAUSEA: 0
EYE PAIN: 0
NUMBNESS: 0
JOINT SWELLING: 1
VOMITING: 0
SORE THROAT: 0

## 2022-03-05 ASSESSMENT — FIBROSIS 4 INDEX: FIB4 SCORE: 1.62

## 2022-03-05 NOTE — PROGRESS NOTES
Subjective:   Cheikh Perez is a 76 y.o. male who presents for Pain (Left hand gout flare up/3day)      Pain  This is a new problem. Episode onset: 3 days; denies injuy hx of gout left wrist. steroids most effective. The problem occurs constantly. The problem has been gradually worsening. Associated symptoms include joint swelling. Pertinent negatives include no chest pain, chills, fever, myalgias, nausea, numbness, rash, sore throat, vomiting or weakness. Exacerbated by: movement  He has tried acetaminophen for the symptoms. The treatment provided no relief.       Review of Systems   Constitutional: Negative for chills and fever.   HENT: Negative for sore throat.    Eyes: Negative for pain.   Respiratory: Negative for shortness of breath.    Cardiovascular: Negative for chest pain.   Gastrointestinal: Negative for nausea and vomiting.   Genitourinary: Negative for hematuria.   Musculoskeletal: Positive for joint pain and joint swelling. Negative for myalgias.   Skin: Negative for rash.   Neurological: Negative for dizziness, weakness and numbness.       Medications:    • atorvastatin Tabs  • cyanocobalamin Tabs  • enalapril Tabs  • fluconazole Tabs  • glimepiride Tabs  • Jardiance Tabs  • metformin  • methylPREDNISolone Tbpk  • metoprolol tartrate Tabs  • tamsulosin  • VITAMIN D (CHOLECALCIFEROL) PO  • warfarin Tabs    Allergies: Nkda [no known drug allergy]    Problem List: Cheikh Perez does not have any pertinent problems on file.    Surgical History:  Past Surgical History:   Procedure Laterality Date   • ORIF, FRACTURE, FEMUR Left 2/24/2016    Procedure: DISTAL FEMUR ORIF;  Surgeon: Montrell Eddy M.D.;  Location: [facility];  Service:    • CYSTOSCOPY STENT PLACEMENT  1/13/2014    Performed by Main Batista M.D. at [facility]   • URETEROSCOPY  1/13/2014    Performed by Main Batista M.D. at [facility]   • LASERTRIPSY  1/13/2014     "Performed by Main Batista M.D. at SURGERY [facility] ORS   • OTHER      Surgery for kidney stones   • RECOVERY  7/11/2013    Performed by Cath-Recovery Surgery at [facility]ERY [facility]   • KNEE ARTHROPLASTY TOTAL  12/1/2009    Performed by GIACOMO PETTIT at SURGERY [facility]   • KNEE ARTHROPLASTY TOTAL  3/2009    Left   • LUMBAR LAMINECTOMY DISKECTOMY  7/2006    L1-L5/ residual left foot weaknesss    • REPAIR, KNEE, ACL  12/2005    Right   • ORIF, FRACTURE, FEMUR         Past Social Hx: Cheikh Perez  reports that he has never smoked. He has never used smokeless tobacco. He reports current alcohol use of about 6.0 oz of alcohol per week. He reports that he does not use drugs.     Past Family Hx:  Cheikh Perez family history includes Arrythmia in his brother; Arthritis in his brother; Breast Cancer in his sister; Diabetes in his brother; Heart Attack (age of onset: 59) in his father; Heart Disease in his father; Hypertension in his brother; No Known Problems in his brother, daughter, maternal grandfather, maternal grandmother, paternal grandfather, and paternal grandmother; Thyroid (age of onset: 50) in his mother.     Problem list, medications, and allergies reviewed by myself today in Epic.     Objective:     /72 (BP Location: Right arm, Patient Position: Sitting)   Pulse 78   Temp 36.7 °C (98 °F) (Temporal)   Resp 20   Ht 1.93 m (6' 4\")   Wt 104 kg (229 lb 9.6 oz)   SpO2 96%   BMI 27.95 kg/m²     Physical Exam  Constitutional:       Appearance: Normal appearance. He is not ill-appearing or toxic-appearing.   HENT:      Head: Normocephalic.      Right Ear: External ear normal.      Left Ear: External ear normal.      Nose: Nose normal.      Mouth/Throat:      Lips: Pink.      Mouth: Mucous membranes are moist.   Eyes:      General: Lids are normal.         Right eye: No discharge.         Left eye: No discharge.   Pulmonary:      Effort: " Pulmonary effort is normal. No accessory muscle usage or respiratory distress.   Musculoskeletal:      Left wrist: Swelling, tenderness and bony tenderness present. No deformity, snuff box tenderness or crepitus. Decreased range of motion. Normal pulse.      Cervical back: Full passive range of motion without pain.   Skin:     Coloration: Skin is not pale.   Neurological:      Mental Status: He is alert and oriented to person, place, and time.   Psychiatric:         Mood and Affect: Mood normal.         Thought Content: Thought content normal.         Assessment/Plan:     Diagnosis and associated orders:     1. Acute gout, unspecified cause, unspecified site  methylPREDNISolone (MEDROL DOSEPAK) 4 MG Tablet Therapy Pack      Comments/MDM:     I personally reviewed prior external notes and prior test results pertinent to today's visit.  Patient with no acute trauma or injury to the left wrist, has a history of gout will not obtain diagnostic x-ray.  In the past patient has tolerated prednisone and Medrol Dosepak prefers Medrol Dosepak.  Will treat at this time.  Discussed management options, risks and benefits, and alternatives to treatment plan agreed upon.   Red flags discussed and indications to immediately call 911 or present to the Emergency Department.   Supportive care, differential diagnoses, and indications for immediate follow-up discussed with patient.    • Patient expresses understanding and agrees to plan. Patient denies any other questions or concerns.              My total time spent caring for the patient on the day of the encounter was 31 minutes.   This does not include time spent on separately billable procedures/tests.    Please note that this dictation was created using voice recognition software. I have made a reasonable attempt to correct obvious errors, but I expect that there are errors of grammar and possibly content that I did not discover before finalizing the note.    This note was  electronically signed by Pawan PUGH.

## 2022-03-07 ENCOUNTER — ANTICOAGULATION VISIT (OUTPATIENT)
Dept: VASCULAR LAB | Facility: MEDICAL CENTER | Age: 77
End: 2022-03-07
Attending: INTERNAL MEDICINE
Payer: MEDICARE

## 2022-03-07 DIAGNOSIS — I48.0 PAROXYSMAL ATRIAL FIBRILLATION (HCC): ICD-10-CM

## 2022-03-07 DIAGNOSIS — D68.69 SECONDARY HYPERCOAGULABLE STATE (HCC): ICD-10-CM

## 2022-03-07 DIAGNOSIS — Z79.01 CHRONIC ANTICOAGULATION: ICD-10-CM

## 2022-03-07 LAB — INR PPP: 2.3 (ref 2–3.5)

## 2022-03-07 PROCEDURE — 85610 PROTHROMBIN TIME: CPT

## 2022-03-07 PROCEDURE — 99211 OFF/OP EST MAY X REQ PHY/QHP: CPT

## 2022-03-07 NOTE — PROGRESS NOTES
Anticoagulation Summary  As of 3/7/2022    INR goal:  2.0-3.0   TTR:  86.9 % (5.8 y)   INR used for dosin.30 (3/7/2022)   Warfarin maintenance plan:  5 mg (5 mg x 1) every Mon, Wed, Fri; 7.5 mg (5 mg x 1.5) all other days   Weekly warfarin total:  45 mg   Plan last modified:  Kandice Mack PharmD (10/30/2018)   Next INR check:  2022   Target end date:  Indefinite    Indications    Paroxysmal atrial fibrillation (HCC) [I48.0]  Chronic anticoagulation [Z79.01]  Secondary hypercoagulable state (HCC) [D68.69]             Anticoagulation Episode Summary     INR check location:  Anticoagulation Clinic    Preferred lab:      Send INR reminders to:      Comments:        Anticoagulation Care Providers     Provider Role Specialty Phone number    Cheikh Turner M.D. Referring Cardiovascular Disease (Cardiology) [phone]    [facility] Anticoagulation Services Responsible  [phone]                Refer to Patient Findings for HPI:  Patient Findings     Negatives:  Signs/symptoms of thrombosis, Signs/symptoms of bleeding, Laboratory test error suspected, Change in health, Change in alcohol use, Change in activity, Upcoming invasive procedure, Emergency department visit, Upcoming dental procedure, Missed doses, Extra doses, Change in medications, Change in diet/appetite, Hospital admission, Bruising, Other complaints          There were no vitals filed for this visit.   pt declined vitals    Verified current warfarin dosing schedule.    Medications reconciled     A/P   INR  therapeutic.     Warfarin dosing recommendation: Pt is to continue with current warfarin dosing regimen.    Pt educated to contact our clinic with any changes in medications or s/s of bleeding or thrombosis. Pt is aware to seek immediate medical attention for falls, head injury or deep cuts.    Follow up appointment in 12 week(s).    Miguelina Baeza, MatthewD

## 2022-03-29 ENCOUNTER — OFFICE VISIT (OUTPATIENT)
Dept: MEDICAL GROUP | Facility: PHYSICIAN GROUP | Age: 77
End: 2022-03-29
Payer: MEDICARE

## 2022-03-29 VITALS
RESPIRATION RATE: 16 BRPM | WEIGHT: 226.7 LBS | BODY MASS INDEX: 27.61 KG/M2 | HEART RATE: 79 BPM | SYSTOLIC BLOOD PRESSURE: 110 MMHG | HEIGHT: 76 IN | DIASTOLIC BLOOD PRESSURE: 62 MMHG | TEMPERATURE: 97.2 F | OXYGEN SATURATION: 97 %

## 2022-03-29 DIAGNOSIS — Z87.442 HISTORY OF KIDNEY STONES: ICD-10-CM

## 2022-03-29 DIAGNOSIS — D68.69 SECONDARY HYPERCOAGULABLE STATE (HCC): ICD-10-CM

## 2022-03-29 DIAGNOSIS — E78.5 DYSLIPIDEMIA: ICD-10-CM

## 2022-03-29 DIAGNOSIS — I10 ESSENTIAL HYPERTENSION: ICD-10-CM

## 2022-03-29 DIAGNOSIS — I48.0 PAROXYSMAL ATRIAL FIBRILLATION (HCC): ICD-10-CM

## 2022-03-29 DIAGNOSIS — R26.89 LOSS OF BALANCE: ICD-10-CM

## 2022-03-29 DIAGNOSIS — H61.23 BILATERAL IMPACTED CERUMEN: ICD-10-CM

## 2022-03-29 DIAGNOSIS — E11.40 CONTROLLED TYPE 2 DIABETES MELLITUS WITH DIABETIC NEUROPATHY, WITHOUT LONG-TERM CURRENT USE OF INSULIN (HCC): ICD-10-CM

## 2022-03-29 DIAGNOSIS — M48.061 SPINAL STENOSIS OF LUMBAR REGION WITHOUT NEUROGENIC CLAUDICATION: ICD-10-CM

## 2022-03-29 DIAGNOSIS — M1A.0410 IDIOPATHIC CHRONIC GOUT OF RIGHT HAND WITHOUT TOPHUS: ICD-10-CM

## 2022-03-29 DIAGNOSIS — G47.33 OSA (OBSTRUCTIVE SLEEP APNEA): ICD-10-CM

## 2022-03-29 PROBLEM — M1A.0490 CHRONIC GOUT OF HAND: Status: ACTIVE | Noted: 2022-03-29

## 2022-03-29 PROCEDURE — 99204 OFFICE O/P NEW MOD 45 MIN: CPT | Performed by: INTERNAL MEDICINE

## 2022-03-29 RX ORDER — COLCHICINE 0.6 MG/1
0.6 TABLET ORAL PRN
Qty: 30 TABLET | Refills: 0 | Status: SHIPPED | OUTPATIENT
Start: 2022-03-29 | End: 2023-03-02

## 2022-03-29 RX ORDER — INDOMETHACIN 50 MG/1
50 CAPSULE ORAL 3 TIMES DAILY PRN
Qty: 30 CAPSULE | Refills: 0 | Status: SHIPPED | OUTPATIENT
Start: 2022-03-29 | End: 2022-05-12 | Stop reason: SDUPTHER

## 2022-03-29 ASSESSMENT — FIBROSIS 4 INDEX: FIB4 SCORE: 1.64

## 2022-03-29 NOTE — PROGRESS NOTES
Annual Health Assessment Questions:    1.  Are you currently engaging in any exercise or physical activity? Yes    2.  How would you describe your mood or emotional well-being today? good    3.  Have you had any falls in the last year? No    4.  Have you noticed any problems with your balance or had difficulty walking? Yes    5.  In the last six months have you experienced any leakage of urine? Yes    6. DPA/Advanced Directive: Patient has Advanced Directive and Living Will on file.

## 2022-03-30 ENCOUNTER — TELEPHONE (OUTPATIENT)
Dept: MEDICAL GROUP | Facility: PHYSICIAN GROUP | Age: 77
End: 2022-03-30
Payer: MEDICARE

## 2022-03-30 NOTE — PROGRESS NOTES
CC: Establish medical care.    HPI:  Cheikh presents with the following    1. Controlled type 2 diabetes mellitus with diabetic neuropathy, without long-term current use of insulin (HCC)  Chronic.  Stable.  Patient is currently treating diabetes with Jardiance 25 mg tablets, Glucophage and glimepiride.  However, patient ran out of glimepiride about 2 months ago.  He does not check his blood sugars regularly.  His hemoglobin A1c was 6.8 in October 2021.  He will be due for his diabetic monofilament test.  Up-to-date with retinal screening, fasting lipid, serum creatinine.    2. Dyslipidemia  Treating cholesterol with Lipitor 10 mg tablets daily.  Cholesterol at goal.  Total cholesterol 137, triglycerides 77, HDL 59, LDL 63.    3. Essential hypertension  Blood pressure very stable with enalapril and Lopressor.    4. SHANNAN (obstructive sleep apnea)  Patient is followed by his pulmonologist.    5. Paroxysmal atrial fibrillation (HCC)  Chronic.  Stable.  Patient is followed by his cardiologist, Dr. Turner yearly.  He continues on Lopressor for rate control and Coumadin.    6. Secondary hypercoagulable state (HCC)  Chronic.  Stable.  Patient treated for atrial fibrillation with Coumadin.    7. 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  Patient was seen in the urgent care on March 5 with a gout flare on his left hand.  He was prescribed Medrol Dosepak which she completed.  Symptoms recurred a few days later.  Patient has declined allopurinol in the past.    8. History of kidney stones  Stable at this time.    9. Loss of balance  Has noticed loss of balance, attributing this to spinal stenosis.    10. Spinal stenosis of lumbar region without neurogenic claudication  Chronic history.  Underwent lumbar laminectomy in 2006.          Patient Active Problem List    Diagnosis Date Noted   • Chronic gout of hand 03/29/2022    • History of kidney stones 03/29/2022   • Loss of balance 03/29/2022   • Spinal stenosis of lumbar region without neurogenic claudication 03/29/2022   • BMI 28.0-28.9,adult 02/17/2022   • Benign prostatic hyperplasia with nocturia 02/17/2022   • Secondary hypercoagulable state (HCC) 12/13/2021   • B12 deficiency 11/12/2020   • Obesity (BMI 30-39.9) 03/27/2018   • Controlled type 2 diabetes mellitus with diabetic neuropathy, without long-term current use of insulin ([facility]) 03/13/2017   • Risk for falls 03/13/2017   • SHANNAN (obstructive sleep apnea) 12/12/2016   • Dyslipidemia 12/12/2016   • Left foot drop 12/12/2016   • Chronic anticoagulation 09/20/2013   • Essential hypertension 07/09/2013   • Paroxysmal atrial fibrillation (HCC) 06/06/2013       Current Outpatient Medications   Medication Sig Dispense Refill   • colchicine (COLCRYS) 0.6 MG Tab Take 1 Tablet by mouth as needed. Take 1 tablet onset of pain, repeat in 1 hour if symptoms persist.  Max 3 tablets daily. 30 Tablet 0   • indomethacin (INDOCIN) 50 MG Cap Take 1 Capsule by mouth 3 times a day as needed. 30 Capsule 0   • fluconazole (DIFLUCAN) 200 MG Tab Take 1 tablet by mouth daily for 14 days. 14 Tablet 0   • tamsulosin (FLOMAX) 0.4 MG capsule Take 1 capsule by mouth daily. 30 Capsule 1   • cyanocobalamin (VITAMIN B12) 1000 MCG Tab Take 1 tablet by mouth every day. 100 Tablet 0   • atorvastatin (LIPITOR) 10 MG Tab Take 1 Tablet by mouth every day. 100 Tablet 3   • enalapril (VASOTEC) 10 MG Tab Take 1 Tablet by mouth every day. 100 Tablet 3   • metoprolol tartrate (LOPRESSOR) 25 MG Tab Take 1 Tablet by mouth 2 times a day. 200 Tablet 3   • Empagliflozin (JARDIANCE) 25 MG Tab Take 1 Tablet by mouth every day. 30 Tablet 5   • warfarin (COUMADIN) 5 MG Tab TAKE 1 TO 1 AND 1/2 TABLETS BY MOUTH DAILY AS DIRECTED BY ANTICOAGULATION PROGRAM (Patient taking differently: TAKE 1 TO 1 AND 1/2 TABLETS BY MOUTH DAILY AS DIRECTED BY ANTICOAGULATION PROGRAM) 135 Tablet 1   •  metformin (GLUCOPHAGE) 1000 MG tablet TAKE ONE TABLET BY MOUTH TWICE DAILY WITH MEALS 200 Tab 2   • VITAMIN D, CHOLECALCIFEROL, PO Take 5,000 Units by mouth.     • methylPREDNISolone (MEDROL DOSEPAK) 4 MG Tablet Therapy Pack Follow schedule on package instructions. (Patient not taking: Reported on 3/29/2022) 21 Tablet 0   • Empagliflozin (JARDIANCE) 25 MG Tab Take 1 Tablet by mouth every day. 100 Tablet 3   • glimepiride (AMARYL) 2 MG Tab TAKE  ONE TABLET BY MOUTH EVERY MORNING 100 tablet 0     No current facility-administered medications for this visit.         Allergies as of 03/29/2022 - Reviewed 03/29/2022   Allergen Reaction Noted   • Nkda [no known drug allergy]  07/31/2009        Social History     Socioeconomic History   • Marital status:      Spouse name: Not on file   • Number of children: Not on file   • Years of education: Not on file   • Highest education level: Not on file   Occupational History   • Not on file   Tobacco Use   • Smoking status: Never Smoker   • Smokeless tobacco: Never Used   Vaping Use   • Vaping Use: Never used   Substance and Sexual Activity   • Alcohol use: Yes     Alcohol/week: 6.0 oz     Types: 10 Glasses of wine per week   • Drug use: No   • Sexual activity: Never     Partners: Female   Other Topics Concern   • Not on file   Social History Narrative   • Not on file     Social Determinants of Health     Financial Resource Strain: Not on file   Food Insecurity: Not on file   Transportation Needs: Not on file   Physical Activity: Not on file   Stress: Not on file   Social Connections: Not on file   Intimate Partner Violence: Not on file   Housing Stability: Not on file       Family History   Problem Relation Age of Onset   • Heart Attack Father 59   • Heart Disease Father    • Hypertension Brother    • Diabetes Brother    • Arthritis Brother    • Thyroid Mother 50   • Breast Cancer Sister    • No Known Problems Maternal Grandmother    • No Known Problems Maternal Grandfather  "   • No Known Problems Paternal Grandmother    • No Known Problems Paternal Grandfather    • Arrythmia Brother    • No Known Problems Brother    • No Known Problems Daughter        Past Surgical History:   Procedure Laterality Date   • ORIF, FRACTURE, FEMUR Left 2/24/2016    Procedure: DISTAL FEMUR ORIF;  Surgeon: Montrell Eddy M.D.;  Location: [facility];  Service:    • CYSTOSCOPY STENT PLACEMENT  1/13/2014    Performed by Main Batista M.D. at SURGERY [facility] ORS   • URETEROSCOPY  1/13/2014    Performed by Main Batista M.D. at SURGERY [facility]   • LASERTRIPSY  1/13/2014    Performed by Main Batista M.D. at SURGERY [facility]   • OTHER      Surgery for kidney stones   • RECOVERY  7/11/2013    Performed by Cath-Recovery Surgery at [facility]   • KNEE ARTHROPLASTY TOTAL  12/1/2009    Performed by GIACOMO PETTIT at SURGERY [facility] ORS   • KNEE ARTHROPLASTY TOTAL  3/2009    Left   • LUMBAR LAMINECTOMY DISKECTOMY  7/2006    L1-L5/ residual left foot weaknesss    • REPAIR, KNEE, ACL  12/2005    Right   • ORIF, FRACTURE, FEMUR         ROS: Positive ROS per HPI.  Denies any Headache,Chest pain,  Shortness of breath,  Abdominal pain, Changes of bowel or bladder, Lower ext edema, Fevers, Nights sweats, Weight Changes, Focal weakness or numbness.  All other systems are negative.    /62 (BP Location: Left arm, Patient Position: Sitting, BP Cuff Size: Adult)   Pulse 79   Temp 36.2 °C (97.2 °F) (Temporal)   Resp 16   Ht 1.93 m (6' 4\")   Wt 103 kg (226 lb 11.2 oz)   SpO2 97%   BMI 27.59 kg/m²      Constitutional: Alert, no distress, well-groomed.  Skin: Warm, dry, good turgor, no rashes in visible areas.  Eye: Equal, round and reactive, conjunctiva clear, lids normal.  ENMT: Lips without lesions, good dentition, moist mucous membranes.  Bilateral impacted cerumen.  Neck: Trachea midline, no masses, no thyromegaly.  Respiratory: Unlabored respiratory " effort, no cough.  Abdomen: Soft, no gross masses.  MSK: Normal gait, moves all extremities.  Neuro: Grossly non-focal. No cranial nerve deficit. Strength and sensation intact.   Psych: Alert and oriented x3, normal affect and mood.      Assessment and Plan.   77 y.o. male presenting with the following.     1. Controlled type 2 diabetes mellitus with diabetic neuropathy, without long-term current use of insulin (HCC)  Chronic.  Stable.  Continue Jardiance and Metformin at current dose.  Check hemoglobin A1c of glimepiride.  Due for monofilament upon follow-up.    - MICROALB/CREAT RATIO, TIMED UR  - HGB A1C WITH EAG ESTIMATION    2. Dyslipidemia  Continue Lipitor at current dose.  Lipid panel up-to-date.    3. Essential hypertension  Stable.  Continue current dose of Lopressor and lisinopril.    4. SHANNAN (obstructive sleep apnea)   Stable.  Follow-up with pulmonology.    5. Paroxysmal atrial fibrillation (HCC)  Chronic.  Stable.  Continue plan of care per cardiology.  Continue Coumadin.    6. Secondary hypercoagulable state (HCC)  Chronic.  Stable.  Continue follow-up with vascular medicine.  Coumadin monitoring for atrial fibrillation.    7. 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  Keep well-hydrated.  Reviewed diet.  Consider allopurinol    - URIC ACID; Future    8. History of kidney stones  Stable.  Keep well-hydrated    9. Loss of balance  Stable.  Continue to monitor    10. Spinal stenosis of lumbar region without neurogenic claudication  Chronic.  Stable.    11. Bilateral impacted cerumen  Ear lavage per MA.    My total time spent caring for the patient on the day of the encounter was 45 minutes.   This does not include time spent on separately billable procedures/tests.

## 2022-03-30 NOTE — TELEPHONE ENCOUNTER
1. Caller Name: Cheikh Perez                          Call Back Number: [phone] (home) [phone] (work)        How would the patient prefer to be contacted with a response: Phone call OK to leave a detailed message    Patient left vm on my extension trying to get a hold of Cynthia.  He has questions about new medicines from yesterday visit

## 2022-03-31 DIAGNOSIS — E53.8 B12 DEFICIENCY: ICD-10-CM

## 2022-03-31 NOTE — TELEPHONE ENCOUNTER
Received request via: Patient    Was the patient seen in the last year in this department? Yes     Does the patient have an active prescription (recently filled or refills available) for medication(s) requested? No

## 2022-04-01 ENCOUNTER — HOSPITAL ENCOUNTER (OUTPATIENT)
Dept: LAB | Facility: MEDICAL CENTER | Age: 77
End: 2022-04-01
Attending: INTERNAL MEDICINE
Payer: MEDICARE

## 2022-04-01 DIAGNOSIS — M1A.0410 IDIOPATHIC CHRONIC GOUT OF RIGHT HAND WITHOUT TOPHUS: ICD-10-CM

## 2022-04-01 LAB
CREAT UR-MCNC: 45.1 MG/DL
MICROALBUMIN UR-MCNC: 1.7 MG/DL
MICROALBUMIN/CREAT UR: 38 MG/G (ref 0–30)
URATE SERPL-MCNC: 5.2 MG/DL (ref 2.5–8.3)

## 2022-04-01 PROCEDURE — 82043 UR ALBUMIN QUANTITATIVE: CPT

## 2022-04-01 PROCEDURE — 82570 ASSAY OF URINE CREATININE: CPT

## 2022-04-01 PROCEDURE — 83036 HEMOGLOBIN GLYCOSYLATED A1C: CPT

## 2022-04-01 PROCEDURE — 36415 COLL VENOUS BLD VENIPUNCTURE: CPT

## 2022-04-01 PROCEDURE — 84550 ASSAY OF BLOOD/URIC ACID: CPT

## 2022-04-04 ENCOUNTER — NON-PROVIDER VISIT (OUTPATIENT)
Dept: MEDICAL GROUP | Facility: PHYSICIAN GROUP | Age: 77
End: 2022-04-04
Payer: MEDICARE

## 2022-04-04 PROCEDURE — 99999 PR NO CHARGE: CPT | Performed by: INTERNAL MEDICINE

## 2022-04-04 NOTE — PROGRESS NOTES
Rajiv Perez is a 77 y.o. male here for a non-provider visit for an ear lavage on the right side.     The patient was found to have excessive cerumen in the right ear. The left side was cleaned out during his appointment with Dr. Jimenes. The ear was irrigated with warm water and peroxide,inspected,scooped,inspected,irrigated and inspected. All cerumen was removed. The patient tolerated this well and both TMs were intact.    If abnormal was an in office provider notified today (if so, indicate provider)? No    Routed to PCP? No

## 2022-04-05 LAB
EST. AVERAGE GLUCOSE BLD GHB EST-MCNC: 194 MG/DL
HBA1C MFR BLD: 8.4 % (ref 4–5.6)

## 2022-04-19 DIAGNOSIS — I48.0 PAROXYSMAL ATRIAL FIBRILLATION (HCC): Primary | ICD-10-CM

## 2022-04-19 RX ORDER — WARFARIN SODIUM 5 MG/1
5-7.5 TABLET ORAL DAILY
Qty: 135 TABLET | Refills: 1 | Status: SHIPPED | OUTPATIENT
Start: 2022-04-19 | End: 2022-09-29 | Stop reason: SDUPTHER

## 2022-05-12 ENCOUNTER — OFFICE VISIT (OUTPATIENT)
Dept: MEDICAL GROUP | Facility: PHYSICIAN GROUP | Age: 77
End: 2022-05-12
Payer: MEDICARE

## 2022-05-12 ENCOUNTER — RESEARCH ENCOUNTER (OUTPATIENT)
Dept: MEDICAL GROUP | Facility: PHYSICIAN GROUP | Age: 77
End: 2022-05-12

## 2022-05-12 VITALS
HEART RATE: 83 BPM | SYSTOLIC BLOOD PRESSURE: 90 MMHG | DIASTOLIC BLOOD PRESSURE: 50 MMHG | BODY MASS INDEX: 27.43 KG/M2 | TEMPERATURE: 97 F | HEIGHT: 76 IN | RESPIRATION RATE: 14 BRPM | WEIGHT: 225.3 LBS | OXYGEN SATURATION: 98 %

## 2022-05-12 DIAGNOSIS — B35.1 ONYCHOMYCOSIS: ICD-10-CM

## 2022-05-12 DIAGNOSIS — E11.40 CONTROLLED TYPE 2 DIABETES MELLITUS WITH DIABETIC NEUROPATHY, WITHOUT LONG-TERM CURRENT USE OF INSULIN (HCC): ICD-10-CM

## 2022-05-12 DIAGNOSIS — M1A.0420 IDIOPATHIC CHRONIC GOUT OF LEFT HAND WITHOUT TOPHUS: ICD-10-CM

## 2022-05-12 DIAGNOSIS — I48.0 PAROXYSMAL ATRIAL FIBRILLATION (HCC): ICD-10-CM

## 2022-05-12 DIAGNOSIS — Z13.79 GENETIC SCREENING: ICD-10-CM

## 2022-05-12 DIAGNOSIS — D68.69 SECONDARY HYPERCOAGULABLE STATE (HCC): ICD-10-CM

## 2022-05-12 PROCEDURE — 99214 OFFICE O/P EST MOD 30 MIN: CPT | Performed by: INTERNAL MEDICINE

## 2022-05-12 RX ORDER — GLIMEPIRIDE 2 MG/1
TABLET ORAL
Qty: 100 TABLET | Refills: 1 | Status: SHIPPED | OUTPATIENT
Start: 2022-05-12 | End: 2022-12-01

## 2022-05-12 RX ORDER — ALLOPURINOL 100 MG/1
100 TABLET ORAL DAILY
Qty: 100 TABLET | Refills: 1 | Status: SHIPPED | OUTPATIENT
Start: 2022-05-12 | End: 2022-12-01

## 2022-05-12 RX ORDER — INDOMETHACIN 50 MG/1
50 CAPSULE ORAL 3 TIMES DAILY PRN
Qty: 30 CAPSULE | Refills: 1 | Status: SHIPPED | OUTPATIENT
Start: 2022-05-12 | End: 2022-06-14

## 2022-05-12 ASSESSMENT — FIBROSIS 4 INDEX: FIB4 SCORE: 1.64

## 2022-05-13 NOTE — PROGRESS NOTES
CC: Follow-up gout, medications, diabetes.    HPI:  Cheikh presents with the following    1. Controlled type 2 diabetes mellitus with diabetic neuropathy, without long-term current use of insulin (HCC)  Chronic.  Stable.  Ongoing.  Patient currently treating diabetes with Jardiance 25 mg tablets, Glucophage 1000 mg twice daily.  Patient discontinued glimepiride as he ran out of this medication on last visit.  Hemoglobin A1c 8.4 up from 6.8 in October.  Patient states that he has not been very physically active and plans to rejoin the gym.  Tries to follow a healthy diet.    2. Idiopathic chronic gout of left hand without tophus  3/29/2022:Diagnosed with gout many years ago and has been preventing gout flares with cherry juice.  Patient was seen in the urgent care on March 5 with a gout flare on his left hand.  He was prescribed Medrol Dosepak which she completed.  Symptoms recurred a few days later.  Patient has declined allopurinol in the past.    5/12/2022:.  Patient has had intermittent flares of his gout in his left hand.  Colchicine was not covered by [employer].  Patient has 7 tablets of indomethacin left he is traveling out of state.    3. Genetic screening  Patient wishes to continue with genetic testing.    4. Onychomycosis  Patient exhibits severe onychomycosis bilaterally lower extremity.  Has not been seen by podiatry.    5. Paroxysmal atrial fibrillation (HCC)  Chronic.  Stable.  Followed by cardiology.    6. Secondary hypercoagulable state (HCC)  Chronic.  Stable.  Patient taking Coumadin and is monitored through vascular medicine.      Patient Active Problem List    Diagnosis Date Noted   • Chronic gout of left hand 05/12/2022   • Chronic gout of hand 03/29/2022   • History of kidney stones 03/29/2022   • Loss of balance 03/29/2022   • Spinal stenosis of lumbar region without neurogenic claudication 03/29/2022   • BMI 28.0-28.9,adult 02/17/2022   • Benign prostatic hyperplasia with  nocturia 02/17/2022   • Secondary hypercoagulable state (HCC) 12/13/2021   • B12 deficiency 11/12/2020   • Obesity (BMI 30-39.9) 03/27/2018   • Controlled type 2 diabetes mellitus with diabetic neuropathy, without long-term current use of insulin (HCC) 03/13/2017   • Risk for falls 03/13/2017   • SHANNAN (obstructive sleep apnea) 12/12/2016   • Dyslipidemia 12/12/2016   • Left foot drop 12/12/2016   • Chronic anticoagulation 09/20/2013   • Essential hypertension 07/09/2013   • Paroxysmal atrial fibrillation (HCC) 06/06/2013       Current Outpatient Medications   Medication Sig Dispense Refill   • allopurinol (ZYLOPRIM) 100 MG Tab Take 1 Tablet by mouth every day. 100 Tablet 1   • glimepiride (AMARYL) 2 MG Tab TAKE  ONE TABLET BY MOUTH EVERY MORNING 100 Tablet 1   • indomethacin (INDOCIN) 50 MG Cap Take 1 Capsule by mouth 3 times a day as needed (Gout pain). 30 Capsule 1   • warfarin (COUMADIN) 5 MG Tab Take 1-1.5 Tablets by mouth every day. As directed by [facility] 135 Tablet 1   • cyanocobalamin ([employer] VITAMIN B12) 1000 MCG Tab Take 1 Tablet by mouth every day. 100 Tablet 1   • metformin (GLUCOPHAGE) 1000 MG tablet TAKE ONE TABLET BY MOUTH TWICE DAILY WITH MEALS 200 Tablet 2   • fluconazole (DIFLUCAN) 200 MG Tab Take 1 tablet by mouth daily for 14 days. 14 Tablet 0   • tamsulosin (FLOMAX) 0.4 MG capsule Take 1 capsule by mouth daily. 30 Capsule 1   • atorvastatin (LIPITOR) 10 MG Tab Take 1 Tablet by mouth every day. 100 Tablet 3   • enalapril (VASOTEC) 10 MG Tab Take 1 Tablet by mouth every day. 100 Tablet 3   • metoprolol tartrate (LOPRESSOR) 25 MG Tab Take 1 Tablet by mouth 2 times a day. 200 Tablet 3   • Empagliflozin (JARDIANCE) 25 MG Tab Take 1 Tablet by mouth every day. 30 Tablet 5   • VITAMIN D, CHOLECALCIFEROL, PO Take 5,000 Units by mouth.     • colchicine (COLCRYS) 0.6 MG Tab Take 1 Tablet by mouth as needed. Take 1 tablet onset of pain, repeat in 1 hour if symptoms persist.  Max 3 tablets  daily. (Patient not taking: Reported on 5/12/2022) 30 Tablet 0     No current facility-administered medications for this visit.         Allergies as of 05/12/2022 - Reviewed 05/12/2022   Allergen Reaction Noted   • Nkda [no known drug allergy]  07/31/2009        Social History     Socioeconomic History   • Marital status:      Spouse name: Not on file   • Number of children: Not on file   • Years of education: Not on file   • Highest education level: Not on file   Occupational History   • Not on file   Tobacco Use   • Smoking status: Never Smoker   • Smokeless tobacco: Never Used   Vaping Use   • Vaping Use: Never used   Substance and Sexual Activity   • Alcohol use: Yes     Alcohol/week: 6.0 oz     Types: 10 Glasses of wine per week   • Drug use: No   • Sexual activity: Never     Partners: Female   Other Topics Concern   • Not on file   Social History Narrative   • Not on file     Social Determinants of Health     Financial Resource Strain: Not on file   Food Insecurity: Not on file   Transportation Needs: Not on file   Physical Activity: Not on file   Stress: Not on file   Social Connections: Not on file   Intimate Partner Violence: Not on file   Housing Stability: Not on file       Family History   Problem Relation Age of Onset   • Heart Attack Father 59   • Heart Disease Father    • Hypertension Brother    • Diabetes Brother    • Arthritis Brother    • Thyroid Mother 50   • Breast Cancer Sister    • No Known Problems Maternal Grandmother    • No Known Problems Maternal Grandfather    • No Known Problems Paternal Grandmother    • No Known Problems Paternal Grandfather    • Arrythmia Brother    • No Known Problems Brother    • No Known Problems Daughter        Past Surgical History:   Procedure Laterality Date   • ORIF, FRACTURE, FEMUR Left 2/24/2016    Procedure: DISTAL FEMUR ORIF;  Surgeon: Montrell Eddy M.D.;  Location: SURGERY [facility];  Service:    • CYSTOSCOPY STENT PLACEMENT  1/13/2014     "Performed by Main Batista M.D. at SURGERY [facility]   • URETEROSCOPY  1/13/2014    Performed by Main Batista M.D. at SURGERY [facility]   • LASERTRIPSY  1/13/2014    Performed by Main Batista M.D. at SURGERY [facility]   • OTHER      Surgery for kidney stones   • RECOVERY  7/11/2013    Performed by Cath-Recovery Surgery at [facility]   • KNEE ARTHROPLASTY TOTAL  12/1/2009    Performed by GIACOMO PETTIT at SURGERY [facility]   • KNEE ARTHROPLASTY TOTAL  3/2009    Left   • LUMBAR LAMINECTOMY DISKECTOMY  7/2006    L1-L5/ residual left foot weaknesss    • REPAIR, KNEE, ACL  12/2005    Right   • ORIF, FRACTURE, FEMUR         ROS:  Denies any Headache,Chest pain,  Shortness of breath,  Abdominal pain, Changes of bowel or bladder, Lower ext edema, Fevers, Nights sweats, Weight Changes, Focal weakness or numbness.  All other systems are negative.    BP (!) 90/50 (BP Location: Left arm, Patient Position: Sitting, BP Cuff Size: Adult)   Pulse 83   Temp 36.1 °C (97 °F) (Temporal)   Resp 14   Ht 1.93 m (6' 4\")   Wt 102 kg (225 lb 4.8 oz)   SpO2 98%   BMI 27.42 kg/m²      Constitutional: Alert, no distress, well-groomed.  Skin: Warm, dry, good turgor, no rashes in visible areas.  Eye: Equal, round and reactive, conjunctiva clear, lids normal.  ENMT: Lips without lesions, good dentition, moist mucous membranes.  Neck: Trachea midline, no masses, no thyromegaly.  Respiratory: Unlabored respiratory effort, no cough.  Abdomen: Soft, no gross masses.  MSK: Normal gait, moves all extremities.  Neuro: Grossly non-focal. No cranial nerve deficit. Strength and sensation intact.   Psych: Alert and oriented x3, normal affect and mood.  Monofilament: Decreased sensation to monofilament throughout lower extremity bilaterally.    Assessment and Plan.   77 y.o. male presenting with the following.     1. Controlled type 2 diabetes mellitus with diabetic neuropathy, without long-term " current use of insulin (HCC)  Chronic.  Stable.  Ongoing.  Continue Jardiance and metformin at current dose.  Restart glimepiride 2 mg tablets.  Increase physical activity, joining gym, diet lifestyle modifications.  Up-to-date with urine microalbumin, retinal screening.  Recheck hemoglobin A1c in 3 months    - Diabetic Monofilament LE Exam    2. Idiopathic chronic gout of left hand without tophus  Briefly discussed gout diet.  Prescription for indomethacin as needed.  Patient will start allopurinol 100 mg daily.    3. Genetic screening    - Referral to Genetic Research Studies    4. Onychomycosis    - Referral to Podiatry    5. Paroxysmal atrial fibrillation (HCC)  Chronic.  Stable.  Continue plan of care per cardiology.    6. Secondary hypercoagulable state (HCC)  Chronic.  Stable.  Continue to follow-up with vascular medicine.    My total time spent caring for the patient on the day of the encounter was 30 minutes.   This does not include time spent on separately billable procedures/tests.

## 2022-05-23 ENCOUNTER — HOSPITAL ENCOUNTER (EMERGENCY)
Facility: MEDICAL CENTER | Age: 77
End: 2022-05-23
Attending: EMERGENCY MEDICINE
Payer: MEDICARE

## 2022-05-23 ENCOUNTER — APPOINTMENT (OUTPATIENT)
Dept: VASCULAR LAB | Facility: MEDICAL CENTER | Age: 77
End: 2022-05-23
Attending: INTERNAL MEDICINE
Payer: MEDICARE

## 2022-05-23 ENCOUNTER — APPOINTMENT (OUTPATIENT)
Dept: RADIOLOGY | Facility: MEDICAL CENTER | Age: 77
End: 2022-05-23
Attending: EMERGENCY MEDICINE
Payer: MEDICARE

## 2022-05-23 VITALS
HEIGHT: 77 IN | HEART RATE: 75 BPM | DIASTOLIC BLOOD PRESSURE: 82 MMHG | TEMPERATURE: 97.4 F | OXYGEN SATURATION: 97 % | RESPIRATION RATE: 18 BRPM | BODY MASS INDEX: 26.76 KG/M2 | WEIGHT: 226.63 LBS | SYSTOLIC BLOOD PRESSURE: 119 MMHG

## 2022-05-23 DIAGNOSIS — R07.89 CHEST WALL PAIN: ICD-10-CM

## 2022-05-23 DIAGNOSIS — M89.8X1 PAIN OF LEFT SCAPULA: ICD-10-CM

## 2022-05-23 LAB
ALBUMIN SERPL BCP-MCNC: 4.4 G/DL (ref 3.2–4.9)
ALBUMIN/GLOB SERPL: 1.4 G/DL
ALP SERPL-CCNC: 87 U/L (ref 30–99)
ALT SERPL-CCNC: 17 U/L (ref 2–50)
ANION GAP SERPL CALC-SCNC: 15 MMOL/L (ref 7–16)
AST SERPL-CCNC: 16 U/L (ref 12–45)
BASOPHILS # BLD AUTO: 0.7 % (ref 0–1.8)
BASOPHILS # BLD: 0.06 K/UL (ref 0–0.12)
BILIRUB SERPL-MCNC: 0.9 MG/DL (ref 0.1–1.5)
BUN SERPL-MCNC: 14 MG/DL (ref 8–22)
CALCIUM SERPL-MCNC: 9.6 MG/DL (ref 8.4–10.2)
CHLORIDE SERPL-SCNC: 103 MMOL/L (ref 96–112)
CO2 SERPL-SCNC: 22 MMOL/L (ref 20–33)
CREAT SERPL-MCNC: 0.63 MG/DL (ref 0.5–1.4)
D DIMER PPP IA.FEU-MCNC: 0.31 UG/ML (FEU) (ref 0–0.5)
EKG IMPRESSION: NORMAL
EOSINOPHIL # BLD AUTO: 0.11 K/UL (ref 0–0.51)
EOSINOPHIL NFR BLD: 1.2 % (ref 0–6.9)
ERYTHROCYTE [DISTWIDTH] IN BLOOD BY AUTOMATED COUNT: 41.4 FL (ref 35.9–50)
GFR SERPLBLD CREATININE-BSD FMLA CKD-EPI: 98 ML/MIN/1.73 M 2
GLOBULIN SER CALC-MCNC: 3.2 G/DL (ref 1.9–3.5)
GLUCOSE SERPL-MCNC: 139 MG/DL (ref 65–99)
HCT VFR BLD AUTO: 48.6 % (ref 42–52)
HGB BLD-MCNC: 16.5 G/DL (ref 14–18)
IMM GRANULOCYTES # BLD AUTO: 0.03 K/UL (ref 0–0.11)
IMM GRANULOCYTES NFR BLD AUTO: 0.3 % (ref 0–0.9)
INR PPP: 1.69 (ref 0.87–1.13)
LYMPHOCYTES # BLD AUTO: 1.51 K/UL (ref 1–4.8)
LYMPHOCYTES NFR BLD: 16.6 % (ref 22–41)
MCH RBC QN AUTO: 30.3 PG (ref 27–33)
MCHC RBC AUTO-ENTMCNC: 34 G/DL (ref 33.7–35.3)
MCV RBC AUTO: 89.3 FL (ref 81.4–97.8)
MONOCYTES # BLD AUTO: 0.71 K/UL (ref 0–0.85)
MONOCYTES NFR BLD AUTO: 7.8 % (ref 0–13.4)
NEUTROPHILS # BLD AUTO: 6.69 K/UL (ref 1.82–7.42)
NEUTROPHILS NFR BLD: 73.4 % (ref 44–72)
NRBC # BLD AUTO: 0 K/UL
NRBC BLD-RTO: 0 /100 WBC
PLATELET # BLD AUTO: 190 K/UL (ref 164–446)
PMV BLD AUTO: 11.7 FL (ref 9–12.9)
POTASSIUM SERPL-SCNC: 4 MMOL/L (ref 3.6–5.5)
PROT SERPL-MCNC: 7.6 G/DL (ref 6–8.2)
PROTHROMBIN TIME: 18.6 SEC (ref 12–14.6)
RBC # BLD AUTO: 5.44 M/UL (ref 4.7–6.1)
SODIUM SERPL-SCNC: 140 MMOL/L (ref 135–145)
TROPONIN T SERPL-MCNC: 16 NG/L (ref 6–19)
WBC # BLD AUTO: 9.1 K/UL (ref 4.8–10.8)

## 2022-05-23 PROCEDURE — 71045 X-RAY EXAM CHEST 1 VIEW: CPT

## 2022-05-23 PROCEDURE — 80053 COMPREHEN METABOLIC PANEL: CPT

## 2022-05-23 PROCEDURE — 85025 COMPLETE CBC W/AUTO DIFF WBC: CPT

## 2022-05-23 PROCEDURE — 85379 FIBRIN DEGRADATION QUANT: CPT

## 2022-05-23 PROCEDURE — 93005 ELECTROCARDIOGRAM TRACING: CPT

## 2022-05-23 PROCEDURE — 84484 ASSAY OF TROPONIN QUANT: CPT

## 2022-05-23 PROCEDURE — 93005 ELECTROCARDIOGRAM TRACING: CPT | Performed by: EMERGENCY MEDICINE

## 2022-05-23 PROCEDURE — 99284 EMERGENCY DEPT VISIT MOD MDM: CPT

## 2022-05-23 PROCEDURE — 36415 COLL VENOUS BLD VENIPUNCTURE: CPT

## 2022-05-23 PROCEDURE — 85610 PROTHROMBIN TIME: CPT

## 2022-05-23 ASSESSMENT — PAIN DESCRIPTION - DESCRIPTORS: DESCRIPTORS: ACHING

## 2022-05-23 ASSESSMENT — FIBROSIS 4 INDEX: FIB4 SCORE: 1.64

## 2022-05-23 NOTE — ED PROVIDER NOTES
"ED Provider Note    CHIEF COMPLAINT  Chief Complaint   Patient presents with   • Shoulder Pain       HPI  Cheikh Perez is a 77 y.o. male who presents with complaint of left chest and scapular pain, 1 radiates to the other, onset Saturday.  He did increase amount of yard work on Thursday.  Onset of symptoms while at rest on Saturday.  He notices it when he pushes himself off a chair or sometimes reaching with the left arm will feel the discomfort.  No pleurisy, no shortness of breath.  He denies history of syncope.  There was no direct blow or direct trauma to the arm, possible overuse doing yard work 2 days before onset of discomfort.  He denies numbness or tingling to the left hand.  No cough, no shortness of breath, no diaphoresis, no nausea.      REVIEW OF SYSTEMS    Constitutional: No fever or dizziness  Respiratory: Shortness of breath or pleurisy, no cough  Cardiac: Left anterior chest pain, no syncope.  History of atrial fibrillation  Gastrointestinal: No abdominal pain, no nausea or vomiting  Musculoskeletal: Left scapular and chest wall pain with movement  Neurologic: No headache       All other systems are negative.       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   • Chronic gout of left hand 5/12/2022   • Diabetes     Diet,oral meds   • History of kidney stones 3/29/2022   • Hypertension    • Hypertension    • Loss of balance 3/29/2022   • Pain      \"stiff\" from arthritis, 2/10   • Paroxysmal atrial fibrillation (HCC) 6/6/2013   • Sleep apnea     CPAP   • Snoring    • Spinal stenosis of lumbar region without neurogenic claudication 3/29/2022       FAMILY HISTORY  Family History   Problem Relation Age of Onset   • Heart Attack Father 59   • Heart Disease Father    • Hypertension Brother    • Diabetes Brother    • Arthritis Brother    • Thyroid Mother 50   • Breast Cancer " "Sister    • No Known Problems Maternal Grandmother    • No Known Problems Maternal Grandfather    • No Known Problems Paternal Grandmother    • No Known Problems Paternal Grandfather    • Arrythmia Brother    • No Known Problems Brother    • No Known Problems Daughter        SOCIAL HISTORY  Social History     Socioeconomic History   • Marital status:    Tobacco Use   • Smoking status: Never Smoker   • Smokeless tobacco: Never Used   Vaping Use   • Vaping Use: Never used   Substance and Sexual Activity   • Alcohol use: Yes     Alcohol/week: 6.0 oz     Types: 10 Glasses of wine per week   • Drug use: No   • Sexual activity: Never     Partners: Female       SURGICAL HISTORY  Past Surgical History:   Procedure Laterality Date   • ORIF, FRACTURE, FEMUR Left 2/24/2016    Procedure: DISTAL FEMUR ORIF;  Surgeon: Montrell Eddy M.D.;  Location: [facility];  Service:    • CYSTOSCOPY STENT PLACEMENT  1/13/2014    Performed by Main Batista M.D. at SURGERY [facility]   • URETEROSCOPY  1/13/2014    Performed by Main Batista M.D. at SURGERY [facility]   • LASERTRIPSY  1/13/2014    Performed by Main Batista M.D. at SURGERY [facility]   • OTHER      Surgery for kidney stones   • RECOVERY  7/11/2013    Performed by Cath-Recovery Surgery at [facility]   • KNEE ARTHROPLASTY TOTAL  12/1/2009    Performed by GIACOMO PETTIT at SURGERY [facility]   • KNEE ARTHROPLASTY TOTAL  3/2009    Left   • LUMBAR LAMINECTOMY DISKECTOMY  7/2006    L1-L5/ residual left foot weaknesss    • REPAIR, KNEE, ACL  12/2005    Right   • ORIF, FRACTURE, FEMUR         CURRENT MEDICATIONS  Home Medications    **Home medications have not yet been reviewed for this encounter**         ALLERGIES  Allergies   Allergen Reactions   • Nkda [No Known Drug Allergy]        PHYSICAL EXAM  VITAL SIGNS: BP (!) 142/92   Pulse 79   Temp 36.3 °C (97.3 °F) (Temporal)   Resp 20   Ht 1.956 m (6' 5\")   Wt 103 " kg (226 lb 10.1 oz)   SpO2 97%   BMI 26.87 kg/m²   Constitutional:  Non-toxic appearance.   HENT: No facial swelling  Eyes: Anicteric, no conjunctivitis.     Cardiovascular: Normal heart rate, irregular rhythm  Pulmonary:  No wheezing, No rales.   Gastrointestinal: Soft, No tenderness, No masses  Skin: Warm, Dry, No cyanosis.   Neurologic: Speech is clear, follows commands, facial expression is symmetrical.  Psychiatric: Affect normal,  Mood normal.  Patient is calm and cooperative  Musculoskeletal: Left medial scapular tenderness.  The left anterior chest is nontender.  Left shoulder nontender.  Patient pushing himself out of his medical chair causes left shoulder pain.  Flexing or extending his shoulder against resistance does not elicit tenderness.    EKG/Labs  Results for orders placed or performed during the hospital encounter of 05/23/22   CBC WITH DIFFERENTIAL   Result Value Ref Range    WBC 9.1 4.8 - 10.8 K/uL    RBC 5.44 4.70 - 6.10 M/uL    Hemoglobin 16.5 14.0 - 18.0 g/dL    Hematocrit 48.6 42.0 - 52.0 %    MCV 89.3 81.4 - 97.8 fL    MCH 30.3 27.0 - 33.0 pg    MCHC 34.0 33.7 - 35.3 g/dL    RDW 41.4 35.9 - 50.0 fL    Platelet Count 190 164 - 446 K/uL    MPV 11.7 9.0 - 12.9 fL    Neutrophils-Polys 73.40 (H) 44.00 - 72.00 %    Lymphocytes 16.60 (L) 22.00 - 41.00 %    Monocytes 7.80 0.00 - 13.40 %    Eosinophils 1.20 0.00 - 6.90 %    Basophils 0.70 0.00 - 1.80 %    Immature Granulocytes 0.30 0.00 - 0.90 %    Nucleated RBC 0.00 /100 WBC    Neutrophils (Absolute) 6.69 1.82 - 7.42 K/uL    Lymphs (Absolute) 1.51 1.00 - 4.80 K/uL    Monos (Absolute) 0.71 0.00 - 0.85 K/uL    Eos (Absolute) 0.11 0.00 - 0.51 K/uL    Baso (Absolute) 0.06 0.00 - 0.12 K/uL    Immature Granulocytes (abs) 0.03 0.00 - 0.11 K/uL    NRBC (Absolute) 0.00 K/uL   COMP METABOLIC PANEL   Result Value Ref Range    Sodium 140 135 - 145 mmol/L    Potassium 4.0 3.6 - 5.5 mmol/L    Chloride 103 96 - 112 mmol/L    Co2 22 20 - 33 mmol/L    Anion Gap  15.0 7.0 - 16.0    Glucose 139 (H) 65 - 99 mg/dL    Bun 14 8 - 22 mg/dL    Creatinine 0.63 0.50 - 1.40 mg/dL    Calcium 9.6 8.4 - 10.2 mg/dL    AST(SGOT) 16 12 - 45 U/L    ALT(SGPT) 17 2 - 50 U/L    Alkaline Phosphatase 87 30 - 99 U/L    Total Bilirubin 0.9 0.1 - 1.5 mg/dL    Albumin 4.4 3.2 - 4.9 g/dL    Total Protein 7.6 6.0 - 8.2 g/dL    Globulin 3.2 1.9 - 3.5 g/dL    A-G Ratio 1.4 g/dL   TROPONIN   Result Value Ref Range    Troponin T 16 6 - 19 ng/L   D-DIMER   Result Value Ref Range    D-Dimer Screen 0.31 0.00 - 0.50 ug/mL (FEU)   PROTHROMBIN TIME (INR)   Result Value Ref Range    PT 18.6 (H) 12.0 - 14.6 sec    INR 1.69 (H) 0.87 - 1.13   ESTIMATED GFR   Result Value Ref Range    GFR (CKD-EPI) 98 >60 mL/min/1.73 m 2   EKG   Result Value Ref Range    Report       [facility]    Test Date:  2022  Pt Name:    TOBI BOJORQUEZ       Department: [facility]  MRN:        [id number]                      Room:  Gender:     Male                         Technician: [facility]  :        1945                   Requested By:ER TRIAGE PROTOCOL  Order #:    [id number]                    Reading MD: BEA HERNANDEZ MD    Measurements  Intervals                                Axis  Rate:       76                           P:  HI:                                      QRS:        -37  QRSD:       106                          T:          12  QT:         405  QTc:        456    Interpretive Statements  Atrial fibrillation  Left axis deviation  Borderline T wave abnormalities  Compared to ECG 2016 23:10:25  EKG is unchanged from previous  Electronically Signed On 2022 11:39:51 PDT by BEA HERNANDEZ MD           RADIOLOGY/PROCEDURES  DX-CHEST-PORTABLE (1 VIEW)   Final Result      Mild cardiomegaly.            COURSE & MEDICAL DECISION MAKING  Pertinent Labs & Imaging studies reviewed. (See chart for details)  Patient developed pain left scapula, left anterior chest with  movement, better at rest suggestive of musculoskeletal origin.  He has been ruled out for pulmonary embolism with negative D-dimer.  No evidence of cardiac ischemia, EKG negative, troponin negative after several days of pain.  There is no evidence of pneumonia or tumor on his chest x-ray.  He appears comfortable at this time.  Patient is advised Tylenol for pain, he is to avoid NSAIDs secondary to Coumadin use.  Patient is well-appearing, discharged to care of his wife.  He was advised to return should symptoms worsen for reevaluation.    FINAL IMPRESSION     1. Pain of left scapula     2. Chest wall pain                     Electronically signed by: Meek Lema M.D., 5/23/2022 11:40 AM

## 2022-05-23 NOTE — ED TRIAGE NOTES
Chief Complaint   Patient presents with   • Shoulder Pain    c/o left shoulder pain, radialtes to the back towards left shoulder blade. Pt states he was out doing yard work last Thursday. Pain is reproducible and worse with movement. EKG was completed. Denies SOB.

## 2022-05-24 ENCOUNTER — ANTICOAGULATION VISIT (OUTPATIENT)
Dept: VASCULAR LAB | Facility: MEDICAL CENTER | Age: 77
End: 2022-05-24
Attending: INTERNAL MEDICINE
Payer: MEDICARE

## 2022-05-24 VITALS — WEIGHT: 226 LBS | BODY MASS INDEX: 26.68 KG/M2 | HEIGHT: 77 IN

## 2022-05-24 DIAGNOSIS — Z79.01 CHRONIC ANTICOAGULATION: ICD-10-CM

## 2022-05-24 DIAGNOSIS — I48.0 PAROXYSMAL ATRIAL FIBRILLATION (HCC): ICD-10-CM

## 2022-05-24 DIAGNOSIS — D68.69 SECONDARY HYPERCOAGULABLE STATE (HCC): ICD-10-CM

## 2022-05-24 LAB — INR PPP: 1.7 (ref 2–3.5)

## 2022-05-24 PROCEDURE — 99212 OFFICE O/P EST SF 10 MIN: CPT

## 2022-05-24 PROCEDURE — 85610 PROTHROMBIN TIME: CPT

## 2022-05-24 ASSESSMENT — FIBROSIS 4 INDEX: FIB4 SCORE: 1.57

## 2022-05-24 NOTE — PROGRESS NOTES
OP Anticoagulation Service Note    Date: 2022    Anticoagulation Summary  As of 2022    INR goal:  2.0-3.0   TTR:  85.5 % (6 y)   INR used for dosin.70 (2022)   Warfarin maintenance plan:  5 mg (5 mg x 1) every Mon, Fri; 7.5 mg (5 mg x 1.5) all other days   Weekly warfarin total:  47.5 mg   Plan last modified:  Maykel Begum, PharmD (2022)   Next INR check:  2022   Target end date:  Indefinite    Indications    Paroxysmal atrial fibrillation (HCC) [I48.0]  Chronic anticoagulation [Z79.01]  Secondary hypercoagulable state (HCC) [D68.69]             Anticoagulation Episode Summary     INR check location:  Anticoagulation Clinic    Preferred lab:      Send INR reminders to:      Comments:  Indomethacin as needed, discussed risks with the patient.      Anticoagulation Care Providers     Provider Role Specialty Phone number    Cheikh Turner M.D. Referring Cardiovascular Disease (Cardiology) [phone]    [facility] Responsible  [phone]        Anticoagulation Patient Findings  Patient Findings     Negatives:  Signs/symptoms of thrombosis, Signs/symptoms of bleeding, Laboratory test error suspected, Change in health, Change in alcohol use, Change in activity, Upcoming invasive procedure, Emergency department visit, Upcoming dental procedure, Missed doses, Extra doses, Change in medications, Change in diet/appetite, Hospital admission, Bruising, Other complaints          HPI:     Cheikh Wallsisiah is in the Anticoagulation Clinic today.     The reason for today's visit is to prevent morbidity and mortality from a blood clot and/or stroke and to reduce the risk of bleeding while on a anticoagulant.     PCP:  Kandace Jimenes M.D.  [address]  [facility] [address]  [phone]    Lab Results   Component Value Date/Time    HBA1C 8.4 (H) 2022 01:47 PM      Lab Results   Component Value Date/Time    CHOLSTRLTOT 137  "10/14/2021 06:09 AM    LDL 63 10/14/2021 06:09 AM    HDL 59 10/14/2021 06:09 AM    TRIGLYCERIDE 77 10/14/2021 06:09 AM       Lab Results   Component Value Date/Time    GLUCOSE 139 (H) 05/23/2022 11:53 AM    BUN 14 05/23/2022 11:53 AM    CREATININE 0.63 05/23/2022 11:53 AM    CREATININE 1.0 06/06/2006 04:10 PM     Lab Results   Component Value Date/Time    ALKPHOSPHAT 87 05/23/2022 11:53 AM    ASTSGOT 16 05/23/2022 11:53 AM    ALTSGPT 17 05/23/2022 11:53 AM    TBILIRUBIN 0.9 05/23/2022 11:53 AM    INR 1.70 (A) 05/24/2022 12:00 AM    ALBUMIN 4.4 05/23/2022 11:53 AM      Lab Results   Component Value Date/Time    RBC 5.44 05/23/2022 11:53 AM    HEMOGLOBIN 16.5 05/23/2022 11:53 AM    HEMATOCRIT 48.6 05/23/2022 11:53 AM    PLATELETCT 190 05/23/2022 11:53 AM      Lab Results   Component Value Date/Time    MALBCRT 38 (H) 04/01/2022 01:47 PM    MICROALBUR 1.7 04/01/2022 01:47 PM       Current Outpatient Medications:   •  allopurinol, 100 mg, Oral, DAILY  •  glimepiride, TAKE  ONE TABLET BY MOUTH EVERY MORNING  •  indomethacin, 50 mg, Oral, TID PRN  •  warfarin, 5-7.5 mg, Oral, DAILY  •  CVS VITAMIN B12, 1,000 mcg, Oral, QDAY  •  metformin, TAKE ONE TABLET BY MOUTH TWICE DAILY WITH MEALS  •  colchicine, 0.6 mg, Oral, PRN (Patient not taking: Reported on 5/12/2022)  •  fluconazole, Take 1 tablet by mouth daily for 14 days.  •  tamsulosin, Take 1 capsule by mouth daily.  •  atorvastatin, 10 mg, Oral, DAILY  •  enalapril, 10 mg, Oral, DAILY  •  metoprolol tartrate, 25 mg, Oral, BID  •  Jardiance, 1 Tablet, Oral, DAILY  •  VITAMIN D, CHOLECALCIFEROL, PO, Take 5,000 Units by mouth.    Assessment:     INR  sub-therapeutic.     Is pt on antiplatelet therapy: no    Is pt on NSAID: see above    3 vitals included with today's appt-unless patient declined:  (BP, weight, ht, RR)   Vitals:    05/24/22 1103   Weight: 103 kg (226 lb)   Height: 1.956 m (6' 5\")       Plan:     • Increase weekly warfarin dose as noted      Follow-up: "     • Test in 2 week(s).    • This decision was made using shared decision making with the pt and benefits vs risks were discussed.      Additional information discussed with patient:     • Pt educated to contact our clinic with any changes in medications or s/s of bleeding or thrombosis.  • Education was provided today regarding tips to reduce their bleed risk and dietary constraints while on a anticoagulant.    National recommendations regarding anticoagulation therapy:     The CHEST guidelines recommends frequent monitoring at regular intervals for any patient on a anticoagulant, to ensure the patient is on the proper dose, and to monitor that they are not having any complications from therapy.       Maykel Begum, PharmD, MS, BCACP, [facility]  Phone [phone] fax [phone]    This note was created using voice recognition software (Dragon). The accuracy of the dictation is limited by the abilities of the software. I have reviewed the note prior to signing, however some errors in grammar and context are still possible. If you have any questions related to this note please do not hesitate to contact our office.

## 2022-05-25 LAB — INR BLD: 1.7 (ref 0.9–1.2)

## 2022-06-07 ENCOUNTER — TELEPHONE (OUTPATIENT)
Dept: VASCULAR LAB | Facility: MEDICAL CENTER | Age: 77
End: 2022-06-07
Payer: MEDICARE

## 2022-06-07 ENCOUNTER — APPOINTMENT (OUTPATIENT)
Dept: VASCULAR LAB | Facility: MEDICAL CENTER | Age: 77
End: 2022-06-07
Attending: INTERNAL MEDICINE
Payer: MEDICARE

## 2022-06-07 NOTE — TELEPHONE ENCOUNTER
----- Message from Miguelina Baeaz PharmD sent at 6/7/2022 11:15 AM PDT -----  Regarding: pt left vm: unable to make today's appt d/t covid

## 2022-06-14 ENCOUNTER — ANTICOAGULATION VISIT (OUTPATIENT)
Dept: VASCULAR LAB | Facility: MEDICAL CENTER | Age: 77
End: 2022-06-14
Attending: INTERNAL MEDICINE
Payer: MEDICARE

## 2022-06-14 VITALS — SYSTOLIC BLOOD PRESSURE: 128 MMHG | DIASTOLIC BLOOD PRESSURE: 80 MMHG | HEART RATE: 66 BPM

## 2022-06-14 DIAGNOSIS — Z79.01 CHRONIC ANTICOAGULATION: ICD-10-CM

## 2022-06-14 DIAGNOSIS — D68.69 SECONDARY HYPERCOAGULABLE STATE (HCC): ICD-10-CM

## 2022-06-14 DIAGNOSIS — I48.0 PAROXYSMAL ATRIAL FIBRILLATION (HCC): ICD-10-CM

## 2022-06-14 LAB
INR BLD: 2.2 (ref 0.9–1.2)
INR PPP: 2.2 (ref 2–3.5)

## 2022-06-14 PROCEDURE — 99211 OFF/OP EST MAY X REQ PHY/QHP: CPT

## 2022-06-14 PROCEDURE — 85610 PROTHROMBIN TIME: CPT

## 2022-06-14 NOTE — PROGRESS NOTES
Anticoagulation Summary  As of 2022    INR goal:  2.0-3.0   TTR:  85.0 % (6 y)   INR used for dosin.20 (2022)   Warfarin maintenance plan:  5 mg (5 mg x 1) every Mon, Fri; 7.5 mg (5 mg x 1.5) all other days   Weekly warfarin total:  47.5 mg   Plan last modified:  Maykel Begum PharmD (2022)   Next INR check:  2022   Target end date:  Indefinite    Indications    Paroxysmal atrial fibrillation (HCC) [I48.0]  Chronic anticoagulation [Z79.01]  Secondary hypercoagulable state (HCC) [D68.69]             Anticoagulation Episode Summary     INR check location:  Anticoagulation Clinic    Preferred lab:      Send INR reminders to:      Comments:  Indomethacin as needed, discussed risks with the patient.      Anticoagulation Care Providers     Provider Role Specialty Phone number    Cheikh Turner M.D. Referring Cardiovascular Disease (Cardiology) [phone]    [facility] Anticoagulation Services Responsible  [phone]                Refer to Patient Findings for HPI:      Vitals:    22 1140   BP: 128/80   Pulse: 66       Verified current warfarin dosing schedule.    Medications reconciled   Pt is not on antiplatelet therapy      A/P   INR  therapeutic.     Warfarin dosing recommendation: Pt is to continue with current warfarin dosing regimen.     Pt educated to contact our clinic with any changes in medications or s/s of bleeding or thrombosis. Pt is aware to seek immediate medical attention for falls, head injury or deep cuts.    Follow up appointment in 4 week(s).    Maximo Salvador, MatthewD

## 2022-06-28 ENCOUNTER — HOSPITAL ENCOUNTER (OUTPATIENT)
Facility: MEDICAL CENTER | Age: 77
End: 2022-06-28
Attending: STUDENT IN AN ORGANIZED HEALTH CARE EDUCATION/TRAINING PROGRAM
Payer: MEDICARE

## 2022-06-28 PROCEDURE — 87086 URINE CULTURE/COLONY COUNT: CPT

## 2022-07-12 ENCOUNTER — ANTICOAGULATION VISIT (OUTPATIENT)
Dept: VASCULAR LAB | Facility: MEDICAL CENTER | Age: 77
End: 2022-07-12
Attending: INTERNAL MEDICINE
Payer: MEDICARE

## 2022-07-12 VITALS — DIASTOLIC BLOOD PRESSURE: 68 MMHG | HEART RATE: 83 BPM | SYSTOLIC BLOOD PRESSURE: 115 MMHG

## 2022-07-12 DIAGNOSIS — I48.0 PAROXYSMAL ATRIAL FIBRILLATION (HCC): ICD-10-CM

## 2022-07-12 DIAGNOSIS — Z79.01 CHRONIC ANTICOAGULATION: ICD-10-CM

## 2022-07-12 DIAGNOSIS — D68.69 SECONDARY HYPERCOAGULABLE STATE (HCC): ICD-10-CM

## 2022-07-12 LAB
INR BLD: 2.7 (ref 0.9–1.2)
INR PPP: 2.7 (ref 2–3.5)

## 2022-07-12 PROCEDURE — 85610 PROTHROMBIN TIME: CPT

## 2022-07-12 PROCEDURE — 99211 OFF/OP EST MAY X REQ PHY/QHP: CPT

## 2022-07-12 NOTE — PROGRESS NOTES
Anticoagulation Summary  As of 2022    INR goal:  2.0-3.0   TTR:  85.2 % (6.1 y)   INR used for dosin.70 (2022)   Warfarin maintenance plan:  5 mg (5 mg x 1) every Mon, Wed, Fri; 7.5 mg (5 mg x 1.5) all other days   Weekly warfarin total:  45 mg   Plan last modified:  No Eastman (2022)   Next INR check:  2022   Target end date:  Indefinite    Indications    Paroxysmal atrial fibrillation (HCC) [I48.0]  Chronic anticoagulation [Z79.01]  Secondary hypercoagulable state (HCC) [D68.69]             Anticoagulation Episode Summary     INR check location:  Anticoagulation Clinic    Preferred lab:      Send INR reminders to:      Comments:  Indomethacin as needed, discussed risks with the patient.      Anticoagulation Care Providers     Provider Role Specialty Phone number    Cheikh Turner M.D. Referring Cardiovascular Disease (Cardiology) [phone]    [facility] Anticoagulation Services Responsible  [phone]        Refer to Patient Findings for HPI:  Patient Findings     Positives:  Change in medications (phenazopyridine and fluconazole)    Negatives:  Signs/symptoms of thrombosis, Signs/symptoms of bleeding, Laboratory test error suspected, Change in health, Change in alcohol use, Change in activity, Upcoming invasive procedure, Emergency department visit, Upcoming dental procedure, Missed doses, Extra doses, Change in diet/appetite, Hospital admission, Bruising, Other complaints        Vitals:    22 1148   BP: 115/68   Pulse: 83       Patient seen in clinic today for follow up on anticoagulation therapy with warfarin (a high risk medication) for hx of PAF  Verified current warfarin dosing schedule, but patient confirms a DIFFERENT DOSING REGIMEN. Patient has been taking 5mg on Mon, Wed and Fri and 7.5mg ROW. Will change the dosing calendar to reflect current dosing.   Patient denies any missed doses of warfarin.    Medications reconciled   Pt is not on antiplatelet  therapy      A/P   INR is therapeutic today at 2.7.     Warfarin dosing recommendation: Patient will continue with the current dosing regimen as he says last time he was on Fluconazole, his INR trended low which is opposite from expected DDI. Patient has been on Fluconazole for one week and INR in range, but patient was taking slightly lower weekly dosing regimen than we had documented.  Will have him continue with the current dosing and will have him follow up again in 1 week.     Pt educated to contact our clinic with any changes in medications or s/s of bleeding or thrombosis. Pt is aware to seek immediate medical attention for falls, head injury or deep cuts.    Follow up appointment in 1 week(s).    Next appt: Tues, July 19 @ 11:30am     Kam Eastman PharmD

## 2022-07-19 ENCOUNTER — ANTICOAGULATION VISIT (OUTPATIENT)
Dept: VASCULAR LAB | Facility: MEDICAL CENTER | Age: 77
End: 2022-07-19
Attending: INTERNAL MEDICINE
Payer: MEDICARE

## 2022-07-19 VITALS — HEART RATE: 82 BPM | SYSTOLIC BLOOD PRESSURE: 100 MMHG | DIASTOLIC BLOOD PRESSURE: 63 MMHG

## 2022-07-19 DIAGNOSIS — Z79.01 CHRONIC ANTICOAGULATION: ICD-10-CM

## 2022-07-19 DIAGNOSIS — I48.0 PAROXYSMAL ATRIAL FIBRILLATION (HCC): ICD-10-CM

## 2022-07-19 DIAGNOSIS — D68.69 SECONDARY HYPERCOAGULABLE STATE (HCC): ICD-10-CM

## 2022-07-19 LAB — INR PPP: 4.8 (ref 2–3.5)

## 2022-07-19 PROCEDURE — 85610 PROTHROMBIN TIME: CPT

## 2022-07-19 PROCEDURE — 99212 OFFICE O/P EST SF 10 MIN: CPT

## 2022-07-19 NOTE — PROGRESS NOTES
Anticoagulation Summary  As of 2022    INR goal:  2.0-3.0   TTR:  85.0 % (6.1 y)   INR used for dosin.80 (2022)   Warfarin maintenance plan:  5 mg (5 mg x 1) every Mon, Wed, Fri; 7.5 mg (5 mg x 1.5) all other days   Weekly warfarin total:  45 mg   Plan last modified:  No Eastman (2022)   Next INR check:  2022   Target end date:  Indefinite    Indications    Paroxysmal atrial fibrillation (HCC) [I48.0]  Chronic anticoagulation [Z79.01]  Secondary hypercoagulable state (HCC) [D68.69]             Anticoagulation Episode Summary     INR check location:  Anticoagulation Clinic    Preferred lab:      Send INR reminders to:      Comments:  Indomethacin as needed, discussed risks with the patient.      Anticoagulation Care Providers     Provider Role Specialty Phone number    Cheikh Turner M.D. Referring Cardiovascular Disease (Cardiology) [phone]    [facility] Anticoagulation Services Responsible  [phone]                Refer to Patient Findings for HPI:      Vitals:    22 1132   BP: 100/63   Pulse: 82       Verified current warfarin dosing schedule.    Medications reconciled   Pt is not on antiplatelet therapy      A/P   INR  SUPRA-therapeutic.     Warfarin dosing recommendation: Pt instructed to hold dose x 2, then continue current warfarin dosing regimen. Pt's last dose of fluconazole is tomorrow. This is believed to be the reason for the increase in INR. At last visit, warfarin was not reduced due pt declining.    Pt educated to contact our clinic with any changes in medications or s/s of bleeding or thrombosis. Pt is aware to seek immediate medical attention for falls, head injury or deep cuts.    Follow up appointment in 1 week(s).    Loly Moser, Pharmacy Intern    Maximo Salvador, PharmD

## 2022-07-20 LAB — INR BLD: 4.8 (ref 0.9–1.2)

## 2022-07-26 ENCOUNTER — ANTICOAGULATION VISIT (OUTPATIENT)
Dept: VASCULAR LAB | Facility: MEDICAL CENTER | Age: 77
End: 2022-07-26
Attending: INTERNAL MEDICINE
Payer: MEDICARE

## 2022-07-26 DIAGNOSIS — D68.69 SECONDARY HYPERCOAGULABLE STATE (HCC): ICD-10-CM

## 2022-07-26 DIAGNOSIS — I48.0 PAROXYSMAL ATRIAL FIBRILLATION (HCC): ICD-10-CM

## 2022-07-26 DIAGNOSIS — Z79.01 CHRONIC ANTICOAGULATION: ICD-10-CM

## 2022-07-26 LAB
INR BLD: 3.6 (ref 0.9–1.2)
INR PPP: 3.6 (ref 2–3.5)

## 2022-07-26 PROCEDURE — 85610 PROTHROMBIN TIME: CPT

## 2022-07-26 PROCEDURE — 99212 OFFICE O/P EST SF 10 MIN: CPT

## 2022-07-26 NOTE — PROGRESS NOTES
Anticoagulation Summary  As of 7/26/2022    INR goal:  2.0-3.0   TTR:  84.7 % (6.1 y)   INR used for dosing:  3.60 (7/26/2022)   Warfarin maintenance plan:  7.5 mg (5 mg x 1.5) every Sun, Tue, Thu; 5 mg (5 mg x 1) all other days   Weekly warfarin total:  42.5 mg   Plan last modified:  Alejandro Bautista PharmD (7/26/2022)   Next INR check:  8/2/2022   Target end date:  Indefinite    Indications    Paroxysmal atrial fibrillation (HCC) [I48.0]  Chronic anticoagulation [Z79.01]  Secondary hypercoagulable state (HCC) [D68.69]             Anticoagulation Episode Summary     INR check location:  Anticoagulation Clinic    Preferred lab:      Send INR reminders to:      Comments:  Indomethacin as needed, discussed risks with the patient.      Anticoagulation Care Providers     Provider Role Specialty Phone number    Cheikh Turner M.D. Referring Cardiovascular Disease (Cardiology) [phone]    [facility] Anticoagulation Services Responsible  [phone]                Refer to Patient Findings for HPI:  Patient Findings     Negatives:  Signs/symptoms of thrombosis, Signs/symptoms of bleeding, Laboratory test error suspected, Change in health, Change in alcohol use, Change in activity, Upcoming invasive procedure, Emergency department visit, Upcoming dental procedure, Missed doses, Extra doses, Change in medications, Change in diet/appetite, Hospital admission, Bruising, Other complaints          There were no vitals filed for this visit.  pt declined vitals    Verified current warfarin dosing schedule.    Medications reconciled   Pt is not on antiplatelet therapy      A/P   INR  therapeutic.     Warfarin dosing recommendation: Hold 1x dose, begin decreased regimen of 7.5 mg Sun, Tue, Thur, 5 mg aod.    Pt educated to contact our clinic with any changes in medications or s/s of bleeding or thrombosis. Pt is aware to seek immediate medical attention for falls, head injury or deep cuts.    Follow up appointment in 1  week(s).    Francois Muller, Pharmacy PGY1 resident  Alejandro Bautista, PharmD, BCACP

## 2022-08-02 ENCOUNTER — ANTICOAGULATION VISIT (OUTPATIENT)
Dept: VASCULAR LAB | Facility: MEDICAL CENTER | Age: 77
End: 2022-08-02
Attending: INTERNAL MEDICINE
Payer: MEDICARE

## 2022-08-02 VITALS — SYSTOLIC BLOOD PRESSURE: 90 MMHG | DIASTOLIC BLOOD PRESSURE: 62 MMHG | HEART RATE: 53 BPM

## 2022-08-02 DIAGNOSIS — D68.69 SECONDARY HYPERCOAGULABLE STATE (HCC): ICD-10-CM

## 2022-08-02 DIAGNOSIS — I48.0 PAROXYSMAL ATRIAL FIBRILLATION (HCC): ICD-10-CM

## 2022-08-02 DIAGNOSIS — Z79.01 CHRONIC ANTICOAGULATION: ICD-10-CM

## 2022-08-02 LAB
INR BLD: 1.3 (ref 0.9–1.2)
INR PPP: 1.3 (ref 2–3.5)

## 2022-08-02 PROCEDURE — 85610 PROTHROMBIN TIME: CPT

## 2022-08-02 PROCEDURE — 99212 OFFICE O/P EST SF 10 MIN: CPT

## 2022-08-02 NOTE — PROGRESS NOTES
"Anticoagulation Summary  As of 2022    INR goal:  2.0-3.0   TTR:  84.6 % (6.2 y)   INR used for dosin.30 (2022)   Warfarin maintenance plan:  5 mg (5 mg x 1) every Mon, Wed, Fri; 7.5 mg (5 mg x 1.5) all other days   Weekly warfarin total:  45 mg   Plan last modified:  No Eastman (2022)   Next INR check:  2022   Target end date:  Indefinite    Indications    Paroxysmal atrial fibrillation (HCC) [I48.0]  Chronic anticoagulation [Z79.01]  Secondary hypercoagulable state (HCC) [D68.69]             Anticoagulation Episode Summary     INR check location:  Anticoagulation Clinic    Preferred lab:      Send INR reminders to:      Comments:  Indomethacin as needed, discussed risks with the patient.      Anticoagulation Care Providers     Provider Role Specialty Phone number    Cheikh Turner M.D. Referring Cardiovascular Disease (Cardiology) [phone]    [facility] Anticoagulation [facility] Responsible  [phone]        Refer to Patient Findings for HPI:    Vitals:    22 1132   BP: (!) 90/62   Pulse: (!) 53       Patient seen in clinic today for follow up on anticoagulation therapy with warfarin (a high risk medication) for hx of PAF  Verified current warfarin dosing schedule.  Patient reports he HELD warfarin both on Tu and Sat as \"instructed\"- he was under the impression his paperwork told him to do so, but he may have been mixed up as his sat dose was reduced, but was not to HOLD completely.     Medications reconciled   Pt is not on antiplatelet therapy      A/P   INR is SUB-therapeutic today at 1.3.     Warfarin dosing recommendation: Patient instructed to bolus with 10mg TONIGHT, then to resume previously therapeutic dosing of 5mg Mon,Wed,Fri and 7.5mg ROW.   Patient will follow up again in 1 week.    Pt educated to contact our clinic with any changes in medications or s/s of bleeding or thrombosis. Pt is aware to seek immediate medical attention for falls, head injury " or deep cuts.    Follow up appointment in 1 week(s).    Next appt: Tues, AUG 9 @ 11:30am    Kam Eastman PharmD

## 2022-08-09 ENCOUNTER — ANTICOAGULATION VISIT (OUTPATIENT)
Dept: VASCULAR LAB | Facility: MEDICAL CENTER | Age: 77
End: 2022-08-09
Attending: INTERNAL MEDICINE
Payer: MEDICARE

## 2022-08-09 DIAGNOSIS — I48.0 PAROXYSMAL ATRIAL FIBRILLATION (HCC): ICD-10-CM

## 2022-08-09 DIAGNOSIS — Z79.01 CHRONIC ANTICOAGULATION: ICD-10-CM

## 2022-08-09 DIAGNOSIS — D68.69 SECONDARY HYPERCOAGULABLE STATE (HCC): ICD-10-CM

## 2022-08-09 LAB — INR PPP: 1.9 (ref 2–3.5)

## 2022-08-09 PROCEDURE — 85610 PROTHROMBIN TIME: CPT

## 2022-08-09 PROCEDURE — 99212 OFFICE O/P EST SF 10 MIN: CPT

## 2022-08-09 NOTE — PROGRESS NOTES
Anticoagulation Summary  As of 2022    INR goal:  2.0-3.0   TTR:  84.3 % (6.2 y)   INR used for dosin.90 (2022)   Warfarin maintenance plan:  5 mg (5 mg x 1) every Mon, Wed, Fri; 7.5 mg (5 mg x 1.5) all other days   Weekly warfarin total:  45 mg   Plan last modified:  No Eastman (2022)   Next INR check:  2022   Target end date:  Indefinite    Indications    Paroxysmal atrial fibrillation (HCC) [I48.0]  Chronic anticoagulation [Z79.01]  Secondary hypercoagulable state (HCC) [D68.69]             Anticoagulation Episode Summary     INR check location:  Anticoagulation Clinic    Preferred lab:      Send INR reminders to:      Comments:  Indomethacin as needed, discussed risks with the patient.      Anticoagulation Care Providers     Provider Role Specialty Phone number    Cheikh Turner M.D. Referring Cardiovascular Disease (Cardiology) [phone]    [facility] Anticoagulation Services Responsible  [phone]                Refer to Patient Findings for HPI:       pt declined vitals    Verified current warfarin dosing schedule.    Medications reconciled   Pt is not on antiplatelet therapy      A/P   INR  sub-therapeutic.     Warfarin dosing recommendation: Take 7.5 mg tomorrow and continue warfarin regimen.    Pt educated to contact our clinic with any changes in medications or s/s of bleeding or thrombosis. Pt is aware to seek immediate medical attention for falls, head injury or deep cuts.    Follow up appointment in 1 week(s).    Petra Larson PharmD

## 2022-08-10 LAB — INR BLD: 1.9 (ref 0.9–1.2)

## 2022-08-18 ENCOUNTER — ANTICOAGULATION VISIT (OUTPATIENT)
Dept: VASCULAR LAB | Facility: MEDICAL CENTER | Age: 77
End: 2022-08-18
Attending: INTERNAL MEDICINE
Payer: MEDICARE

## 2022-08-18 ENCOUNTER — OFFICE VISIT (OUTPATIENT)
Dept: MEDICAL GROUP | Facility: PHYSICIAN GROUP | Age: 77
End: 2022-08-18
Payer: MEDICARE

## 2022-08-18 VITALS — SYSTOLIC BLOOD PRESSURE: 115 MMHG | HEART RATE: 96 BPM | DIASTOLIC BLOOD PRESSURE: 66 MMHG

## 2022-08-18 VITALS
SYSTOLIC BLOOD PRESSURE: 100 MMHG | HEIGHT: 77 IN | WEIGHT: 225 LBS | HEART RATE: 80 BPM | RESPIRATION RATE: 16 BRPM | OXYGEN SATURATION: 96 % | DIASTOLIC BLOOD PRESSURE: 70 MMHG | TEMPERATURE: 98.4 F | BODY MASS INDEX: 26.57 KG/M2

## 2022-08-18 DIAGNOSIS — I10 ESSENTIAL HYPERTENSION: ICD-10-CM

## 2022-08-18 DIAGNOSIS — E55.9 VITAMIN D DEFICIENCY: ICD-10-CM

## 2022-08-18 DIAGNOSIS — E11.40 CONTROLLED TYPE 2 DIABETES MELLITUS WITH DIABETIC NEUROPATHY, WITHOUT LONG-TERM CURRENT USE OF INSULIN (HCC): ICD-10-CM

## 2022-08-18 DIAGNOSIS — D68.69 SECONDARY HYPERCOAGULABLE STATE (HCC): ICD-10-CM

## 2022-08-18 DIAGNOSIS — E78.5 DYSLIPIDEMIA: ICD-10-CM

## 2022-08-18 DIAGNOSIS — I48.0 PAROXYSMAL ATRIAL FIBRILLATION (HCC): ICD-10-CM

## 2022-08-18 DIAGNOSIS — N40.1 LOWER URINARY TRACT SYMPTOMS DUE TO BENIGN PROSTATIC HYPERPLASIA: ICD-10-CM

## 2022-08-18 DIAGNOSIS — Z79.01 CHRONIC ANTICOAGULATION: ICD-10-CM

## 2022-08-18 LAB
HBA1C MFR BLD: 6.9 % (ref 0–5.6)
INR PPP: 1.8 (ref 2–3.5)
INT CON NEG: NEGATIVE
INT CON POS: POSITIVE

## 2022-08-18 PROCEDURE — 83036 HEMOGLOBIN GLYCOSYLATED A1C: CPT | Performed by: INTERNAL MEDICINE

## 2022-08-18 PROCEDURE — 99214 OFFICE O/P EST MOD 30 MIN: CPT | Performed by: INTERNAL MEDICINE

## 2022-08-18 PROCEDURE — 85610 PROTHROMBIN TIME: CPT

## 2022-08-18 PROCEDURE — 99212 OFFICE O/P EST SF 10 MIN: CPT

## 2022-08-18 ASSESSMENT — FIBROSIS 4 INDEX: FIB4 SCORE: 1.57

## 2022-08-18 NOTE — PROGRESS NOTES
Anticoagulation Summary  As of 2022      INR goal:  2.0-3.0   TTR:  84.0 % (6.2 y)   INR used for dosin.80 (2022)   Warfarin maintenance plan:  5 mg (5 mg x 1) every Mon, Fri; 7.5 mg (5 mg x 1.5) all other days   Weekly warfarin total:  47.5 mg   Plan last modified:  Petra Larson PharmD (2022)   Next INR check:  2022   Target end date:  Indefinite    Indications    Paroxysmal atrial fibrillation (HCC) [I48.0]  Chronic anticoagulation [Z79.01]  Secondary hypercoagulable state (HCC) [D68.69]                 Anticoagulation Episode Summary       INR check location:  Anticoagulation Clinic    Preferred lab:      Send INR reminders to:      Comments:  Indomethacin as needed, discussed risks with the patient.          Anticoagulation Care Providers       Provider Role Specialty Phone number    Cheikh Turner M.D. Referring Cardiovascular Disease (Cardiology) [phone]    [facility] Anticoagulation Services Responsible  [phone]                  Refer to Patient Findings for HPI:      Vitals:    22 1541   BP: 115/66   Pulse: 96       Verified current warfarin dosing schedule.    Medications reconciled   Pt is not on antiplatelet therapy      A/P   INR  SUB-therapeutic.     Warfarin dosing recommendation: Take increased dose of Warfarin 10 mg today and increase weekly regimen as indicated above (~6% increase).    Pt educated to contact our clinic with any changes in medications or s/s of bleeding or thrombosis. Pt is aware to seek immediate medical attention for falls, head injury or deep cuts.    Follow up appointment in 1 week(s).    Petra Larson PharmD

## 2022-08-18 NOTE — PROGRESS NOTES
CC: 6-month follow-up visit.  Diabetes.  UTI.    HPI:  Cheikh presents with the following    1. Controlled type 2 diabetes mellitus with diabetic neuropathy, without long-term current use of insulin (HCC)  Chronic.  Stable.  Currently taking 25 mg tablets, Glucophage and restarted glimepiride.  Hemoglobin A1c 6.9 down from 8.4.  Patient started lifting weights and rides a stationary bike 30 minutes a day.  Trouble with walking due to spinal stenosis and needing a cane.  Did not make any dietary changes.    2. Lower urinary tract symptoms due to benign prostatic hyperplasia  Patient is followed by Dr. Batista from [facility].  Patient recently was diagnosed with a yeast infection and completed a 14-day course of fluconazole.  He continues to take Flomax.  Patient will make a follow-up appointment soon.    3. Essential hypertension  Patient currently taking enalapril 10 mg daily as well as Lopressor 25 mg dose.  Blood pressure has been hovering in the low side, systolic 100 on average.  Patient is completely asymptomatic and feels well.    4. Paroxysmal atrial fibrillation (HCC)  Chronic.  Stable.  Followed by cardiology.  Patient sees Dr. Turner yearly and is due for an appointment in the fall.  Patient treated with Coumadin and Lopressor.  Asymptomatic.    5. Secondary hypercoagulable state (HCC)   chronic.  Stable.  Patient is followed by vascular medicine and has an appointment today.  Patient's INR has been labile due to recent treatment with fluconazole.    6. Dyslipidemia  Patient is due for lipid panel prior to his visit with cardiology.  Patient takes Lipitor 10 mg daily.    7. Vitamin D deficiency  Not taking supplement.  Vitamin D level 19 about 3 years ago.      Patient Active Problem List    Diagnosis Date Noted    Vitamin D deficiency 08/18/2022    Lower urinary tract symptoms due to benign prostatic hyperplasia 06/07/2022    Chronic gout of left hand 05/12/2022    Chronic gout of hand  03/29/2022    History of kidney stones 03/29/2022    Loss of balance 03/29/2022    Spinal stenosis of lumbar region without neurogenic claudication 03/29/2022    BMI 28.0-28.9,adult 02/17/2022    Benign prostatic hyperplasia with nocturia 02/17/2022    Secondary hypercoagulable state (HCC) 12/13/2021    B12 deficiency 11/12/2020    Obesity (BMI 30-39.9) 03/27/2018    Controlled type 2 diabetes mellitus with diabetic neuropathy, without long-term current use of insulin (HCC) 03/13/2017    Risk for falls 03/13/2017    SHANNAN (obstructive sleep apnea) 12/12/2016    Dyslipidemia 12/12/2016    Left foot drop 12/12/2016    Chronic anticoagulation 09/20/2013    Essential hypertension 07/09/2013    Paroxysmal atrial fibrillation (HCC) 06/06/2013       Current Outpatient Medications   Medication Sig Dispense Refill    allopurinol (ZYLOPRIM) 100 MG Tab Take 1 Tablet by mouth every day. 100 Tablet 1    glimepiride (AMARYL) 2 MG Tab TAKE  ONE TABLET BY MOUTH EVERY MORNING 100 Tablet 1    warfarin (COUMADIN) 5 MG Tab Take 1-1.5 Tablets by mouth every day. As directed by [facility] 135 Tablet 1    cyanocobalamin ([employer] VITAMIN B12) 1000 MCG Tab Take 1 Tablet by mouth every day. 100 Tablet 1    metformin (GLUCOPHAGE) 1000 MG tablet TAKE ONE TABLET BY MOUTH TWICE DAILY WITH MEALS 200 Tablet 2    tamsulosin (FLOMAX) 0.4 MG capsule Take 1 capsule by mouth daily. 30 Capsule 1    atorvastatin (LIPITOR) 10 MG Tab Take 1 Tablet by mouth every day. 100 Tablet 3    enalapril (VASOTEC) 10 MG Tab Take 1 Tablet by mouth every day. 100 Tablet 3    metoprolol tartrate (LOPRESSOR) 25 MG Tab Take 1 Tablet by mouth 2 times a day. 200 Tablet 3    Empagliflozin (JARDIANCE) 25 MG Tab Take 1 Tablet by mouth every day. 30 Tablet 5    VITAMIN D, CHOLECALCIFEROL, PO Take 5,000 Units by mouth.      colchicine (COLCRYS) 0.6 MG Tab Take 1 Tablet by mouth as needed. Take 1 tablet onset of pain, repeat in 1 hour if symptoms persist.  Max 3  tablets daily. (Patient not taking: Reported on 8/18/2022) 30 Tablet 0     No current facility-administered medications for this visit.         Allergies as of 08/18/2022 - Reviewed 08/18/2022   Allergen Reaction Noted    Nkda [no known drug allergy]  07/31/2009        Social History     Socioeconomic History    Marital status:      Spouse name: Not on file    Number of children: Not on file    Years of education: Not on file    Highest education level: Not on file   Occupational History    Not on file   Tobacco Use    Smoking status: Never    Smokeless tobacco: Never   Vaping Use    Vaping Use: Never used   Substance and Sexual Activity    Alcohol use: Yes     Alcohol/week: 6.0 oz     Types: 10 Glasses of wine per week    Drug use: No    Sexual activity: Never     Partners: Female   Other Topics Concern    Not on file   Social History Narrative    Not on file     Social Determinants of Health     Financial Resource Strain: Not on file   Food Insecurity: Not on file   Transportation Needs: Not on file   Physical Activity: Not on file   Stress: Not on file   Social Connections: Not on file   Intimate Partner Violence: Not on file   Housing Stability: Not on file       Family History   Problem Relation Age of Onset    Heart Attack Father 59    Heart Disease Father     Hypertension Brother     Diabetes Brother     Arthritis Brother     Thyroid Mother 50    Breast Cancer Sister     No Known Problems Maternal Grandmother     No Known Problems Maternal Grandfather     No Known Problems Paternal Grandmother     No Known Problems Paternal Grandfather     Arrythmia Brother     No Known Problems Brother     No Known Problems Daughter        Past Surgical History:   Procedure Laterality Date    ORIF, FRACTURE, FEMUR Left 2/24/2016    Procedure: DISTAL FEMUR ORIF;  Surgeon: Montrell Eddy M.D.;  Location: SURGERY [facility];  Service:     CYSTOSCOPY STENT PLACEMENT  1/13/2014    Performed by Main Batista M.D. at  "SURGERY [facility]    URETEROSCOPY  1/13/2014    Performed by Main Batista M.D. at SURGERY [facility]    LASERTRIPSY  1/13/2014    Performed by Main Batista M.D. at SURGERY [facility]    OTHER      Surgery for kidney stones    RECOVERY  7/11/2013    Performed by Cath-Recovery Surgery at SURGERY SAME DAY [facility]    KNEE ARTHROPLASTY TOTAL  12/1/2009    Performed by GIACOMO PETTIT at SURGERY [facility]    KNEE ARTHROPLASTY TOTAL  3/2009    Left    LUMBAR LAMINECTOMY DISKECTOMY  7/2006    L1-L5/ residual left foot weaknesss     REPAIR, KNEE, ACL  12/2005    Right    ORIF, FRACTURE, FEMUR         ROS:  Denies any Headache,Chest pain,  Shortness of breath,  Abdominal pain, Changes of bowel or bladder, Lower ext edema, Fevers, Nights sweats, Weight Changes, Focal weakness or numbness.  All other systems are negative.    /70 (BP Location: Left arm, Patient Position: Sitting, BP Cuff Size: Adult)   Pulse 80   Temp 36.9 °C (98.4 °F) (Temporal)   Resp 16   Ht 1.956 m (6' 5\")   Wt 102 kg (225 lb)   SpO2 96%   BMI 26.68 kg/m²      Constitutional: Alert, no distress, well-groomed.  Skin: Warm, dry, good turgor, no rashes in visible areas.  Eye: Equal, round and reactive, conjunctiva clear, lids normal.  ENMT: Lips without lesions, good dentition, moist mucous membranes.  Neck: Trachea midline, no masses, no thyromegaly.  Respiratory: Unlabored respiratory effort, no cough.  Abdomen: Soft, no gross masses.  MSK: Normal gait, moves all extremities.  Neuro: Grossly non-focal. No cranial nerve deficit. Strength and sensation intact.   Psych: Alert and oriented x3, normal affect and mood.      Assessment and Plan.   77 y.o. male presenting with the following.     1. Controlled type 2 diabetes mellitus with diabetic neuropathy, without long-term current use of insulin (HCC)  Chronic.  Stable.  Continue current management.  - POCT Hemoglobin A1C    2. Lower urinary tract symptoms due to " benign prostatic hyperplasia  Patient to keep follow-up appointment with urology.  Continue Flomax.  Asymptomatic.    3. Essential hypertension  Continue current dose of enalapril and Lopressor.  Patient asymptomatic.    4. Paroxysmal atrial fibrillation (HCC)  Chronic.  Stable.  Patient make follow-up appointment with cardiology.    5. Secondary hypercoagulable state (HCC)  Chronic.  Stable.  Patient will follow up with vascular medicine today.  Continue Coumadin.    6. Dyslipidemia  Continue Lipitor 10 mg tablets daily.  - Lipid Profile; Future    7. Vitamin D deficiency    - VITAMIN D,25 HYDROXY; Future    My total time spent caring for the patient on the day of the encounter was 32 minutes.   This does not include time spent on separately billable procedures/tests.

## 2022-08-19 LAB — INR BLD: 1.8 (ref 0.9–1.2)

## 2022-08-25 ENCOUNTER — ANTICOAGULATION VISIT (OUTPATIENT)
Dept: VASCULAR LAB | Facility: MEDICAL CENTER | Age: 77
End: 2022-08-25
Attending: INTERNAL MEDICINE
Payer: MEDICARE

## 2022-08-25 DIAGNOSIS — I48.0 PAROXYSMAL ATRIAL FIBRILLATION (HCC): ICD-10-CM

## 2022-08-25 DIAGNOSIS — Z79.01 CHRONIC ANTICOAGULATION: ICD-10-CM

## 2022-08-25 DIAGNOSIS — D68.69 SECONDARY HYPERCOAGULABLE STATE (HCC): ICD-10-CM

## 2022-08-25 LAB
INR BLD: 2.1 (ref 0.9–1.2)
INR PPP: 2.1 (ref 2–3.5)

## 2022-08-25 PROCEDURE — 99212 OFFICE O/P EST SF 10 MIN: CPT | Performed by: NURSE PRACTITIONER

## 2022-08-25 PROCEDURE — 85610 PROTHROMBIN TIME: CPT

## 2022-08-25 NOTE — PROGRESS NOTES
Anticoagulation Summary  As of 2022      INR goal:  2.0-3.0   TTR:  83.9 % (6.2 y)   INR used for dosin.10 (2022)   Warfarin maintenance plan:  5 mg (5 mg x 1) every Mon, Fri; 7.5 mg (5 mg x 1.5) all other days   Weekly warfarin total:  47.5 mg   Plan last modified:  Petra Larson PharmD (2022)   Next INR check:  2022   Target end date:  Indefinite    Indications    Paroxysmal atrial fibrillation (HCC) [I48.0]  Chronic anticoagulation [Z79.01]  Secondary hypercoagulable state (HCC) [D68.69]                 Anticoagulation Episode Summary       INR check location:  Anticoagulation Clinic    Preferred lab:      Send INR reminders to:      Comments:  Indomethacin as needed, discussed risks with the patient.          Anticoagulation Care Providers       Provider Role Specialty Phone number    Cheikh Turner M.D. Referring Cardiovascular Disease (Cardiology) [phone]    [facility] Anticoagulation Services Responsible  [phone]                  Refer to Patient Findings for HPI:  Patient Findings       Negatives:  Signs/symptoms of thrombosis, Signs/symptoms of bleeding, Laboratory test error suspected, Change in health, Change in alcohol use, Change in activity, Upcoming invasive procedure, Emergency department visit, Upcoming dental procedure, Missed doses, Extra doses, Change in medications, Change in diet/appetite, Hospital admission, Bruising, Other complaints            There were no vitals filed for this visit.   pt declined vitals    Verified current warfarin dosing schedule.    Medications reconciled   Pt is not on antiplatelet therapy      A/P   INR  -therapeutic. INR up from 1.8 with a loading dose. Will have pt take a slightly increased regimen.    Warfarin dosing recommendation: Take 5 mg Mon/Fri, 7.5 mg all other days.    Pt educated to contact our clinic with any changes in medications or s/s of bleeding or thrombosis. Pt is aware to seek immediate medical attention for  falls, head injury or deep cuts.    Follow up appointment in 2 week(s) per pt's request.    PAMELA Bryan.

## 2022-09-02 ENCOUNTER — HOSPITAL ENCOUNTER (OUTPATIENT)
Dept: LAB | Facility: MEDICAL CENTER | Age: 77
End: 2022-09-02
Attending: INTERNAL MEDICINE
Payer: MEDICARE

## 2022-09-02 DIAGNOSIS — E78.5 DYSLIPIDEMIA: ICD-10-CM

## 2022-09-02 DIAGNOSIS — E55.9 VITAMIN D DEFICIENCY: ICD-10-CM

## 2022-09-02 LAB
25(OH)D3 SERPL-MCNC: 60 NG/ML (ref 30–100)
CHOLEST SERPL-MCNC: 135 MG/DL (ref 100–199)
FASTING STATUS PATIENT QL REPORTED: NORMAL
HDLC SERPL-MCNC: 60 MG/DL
LDLC SERPL CALC-MCNC: 60 MG/DL
TRIGL SERPL-MCNC: 75 MG/DL (ref 0–149)

## 2022-09-02 PROCEDURE — 80061 LIPID PANEL: CPT

## 2022-09-02 PROCEDURE — 36415 COLL VENOUS BLD VENIPUNCTURE: CPT

## 2022-09-02 PROCEDURE — 82306 VITAMIN D 25 HYDROXY: CPT

## 2022-09-07 ENCOUNTER — OFFICE VISIT (OUTPATIENT)
Dept: CARDIOLOGY | Facility: MEDICAL CENTER | Age: 77
End: 2022-09-07
Payer: MEDICARE

## 2022-09-07 VITALS
SYSTOLIC BLOOD PRESSURE: 112 MMHG | WEIGHT: 230 LBS | HEART RATE: 79 BPM | RESPIRATION RATE: 20 BRPM | HEIGHT: 77 IN | OXYGEN SATURATION: 98 % | BODY MASS INDEX: 27.16 KG/M2 | DIASTOLIC BLOOD PRESSURE: 68 MMHG

## 2022-09-07 DIAGNOSIS — D68.69 SECONDARY HYPERCOAGULABLE STATE (HCC): ICD-10-CM

## 2022-09-07 DIAGNOSIS — E11.40 CONTROLLED TYPE 2 DIABETES MELLITUS WITH DIABETIC NEUROPATHY, WITHOUT LONG-TERM CURRENT USE OF INSULIN (HCC): ICD-10-CM

## 2022-09-07 DIAGNOSIS — E11.9 CONTROLLED TYPE 2 DIABETES MELLITUS WITHOUT COMPLICATION, WITHOUT LONG-TERM CURRENT USE OF INSULIN (HCC): ICD-10-CM

## 2022-09-07 DIAGNOSIS — I10 ESSENTIAL HYPERTENSION: ICD-10-CM

## 2022-09-07 DIAGNOSIS — Z79.01 CHRONIC ANTICOAGULATION: ICD-10-CM

## 2022-09-07 DIAGNOSIS — I48.21 PERMANENT ATRIAL FIBRILLATION (HCC): Chronic | ICD-10-CM

## 2022-09-07 PROCEDURE — 99214 OFFICE O/P EST MOD 30 MIN: CPT | Performed by: INTERNAL MEDICINE

## 2022-09-07 RX ORDER — PHENAZOPYRIDINE HYDROCHLORIDE 200 MG/1
TABLET, FILM COATED ORAL
COMMUNITY
Start: 2022-06-28 | End: 2022-09-07

## 2022-09-07 RX ORDER — INDOMETHACIN 50 MG/1
CAPSULE ORAL
COMMUNITY
End: 2022-09-07

## 2022-09-07 RX ORDER — PHENAZOPYRIDINE HYDROCHLORIDE 200 MG/1
TABLET, FILM COATED ORAL
COMMUNITY
End: 2022-09-07

## 2022-09-07 RX ORDER — EMPAGLIFLOZIN 25 MG/1
1 TABLET, FILM COATED ORAL DAILY
Qty: 90 TABLET | Refills: 3 | Status: SHIPPED | OUTPATIENT
Start: 2022-09-07 | End: 2023-02-07 | Stop reason: SDUPTHER

## 2022-09-07 ASSESSMENT — FIBROSIS 4 INDEX: FIB4 SCORE: 1.57

## 2022-09-07 NOTE — PROGRESS NOTES
"Chief Complaint   Patient presents with    Anticoagulation       Subjective     Rajiv Perez is a 77 y.o. male who presents today for follow-up of his history regurgitation physician chronic anticoagulation    The past year tolerating his medications well    Past Medical History:   Diagnosis Date    Arrhythmia July 2013    AFIB, cardioversion    Arthritis     Chronic gout of hand 3/29/2022    Chronic gout of left hand 5/12/2022    Diabetes     Diet,oral meds    History of kidney stones 3/29/2022    Hypertension     Hypertension     Loss of balance 3/29/2022    Pain      \"stiff\" from arthritis, 2/10    Paroxysmal atrial fibrillation (HCC) 6/6/2013    Sleep apnea     CPAP    Snoring     Spinal stenosis of lumbar region without neurogenic claudication 3/29/2022    Vitamin D deficiency 8/18/2022     Past Surgical History:   Procedure Laterality Date    ORIF, FRACTURE, FEMUR Left 2/24/2016    Procedure: DISTAL FEMUR ORIF;  Surgeon: Montrell Eddy M.D.;  Location: SURGERY [facility];  Service:     CYSTOSCOPY STENT PLACEMENT  1/13/2014    Performed by Main Batista M.D. at SURGERY [facility]    URETEROSCOPY  1/13/2014    Performed by Main Batista M.D. at SURGERY [facility]    LASERTRIPSY  1/13/2014    Performed by Main Batista M.D. at SURGERY [facility]    OTHER      Surgery for kidney stones    RECOVERY  7/11/2013    Performed by Cath-Recovery Surgery at SURGERY SAME DAY [facility]    KNEE ARTHROPLASTY TOTAL  12/1/2009    Performed by GIACOMO PETTIT at SURGERY [facility]    KNEE ARTHROPLASTY TOTAL  3/2009    Left    LUMBAR LAMINECTOMY DISKECTOMY  7/2006    L1-L5/ residual left foot weaknesss     REPAIR, KNEE, ACL  12/2005    Right    ORIF, FRACTURE, FEMUR       Family History   Problem Relation Age of Onset    Heart Attack Father 59    Heart Disease Father     Hypertension Brother     Diabetes Brother     Arthritis Brother     Thyroid Mother 50    Breast Cancer Sister     " No Known Problems Maternal Grandmother     No Known Problems Maternal Grandfather     No Known Problems Paternal Grandmother     No Known Problems Paternal Grandfather     Arrythmia Brother     No Known Problems Brother     No Known Problems Daughter      Social History     Socioeconomic History    Marital status:      Spouse name: Not on file    Number of children: Not on file    Years of education: Not on file    Highest education level: Not on file   Occupational History    Not on file   Tobacco Use    Smoking status: Never    Smokeless tobacco: Never   Vaping Use    Vaping Use: Never used   Substance and Sexual Activity    Alcohol use: Yes     Alcohol/week: 6.0 oz     Types: 10 Glasses of wine per week     Comment: everyday    Drug use: No    Sexual activity: Never     Partners: Female   Other Topics Concern    Not on file   Social History Narrative    Not on file     Social Determinants of Health     Financial Resource Strain: Not on file   Food Insecurity: Not on file   Transportation Needs: Not on file   Physical Activity: Not on file   Stress: Not on file   Social Connections: Not on file   Intimate Partner Violence: Not on file   Housing Stability: Not on file     Allergies   Allergen Reactions    Nkda [No Known Drug Allergy]      Outpatient Encounter Medications as of 9/7/2022   Medication Sig Dispense Refill    allopurinol (ZYLOPRIM) 100 MG Tab Take 1 Tablet by mouth every day. 100 Tablet 1    glimepiride (AMARYL) 2 MG Tab TAKE  ONE TABLET BY MOUTH EVERY MORNING 100 Tablet 1    warfarin (COUMADIN) 5 MG Tab Take 1-1.5 Tablets by mouth every day. As directed by [facility] 135 Tablet 1    cyanocobalamin ([employer] VITAMIN B12) 1000 MCG Tab Take 1 Tablet by mouth every day. 100 Tablet 1    metformin (GLUCOPHAGE) 1000 MG tablet TAKE ONE TABLET BY MOUTH TWICE DAILY WITH MEALS 200 Tablet 2    colchicine (COLCRYS) 0.6 MG Tab Take 1 Tablet by mouth as needed. Take 1 tablet onset of pain, repeat  "in 1 hour if symptoms persist.  Max 3 tablets daily. (Patient taking differently: Take 0.6 mg by mouth as needed. Take 1 tablet onset of pain, repeat in 1 hour if symptoms persist.  Max 3 tablets daily.) 30 Tablet 0    tamsulosin (FLOMAX) 0.4 MG capsule Take 1 capsule by mouth daily. 30 Capsule 1    atorvastatin (LIPITOR) 10 MG Tab Take 1 Tablet by mouth every day. 100 Tablet 3    enalapril (VASOTEC) 10 MG Tab Take 1 Tablet by mouth every day. 100 Tablet 3    metoprolol tartrate (LOPRESSOR) 25 MG Tab Take 1 Tablet by mouth 2 times a day. 200 Tablet 3    Empagliflozin (JARDIANCE) 25 MG Tab Take 1 Tablet by mouth every day. 30 Tablet 5    VITAMIN D, CHOLECALCIFEROL, PO Take 5,000 Units by mouth.      [DISCONTINUED] phenazopyridine (PYRIDIUM) 200 MG Tab TAKE 1 TABLET BY MOUTH THREE TIMES A DAY FOR 2 DAYS (Patient not taking: Reported on 9/7/2022)      [DISCONTINUED] phenazopyridine (PYRIDIUM) 200 MG Tab phenazopyridine 200 mg tablet   TAKE 1 TABLET BY MOUTH THREE TIMES A DAY FOR 2 DAYS (Patient not taking: Reported on 9/7/2022)      [DISCONTINUED] indomethacin (INDOCIN) 50 MG Cap indomethacin 50 mg capsule   TAKE 1 CAPSULE BY MOUTH 3 TIMES A DAY AS NEEDED (GOUT PAIN). (Patient not taking: Reported on 9/7/2022)       No facility-administered encounter medications on file as of 9/7/2022.     ROS           Objective     /68 (BP Location: Left arm, Patient Position: Sitting, BP Cuff Size: Adult)   Pulse 79   Resp 20   Ht 1.956 m (6' 5\")   Wt 104 kg (230 lb)   SpO2 98%   BMI 27.27 kg/m²     Physical Exam  Constitutional:       General: He is not in acute distress.     Appearance: He is not diaphoretic.   HENT:      Mouth/Throat:      Comments: Wearing a mask for COVID protocol  Eyes:      General: No scleral icterus.  Neck:      Vascular: No JVD.   Cardiovascular:      Rate and Rhythm: Normal rate.      Heart sounds: Normal heart sounds. No murmur heard.    No friction rub. No gallop.   Pulmonary:      Effort: " No respiratory distress.      Breath sounds: No wheezing or rales.   Abdominal:      General: Bowel sounds are normal.      Palpations: Abdomen is soft.   Musculoskeletal:      Right lower leg: No edema.      Left lower leg: No edema.   Skin:     Findings: No rash.   Neurological:      Mental Status: He is alert. Mental status is at baseline.   Psychiatric:         Mood and Affect: Mood normal.          We reviewed in person the most recent labs  Recent Results (from the past 5040 hour(s))   POCT Protime    Collection Time: 22 12:00 AM   Result Value Ref Range    INR 2.30 2 - 3.5   URIC ACID    Collection Time: 22  1:47 PM   Result Value Ref Range    Uric Acid 5.2 2.5 - 8.3 mg/dL   HEMOGLOBIN A1C    Collection Time: 22  1:47 PM   Result Value Ref Range    Glycohemoglobin 8.4 (H) 4.0 - 5.6 %    Est Avg Glucose 194 mg/dL   MICROALBUMIN CREAT RATIO URINE    Collection Time: 22  1:47 PM   Result Value Ref Range    Creatinine, Urine 45.10 mg/dL    Microalbumin, Urine Random 1.7 mg/dL    Micro Alb Creat Ratio 38 (H) 0 - 30 mg/g   EKG    Collection Time: 22 10:34 AM   Result Value Ref Range    Report       [facility] Emergency Dept.    Test Date:  2022  Pt Name:    TOBI BOJORQUEZ       Department: [facility]  MRN:        [id number]                      Room:  Gender:     Male                         Technician: [facility]  :        1945                   Requested By:ER TRIAGE PROTOCOL  Order #:    [id number]                    Reading MD: BEA HERNANDEZ MD    Measurements  Intervals                                Axis  Rate:       76                           P:  MN:                                      QRS:        -37  QRSD:       106                          T:          12  QT:         405  QTc:        456    Interpretive Statements  Atrial fibrillation  Left axis deviation  Borderline T wave abnormalities  Compared to ECG 2016 23:10:25  EKG is  unchanged from previous  Electronically Signed On 5- 11:39:51 PDT by BEA HERNANDEZ MD     CBC WITH DIFFERENTIAL    Collection Time: 05/23/22 11:53 AM   Result Value Ref Range    WBC 9.1 4.8 - 10.8 K/uL    RBC 5.44 4.70 - 6.10 M/uL    Hemoglobin 16.5 14.0 - 18.0 g/dL    Hematocrit 48.6 42.0 - 52.0 %    MCV 89.3 81.4 - 97.8 fL    MCH 30.3 27.0 - 33.0 pg    MCHC 34.0 33.7 - 35.3 g/dL    RDW 41.4 35.9 - 50.0 fL    Platelet Count 190 164 - 446 K/uL    MPV 11.7 9.0 - 12.9 fL    Neutrophils-Polys 73.40 (H) 44.00 - 72.00 %    Lymphocytes 16.60 (L) 22.00 - 41.00 %    Monocytes 7.80 0.00 - 13.40 %    Eosinophils 1.20 0.00 - 6.90 %    Basophils 0.70 0.00 - 1.80 %    Immature Granulocytes 0.30 0.00 - 0.90 %    Nucleated RBC 0.00 /100 WBC    Neutrophils (Absolute) 6.69 1.82 - 7.42 K/uL    Lymphs (Absolute) 1.51 1.00 - 4.80 K/uL    Monos (Absolute) 0.71 0.00 - 0.85 K/uL    Eos (Absolute) 0.11 0.00 - 0.51 K/uL    Baso (Absolute) 0.06 0.00 - 0.12 K/uL    Immature Granulocytes (abs) 0.03 0.00 - 0.11 K/uL    NRBC (Absolute) 0.00 K/uL   COMP METABOLIC PANEL    Collection Time: 05/23/22 11:53 AM   Result Value Ref Range    Sodium 140 135 - 145 mmol/L    Potassium 4.0 3.6 - 5.5 mmol/L    Chloride 103 96 - 112 mmol/L    Co2 22 20 - 33 mmol/L    Anion Gap 15.0 7.0 - 16.0    Glucose 139 (H) 65 - 99 mg/dL    Bun 14 8 - 22 mg/dL    Creatinine 0.63 0.50 - 1.40 mg/dL    Calcium 9.6 8.4 - 10.2 mg/dL    AST(SGOT) 16 12 - 45 U/L    ALT(SGPT) 17 2 - 50 U/L    Alkaline Phosphatase 87 30 - 99 U/L    Total Bilirubin 0.9 0.1 - 1.5 mg/dL    Albumin 4.4 3.2 - 4.9 g/dL    Total Protein 7.6 6.0 - 8.2 g/dL    Globulin 3.2 1.9 - 3.5 g/dL    A-G Ratio 1.4 g/dL   TROPONIN    Collection Time: 05/23/22 11:53 AM   Result Value Ref Range    Troponin T 16 6 - 19 ng/L   D-DIMER    Collection Time: 05/23/22 11:53 AM   Result Value Ref Range    D-Dimer Screen 0.31 0.00 - 0.50 ug/mL (FEU)   PROTHROMBIN TIME (INR)    Collection Time: 05/23/22 11:53 AM    Result Value Ref Range    PT 18.6 (H) 12.0 - 14.6 sec    INR 1.69 (H) 0.87 - 1.13   ESTIMATED GFR    Collection Time: 05/23/22 11:53 AM   Result Value Ref Range    GFR (CKD-EPI) 98 >60 mL/min/1.73 m 2   POCT Protime    Collection Time: 05/24/22 12:00 AM   Result Value Ref Range    INR 1.70 (A) 2 - 3.5   POCT INR device results    Collection Time: 05/24/22 11:02 AM   Result Value Ref Range    POC INR 1.7 (H) 0.9 - 1.2   POCT Protime    Collection Time: 06/14/22 12:00 AM   Result Value Ref Range    INR 2.20 2 - 3.5   POCT INR device results    Collection Time: 06/14/22 11:37 AM   Result Value Ref Range    POC INR 2.2 (H) 0.9 - 1.2   URINE CULTURE(NEW)    Collection Time: 06/28/22 12:00 PM    Specimen: Urine   Result Value Ref Range    Significant Indicator POS (POS)     Source UR     Site -     Culture Result - (A)     Culture Result Candida glabrata  ,000 cfu/mL   (A)    POCT Protime    Collection Time: 07/12/22 12:00 AM   Result Value Ref Range    INR 2.70 2 - 3.5   POCT INR device results    Collection Time: 07/12/22 11:43 AM   Result Value Ref Range    POC INR 2.7 (H) 0.9 - 1.2   POCT Protime    Collection Time: 07/19/22 12:00 AM   Result Value Ref Range    INR 4.80 (A) 2 - 3.5   POCT INR device results    Collection Time: 07/19/22 11:28 AM   Result Value Ref Range    POC INR 4.8 (H) 0.9 - 1.2   POCT Protime    Collection Time: 07/26/22 12:00 AM   Result Value Ref Range    INR 3.60 (A) 2 - 3.5   POCT INR device results    Collection Time: 07/26/22 11:00 AM   Result Value Ref Range    POC INR 3.6 (H) 0.9 - 1.2   POCT Protime    Collection Time: 08/02/22 12:00 AM   Result Value Ref Range    INR 1.30 (A) 2 - 3.5   POCT INR device results    Collection Time: 08/02/22 11:29 AM   Result Value Ref Range    POC INR 1.3 (H) 0.9 - 1.2   POCT Protime    Collection Time: 08/09/22 12:00 AM   Result Value Ref Range    INR 1.90 (A) 2 - 3.5   POCT INR device results    Collection Time: 08/09/22 11:52 AM   Result Value Ref  Range    POC INR 1.9 (H) 0.9 - 1.2   POCT Protime    Collection Time: 08/18/22 12:00 AM   Result Value Ref Range    INR 1.80 (A) 2 - 3.5   POCT Hemoglobin A1C    Collection Time: 08/18/22  1:04 PM   Result Value Ref Range    Glycohemoglobin 6.9 (A) 0.0 - 5.6 %    Internal Control Negative Negative     Internal Control Positive Positive    POCT INR device results    Collection Time: 08/18/22  3:36 PM   Result Value Ref Range    POC INR 1.8 (H) 0.9 - 1.2   POCT Protime    Collection Time: 08/25/22 12:00 AM   Result Value Ref Range    INR 2.10 2 - 3.5   POCT INR device results    Collection Time: 08/25/22  3:25 PM   Result Value Ref Range    POC INR 2.1 (H) 0.9 - 1.2   VITAMIN D,25 HYDROXY    Collection Time: 09/02/22  7:06 AM   Result Value Ref Range    25-Hydroxy   Vitamin D 25 60 30 - 100 ng/mL   Lipid Profile    Collection Time: 09/02/22  7:06 AM   Result Value Ref Range    Cholesterol,Tot 135 100 - 199 mg/dL    Triglycerides 75 0 - 149 mg/dL    HDL 60 >=40 mg/dL    LDL 60 <100 mg/dL   FASTING STATUS    Collection Time: 09/02/22  7:06 AM   Result Value Ref Range    Fasting Status Fasting          Assessment & Plan     1. Chronic anticoagulation        2. Essential hypertension        3. Secondary hypercoagulable state (HCC)        4. Permanent atrial fibrillation (HCC)        5. Controlled type 2 diabetes mellitus without complication, without long-term current use of insulin (HCC)  Empagliflozin (JARDIANCE) 25 MG Tab          Medical Decision Making: Today's Assessment/Status/Plan:        It was my pleasure to meet with Mr. Perez.  We addressed the management of hypertension at today's visit. Blood pressure is well controlled.  We specifically assessed the labs on hypertension treatment  We addressed the management of dyslipidemia at today's visit. He is on appropriate statin.    We addressed the management of chronic anticoagulation at today's visit. He understands the risks and benefits of chronic  anticoagulation.  We reviewed and verified the results of annual testing for anemia and kidney function.        I will see Mr. Perez back in 1 year time and encouraged him to follow up with us over the phone or electronically using my MyChart as issues arise.    It is my pleasure to participate in the care of Mr. Perez.  Please do not hesitate to contact me with questions or concerns.    Cheikh Turner MD PhD [facility]  Cardiologist [facility]    Please note that this dictation was created using voice recognition software. There may be errors I did not discover before finalizing the note.

## 2022-09-08 ENCOUNTER — ANTICOAGULATION VISIT (OUTPATIENT)
Dept: VASCULAR LAB | Facility: MEDICAL CENTER | Age: 77
End: 2022-09-08
Attending: NURSE PRACTITIONER
Payer: MEDICARE

## 2022-09-08 DIAGNOSIS — I48.21 PERMANENT ATRIAL FIBRILLATION (HCC): ICD-10-CM

## 2022-09-08 DIAGNOSIS — D68.69 SECONDARY HYPERCOAGULABLE STATE (HCC): ICD-10-CM

## 2022-09-08 DIAGNOSIS — Z79.01 CHRONIC ANTICOAGULATION: ICD-10-CM

## 2022-09-08 LAB
INR BLD: 2.8 (ref 0.9–1.2)
INR PPP: 2.8 (ref 2–3.5)

## 2022-09-08 PROCEDURE — 85610 PROTHROMBIN TIME: CPT

## 2022-09-08 PROCEDURE — 99211 OFF/OP EST MAY X REQ PHY/QHP: CPT | Performed by: NURSE PRACTITIONER

## 2022-09-08 NOTE — PROGRESS NOTES
Anticoagulation Summary  As of 2022      INR goal:  2.0-3.0   TTR:  84.0 % (6.3 y)   INR used for dosin.80 (2022)   Warfarin maintenance plan:  5 mg (5 mg x 1) every Mon, Fri; 7.5 mg (5 mg x 1.5) all other days   Weekly warfarin total:  47.5 mg   Plan last modified:  Petra Larson PharmD (2022)   Next INR check:  2022   Target end date:  Indefinite    Indications    Permanent atrial fibrillation (HCC) [I48.21]  Chronic anticoagulation [Z79.01]  Secondary hypercoagulable state (HCC) [D68.69]                 Anticoagulation Episode Summary       INR check location:  Anticoagulation Clinic    Preferred lab:      Send INR reminders to:      Comments:  Indomethacin as needed, discussed risks with the patient.          Anticoagulation Care Providers       Provider Role Specialty Phone number    Cheikh Turner M.D. Referring Cardiovascular Disease (Cardiology) [phone]    [facility] Anticoagulation Services Responsible  [phone]                  Refer to Patient Findings for HPI:  Patient Findings       Negatives:  Signs/symptoms of thrombosis, Signs/symptoms of bleeding, Laboratory test error suspected, Change in health, Change in alcohol use, Change in activity, Upcoming invasive procedure, Emergency department visit, Upcoming dental procedure, Missed doses, Extra doses, Change in medications, Change in diet/appetite, Hospital admission, Bruising, Other complaints            There were no vitals filed for this visit.   pt declined vitals    Verified current warfarin dosing schedule.    Medications reconciled   Pt is not on antiplatelet therapy      A/P   INR  -therapeutic.     Warfarin dosing recommendation: Continue current regimen for now.    Pt educated to contact our clinic with any changes in medications or s/s of bleeding or thrombosis. Pt is aware to seek immediate medical attention for falls, head injury or deep cuts.    Follow up appointment in 3 week(s) per pt's  request.    REY Bryan

## 2022-09-29 ENCOUNTER — ANTICOAGULATION VISIT (OUTPATIENT)
Dept: VASCULAR LAB | Facility: MEDICAL CENTER | Age: 77
End: 2022-09-29
Attending: NURSE PRACTITIONER
Payer: MEDICARE

## 2022-09-29 DIAGNOSIS — I48.0 PAROXYSMAL ATRIAL FIBRILLATION (HCC): ICD-10-CM

## 2022-09-29 DIAGNOSIS — D68.69 SECONDARY HYPERCOAGULABLE STATE (HCC): ICD-10-CM

## 2022-09-29 DIAGNOSIS — I48.21 PERMANENT ATRIAL FIBRILLATION (HCC): ICD-10-CM

## 2022-09-29 DIAGNOSIS — Z79.01 CHRONIC ANTICOAGULATION: ICD-10-CM

## 2022-09-29 LAB
INR BLD: 2.1 (ref 0.9–1.2)
INR PPP: 2.1 (ref 2–3.5)

## 2022-09-29 PROCEDURE — 99211 OFF/OP EST MAY X REQ PHY/QHP: CPT | Performed by: NURSE PRACTITIONER

## 2022-09-29 PROCEDURE — 85610 PROTHROMBIN TIME: CPT

## 2022-09-29 RX ORDER — WARFARIN SODIUM 5 MG/1
5-7.5 TABLET ORAL DAILY
Qty: 135 TABLET | Refills: 1 | Status: SHIPPED | OUTPATIENT
Start: 2022-09-29 | End: 2023-03-02 | Stop reason: SDUPTHER

## 2022-09-29 NOTE — PROGRESS NOTES
Anticoagulation Summary  As of 2022      INR goal:  2.0-3.0   TTR:  84.1 % (6.3 y)   INR used for dosin.10 (2022)   Warfarin maintenance plan:  5 mg (5 mg x 1) every Mon, Fri; 7.5 mg (5 mg x 1.5) all other days   Weekly warfarin total:  47.5 mg   Plan last modified:  Matthew JimenezD (2022)   Next INR check:  10/27/2022   Target end date:  Indefinite    Indications    Permanent atrial fibrillation (HCC) [I48.21]  Chronic anticoagulation [Z79.01]  Secondary hypercoagulable state (HCC) [D68.69]                 Anticoagulation Episode Summary       INR check location:  Anticoagulation Clinic    Preferred lab:      Send INR reminders to:      Comments:  Indomethacin as needed, discussed risks with the patient.          Anticoagulation Care Providers       Provider Role Specialty Phone number    Cheikh Turner M.D. Referring Cardiovascular Disease (Cardiology) [phone]    [facility] Anticoagulation Services Responsible  [phone]                  Refer to Patient Findings for HPI:  Patient Findings       Negatives:  Signs/symptoms of thrombosis, Signs/symptoms of bleeding, Laboratory test error suspected, Change in health, Change in alcohol use, Change in activity, Upcoming invasive procedure, Emergency department visit, Upcoming dental procedure, Missed doses, Extra doses, Change in medications, Change in diet/appetite, Hospital admission, Bruising, Other complaints            There were no vitals filed for this visit.   pt declined vitals    Verified current warfarin dosing schedule.    Medications reconciled   Pt is not on antiplatelet therapy      A/P   INR  -therapeutic.     Warfarin dosing recommendation: Continue current regimen.    Pt educated to contact our clinic with any changes in medications or s/s of bleeding or thrombosis. Pt is aware to seek immediate medical attention for falls, head injury or deep cuts.    Follow up appointment in 4 week(s).    PAMELA Bryan.

## 2022-10-02 DIAGNOSIS — E78.5 DYSLIPIDEMIA: ICD-10-CM

## 2022-10-03 RX ORDER — ATORVASTATIN CALCIUM 10 MG/1
10 TABLET, FILM COATED ORAL DAILY
Qty: 100 TABLET | Refills: 3 | Status: SHIPPED | OUTPATIENT
Start: 2022-10-03 | End: 2023-03-02 | Stop reason: SDUPTHER

## 2022-10-03 NOTE — TELEPHONE ENCOUNTER
Is the patient due for a refill? Yes    Was the patient seen the past year? Yes    Date of last office visit: 9/7/22    Does the patient have an upcoming appointment?  No    Provider to refill:CW    Does the patients insurance require a 100 day supply?  Yes

## 2022-10-19 ENCOUNTER — HOSPITAL ENCOUNTER (OUTPATIENT)
Facility: MEDICAL CENTER | Age: 77
End: 2022-10-19
Attending: STUDENT IN AN ORGANIZED HEALTH CARE EDUCATION/TRAINING PROGRAM
Payer: MEDICARE

## 2022-10-19 PROCEDURE — 87086 URINE CULTURE/COLONY COUNT: CPT

## 2022-10-20 DIAGNOSIS — I10 ESSENTIAL HYPERTENSION: ICD-10-CM

## 2022-10-22 LAB
BACTERIA UR CULT: NORMAL
SIGNIFICANT IND 70042: NORMAL
SITE SITE: NORMAL
SOURCE SOURCE: NORMAL

## 2022-10-27 ENCOUNTER — ANTICOAGULATION VISIT (OUTPATIENT)
Dept: VASCULAR LAB | Facility: MEDICAL CENTER | Age: 77
End: 2022-10-27
Attending: NURSE PRACTITIONER
Payer: MEDICARE

## 2022-10-27 DIAGNOSIS — I48.21 PERMANENT ATRIAL FIBRILLATION (HCC): ICD-10-CM

## 2022-10-27 DIAGNOSIS — D68.69 SECONDARY HYPERCOAGULABLE STATE (HCC): ICD-10-CM

## 2022-10-27 DIAGNOSIS — Z79.01 CHRONIC ANTICOAGULATION: ICD-10-CM

## 2022-10-27 LAB — INR PPP: 2.1 (ref 2–3.5)

## 2022-10-27 PROCEDURE — 99211 OFF/OP EST MAY X REQ PHY/QHP: CPT

## 2022-10-27 PROCEDURE — 85610 PROTHROMBIN TIME: CPT

## 2022-10-27 NOTE — PROGRESS NOTES
Anticoagulation Summary  As of 10/27/2022      INR goal:  2.0-3.0   TTR:  84.3 % (6.4 y)   INR used for dosin.10 (10/27/2022)   Warfarin maintenance plan:  5 mg (5 mg x 1) every Mon, Fri; 7.5 mg (5 mg x 1.5) all other days   Weekly warfarin total:  47.5 mg   Plan last modified:  Petra Larson PharmD (2022)   Next INR check:  2022   Target end date:  Indefinite    Indications    Permanent atrial fibrillation (HCC) [I48.21]  Chronic anticoagulation [Z79.01]  Secondary hypercoagulable state (HCC) [D68.69]                 Anticoagulation Episode Summary       INR check location:  Anticoagulation Clinic    Preferred lab:      Send INR reminders to:      Comments:  Indomethacin as needed, discussed risks with the patient.          Anticoagulation Care Providers       Provider Role Specialty Phone number    Cheikh Turner M.D. Referring Cardiovascular Disease (Cardiology) [phone]    [facility] Anticoagulation Services Responsible  [phone]                  Refer to Patient Findings for HPI:  Patient Findings       Negatives:  Signs/symptoms of thrombosis, Signs/symptoms of bleeding, Laboratory test error suspected, Change in health, Change in alcohol use, Change in activity, Upcoming invasive procedure, Emergency department visit, Upcoming dental procedure, Missed doses, Extra doses, Change in medications, Change in diet/appetite, Hospital admission, Bruising, Other complaints            There were no vitals filed for this visit.   pt declined vitals    Verified current warfarin dosing schedule.    Medications reconciled   Pt is not on antiplatelet therapy      A/P   INR  therapeutic.     Warfarin dosing recommendation: Pt is to continue with current warfarin dosing regimen.    Pt educated to contact our clinic with any changes in medications or s/s of bleeding or thrombosis. Pt is aware to seek immediate medical attention for falls, head injury or deep cuts.    Follow up appointment in 5  week(s).    Miguelina Baeza, PharmD

## 2022-11-01 LAB — INR BLD: 2.1 (ref 0.9–1.2)

## 2022-11-21 DIAGNOSIS — I10 ESSENTIAL HYPERTENSION: ICD-10-CM

## 2022-11-23 RX ORDER — ENALAPRIL MALEATE 10 MG/1
10 TABLET ORAL DAILY
Qty: 100 TABLET | Refills: 2 | Status: SHIPPED | OUTPATIENT
Start: 2022-11-23 | End: 2023-03-06 | Stop reason: SDUPTHER

## 2022-12-01 ENCOUNTER — ANTICOAGULATION VISIT (OUTPATIENT)
Dept: VASCULAR LAB | Facility: MEDICAL CENTER | Age: 77
End: 2022-12-01
Attending: NURSE PRACTITIONER
Payer: MEDICARE

## 2022-12-01 DIAGNOSIS — Z79.01 CHRONIC ANTICOAGULATION: ICD-10-CM

## 2022-12-01 DIAGNOSIS — I48.21 PERMANENT ATRIAL FIBRILLATION (HCC): Chronic | ICD-10-CM

## 2022-12-01 DIAGNOSIS — D68.69 SECONDARY HYPERCOAGULABLE STATE (HCC): ICD-10-CM

## 2022-12-01 LAB — INR PPP: 2.3 (ref 2–3.5)

## 2022-12-01 PROCEDURE — 99211 OFF/OP EST MAY X REQ PHY/QHP: CPT

## 2022-12-01 PROCEDURE — 85610 PROTHROMBIN TIME: CPT

## 2022-12-01 NOTE — PROGRESS NOTES
Anticoagulation Summary  As of 2022      INR goal:  2.0-3.0   TTR:  84.5 % (6.5 y)   INR used for dosin.30 (2022)   Warfarin maintenance plan:  5 mg (5 mg x 1) every Mon, Fri; 7.5 mg (5 mg x 1.5) all other days; Starting 2022   Weekly warfarin total:  47.5 mg   No change documented:  No Eastman   Plan last modified:  Matthew JimenezD (2022)   Next INR check:  2023   Target end date:  Indefinite    Indications    Permanent atrial fibrillation (HCC) [I48.21]  Chronic anticoagulation [Z79.01]  Secondary hypercoagulable state (HCC) [D68.69]                 Anticoagulation Episode Summary       INR check location:  Anticoagulation Clinic    Preferred lab:      Send INR reminders to:      Comments:  Indomethacin as needed, discussed risks with the patient.          Anticoagulation Care Providers       Provider Role Specialty Phone number    Cheikh Turner M.D. Referring Cardiovascular Disease (Cardiology) [phone]    [facility]ticoagulation [facility] Responsible  [phone]          Refer to Patient Findings for HPI:  Patient Findings       Negatives:  Signs/symptoms of thrombosis, Signs/symptoms of bleeding, Laboratory test error suspected, Change in health, Change in alcohol use, Change in activity, Upcoming invasive procedure, Emergency department visit, Upcoming dental procedure, Missed doses, Extra doses, Change in medications, Change in diet/appetite, Hospital admission, Bruising, Other complaints          There were no vitals filed for this visit.  The pt declined vitals today     Patient seen in clinic today for follow up on anticoagulation therapy with warfarin (a high risk medication) for hx of AF  Verified current warfarin dosing schedule.  Patient denies any missed doses of warfarin.    Medications reconciled   Pt is not on antiplatelet therapy      A/P   INR is therapeutic today at 2.3.     Warfarin dosing recommendation: Continue with the current  dosing regimen.   Patient will follow up again in 6 weeks.       Pt educated to contact our clinic with any changes in medications or s/s of bleeding or thrombosis. Pt is aware to seek immediate medical attention for falls, head injury or deep cuts.    Follow up appointment in 6 week(s).    Next appt: Thurs, Jan 12 @ 11am     Kam Eastman PharmD

## 2022-12-02 LAB — INR BLD: 2.3 (ref 0.9–1.2)

## 2022-12-05 ENCOUNTER — NON-PROVIDER VISIT (OUTPATIENT)
Dept: MEDICAL GROUP | Facility: PHYSICIAN GROUP | Age: 77
End: 2022-12-05
Payer: MEDICARE

## 2022-12-05 DIAGNOSIS — Z23 NEED FOR VACCINATION: ICD-10-CM

## 2022-12-05 PROCEDURE — 90662 IIV NO PRSV INCREASED AG IM: CPT | Performed by: FAMILY MEDICINE

## 2022-12-05 PROCEDURE — G0008 ADMIN INFLUENZA VIRUS VAC: HCPCS | Performed by: FAMILY MEDICINE

## 2022-12-05 NOTE — PROGRESS NOTES
"Rajiv Perez is a 77 y.o. male here for a non-provider visit for:   FLU    Reason for immunization: Annual Flu Vaccine  Immunization records indicate need for vaccine: Yes, confirmed with Epic  Minimum interval has been met for this vaccine: Yes  ABN completed: Yes    All IAC Questionnaire questions were answered \"No.\"    Patient tolerated injection and no adverse effects were observed or reported: Yes    Pt scheduled for next dose in series: Not Indicated   "

## 2023-01-12 ENCOUNTER — ANTICOAGULATION VISIT (OUTPATIENT)
Dept: VASCULAR LAB | Facility: MEDICAL CENTER | Age: 78
End: 2023-01-12
Attending: NURSE PRACTITIONER
Payer: MEDICARE

## 2023-01-12 DIAGNOSIS — Z79.01 CHRONIC ANTICOAGULATION: ICD-10-CM

## 2023-01-12 DIAGNOSIS — D68.69 SECONDARY HYPERCOAGULABLE STATE (HCC): ICD-10-CM

## 2023-01-12 DIAGNOSIS — I48.21 PERMANENT ATRIAL FIBRILLATION (HCC): Chronic | ICD-10-CM

## 2023-01-12 LAB
INR BLD: 2.4 (ref 0.9–1.2)
INR PPP: 2.4 (ref 2–3.5)

## 2023-01-12 PROCEDURE — 99211 OFF/OP EST MAY X REQ PHY/QHP: CPT | Performed by: NURSE PRACTITIONER

## 2023-01-12 PROCEDURE — 85610 PROTHROMBIN TIME: CPT

## 2023-01-12 NOTE — PROGRESS NOTES
Anticoagulation Summary  As of 2023      INR goal:  2.0-3.0   TTR:  84.8 % (6.6 y)   INR used for dosin.40 (2023)   Warfarin maintenance plan:  5 mg (5 mg x 1) every Mon, Fri; 7.5 mg (5 mg x 1.5) all other days   Weekly warfarin total:  47.5 mg   No change documented:  Amanda Guzman, A.P.N.   Plan last modified:  Petra Larson, PharmD (2022)   Next INR check:  3/2/2023   Target end date:  Indefinite    Indications    Permanent atrial fibrillation (HCC) [I48.21]  Chronic anticoagulation [Z79.01]  Secondary hypercoagulable state (HCC) [D68.69]                 Anticoagulation Episode Summary       INR check location:  Anticoagulation Clinic    Preferred lab:      Send INR reminders to:      Comments:  Indomethacin as needed, discussed risks with the patient.          Anticoagulation Care Providers       Provider Role Specialty Phone number    Cheikh Turner M.D. Referring Cardiovascular Disease (Cardiology) [phone]    [facility] Anticoagulation Services Responsible  [phone]                  Refer to Patient Findings for HPI:  Patient Findings       Negatives:  Signs/symptoms of thrombosis, Signs/symptoms of bleeding, Laboratory test error suspected, Change in health, Change in alcohol use, Change in activity, Upcoming invasive procedure, Emergency department visit, Upcoming dental procedure, Missed doses, Extra doses, Change in medications, Change in diet/appetite, Hospital admission, Bruising, Other complaints            There were no vitals filed for this visit.   pt declined vitals    Verified current warfarin dosing schedule.    Medications reconciled   Pt is not on antiplatelet therapy      A/P   INR  -therapeutic.     Warfarin dosing recommendation: Continue current regimen.    Pt educated to contact our clinic with any changes in medications or s/s of bleeding or thrombosis. Pt is aware to seek immediate medical attention for falls, head injury or deep cuts.    Follow up  appointment in 7 week(s).    PAMELA Bryan.

## 2023-02-03 DIAGNOSIS — E11.9 CONTROLLED TYPE 2 DIABETES MELLITUS WITHOUT COMPLICATION, WITHOUT LONG-TERM CURRENT USE OF INSULIN (HCC): ICD-10-CM

## 2023-02-03 RX ORDER — EMPAGLIFLOZIN 25 MG/1
1 TABLET, FILM COATED ORAL DAILY
Qty: 100 TABLET | Refills: 2 | Status: CANCELLED | OUTPATIENT
Start: 2023-02-03

## 2023-02-03 NOTE — TELEPHONE ENCOUNTER
Received request via: Pharmacy    Was the patient seen in the last year in this department? Yes    Does the patient have an active prescription (recently filled or refills available) for medication(s) requested? No    Does the patient have [facility] and need 100 day supply (blood pressure, diabetes and cholesterol meds only)? Yes, quantity updated to 100 days

## 2023-02-04 NOTE — TELEPHONE ENCOUNTER
Is the patient due for a refill? Yes    Was the patient seen the past year? Yes    Date of last office visit: 9/7/2022    Does the patient have an upcoming appointment?  No      Provider to refill:CW    Does the patients insurance require a 100 day supply?  Yes

## 2023-02-06 ENCOUNTER — PHARMACY VISIT (OUTPATIENT)
Dept: PHARMACY | Facility: MEDICAL CENTER | Age: 78
End: 2023-02-06
Payer: COMMERCIAL

## 2023-02-06 DIAGNOSIS — E11.9 CONTROLLED TYPE 2 DIABETES MELLITUS WITHOUT COMPLICATION, WITHOUT LONG-TERM CURRENT USE OF INSULIN (HCC): ICD-10-CM

## 2023-02-06 PROCEDURE — RXMED WILLOW AMBULATORY MEDICATION CHARGE: Performed by: FAMILY MEDICINE

## 2023-02-06 RX ORDER — EMPAGLIFLOZIN 25 MG/1
1 TABLET, FILM COATED ORAL DAILY
Qty: 100 TABLET | Refills: 2 | Status: CANCELLED | OUTPATIENT
Start: 2023-02-03

## 2023-02-06 NOTE — TELEPHONE ENCOUNTER
Requested Prescriptions     Pending Prescriptions Disp Refills   • metformin (GLUCOPHAGE) 1000 MG tablet 200 Tablet 0     Sig: TAKE ONE TABLET BY MOUTH TWICE DAILY WITH MEALS     Gisele Berg M.D.

## 2023-02-07 ENCOUNTER — TELEPHONE (OUTPATIENT)
Dept: CARDIOLOGY | Facility: MEDICAL CENTER | Age: 78
End: 2023-02-07
Payer: MEDICARE

## 2023-02-07 DIAGNOSIS — E11.9 CONTROLLED TYPE 2 DIABETES MELLITUS WITHOUT COMPLICATION, WITHOUT LONG-TERM CURRENT USE OF INSULIN (HCC): ICD-10-CM

## 2023-02-07 DIAGNOSIS — E78.5 DYSLIPIDEMIA: ICD-10-CM

## 2023-02-07 NOTE — TELEPHONE ENCOUNTER
CW    Caller: Rajiv    Medication Name and Dosage:     Empagliflozin (JARDIANCE) 25 MG Tab    Medication amount left: 3 tablets    Preferred Pharmacy:    [facility] - [address] [place] [address]   Phone:  [phone]  Fax:  [phone]   VOIDIO #:  [id number]    Other questions (Topic): Pt is changing his pharmacy back over to [facility] from [facility].    Callback Number (Will only call for issues): [phone]

## 2023-02-07 NOTE — TELEPHONE ENCOUNTER
Is the patient due for a refill? Yes- pharmacy change    Was the patient seen the past year? Yes    Date of last office visit: 9/7/2022    Does the patient have an upcoming appointment?  No   If yes, When?     Provider to refill:CW    Does the patients insurance require a 100 day supply?  Yes

## 2023-02-08 DIAGNOSIS — E11.9 CONTROLLED TYPE 2 DIABETES MELLITUS WITHOUT COMPLICATION, WITHOUT LONG-TERM CURRENT USE OF INSULIN (HCC): ICD-10-CM

## 2023-02-08 RX ORDER — EMPAGLIFLOZIN 25 MG/1
1 TABLET, FILM COATED ORAL DAILY
Qty: 100 TABLET | Refills: 3 | Status: CANCELLED | OUTPATIENT
Start: 2023-02-08

## 2023-02-09 ENCOUNTER — PHARMACY VISIT (OUTPATIENT)
Dept: PHARMACY | Facility: MEDICAL CENTER | Age: 78
End: 2023-02-09
Payer: COMMERCIAL

## 2023-02-09 ENCOUNTER — TELEPHONE (OUTPATIENT)
Dept: CARDIOLOGY | Facility: MEDICAL CENTER | Age: 78
End: 2023-02-09
Payer: MEDICARE

## 2023-02-09 DIAGNOSIS — E11.9 CONTROLLED TYPE 2 DIABETES MELLITUS WITHOUT COMPLICATION, WITHOUT LONG-TERM CURRENT USE OF INSULIN (HCC): ICD-10-CM

## 2023-02-09 PROCEDURE — RXMED WILLOW AMBULATORY MEDICATION CHARGE: Performed by: INTERNAL MEDICINE

## 2023-02-09 RX ORDER — EMPAGLIFLOZIN 25 MG/1
1 TABLET, FILM COATED ORAL DAILY
Qty: 100 TABLET | Refills: 3 | Status: CANCELLED | OUTPATIENT
Start: 2023-02-08

## 2023-02-09 RX ORDER — EMPAGLIFLOZIN 25 MG/1
1 TABLET, FILM COATED ORAL DAILY
Qty: 90 TABLET | Refills: 2 | Status: SHIPPED | OUTPATIENT
Start: 2023-02-09 | End: 2023-09-12 | Stop reason: SDUPTHER

## 2023-02-09 NOTE — TELEPHONE ENCOUNTER
CW        Caller: Cheikh Perez      Topic/issue: Patient was calling to follow up on his Empagliflozin (JARDIANCE) 25 MG Tab medication and if it was going to be filled. He took his last one today    Callback Number: [phone]      Thank you    -Oj [facility]

## 2023-02-21 ENCOUNTER — APPOINTMENT (OUTPATIENT)
Dept: MEDICAL GROUP | Facility: PHYSICIAN GROUP | Age: 78
End: 2023-02-21
Payer: MEDICARE

## 2023-02-24 ENCOUNTER — APPOINTMENT (OUTPATIENT)
Dept: MEDICAL GROUP | Facility: PHYSICIAN GROUP | Age: 78
End: 2023-02-24
Payer: MEDICARE

## 2023-03-02 ENCOUNTER — ANTICOAGULATION VISIT (OUTPATIENT)
Dept: VASCULAR LAB | Facility: MEDICAL CENTER | Age: 78
End: 2023-03-02
Attending: INTERNAL MEDICINE
Payer: MEDICARE

## 2023-03-02 DIAGNOSIS — I48.21 PERMANENT ATRIAL FIBRILLATION (HCC): Chronic | ICD-10-CM

## 2023-03-02 DIAGNOSIS — D68.69 SECONDARY HYPERCOAGULABLE STATE (HCC): ICD-10-CM

## 2023-03-02 DIAGNOSIS — I48.0 PAROXYSMAL ATRIAL FIBRILLATION (HCC): ICD-10-CM

## 2023-03-02 DIAGNOSIS — Z79.01 CHRONIC ANTICOAGULATION: ICD-10-CM

## 2023-03-02 DIAGNOSIS — I10 ESSENTIAL HYPERTENSION: ICD-10-CM

## 2023-03-02 DIAGNOSIS — E78.5 DYSLIPIDEMIA: ICD-10-CM

## 2023-03-02 LAB — INR PPP: 1.9 (ref 2–3.5)

## 2023-03-02 PROCEDURE — 99212 OFFICE O/P EST SF 10 MIN: CPT

## 2023-03-02 PROCEDURE — 85610 PROTHROMBIN TIME: CPT

## 2023-03-02 RX ORDER — WARFARIN SODIUM 5 MG/1
5-7.5 TABLET ORAL DAILY
Qty: 135 TABLET | Refills: 1 | Status: SHIPPED | OUTPATIENT
Start: 2023-03-02 | End: 2023-03-30 | Stop reason: SDUPTHER

## 2023-03-02 NOTE — PROGRESS NOTES
Anticoagulation Summary  As of 3/2/2023      INR goal:  2.0-3.0   TTR:  84.7 % (6.7 y)   INR used for dosin.90 (3/2/2023)   Warfarin maintenance plan:  5 mg (5 mg x 1) every Mon, Fri; 7.5 mg (5 mg x 1.5) all other days   Weekly warfarin total:  47.5 mg   Plan last modified:  Petra Larson PharmD (2022)   Next INR check:  3/30/2023   Target end date:  Indefinite    Indications    Permanent atrial fibrillation (HCC) [I48.21]  Chronic anticoagulation [Z79.01]  Secondary hypercoagulable state (HCC) [D68.69]                 Anticoagulation Episode Summary       INR check location:  Anticoagulation Clinic    Preferred lab:      Send INR reminders to:      Comments:  Indomethacin as needed, discussed risks with the patient.          Anticoagulation Care Providers       Provider Role Specialty Phone number    Cheikh Turner M.D. Referring Cardiovascular Disease (Cardiology) [phone]    [facility] Anticoagulation Services Responsible  [phone]                  Refer to Patient Findings for HPI:       pt declined vitals    Verified current warfarin dosing schedule.    Medications reconciled   Pt is not on antiplatelet therapy      A/P   INR  SUB-therapeutic.     Warfarin dosing recommendation: Pt states he may have missed a dose.     Bolus today then Pt is to continue with current warfarin dosing regimen.     Pt educated to contact our clinic with any changes in medications or s/s of bleeding or thrombosis. Pt is aware to seek immediate medical attention for falls, head injury or deep cuts.    Follow up appointment in 4 week(s).    Maximo Salvador, MatthewD

## 2023-03-03 ENCOUNTER — OFFICE VISIT (OUTPATIENT)
Dept: MEDICAL GROUP | Facility: PHYSICIAN GROUP | Age: 78
End: 2023-03-03
Payer: MEDICARE

## 2023-03-03 VITALS
BODY MASS INDEX: 25.98 KG/M2 | HEIGHT: 77 IN | TEMPERATURE: 97.6 F | SYSTOLIC BLOOD PRESSURE: 108 MMHG | DIASTOLIC BLOOD PRESSURE: 70 MMHG | OXYGEN SATURATION: 96 % | HEART RATE: 88 BPM | WEIGHT: 220 LBS | RESPIRATION RATE: 14 BRPM

## 2023-03-03 DIAGNOSIS — M48.061 SPINAL STENOSIS OF LUMBAR REGION WITHOUT NEUROGENIC CLAUDICATION: ICD-10-CM

## 2023-03-03 DIAGNOSIS — M21.371 RIGHT FOOT DROP: ICD-10-CM

## 2023-03-03 DIAGNOSIS — M21.372 LEFT FOOT DROP: ICD-10-CM

## 2023-03-03 PROCEDURE — 99213 OFFICE O/P EST LOW 20 MIN: CPT | Performed by: NURSE PRACTITIONER

## 2023-03-03 RX ORDER — ENALAPRIL MALEATE 10 MG/1
10 TABLET ORAL DAILY
Qty: 100 TABLET | Refills: 1 | Status: CANCELLED | OUTPATIENT
Start: 2023-03-03

## 2023-03-03 ASSESSMENT — ENCOUNTER SYMPTOMS
BLOOD IN STOOL: 0
DIARRHEA: 0
CONSTIPATION: 1
CHILLS: 0
WHEEZING: 0
WEIGHT LOSS: 0
ABDOMINAL PAIN: 0
SHORTNESS OF BREATH: 0
PALPITATIONS: 0
COUGH: 0
FEVER: 0

## 2023-03-03 ASSESSMENT — FIBROSIS 4 INDEX: FIB4 SCORE: 1.57

## 2023-03-03 NOTE — TELEPHONE ENCOUNTER
Is the patient due for a refill? Yes    Was the patient seen the past year? Yes    Date of last office visit: 9/7/2022    Does the patient have an upcoming appointment?  No   If yes, When?     Provider to refill:CW    Does the patients insurance require a 100 day supply?  Yes

## 2023-03-03 NOTE — PROGRESS NOTES
Chief Complaint   Patient presents with    Referral Needed     Needs RX for walker        Subjective:     HPI:   Chiekh Perez is a 77 y.o. male here to discuss the evaluation and management of:        Spinal stenosis of lumbar region without neurogenic claudication  He has trouble ambulating due to his spinal stenosis.  His drop foot started in the left and he could get around well enough, now he has it in both feet and is unsteady.  He wears braces bilaterally n his feet/ankles. He has had a L1-L5 fusion.  He has had a fall in 2015 and broke his leg.  He has been using a cane, but this is not enough support for him.  This makes ambulating and performing his activities of daily living difficult.       ROS  Review of Systems   Constitutional:  Negative for chills, fever, malaise/fatigue and weight loss.   Respiratory:  Negative for cough, shortness of breath and wheezing.    Cardiovascular:  Negative for chest pain, palpitations and leg swelling.   Gastrointestinal:  Positive for constipation. Negative for abdominal pain, blood in stool and diarrhea.       Allergies   Allergen Reactions    Nkda [No Known Drug Allergy]        Current medicines (including changes today)  Current Outpatient Medications   Medication Sig Dispense Refill    Misc. Devices Misc Please provide the patient with a four wheel walker with a seat. M48.061 M21.372 M21.371 1 Each 0    warfarin (COUMADIN) 5 MG Tab Take 1-1.5 Tablets by mouth every day. As directed by [facility] 135 Tablet 1    Empagliflozin (JARDIANCE) 25 MG Tab Take 1 Tablet by mouth every day. 90 Tablet 2    metformin (GLUCOPHAGE) 1000 MG tablet TAKE ONE TABLET BY MOUTH TWICE DAILY WITH MEALS 200 Tablet 0    allopurinol (ZYLOPRIM) 100 MG Tab TAKE 1 TABLET BY MOUTH EVERY  Tablet 3    glimepiride (AMARYL) 2 MG Tab TAKE 1 TABLET BY MOUTH EVERY DAY IN THE MORNING 100 Tablet 3    enalapril (VASOTEC) 10 MG Tab Take 1 Tablet by mouth every day.  100 Tablet 2    metoprolol tartrate (LOPRESSOR) 25 MG Tab TAKE 1 TABLET BY MOUTH TWICE A  Tablet 3    [employer] VITAMIN B12 1000 MCG Tab TAKE 1 TABLET BY MOUTH EVERY  Tablet 2    atorvastatin (LIPITOR) 10 MG Tab TAKE 1 TABLET BY MOUTH EVERY  Tablet 3    tamsulosin (FLOMAX) 0.4 MG capsule Take 1 capsule by mouth daily. 30 Capsule 1    VITAMIN D, CHOLECALCIFEROL, PO Take 5,000 Units by mouth.       No current facility-administered medications for this visit.       Social History     Tobacco Use    Smoking status: Never    Smokeless tobacco: Never   Vaping Use    Vaping Use: Never used   Substance Use Topics    Alcohol use: Yes     Alcohol/week: 6.0 oz     Types: 10 Glasses of wine per week     Comment: everyday    Drug use: No       Patient Active Problem List    Diagnosis Date Noted    Vitamin D deficiency 08/18/2022    Lower urinary tract symptoms due to benign prostatic hyperplasia 06/07/2022    Chronic gout of left hand 05/12/2022    Chronic gout of hand 03/29/2022    History of kidney stones 03/29/2022    Loss of balance 03/29/2022    Spinal stenosis of lumbar region without neurogenic claudication 03/29/2022    BMI 28.0-28.9,adult 02/17/2022    Benign prostatic hyperplasia with nocturia 02/17/2022    Secondary hypercoagulable state (HCC) 12/13/2021    B12 deficiency 11/12/2020    Obesity (BMI 30-39.9) 03/27/2018    Controlled type 2 diabetes mellitus with diabetic neuropathy, without long-term current use of insulin (HCC) 03/13/2017    Risk for falls 03/13/2017    SHANNAN (obstructive sleep apnea) 12/12/2016    Dyslipidemia 12/12/2016    Left foot drop 12/12/2016    Chronic anticoagulation 09/20/2013    Essential hypertension 07/09/2013    Permanent atrial fibrillation (HCC)        Family History   Problem Relation Age of Onset    Heart Attack Father 59    Heart Disease Father     Hypertension Brother     Diabetes Brother     Arthritis Brother     Thyroid Mother 50    Breast Cancer Sister     No Known  "Problems Maternal Grandmother     No Known Problems Maternal Grandfather     No Known Problems Paternal Grandmother     No Known Problems Paternal Grandfather     Arrythmia Brother     No Known Problems Brother     No Known Problems Daughter           Objective:     /70 (BP Location: Left arm, Patient Position: Sitting, BP Cuff Size: Large adult)   Pulse 88   Temp 36.4 °C (97.6 °F) (Temporal)   Resp 14   Ht 1.956 m (6' 5\")   Wt 99.8 kg (220 lb)   SpO2 96%  Body mass index is 26.09 kg/m².    Physical Exam:  Physical Exam  Constitutional:       General: He is not in acute distress.  HENT:      Head: Normocephalic.      Right Ear: Tympanic membrane and external ear normal.      Left Ear: Tympanic membrane and external ear normal.   Eyes:      Conjunctiva/sclera: Conjunctivae normal.      Pupils: Pupils are equal, round, and reactive to light.   Neck:      Trachea: No tracheal deviation.   Cardiovascular:      Rate and Rhythm: Normal rate and regular rhythm.      Heart sounds: Normal heart sounds.   Pulmonary:      Effort: Pulmonary effort is normal.      Breath sounds: Normal breath sounds.   Abdominal:      General: Bowel sounds are normal.      Palpations: Abdomen is soft.   Musculoskeletal:      Cervical back: Normal range of motion and neck supple.      Lumbar back: Decreased range of motion.      Right foot: Foot drop present.      Left foot: Foot drop present.   Lymphadenopathy:      Head:      Right side of head: No preauricular adenopathy.      Left side of head: No preauricular adenopathy.      Cervical: No cervical adenopathy.   Skin:     General: Skin is warm and dry.   Neurological:      Mental Status: He is alert and oriented to person, place, and time.      Sensory: Sensation is intact.      Gait: Gait abnormal.   Psychiatric:         Mood and Affect: Mood and affect normal.         Judgment: Judgment normal.       Assessment and Plan:     The following treatment plan was discussed:    1. " Spinal stenosis of lumbar region without neurogenic claudication  Misc. Devices Misc   - Chronic problem, unstable.  Patient requires a four-wheel walker with a seat due to his mobility limitation that significantly impairs his ability to participate in 1 or more mobility related activities of daily living in the home and his mobility limitation cannot be sufficiently resolved by the use of a cane or crutches.  The patient is able to safely use this walker.  The functional mobility deficit can be sufficiently resolved with the use of this four-wheel walker with a seat.      2. Left foot drop  Misc. Devices Misc  -See above      3. Right foot drop  Misc. Devices Misc  -See above          Any change or worsening of signs or symptoms, patient encouraged to follow-up or report to emergency room for further evaluation. Patient verbalizes understanding and agrees.    Follow-Up: Return in about 6 weeks (around 4/14/2023) for Routine follow up with PCP.      PLEASE NOTE: This dictation was created using voice recognition software. I have made every reasonable attempt to correct obvious errors, but I expect that there are errors of grammar and possibly content that I did not discover before finalizing the note.

## 2023-03-03 NOTE — ASSESSMENT & PLAN NOTE
He has trouble ambulating due to his spinal stenosis.  His drop foot started in the left and he could get around well enough, now he has it in both feet and is unsteady.  He wears braces bilaterally n his feet/ankles. He has had a L1-L5 fusion.  He has had a fall in 2015 and broke his leg.  He has been using a cane, but this is not enough support for him.  This makes ambulating and performing his activities of daily living difficult.

## 2023-03-06 ENCOUNTER — TELEPHONE (OUTPATIENT)
Dept: CARDIOLOGY | Facility: MEDICAL CENTER | Age: 78
End: 2023-03-06
Payer: MEDICARE

## 2023-03-06 DIAGNOSIS — I10 ESSENTIAL HYPERTENSION: ICD-10-CM

## 2023-03-06 DIAGNOSIS — I48.21 PERMANENT ATRIAL FIBRILLATION (HCC): ICD-10-CM

## 2023-03-06 RX ORDER — ENALAPRIL MALEATE 10 MG/1
10 TABLET ORAL DAILY
Qty: 100 TABLET | Refills: 1 | Status: CANCELLED | OUTPATIENT
Start: 2023-03-03

## 2023-03-06 RX ORDER — ENALAPRIL MALEATE 10 MG/1
10 TABLET ORAL DAILY
Qty: 100 TABLET | Refills: 2 | Status: SHIPPED | OUTPATIENT
Start: 2023-03-06 | End: 2023-12-07 | Stop reason: SDUPTHER

## 2023-03-06 NOTE — TELEPHONE ENCOUNTER
Is the patient due for a refill? Yes, pharmacy change.     Was the patient seen the past year? Yes    Date of last office visit: 9/7/22    Does the patient have an upcoming appointment?  No       Provider to refill:CW    Does the patients insurance require a 100 day supply?  Yes

## 2023-03-06 NOTE — TELEPHONE ENCOUNTER
MARGARITO    Caller: Rajiv Perez    Topic/issue: Patient is following up on medication refill because he will be out in two days and would like for it to go to the [facility] pharmacy.    Callback Number: [phone]    Thank you,  -Danielle [facility]

## 2023-03-06 NOTE — TELEPHONE ENCOUNTER
CW     Caller: Rajiv     Medication Name and Dosage:      enalapril (VASOTEC) 10 MG Tab  metoprolol tartrate (LOPRESSOR) 25 MG Tab    Medication amount left: 3 tablets     Preferred Pharmacy:     [facility] [place]   [address] [place] [id number]   Phone:  [phone]  Fax:  [phone]   OVIDIO #:  [id number]     Other questions (Topic): Pt is changing his pharmacy back over to [facility] from [facility].     Callback Number (Will only call for issues): [phone]

## 2023-03-07 PROCEDURE — RXMED WILLOW AMBULATORY MEDICATION CHARGE: Performed by: INTERNAL MEDICINE

## 2023-03-08 RX ORDER — ATORVASTATIN CALCIUM 10 MG/1
10 TABLET, FILM COATED ORAL DAILY
Qty: 100 TABLET | Refills: 1 | Status: SHIPPED | OUTPATIENT
Start: 2023-03-08 | End: 2023-10-01 | Stop reason: SDUPTHER

## 2023-03-28 PROCEDURE — RXMED WILLOW AMBULATORY MEDICATION CHARGE: Performed by: INTERNAL MEDICINE

## 2023-03-30 ENCOUNTER — ANTICOAGULATION VISIT (OUTPATIENT)
Dept: VASCULAR LAB | Facility: MEDICAL CENTER | Age: 78
End: 2023-03-30
Attending: INTERNAL MEDICINE
Payer: MEDICARE

## 2023-03-30 DIAGNOSIS — I48.21 PERMANENT ATRIAL FIBRILLATION (HCC): Chronic | ICD-10-CM

## 2023-03-30 DIAGNOSIS — D68.69 SECONDARY HYPERCOAGULABLE STATE (HCC): ICD-10-CM

## 2023-03-30 DIAGNOSIS — I48.0 PAROXYSMAL ATRIAL FIBRILLATION (HCC): ICD-10-CM

## 2023-03-30 DIAGNOSIS — Z79.01 CHRONIC ANTICOAGULATION: ICD-10-CM

## 2023-03-30 LAB — INR PPP: 1.7 (ref 2–3.5)

## 2023-03-30 PROCEDURE — RXMED WILLOW AMBULATORY MEDICATION CHARGE: Performed by: INTERNAL MEDICINE

## 2023-03-30 PROCEDURE — 99212 OFFICE O/P EST SF 10 MIN: CPT

## 2023-03-30 PROCEDURE — 85610 PROTHROMBIN TIME: CPT

## 2023-03-30 RX ORDER — GLIMEPIRIDE 2 MG/1
2 TABLET ORAL EVERY MORNING
Qty: 100 TABLET | Refills: 3 | Status: SHIPPED | OUTPATIENT
Start: 2023-03-30 | End: 2024-03-11 | Stop reason: SDUPTHER

## 2023-03-30 RX ORDER — TAMSULOSIN HYDROCHLORIDE 0.4 MG/1
CAPSULE ORAL
Qty: 100 CAPSULE | Refills: 3 | Status: SHIPPED | OUTPATIENT
Start: 2023-03-30 | End: 2024-03-11 | Stop reason: SDUPTHER

## 2023-03-30 RX ORDER — ALLOPURINOL 100 MG/1
100 TABLET ORAL DAILY
Qty: 100 TABLET | Refills: 3 | Status: SHIPPED | OUTPATIENT
Start: 2023-03-30 | End: 2024-03-11 | Stop reason: SDUPTHER

## 2023-03-30 RX ORDER — WARFARIN SODIUM 5 MG/1
7.5 TABLET ORAL DAILY
Qty: 135 TABLET | Refills: 1 | Status: SHIPPED | OUTPATIENT
Start: 2023-03-30 | End: 2023-10-01 | Stop reason: SDUPTHER

## 2023-03-30 NOTE — TELEPHONE ENCOUNTER
Received request via: Pharmacy    Was the patient seen in the last year in this department? Yes    Does the patient have an active prescription (recently filled or refills available) for medication(s) requested? No    Does the patient have group home Plus and need 100 day supply (blood pressure, diabetes and cholesterol meds only)? Medication is not for cholesterol, blood pressure or diabetes

## 2023-03-30 NOTE — PROGRESS NOTES
Anticoagulation Summary  As of 3/30/2023      INR goal:  2.0-3.0   TTR:  83.7 % (6.8 y)   INR used for dosin.70 (3/30/2023)   Warfarin maintenance plan:  5 mg (5 mg x 1) every Mon; 7.5 mg (5 mg x 1.5) all other days   Weekly warfarin total:  50 mg   Plan last modified:  Matthew JimenezD (3/30/2023)   Next INR check:  2023   Target end date:  Indefinite    Indications    Permanent atrial fibrillation (HCC) [I48.21]  Chronic anticoagulation [Z79.01]  Secondary hypercoagulable state (HCC) [D68.69]                 Anticoagulation Episode Summary       INR check location:  Anticoagulation Clinic    Preferred lab:      Send INR reminders to:      Comments:  Indomethacin as needed, discussed risks with the patient.          Anticoagulation Care Providers       Provider Role Specialty Phone number    Cheikh Turner M.D. Referring Cardiovascular Disease (Cardiology) [phone]    [facility] Anticoagulation Services Responsible  [phone]                  Refer to Patient Findings for HPI:  Patient Findings       Negatives:  Signs/symptoms of thrombosis, Signs/symptoms of bleeding, Laboratory test error suspected, Change in health, Change in alcohol use, Change in activity, Upcoming invasive procedure, Emergency department visit, Upcoming dental procedure, Missed doses, Extra doses, Change in medications, Change in diet/appetite, Hospital admission, Bruising, Other complaints            There were no vitals filed for this visit.  pt declined vitals    Verified current warfarin dosing schedule.    Medications reconciled   Pt is not on antiplatelet therapy      A/P   INR  SUB-therapeutic.     Warfarin dosing recommendation: Bolus dose of 10 mg today and increase weekly regimen to 5 mg on Monday and 7.5 mg AOD. (5% increase)    Pt educated to contact our clinic with any changes in medications or s/s of bleeding or thrombosis. Pt is aware to seek immediate medical attention for falls, head injury or deep  cuts.    Follow up appointment in 2 week(s).    Petra Larson, MatthewD

## 2023-04-04 ENCOUNTER — PHARMACY VISIT (OUTPATIENT)
Dept: PHARMACY | Facility: MEDICAL CENTER | Age: 78
End: 2023-04-04
Payer: MEDICARE

## 2023-04-04 PROCEDURE — RXMED WILLOW AMBULATORY MEDICATION CHARGE: Performed by: INTERNAL MEDICINE

## 2023-04-06 ENCOUNTER — PHARMACY VISIT (OUTPATIENT)
Dept: PHARMACY | Facility: MEDICAL CENTER | Age: 78
End: 2023-04-06
Payer: MEDICARE

## 2023-04-13 ENCOUNTER — ANTICOAGULATION VISIT (OUTPATIENT)
Dept: VASCULAR LAB | Facility: MEDICAL CENTER | Age: 78
End: 2023-04-13
Attending: INTERNAL MEDICINE
Payer: MEDICARE

## 2023-04-13 DIAGNOSIS — I48.21 PERMANENT ATRIAL FIBRILLATION (HCC): Chronic | ICD-10-CM

## 2023-04-13 DIAGNOSIS — D68.69 SECONDARY HYPERCOAGULABLE STATE (HCC): ICD-10-CM

## 2023-04-13 DIAGNOSIS — Z79.01 CHRONIC ANTICOAGULATION: ICD-10-CM

## 2023-04-13 LAB — INR PPP: 3.2 (ref 2–3.5)

## 2023-04-13 PROCEDURE — 99212 OFFICE O/P EST SF 10 MIN: CPT

## 2023-04-13 PROCEDURE — 85610 PROTHROMBIN TIME: CPT

## 2023-04-13 NOTE — PROGRESS NOTES
Anticoagulation Summary  As of 4/13/2023      INR goal:  2.0-3.0   TTR:  83.6 % (6.9 y)   INR used for dosing:  3.20 (4/13/2023)   Warfarin maintenance plan:  5 mg (5 mg x 1) every Mon; 7.5 mg (5 mg x 1.5) all other days   Weekly warfarin total:  50 mg   Plan last modified:  Matthew JimenezD (3/30/2023)   Next INR check:  4/20/2023   Target end date:  Indefinite    Indications    Permanent atrial fibrillation (HCC) [I48.21]  Chronic anticoagulation [Z79.01]  Secondary hypercoagulable state (HCC) [D68.69]                 Anticoagulation Episode Summary       INR check location:  Anticoagulation Clinic    Preferred lab:      Send INR reminders to:      Comments:  Indomethacin as needed, discussed risks with the patient.          Anticoagulation Care Providers       Provider Role Specialty Phone number    Cheikh Turner M.D. Referring Cardiovascular Disease (Cardiology) [phone]    [facility] Anticoagulation Services Responsible  [phone]                  Refer to Patient Findings for HPI:  Patient Findings       Negatives:  Signs/symptoms of thrombosis, Signs/symptoms of bleeding, Laboratory test error suspected, Change in health, Change in alcohol use, Change in activity, Upcoming invasive procedure, Emergency department visit, Upcoming dental procedure, Missed doses, Extra doses, Change in medications, Change in diet/appetite, Hospital admission, Bruising, Other complaints             pt declined vitals     Verified current warfarin dosing schedule.     Medications reconciled   Pt is not on antiplatelet therapy        A/P   INR  SUPRA-therapeutic.      Warfarin dosing recommendation: Take 5 mg tonight for 5% decrease. Then resume normal dosing schedule.     Pt educated to contact our clinic with any changes in medications or s/s of bleeding or thrombosis. Pt is aware to seek immediate medical attention for falls, head injury or deep cuts.     Follow up appointment in 1 week(s).     Maximo Castanon,  Pharmacy Intern        Miguelina Baeza, PharmD

## 2023-04-13 NOTE — NON-PROVIDER
Anticoagulation Summary  As of 4/13/2023      INR goal:  2.0-3.0   TTR:  83.6 % (6.9 y)   INR used for dosing:  3.20 (4/13/2023)   Warfarin maintenance plan:  5 mg (5 mg x 1) every Mon; 7.5 mg (5 mg x 1.5) all other days   Weekly warfarin total:  50 mg   Plan last modified:  Matthew JimenezD (3/30/2023)   Next INR check:  4/20/2023   Target end date:  Indefinite    Indications    Permanent atrial fibrillation (HCC) [I48.21]  Chronic anticoagulation [Z79.01]  Secondary hypercoagulable state (HCC) [D68.69]                 Anticoagulation Episode Summary       INR check location:  Anticoagulation Clinic    Preferred lab:      Send INR reminders to:      Comments:  Indomethacin as needed, discussed risks with the patient.          Anticoagulation Care Providers       Provider Role Specialty Phone number    Cheikh Turner M.D. Referring Cardiovascular Disease (Cardiology) [phone]    [facility] Anticoagulation Services Responsible  [phone]                  Refer to Patient Findings for HPI:  Patient Findings       Negatives:  Signs/symptoms of thrombosis, Signs/symptoms of bleeding, Laboratory test error suspected, Change in health, Change in alcohol use, Change in activity, Upcoming invasive procedure, Emergency department visit, Upcoming dental procedure, Missed doses, Extra doses, Change in medications, Change in diet/appetite, Hospital admission, Bruising, Other complaints            There were no vitals filed for this visit.   pt declined vitals    Verified current warfarin dosing schedule.    Medications reconciled   Pt is not on antiplatelet therapy      A/P   INR  SUPRA-therapeutic.     Warfarin dosing recommendation: Take 5 mg tonight for 5% decrease. Then resume normal dosing schedule.    Pt educated to contact our clinic with any changes in medications or s/s of bleeding or thrombosis. Pt is aware to seek immediate medical attention for falls, head injury or deep cuts.    Follow up appointment  in 1 week(s).    Maximo Castanon, Pharmacy Intern      Miguelina Baeza, PharmD

## 2023-04-20 ENCOUNTER — ANTICOAGULATION VISIT (OUTPATIENT)
Dept: VASCULAR LAB | Facility: MEDICAL CENTER | Age: 78
End: 2023-04-20
Attending: INTERNAL MEDICINE
Payer: MEDICARE

## 2023-04-20 ENCOUNTER — APPOINTMENT (OUTPATIENT)
Dept: MEDICAL GROUP | Facility: PHYSICIAN GROUP | Age: 78
End: 2023-04-20
Payer: MEDICARE

## 2023-04-20 DIAGNOSIS — I48.21 PERMANENT ATRIAL FIBRILLATION (HCC): Chronic | ICD-10-CM

## 2023-04-20 DIAGNOSIS — D68.69 SECONDARY HYPERCOAGULABLE STATE (HCC): ICD-10-CM

## 2023-04-20 DIAGNOSIS — Z79.01 CHRONIC ANTICOAGULATION: ICD-10-CM

## 2023-04-20 LAB — INR PPP: 3.5 (ref 2–3.5)

## 2023-04-20 PROCEDURE — 99212 OFFICE O/P EST SF 10 MIN: CPT | Performed by: NURSE PRACTITIONER

## 2023-04-20 PROCEDURE — 85610 PROTHROMBIN TIME: CPT

## 2023-04-20 NOTE — PROGRESS NOTES
Anticoagulation Summary  As of 4/20/2023      INR goal:  2.0-3.0   TTR:  83.4 % (6.9 y)   INR used for dosing:  3.50 (4/20/2023)   Warfarin maintenance plan:  5 mg (5 mg x 1) every Mon; 7.5 mg (5 mg x 1.5) all other days   Weekly warfarin total:  50 mg   Plan last modified:  Matthew JimenezD (3/30/2023)   Next INR check:  4/27/2023   Target end date:  Indefinite    Indications    Permanent atrial fibrillation (HCC) [I48.21]  Chronic anticoagulation [Z79.01]  Secondary hypercoagulable state (HCC) [D68.69]                 Anticoagulation Episode Summary       INR check location:  Anticoagulation Clinic    Preferred lab:      Send INR reminders to:      Comments:  Indomethacin as needed, discussed risks with the patient.          Anticoagulation Care Providers       Provider Role Specialty Phone number    Cheikh Turner M.D. Referring Cardiovascular Disease (Cardiology) [phone]    [facility] Anticoagulation Services Responsible  [phone]                  Refer to Patient Findings for HPI:  Patient Findings       Negatives:  Signs/symptoms of thrombosis, Signs/symptoms of bleeding, Laboratory test error suspected, Change in health, Change in alcohol use, Change in activity, Upcoming invasive procedure, Emergency department visit, Upcoming dental procedure, Missed doses, Extra doses, Change in medications, Change in diet/appetite, Hospital admission, Bruising, Other complaints            There were no vitals filed for this visit.   pt declined vitals    Verified current warfarin dosing schedule.    Medications reconciled   Pt is not on antiplatelet therapy      A/P   INR  supra-therapeutic despite a dose decrease last week. Unsure why INR trending high. He is keep up with his vit k intake. Will decrease regimen further.     Warfarin dosing recommendation: Take 5 mg tonight then following dosing calendar.     Pt educated to contact our clinic with any changes in medications or s/s of bleeding or  thrombosis. Pt is aware to seek immediate medical attention for falls, head injury or deep cuts.    Follow up appointment in 1 week(s).    PAMELA Bryan.

## 2023-04-27 ENCOUNTER — ANTICOAGULATION VISIT (OUTPATIENT)
Dept: VASCULAR LAB | Facility: MEDICAL CENTER | Age: 78
End: 2023-04-27
Attending: INTERNAL MEDICINE
Payer: MEDICARE

## 2023-04-27 VITALS — HEART RATE: 66 BPM | DIASTOLIC BLOOD PRESSURE: 84 MMHG | SYSTOLIC BLOOD PRESSURE: 120 MMHG

## 2023-04-27 DIAGNOSIS — Z79.01 CHRONIC ANTICOAGULATION: ICD-10-CM

## 2023-04-27 DIAGNOSIS — D68.69 SECONDARY HYPERCOAGULABLE STATE (HCC): ICD-10-CM

## 2023-04-27 DIAGNOSIS — I48.21 PERMANENT ATRIAL FIBRILLATION (HCC): Chronic | ICD-10-CM

## 2023-04-27 LAB — INR PPP: 1.7 (ref 2–3.5)

## 2023-04-27 PROCEDURE — 99212 OFFICE O/P EST SF 10 MIN: CPT | Performed by: NURSE PRACTITIONER

## 2023-04-27 PROCEDURE — 85610 PROTHROMBIN TIME: CPT

## 2023-04-27 NOTE — PROGRESS NOTES
Anticoagulation Summary  As of 2023      INR goal:  2.0-3.0   TTR:  83.3 % (6.9 y)   INR used for dosin.70 (2023)   Warfarin maintenance plan:  5 mg (5 mg x 1) every Mon; 7.5 mg (5 mg x 1.5) all other days   Weekly warfarin total:  50 mg   Plan last modified:  Matthew JimenezD (3/30/2023)   Next INR check:  2023   Target end date:  Indefinite    Indications    Permanent atrial fibrillation (HCC) [I48.21]  Chronic anticoagulation [Z79.01]  Secondary hypercoagulable state (HCC) [D68.69]                 Anticoagulation Episode Summary       INR check location:  Anticoagulation Clinic    Preferred lab:      Send INR reminders to:      Comments:  Indomethacin as needed, discussed risks with the patient.          Anticoagulation Care Providers       Provider Role Specialty Phone number    Cheikh Turner M.D. Referring Cardiovascular Disease (Cardiology) [phone]    [facility] Anticoagulation Services Responsible  [phone]                  Refer to Patient Findings for HPI:  Patient Findings       Negatives:  Signs/symptoms of thrombosis, Signs/symptoms of bleeding, Laboratory test error suspected, Change in health, Change in alcohol use, Change in activity, Upcoming invasive procedure, Emergency department visit, Upcoming dental procedure, Missed doses, Extra doses, Change in medications, Change in diet/appetite, Hospital admission, Bruising, Other complaints    Comments:  Has 1-2 servings of alcohol nightly but is consistent. He is aware of the increased bleeding risk with concurrent warfarin.             Vitals:    23 1134   BP: 120/84   Pulse: 66       Verified current warfarin dosing schedule.    Medications reconciled   Pt is not on antiplatelet therapy      A/P   INR  sub-therapeutic. INRs labile. CHADSVASC score = 4 (age, htn, dm). No hx of CVA/TIA. He is trying to stay consistent with his greens. With shared decision-making, we will continue his current dose for  now.    Warfarin dosing recommendation: Take 5 mg on Mondays, 7.5 mg AODs.    Pt educated to contact our clinic with any changes in medications or s/s of bleeding or thrombosis. Pt is aware to seek immediate medical attention for falls, head injury or deep cuts.    Follow up appointment in 2 week(s) per patient's preference.    PAMELA Bryan.

## 2023-04-28 ENCOUNTER — HOSPITAL ENCOUNTER (OUTPATIENT)
Facility: MEDICAL CENTER | Age: 78
End: 2023-04-28
Attending: INTERNAL MEDICINE
Payer: MEDICARE

## 2023-04-28 ENCOUNTER — OFFICE VISIT (OUTPATIENT)
Dept: MEDICAL GROUP | Facility: PHYSICIAN GROUP | Age: 78
End: 2023-04-28
Payer: MEDICARE

## 2023-04-28 VITALS
SYSTOLIC BLOOD PRESSURE: 124 MMHG | DIASTOLIC BLOOD PRESSURE: 68 MMHG | RESPIRATION RATE: 12 BRPM | TEMPERATURE: 97.3 F | HEART RATE: 71 BPM | OXYGEN SATURATION: 97 % | WEIGHT: 235 LBS | BODY MASS INDEX: 27.75 KG/M2 | HEIGHT: 77 IN

## 2023-04-28 DIAGNOSIS — E11.40 CONTROLLED TYPE 2 DIABETES MELLITUS WITH DIABETIC NEUROPATHY, WITHOUT LONG-TERM CURRENT USE OF INSULIN (HCC): ICD-10-CM

## 2023-04-28 DIAGNOSIS — I48.21 PERMANENT ATRIAL FIBRILLATION (HCC): Chronic | ICD-10-CM

## 2023-04-28 DIAGNOSIS — M48.061 SPINAL STENOSIS OF LUMBAR REGION WITHOUT NEUROGENIC CLAUDICATION: ICD-10-CM

## 2023-04-28 DIAGNOSIS — F43.9 STRESS: ICD-10-CM

## 2023-04-28 LAB
AMBIGUOUS DTTM AMBI4: NORMAL
HBA1C MFR BLD: 7.7 % (ref ?–5.8)
POCT INT CON NEG: NEGATIVE
POCT INT CON POS: POSITIVE

## 2023-04-28 PROCEDURE — 82043 UR ALBUMIN QUANTITATIVE: CPT

## 2023-04-28 PROCEDURE — 82570 ASSAY OF URINE CREATININE: CPT

## 2023-04-28 PROCEDURE — 99214 OFFICE O/P EST MOD 30 MIN: CPT | Performed by: INTERNAL MEDICINE

## 2023-04-28 PROCEDURE — 83036 HEMOGLOBIN GLYCOSYLATED A1C: CPT | Performed by: INTERNAL MEDICINE

## 2023-04-28 ASSESSMENT — FIBROSIS 4 INDEX: FIB4 SCORE: 1.59

## 2023-04-28 ASSESSMENT — PATIENT HEALTH QUESTIONNAIRE - PHQ9: CLINICAL INTERPRETATION OF PHQ2 SCORE: 0

## 2023-04-28 NOTE — PROGRESS NOTES
CC: Follow-up diabetes, spinal stenosis.    HPI:  Cheikh presents with the following    1. Controlled type 2 diabetes mellitus with diabetic neuropathy, without long-term current use of insulin (HCC)  Chronic.  Stable.  Patient is currently taking Jardiance 25 mg daily, metformin 1000 mg twice daily, glimepiride 2 mg daily.  Patient has not been very physically active, not riding his stationary bike.  His hemoglobin A1c is 7.7, up from 6.9.  Has not been checking his blood sugars.  Due for urine microalbumin.  Patient taking Lipitor 10 mg daily.  ACE inhibitor, Vasotec 10 mg daily.  Up-to-date with diabetic retinal exam.    2. Spinal stenosis of lumbar region without neurogenic claudication  Patient is requesting a prescription for a walker.  He is very tall, over 6 foot 1 inches.  He will need a walker with a seat that is 24 to 25 inches wide, 37 inches in height.    3. Stress  Patient is under a bit of stress as his wife was recently diagnosed with non-Hodgkin's lymphoma.    4. Permanent atrial fibrillation (HCC)  Chronic.  Stable.  Followed by Dr. Turner.  Patient also follows up with the vascular clinic as he is on Coumadin.  No recent changes to his care.      Patient Active Problem List    Diagnosis Date Noted    Stress 04/28/2023    Vitamin D deficiency 08/18/2022    Lower urinary tract symptoms due to benign prostatic hyperplasia 06/07/2022    Chronic gout of left hand 05/12/2022    Chronic gout of hand 03/29/2022    History of kidney stones 03/29/2022    Loss of balance 03/29/2022    Spinal stenosis of lumbar region without neurogenic claudication 03/29/2022    BMI 28.0-28.9,adult 02/17/2022    Benign prostatic hyperplasia with nocturia 02/17/2022    Secondary hypercoagulable state (HCC) 12/13/2021    B12 deficiency 11/12/2020    Obesity (BMI 30-39.9) 03/27/2018    Controlled type 2 diabetes mellitus with diabetic neuropathy, without long-term current use of insulin (HCC) 03/13/2017    Risk for  falls 03/13/2017    SHANNAN (obstructive sleep apnea) 12/12/2016    Dyslipidemia 12/12/2016    Left foot drop 12/12/2016    Chronic anticoagulation 09/20/2013    Essential hypertension 07/09/2013    Permanent atrial fibrillation (HCC)        Current Outpatient Medications   Medication Sig Dispense Refill    tamsulosin (FLOMAX) 0.4 MG capsule Take 1 capsule by mouth daily. 100 Capsule 3    glimepiride (AMARYL) 2 MG Tab Take 1 Tablet by mouth every morning. 100 Tablet 3    allopurinol (ZYLOPRIM) 100 MG Tab Take 1 Tablet by mouth every day. 100 Tablet 3    warfarin (COUMADIN) 5 MG Tab Take 1.5 Tablets by mouth every day. As directed by [facility] 135 Tablet 1    atorvastatin (LIPITOR) 10 MG Tab TAKE 1 TABLET BY MOUTH EVERY  Tablet 1    enalapril (VASOTEC) 10 MG Tab Take 1 Tablet by mouth every day. 100 Tablet 2    metoprolol tartrate (LOPRESSOR) 25 MG Tab Take 1 Tablet by mouth 2 times a day. 200 Tablet 2    Misc. Devices Misc Please provide the patient with a four wheel walker with a seat. M48.061 M21.372 M21.371 1 Each 0    Empagliflozin (JARDIANCE) 25 MG Tab Take 1 Tablet by mouth every day. 90 Tablet 2    metformin (GLUCOPHAGE) 1000 MG tablet TAKE ONE TABLET BY MOUTH TWICE DAILY WITH MEALS 200 Tablet 0    [employer] VITAMIN B12 1000 MCG Tab TAKE 1 TABLET BY MOUTH EVERY  Tablet 2    VITAMIN D, CHOLECALCIFEROL, PO Take 5,000 Units by mouth.       No current facility-administered medications for this visit.         Allergies as of 04/28/2023 - Reviewed 04/28/2023   Allergen Reaction Noted    Nkda [no known drug allergy]  07/31/2009        Social History     Socioeconomic History    Marital status:      Spouse name: Not on file    Number of children: Not on file    Years of education: Not on file    Highest education level: Not on file   Occupational History    Not on file   Tobacco Use    Smoking status: Never    Smokeless tobacco: Never   Vaping Use    Vaping Use: Never used   Substance  and Sexual Activity    Alcohol use: Yes     Alcohol/week: 6.0 oz     Types: 10 Glasses of wine per week     Comment: everyday    Drug use: No    Sexual activity: Never     Partners: Female   Other Topics Concern    Not on file   Social History Narrative    Not on file     Social Determinants of Health     Financial Resource Strain: Not on file   Food Insecurity: Not on file   Transportation Needs: Not on file   Physical Activity: Not on file   Stress: Not on file   Social Connections: Not on file   Intimate Partner Violence: Not on file   Housing Stability: Not on file       Family History   Problem Relation Age of Onset    Heart Attack Father 59    Heart Disease Father     Hypertension Brother     Diabetes Brother     Arthritis Brother     Thyroid Mother 50    Breast Cancer Sister     No Known Problems Maternal Grandmother     No Known Problems Maternal Grandfather     No Known Problems Paternal Grandmother     No Known Problems Paternal Grandfather     Arrythmia Brother     No Known Problems Brother     No Known Problems Daughter        Past Surgical History:   Procedure Laterality Date    ORIF, FRACTURE, FEMUR Left 2/24/2016    Procedure: DISTAL FEMUR ORIF;  Surgeon: Montrell Eddy M.D.;  Location: SURGERY [facility];  Service:     CYSTOSCOPY STENT PLACEMENT  1/13/2014    Performed by Main Batista M.D. at SURGERY [facility]    URETEROSCOPY  1/13/2014    Performed by Main Batista M.D. at SURGERY [facility]    LASERTRIPSY  1/13/2014    Performed by Main Batista M.D. at SURGERY [facility]    OTHER      Surgery for kidney stones    RECOVERY  7/11/2013    Performed by [facility]Recovery Surgery at SURGERY SAME DAY [facility]    KNEE ARTHROPLASTY TOTAL  12/1/2009    Performed by GIACOMO PETTIT at SURGERY [facility]    KNEE ARTHROPLASTY TOTAL  3/2009    Left    LUMBAR LAMINECTOMY DISKECTOMY  7/2006    L1-L5/ residual left foot weaknesss     REPAIR, KNEE, ACL  12/2005    Right    ORIF,  "FRACTURE, FEMUR         ROS:  Denies any Headache,Chest pain,  Shortness of breath,  Abdominal pain, Changes of bowel or bladder, Lower ext edema, Fevers, Nights sweats, Weight Changes, Focal weakness or numbness.  All other systems are negative.    /68   Pulse 71   Temp 36.3 °C (97.3 °F) (Temporal)   Resp 12   Ht 1.956 m (6' 5\")   Wt 107 kg (235 lb)   SpO2 97%   BMI 27.87 kg/m²      Constitutional: Alert, no distress, well-groomed.  Skin: Warm, dry, good turgor, no rashes in visible areas.  Eye: Equal, round and reactive, conjunctiva clear, lids normal.  ENMT: Lips without lesions, good dentition, moist mucous membranes.  Neck: Trachea midline, no masses, no thyromegaly.  Respiratory: Unlabored respiratory effort, no cough.  Abdomen: Soft, no gross masses.  MSK: Normal gait, moves all extremities.  Neuro: Grossly non-focal. No cranial nerve deficit. Strength and sensation intact.   Psych: Alert and oriented x3, normal affect and mood.      Assessment and Plan.   78 y.o. male presenting with the following.     1. Controlled type 2 diabetes mellitus with diabetic neuropathy, without long-term current use of insulin (HCC)  Continue with current doses of Jardiance, metformin and glimepiride.  Patient will focus on diet and lifestyle modifications, regular exercise, weight loss and diet.  - POCT Hemoglobin A1C  - POCT Microalbumin Creat Ratio Urine; Future    2. Spinal stenosis of lumbar region without neurogenic claudication  Prescription for four-wheel walker with seat will be sent to appropriate [employer] company.     3. Stress  Offered brief counseling today.    4. Permanent atrial fibrillation (HCC)  Stable.  Continue current plan of care per cardiology.    My total time spent caring for the patient on the day of the encounter was 32 minutes.   This does not include time spent on separately billable procedures/tests.      "

## 2023-04-29 LAB
CREAT UR-MCNC: 66.96 MG/DL
MICROALBUMIN UR-MCNC: 3.1 MG/DL
MICROALBUMIN/CREAT UR: 46 MG/G (ref 0–30)

## 2023-05-05 DIAGNOSIS — E53.8 B12 DEFICIENCY: ICD-10-CM

## 2023-05-09 RX ORDER — OMEGA-3/DHA/EPA/FISH OIL 35-113.5MG
1000 TABLET,CHEWABLE ORAL
Qty: 100 TABLET | Refills: 1 | Status: SHIPPED | OUTPATIENT
Start: 2023-05-09

## 2023-05-10 PROCEDURE — RXMED WILLOW AMBULATORY MEDICATION CHARGE: Performed by: INTERNAL MEDICINE

## 2023-05-11 ENCOUNTER — ANTICOAGULATION VISIT (OUTPATIENT)
Dept: VASCULAR LAB | Facility: MEDICAL CENTER | Age: 78
End: 2023-05-11
Attending: INTERNAL MEDICINE
Payer: MEDICARE

## 2023-05-11 VITALS — HEART RATE: 82 BPM | SYSTOLIC BLOOD PRESSURE: 147 MMHG | DIASTOLIC BLOOD PRESSURE: 80 MMHG

## 2023-05-11 DIAGNOSIS — I48.21 PERMANENT ATRIAL FIBRILLATION (HCC): Chronic | ICD-10-CM

## 2023-05-11 DIAGNOSIS — D68.69 SECONDARY HYPERCOAGULABLE STATE (HCC): ICD-10-CM

## 2023-05-11 DIAGNOSIS — Z79.01 CHRONIC ANTICOAGULATION: ICD-10-CM

## 2023-05-11 LAB — INR PPP: 2 (ref 2–3.5)

## 2023-05-11 PROCEDURE — 99211 OFF/OP EST MAY X REQ PHY/QHP: CPT | Performed by: NURSE PRACTITIONER

## 2023-05-11 PROCEDURE — 85610 PROTHROMBIN TIME: CPT

## 2023-05-12 ENCOUNTER — PHARMACY VISIT (OUTPATIENT)
Dept: PHARMACY | Facility: MEDICAL CENTER | Age: 78
End: 2023-05-12
Payer: MEDICARE

## 2023-05-17 DIAGNOSIS — E11.40 CONTROLLED TYPE 2 DIABETES MELLITUS WITH DIABETIC NEUROPATHY, WITHOUT LONG-TERM CURRENT USE OF INSULIN (HCC): ICD-10-CM

## 2023-05-18 PROCEDURE — RXMED WILLOW AMBULATORY MEDICATION CHARGE: Performed by: INTERNAL MEDICINE

## 2023-05-22 ENCOUNTER — PHARMACY VISIT (OUTPATIENT)
Dept: PHARMACY | Facility: MEDICAL CENTER | Age: 78
End: 2023-05-22
Payer: MEDICARE

## 2023-06-02 ENCOUNTER — ANTICOAGULATION VISIT (OUTPATIENT)
Dept: VASCULAR LAB | Facility: MEDICAL CENTER | Age: 78
End: 2023-06-02
Attending: INTERNAL MEDICINE
Payer: MEDICARE

## 2023-06-02 DIAGNOSIS — D68.69 SECONDARY HYPERCOAGULABLE STATE (HCC): ICD-10-CM

## 2023-06-02 DIAGNOSIS — I48.21 PERMANENT ATRIAL FIBRILLATION (HCC): Chronic | ICD-10-CM

## 2023-06-02 DIAGNOSIS — Z79.01 CHRONIC ANTICOAGULATION: ICD-10-CM

## 2023-06-02 LAB — INR PPP: 3.7 (ref 2–3.5)

## 2023-06-02 PROCEDURE — 85610 PROTHROMBIN TIME: CPT

## 2023-06-02 PROCEDURE — RXMED WILLOW AMBULATORY MEDICATION CHARGE: Performed by: INTERNAL MEDICINE

## 2023-06-02 PROCEDURE — 99212 OFFICE O/P EST SF 10 MIN: CPT

## 2023-06-02 NOTE — PROGRESS NOTES
Anticoagulation Summary  As of 6/2/2023      INR goal:  2.0-3.0   TTR:  82.6 % (7 y)   INR used for dosing:  3.70 (6/2/2023)   Warfarin maintenance plan:  5 mg (5 mg x 1) every Mon; 7.5 mg (5 mg x 1.5) all other days   Weekly warfarin total:  50 mg   Plan last modified:  Petra Larson PharmD (3/30/2023)   Next INR check:  6/16/2023   Target end date:  Indefinite    Indications    Permanent atrial fibrillation (HCC) [I48.21]  Chronic anticoagulation [Z79.01]  Secondary hypercoagulable state (HCC) [D68.69]                 Anticoagulation Episode Summary       INR check location:  Anticoagulation Clinic    Preferred lab:      Send INR reminders to:      Comments:  Indomethacin as needed, discussed risks with the patient.          Anticoagulation Care Providers       Provider Role Specialty Phone number    Cheikh Turner M.D. Referring Cardiovascular Disease (Cardiology) [phone]    [facility] Anticoagulation Services Responsible  [phone]                  Refer to Patient Findings for HPI:  Patient Findings       Positives:  Missed doses, Change in diet/appetite (Eating less veggies lately.)    Negatives:  Signs/symptoms of thrombosis, Signs/symptoms of bleeding, Laboratory test error suspected, Change in health, Change in alcohol use, Change in activity, Upcoming invasive procedure, Emergency department visit, Upcoming dental procedure, Extra doses, Change in medications, Hospital admission, Bruising, Other complaints            There were no vitals filed for this visit.    Verified current warfarin dosing schedule.    Medications reconciled   Pt is not on antiplatelet therapy      A/P   INR  SUPRA-therapeutic.     Warfarin dosing recommendation: Instructed pt to HOLD x 1 dose and to then continue on w/ his current regimen.    Pt educated to contact our clinic with any changes in medications or s/s of bleeding or thrombosis. Pt is aware to seek immediate medical attention for falls, head injury or deep  cuts.    Follow up appointment in 2 week(s).    Alejandro Bautista, PharmD, BCACP

## 2023-06-06 ENCOUNTER — PHARMACY VISIT (OUTPATIENT)
Dept: PHARMACY | Facility: MEDICAL CENTER | Age: 78
End: 2023-06-06
Payer: MEDICARE

## 2023-06-16 ENCOUNTER — PHARMACY VISIT (OUTPATIENT)
Dept: PHARMACY | Facility: MEDICAL CENTER | Age: 78
End: 2023-06-16
Payer: MEDICARE

## 2023-06-16 ENCOUNTER — ANTICOAGULATION VISIT (OUTPATIENT)
Dept: VASCULAR LAB | Facility: MEDICAL CENTER | Age: 78
End: 2023-06-16
Attending: INTERNAL MEDICINE
Payer: MEDICARE

## 2023-06-16 DIAGNOSIS — I48.21 PERMANENT ATRIAL FIBRILLATION (HCC): Chronic | ICD-10-CM

## 2023-06-16 DIAGNOSIS — Z79.01 CHRONIC ANTICOAGULATION: ICD-10-CM

## 2023-06-16 DIAGNOSIS — D68.69 SECONDARY HYPERCOAGULABLE STATE (HCC): ICD-10-CM

## 2023-06-16 LAB — INR PPP: 1.9 (ref 2–3.5)

## 2023-06-16 PROCEDURE — 85610 PROTHROMBIN TIME: CPT

## 2023-06-16 PROCEDURE — RXMED WILLOW AMBULATORY MEDICATION CHARGE: Performed by: INTERNAL MEDICINE

## 2023-06-16 PROCEDURE — 99212 OFFICE O/P EST SF 10 MIN: CPT

## 2023-06-16 NOTE — PROGRESS NOTES
Anticoagulation Summary  As of 2023      INR goal:  2.0-3.0   TTR:  82.4 % (7 y)   INR used for dosin.90 (2023)   Warfarin maintenance plan:  5 mg (5 mg x 1) every Mon; 7.5 mg (5 mg x 1.5) all other days   Weekly warfarin total:  50 mg   Plan last modified:  Petra Larson PharmD (3/30/2023)   Next INR check:  2023   Target end date:  Indefinite    Indications    Permanent atrial fibrillation (HCC) [I48.21]  Chronic anticoagulation [Z79.01]  Secondary hypercoagulable state (HCC) [D68.69]                 Anticoagulation Episode Summary       INR check location:  Anticoagulation Clinic    Preferred lab:      Send INR reminders to:      Comments:  Indomethacin as needed, discussed risks with the patient.          Anticoagulation Care Providers       Provider Role Specialty Phone number    Cheikh Turner M.D. Referring Cardiovascular Disease (Cardiology) [phone]    [facility] Anticoagulation Services Responsible  [phone]             Refer to Patient Findings for HPI:  Patient Findings       Negatives:  Signs/symptoms of thrombosis, Signs/symptoms of bleeding, Laboratory test error suspected, Change in health, Change in alcohol use, Change in activity, Upcoming invasive procedure, Emergency department visit, Upcoming dental procedure, Missed doses, Extra doses, Change in medications, Change in diet/appetite, Hospital admission, Bruising, Other complaints            There were no vitals filed for this visit.   pt declined vitals    Verified current warfarin dosing schedule.    Medications reconciled  Pt is not on antiplatelet therapy      A/P   INR  is slightly sub-therapeutic.     Warfarin dosing recommendation: Last INR elevated. I will not bolus today. Pt is to continue with current warfarin dosing regimen.      Pt educated to contact our clinic with any changes in medications or s/s of bleeding or thrombosis. Pt is aware to seek immediate medical attention for falls, head injury or deep  cuts.    Follow up appointment in 2 week(s).    Cliff Granados, MatthewD

## 2023-06-30 ENCOUNTER — ANTICOAGULATION VISIT (OUTPATIENT)
Dept: VASCULAR LAB | Facility: MEDICAL CENTER | Age: 78
End: 2023-06-30
Attending: INTERNAL MEDICINE
Payer: MEDICARE

## 2023-06-30 DIAGNOSIS — Z79.01 CHRONIC ANTICOAGULATION: ICD-10-CM

## 2023-06-30 DIAGNOSIS — I48.21 PERMANENT ATRIAL FIBRILLATION (HCC): Chronic | ICD-10-CM

## 2023-06-30 DIAGNOSIS — D68.69 SECONDARY HYPERCOAGULABLE STATE (HCC): ICD-10-CM

## 2023-06-30 LAB — INR PPP: 1.8 (ref 2–3.5)

## 2023-06-30 PROCEDURE — 99212 OFFICE O/P EST SF 10 MIN: CPT

## 2023-06-30 PROCEDURE — 85610 PROTHROMBIN TIME: CPT

## 2023-06-30 NOTE — PROGRESS NOTES
Anticoagulation Summary  As of 2023      INR goal:  2.0-3.0   TTR:  82.0 % (7.1 y)   INR used for dosin.80 (2023)   Warfarin maintenance plan:  7.5 mg (5 mg x 1.5) every day   Weekly warfarin total:  52.5 mg   Plan last modified:  Alejandro Bautista PharmD (2023)   Next INR check:  2023   Target end date:  Indefinite    Indications    Permanent atrial fibrillation (HCC) [I48.21]  Chronic anticoagulation [Z79.01]  Secondary hypercoagulable state (HCC) [D68.69]                 Anticoagulation Episode Summary       INR check location:  Anticoagulation Clinic    Preferred lab:      Send INR reminders to:      Comments:  Indomethacin as needed, discussed risks with the patient.          Anticoagulation Care Providers       Provider Role Specialty Phone number    Cheikh Turner M.D. Referring Cardiovascular Disease (Cardiology) [phone]    [facility]ticoagulation [facility] Responsible  [phone]                  Refer to Patient Findings for HPI:  Patient Findings       Negatives:  Signs/symptoms of thrombosis, Signs/symptoms of bleeding, Laboratory test error suspected, Change in health, Change in alcohol use, Change in activity, Upcoming invasive procedure, Emergency department visit, Upcoming dental procedure, Missed doses, Extra doses, Change in medications, Change in diet/appetite, Hospital admission, Bruising, Other complaints            There were no vitals filed for this visit.   pt declined vitals    Verified current warfarin dosing schedule.    Medications reconciled   Pt is not on antiplatelet therapy      A/P   INR  SUB-therapeutic.     Warfarin dosing recommendation: Instructed pt to BOLUS x 1 dose w/ 10 mg today and to then begin newly increased regimen of 7.5 mg once daily.    Pt educated to contact our clinic with any changes in medications or s/s of bleeding or thrombosis. Pt is aware to seek immediate medical attention for falls, head injury or deep cuts.    Follow up  appointment in 2 week(s).    Alejandro Bautista, PharmD, BCACP

## 2023-07-11 PROCEDURE — RXMED WILLOW AMBULATORY MEDICATION CHARGE: Performed by: INTERNAL MEDICINE

## 2023-07-12 ENCOUNTER — TELEPHONE (OUTPATIENT)
Dept: CARDIOLOGY | Facility: MEDICAL CENTER | Age: 78
End: 2023-07-12
Payer: MEDICARE

## 2023-07-12 ENCOUNTER — DOCUMENTATION (OUTPATIENT)
Dept: PHARMACY | Facility: MEDICAL CENTER | Age: 78
End: 2023-07-12
Payer: MEDICARE

## 2023-07-12 NOTE — PROGRESS NOTES
I S/W SABRA ABOUT RF FOR 801511/WARFARIN , 417070/FLOMAX, 304490/AMARYL, 97073/LIPITOR, 786802/ALLOPURINOL. ALL 100DS $43.50 TOTAL. OK TO CHARGE [facility]. DEL DATE 07/17 BY . PATIENT STATED THEY ARE DOING WELL ON MED. 0 MISSED DOSES. 7DS ON HAND. NO NEW SIDE EFFECTS. NO CONCERNS FOR RPH.  SABRA APPRECIATED CALL TO FOLLOW UP

## 2023-07-12 NOTE — TELEPHONE ENCOUNTER
"Last OV: 9/7/2022  Proposed Surgery: Bipolar Transurethral Resection of Prostate  Surgery Date: 9-1-2023  Requesting Office Name: [facility]  Fax Number: [phone]  Preference of Location (default is surgery center unless specified by Cardiologist or VALERIE)  Prior Clearance Addressed: No      Anticoags/Antiplatelets: Warfarin  Outstanding Cardiac Imaging : No  Stent, Cardiac Devices, or Catheterization: No  Ablation, TAVR, Cardioversion: No  Recent Cardiac Hospitalization: No            When: N/A  History (cardiac history):   Past Medical History:   Diagnosis Date    Arrhythmia July 2013    AFIB, cardioversion    Arthritis     Chronic gout of hand 3/29/2022    Chronic gout of left hand 5/12/2022    Diabetes     Diet,oral meds    History of kidney stones 3/29/2022    Hypertension     Hypertension     Loss of balance 3/29/2022    Pain      \"stiff\" from arthritis, 2/10    Paroxysmal atrial fibrillation (HCC) 6/6/2013    Sleep apnea     CPAP    Snoring     Spinal stenosis of lumbar region without neurogenic claudication 3/29/2022    Stress 4/28/2023    Vitamin D deficiency 8/18/2022             Surgical Clearance Letter Sent: YES   **[facility]    "

## 2023-07-12 NOTE — LETTER
PROCEDURE/SURGERY CLEARANCE FORM    Date: 7/12/2023   Patient Name: Cheikh Perez    Dear Surgeon or Proceduralist,      Thank you for your request for cardiac stratification of our mutual patient Cheikh Perez 1945. We have reviewed their [facility] records; and to the best of our understanding this patient has not had stenting, ablation, cardiothoracic surgery or hospitalization for cardiovascular reasons in the past 6 months.  Cheikh Perez has been seen within the past 18 months and is considered to have non-modifiable cardiac risk for this low-risk procedure/surgery. They may proceed from a cardiovascular standpoint and may hold their antiplatelet/anticoagulation as briefly as possible. Please have patient resume this medication when hemodynamically stable to do so.     Aspirin or Prasugrel   - hold 7 days prior to procedure/surgery, resume when hemodynamically stable      Clopidrogrel or Ticagrelor  - hold 7 days for all neurological procedures, hold 5 days prior to all other procedure/surgery,  resume when hemodynamically stable     Warfarin - hold 7 days for all neurological procedures, hold 5 days prior to all other procedure/surgery and coordinate with [facility] Anticoagulation [facility] ([phone]) INR testing and dose management.      Pradaxa/Xarelto/Eliquis/Savesya - hold 1 day prior to procedure for low bleeding risk procedure, 2 days for high bleeding risk procedure, or consider holding 3 days or longer for patients with reduced kidney function (CrCl <30mL/min) or spinal/cranial surgeries/procedures.      If they have a mechanical heart valve, please coordinate with [facility] Anticoagulation Service ([phone]) the proper management of their anticoagulant in the periprocedural or perioperative period.      Some patients have higher risk for cardiovascular complications or holding medication. If our patient has had prior complications of holding  antiplatelet or anticoagulants in the past and we have seen them after these events, we have addressed these concerns with the patient. They are at an unknown degree of increased risk for recurrent complication.  You may hold anticoagulation/antiplatelets for the procedure or surgery if the benefits of the procedure or surgery outweigh this nonmodifiable risk.      If Cheikh Perez 1945 has new symptoms of heart failure decompensation, unstable arrythmia, or angina please reach out and we will assess the patient.      If you have other patient-specific concerns, please feel free to reach out to the patient's cardiologist directly at [phone].     Thank you,       [facility]

## 2023-07-14 ENCOUNTER — ANTICOAGULATION VISIT (OUTPATIENT)
Dept: VASCULAR LAB | Facility: MEDICAL CENTER | Age: 78
End: 2023-07-14
Attending: INTERNAL MEDICINE
Payer: MEDICARE

## 2023-07-14 DIAGNOSIS — Z79.01 CHRONIC ANTICOAGULATION: ICD-10-CM

## 2023-07-14 DIAGNOSIS — I48.21 PERMANENT ATRIAL FIBRILLATION (HCC): Chronic | ICD-10-CM

## 2023-07-14 DIAGNOSIS — D68.69 SECONDARY HYPERCOAGULABLE STATE (HCC): ICD-10-CM

## 2023-07-14 LAB — INR PPP: 1.5 (ref 2–3.5)

## 2023-07-14 PROCEDURE — 85610 PROTHROMBIN TIME: CPT

## 2023-07-14 PROCEDURE — 99212 OFFICE O/P EST SF 10 MIN: CPT

## 2023-07-14 NOTE — PROGRESS NOTES
Anticoagulation Summary  As of 2023      INR goal:  2.0-3.0   TTR:  81.7 % (7.1 y)   INR used for dosin.50 (2023)   Warfarin maintenance plan:  10 mg (5 mg x 2) every Wed; 7.5 mg (5 mg x 1.5) all other days   Weekly warfarin total:  55 mg   Plan last modified:  Cliff Granados, PharmD (2023)   Next INR check:  2023   Target end date:  Indefinite    Indications    Permanent atrial fibrillation (HCC) [I48.21]  Chronic anticoagulation [Z79.01]  Secondary hypercoagulable state (HCC) [D68.69]                 Anticoagulation Episode Summary       INR check location:  Anticoagulation Clinic    Preferred lab:      Send INR reminders to:      Comments:  Indomethacin as needed, discussed risks with the patient.          Anticoagulation Care Providers       Provider Role Specialty Phone number    Cheikh Turner M.D. Referring Cardiovascular Disease (Cardiology) [phone]    [facility] Anticoagulation Services Responsible  [phone]          Refer to Patient Findings for HPI:  Patient Findings       Negatives:  Signs/symptoms of thrombosis, Signs/symptoms of bleeding, Laboratory test error suspected, Change in health, Change in alcohol use, Change in activity, Upcoming invasive procedure, Emergency department visit, Upcoming dental procedure, Missed doses, Extra doses, Change in medications, Change in diet/appetite, Hospital admission, Bruising, Other complaints            There were no vitals filed for this visit.  pt declined vitals    Verified current warfarin dosing schedule.    Medications reconciled  Pt is not on antiplatelet therapy    A/P   INR is still sub-therapeutic.     Warfarin dosing recommendation: Patients admits to eating more spinach every other day in a home made smoothie.    He is to bolus tonight with 10 mg then increase weekly regimen.    Pt educated to contact our clinic with any changes in medications or s/s of bleeding or thrombosis. Pt is aware to seek immediate  medical attention for falls, head injury or deep cuts.    Follow up appointment in 1 week(s).    Cliff Granados, MatthewD

## 2023-07-17 ENCOUNTER — PHARMACY VISIT (OUTPATIENT)
Dept: PHARMACY | Facility: MEDICAL CENTER | Age: 78
End: 2023-07-17
Payer: MEDICARE

## 2023-07-21 ENCOUNTER — ANTICOAGULATION VISIT (OUTPATIENT)
Dept: VASCULAR LAB | Facility: MEDICAL CENTER | Age: 78
End: 2023-07-21
Attending: INTERNAL MEDICINE
Payer: MEDICARE

## 2023-07-21 DIAGNOSIS — D68.69 SECONDARY HYPERCOAGULABLE STATE (HCC): ICD-10-CM

## 2023-07-21 DIAGNOSIS — I48.21 PERMANENT ATRIAL FIBRILLATION (HCC): Chronic | ICD-10-CM

## 2023-07-21 DIAGNOSIS — Z79.01 CHRONIC ANTICOAGULATION: ICD-10-CM

## 2023-07-21 LAB — INR PPP: 2.4 (ref 2–3.5)

## 2023-07-21 PROCEDURE — 85610 PROTHROMBIN TIME: CPT

## 2023-07-21 PROCEDURE — 99211 OFF/OP EST MAY X REQ PHY/QHP: CPT

## 2023-07-21 NOTE — PROGRESS NOTES
Anticoagulation Summary  As of 2023      INR goal:  2.0-3.0   TTR:  81.6 % (7.1 y)   INR used for dosin.40 (2023)   Warfarin maintenance plan:  10 mg (5 mg x 2) every Wed; 7.5 mg (5 mg x 1.5) all other days   Weekly warfarin total:  55 mg   Plan last modified:  Cliff Granados PharmD (2023)   Next INR check:  2023   Target end date:  Indefinite    Indications    Permanent atrial fibrillation (HCC) [I48.21]  Chronic anticoagulation [Z79.01]  Secondary hypercoagulable state (HCC) [D68.69]                 Anticoagulation Episode Summary       INR check location:  Anticoagulation Clinic    Preferred lab:      Send INR reminders to:      Comments:  Indomethacin as needed, discussed risks with the patient.          Anticoagulation Care Providers       Provider Role Specialty Phone number    Cheikh Turner M.D. Referring Cardiovascular Disease (Cardiology) [phone]    [facility] Anticoagulation Services Responsible  [phone]                  Refer to Patient Findings for HPI:  Patient Findings       Negatives:  Signs/symptoms of thrombosis, Signs/symptoms of bleeding, Laboratory test error suspected, Change in health, Change in alcohol use, Change in activity, Upcoming invasive procedure, Emergency department visit, Upcoming dental procedure, Missed doses, Extra doses, Change in medications, Change in diet/appetite, Hospital admission, Bruising, Other complaints            There were no vitals filed for this visit.    Verified current warfarin dosing schedule.    Medications reconciled   Pt is not on antiplatelet therapy      A/P   INR  therapeutic.     Warfarin dosing recommendation: Instructed pt to continue on with current regimen.    Pt educated to contact our clinic with any changes in medications or s/s of bleeding or thrombosis. Pt is aware to seek immediate medical attention for falls, head injury or deep cuts.    Follow up appointment in 2 week(s).    Alejandro Bautista PharmD,  BCACP

## 2023-08-02 PROCEDURE — RXMED WILLOW AMBULATORY MEDICATION CHARGE: Performed by: INTERNAL MEDICINE

## 2023-08-03 ENCOUNTER — DOCUMENTATION (OUTPATIENT)
Dept: PHARMACY | Facility: MEDICAL CENTER | Age: 78
End: 2023-08-03
Payer: MEDICARE

## 2023-08-03 NOTE — PROGRESS NOTES
I SPOKE TO SABRA ABOUT RF FOR METFORMIN 200/100DS $6.25, AND VIT B12 90/90DS $5.01. DEL DATE 08/14 BY CARMEN. SABRA STATED HE IS DOING WELL ON MEDS. NO MISSED DOSES. 10DS ON HAND FOR B12, AND 20DS FOR METFORMIN. NO CONCERNS FOR RPH. SABRA APPRECIATED CALL TO REFILL /FOLLOW UP

## 2023-08-04 ENCOUNTER — APPOINTMENT (OUTPATIENT)
Dept: VASCULAR LAB | Facility: MEDICAL CENTER | Age: 78
End: 2023-08-04
Payer: MEDICARE

## 2023-08-08 ENCOUNTER — ANTICOAGULATION VISIT (OUTPATIENT)
Dept: VASCULAR LAB | Facility: MEDICAL CENTER | Age: 78
End: 2023-08-08
Attending: INTERNAL MEDICINE
Payer: MEDICARE

## 2023-08-08 DIAGNOSIS — D68.69 SECONDARY HYPERCOAGULABLE STATE (HCC): ICD-10-CM

## 2023-08-08 DIAGNOSIS — Z79.01 CHRONIC ANTICOAGULATION: ICD-10-CM

## 2023-08-08 DIAGNOSIS — I48.21 PERMANENT ATRIAL FIBRILLATION (HCC): Chronic | ICD-10-CM

## 2023-08-08 LAB — INR PPP: 3 (ref 2–3.5)

## 2023-08-08 PROCEDURE — 99211 OFF/OP EST MAY X REQ PHY/QHP: CPT

## 2023-08-08 PROCEDURE — 85610 PROTHROMBIN TIME: CPT

## 2023-08-08 NOTE — PROGRESS NOTES
Anticoagulation Summary  As of 8/8/2023      INR goal:  2.0-3.0   TTR:  81.7 % (7.2 y)   INR used for dosing:  3.00 (8/8/2023)   Warfarin maintenance plan:  10 mg (5 mg x 2) every Wed; 7.5 mg (5 mg x 1.5) all other days   Weekly warfarin total:  55 mg   Plan last modified:  Cliff Granados, PharmD (7/14/2023)   Next INR check:  9/1/2023   Target end date:  Indefinite    Indications    Permanent atrial fibrillation (HCC) [I48.21]  Chronic anticoagulation [Z79.01]  Secondary hypercoagulable state (HCC) [D68.69]                 Anticoagulation Episode Summary       INR check location:  Anticoagulation Clinic    Preferred lab:      Send INR reminders to:      Comments:  Indomethacin as needed, discussed risks with the patient.          Anticoagulation Care Providers       Provider Role Specialty Phone number    Cheikh Turner M.D. Referring Cardiovascular Disease (Cardiology) [phone]    [facility] Anticoagulation Services Responsible  [phone]               Refer to Patient Findings for HPI:  Patient Findings       Negatives:  Signs/symptoms of thrombosis, Signs/symptoms of bleeding, Laboratory test error suspected, Change in health, Change in alcohol use, Change in activity, Upcoming invasive procedure, Emergency department visit, Upcoming dental procedure, Missed doses, Extra doses, Change in medications, Change in diet/appetite, Hospital admission, Bruising, Other complaints            There were no vitals filed for this visit.   pt declined vitals    Verified current warfarin dosing schedule.    Medications reconciled   Pt is not on antiplatelet therapy      A/P   INR  therapeutic.     Warfarin dosing recommendation: Pt is to continue with current warfarin dosing regimen.    Pt educated to contact our clinic with any changes in medications or s/s of bleeding or thrombosis. Pt is aware to seek immediate medical attention for falls, head injury or deep cuts.    Follow up appointment in 3  week(s).    Miguelina Baeza, PharmD

## 2023-08-14 ENCOUNTER — PHARMACY VISIT (OUTPATIENT)
Dept: PHARMACY | Facility: MEDICAL CENTER | Age: 78
End: 2023-08-14
Payer: MEDICARE

## 2023-08-14 ENCOUNTER — HOSPITAL ENCOUNTER (OUTPATIENT)
Dept: LAB | Facility: MEDICAL CENTER | Age: 78
End: 2023-08-14
Attending: UROLOGY
Payer: MEDICARE

## 2023-08-14 LAB
ANION GAP SERPL CALC-SCNC: 13 MMOL/L (ref 7–16)
APPEARANCE UR: ABNORMAL
APTT PPP: 46.8 SEC (ref 24.7–36)
BACTERIA #/AREA URNS HPF: ABNORMAL /HPF
BASOPHILS # BLD AUTO: 0.9 % (ref 0–1.8)
BASOPHILS # BLD: 0.06 K/UL (ref 0–0.12)
BILIRUB UR QL STRIP.AUTO: NEGATIVE
BUN SERPL-MCNC: 16 MG/DL (ref 8–22)
CALCIUM SERPL-MCNC: 9.3 MG/DL (ref 8.5–10.5)
CHLORIDE SERPL-SCNC: 106 MMOL/L (ref 96–112)
CO2 SERPL-SCNC: 23 MMOL/L (ref 20–33)
COLOR UR: YELLOW
CREAT SERPL-MCNC: 0.76 MG/DL (ref 0.5–1.4)
EOSINOPHIL # BLD AUTO: 0.24 K/UL (ref 0–0.51)
EOSINOPHIL NFR BLD: 3.5 % (ref 0–6.9)
EPI CELLS #/AREA URNS HPF: ABNORMAL /HPF
ERYTHROCYTE [DISTWIDTH] IN BLOOD BY AUTOMATED COUNT: 43.6 FL (ref 35.9–50)
GFR SERPLBLD CREATININE-BSD FMLA CKD-EPI: 92 ML/MIN/1.73 M 2
GLUCOSE SERPL-MCNC: 144 MG/DL (ref 65–99)
GLUCOSE UR STRIP.AUTO-MCNC: >=1000 MG/DL
HCT VFR BLD AUTO: 50.8 % (ref 42–52)
HGB BLD-MCNC: 17 G/DL (ref 14–18)
HYALINE CASTS #/AREA URNS LPF: ABNORMAL /LPF
IMM GRANULOCYTES # BLD AUTO: 0.01 K/UL (ref 0–0.11)
IMM GRANULOCYTES NFR BLD AUTO: 0.1 % (ref 0–0.9)
INR PPP: 3.04 (ref 0.87–1.13)
KETONES UR STRIP.AUTO-MCNC: NEGATIVE MG/DL
LEUKOCYTE ESTERASE UR QL STRIP.AUTO: ABNORMAL
LYMPHOCYTES # BLD AUTO: 1.63 K/UL (ref 1–4.8)
LYMPHOCYTES NFR BLD: 23.9 % (ref 22–41)
MCH RBC QN AUTO: 30 PG (ref 27–33)
MCHC RBC AUTO-ENTMCNC: 33.5 G/DL (ref 32.3–36.5)
MCV RBC AUTO: 89.6 FL (ref 81.4–97.8)
MICRO URNS: ABNORMAL
MONOCYTES # BLD AUTO: 0.73 K/UL (ref 0–0.85)
MONOCYTES NFR BLD AUTO: 10.7 % (ref 0–13.4)
NEUTROPHILS # BLD AUTO: 4.16 K/UL (ref 1.82–7.42)
NEUTROPHILS NFR BLD: 60.9 % (ref 44–72)
NITRITE UR QL STRIP.AUTO: NEGATIVE
NRBC # BLD AUTO: 0 K/UL
NRBC BLD-RTO: 0 /100 WBC (ref 0–0.2)
PH UR STRIP.AUTO: 5.5 [PH] (ref 5–8)
PLATELET # BLD AUTO: 168 K/UL (ref 164–446)
PMV BLD AUTO: 11.8 FL (ref 9–12.9)
POTASSIUM SERPL-SCNC: 4.3 MMOL/L (ref 3.6–5.5)
PROT UR QL STRIP: 30 MG/DL
PROTHROMBIN TIME: 30.4 SEC (ref 12–14.6)
RBC # BLD AUTO: 5.67 M/UL (ref 4.7–6.1)
RBC # URNS HPF: ABNORMAL /HPF
RBC UR QL AUTO: ABNORMAL
SODIUM SERPL-SCNC: 142 MMOL/L (ref 135–145)
SP GR UR STRIP.AUTO: 1.02
UROBILINOGEN UR STRIP.AUTO-MCNC: 0.2 MG/DL
WBC # BLD AUTO: 6.8 K/UL (ref 4.8–10.8)
WBC #/AREA URNS HPF: ABNORMAL /HPF
YEAST BUDDING URNS QL: PRESENT /HPF

## 2023-08-14 PROCEDURE — 81001 URINALYSIS AUTO W/SCOPE: CPT

## 2023-08-14 PROCEDURE — 87077 CULTURE AEROBIC IDENTIFY: CPT

## 2023-08-14 PROCEDURE — 85730 THROMBOPLASTIN TIME PARTIAL: CPT

## 2023-08-14 PROCEDURE — 85610 PROTHROMBIN TIME: CPT

## 2023-08-14 PROCEDURE — 80048 BASIC METABOLIC PNL TOTAL CA: CPT

## 2023-08-14 PROCEDURE — 85025 COMPLETE CBC W/AUTO DIFF WBC: CPT

## 2023-08-14 PROCEDURE — 36415 COLL VENOUS BLD VENIPUNCTURE: CPT

## 2023-08-14 PROCEDURE — 87086 URINE CULTURE/COLONY COUNT: CPT

## 2023-08-15 ENCOUNTER — HOSPITAL ENCOUNTER (OUTPATIENT)
Dept: CARDIOLOGY | Facility: MEDICAL CENTER | Age: 78
End: 2023-08-15
Attending: UROLOGY
Payer: MEDICARE

## 2023-08-15 LAB — EKG IMPRESSION: NORMAL

## 2023-08-15 PROCEDURE — 93010 ELECTROCARDIOGRAM REPORT: CPT | Performed by: INTERNAL MEDICINE

## 2023-08-15 PROCEDURE — 93005 ELECTROCARDIOGRAM TRACING: CPT | Performed by: UROLOGY

## 2023-08-24 PROCEDURE — RXMED WILLOW AMBULATORY MEDICATION CHARGE: Performed by: UROLOGY

## 2023-08-25 ENCOUNTER — ANTICOAGULATION VISIT (OUTPATIENT)
Dept: VASCULAR LAB | Facility: MEDICAL CENTER | Age: 78
End: 2023-08-25
Attending: INTERNAL MEDICINE
Payer: MEDICARE

## 2023-08-25 ENCOUNTER — PHARMACY VISIT (OUTPATIENT)
Dept: PHARMACY | Facility: MEDICAL CENTER | Age: 78
End: 2023-08-25
Payer: MEDICARE

## 2023-08-25 DIAGNOSIS — Z79.01 CHRONIC ANTICOAGULATION: ICD-10-CM

## 2023-08-25 DIAGNOSIS — I48.21 PERMANENT ATRIAL FIBRILLATION (HCC): Chronic | ICD-10-CM

## 2023-08-25 DIAGNOSIS — D68.69 SECONDARY HYPERCOAGULABLE STATE (HCC): ICD-10-CM

## 2023-08-25 LAB — INR PPP: 3.6 (ref 2–3.5)

## 2023-08-25 PROCEDURE — 99212 OFFICE O/P EST SF 10 MIN: CPT

## 2023-08-25 PROCEDURE — 85610 PROTHROMBIN TIME: CPT

## 2023-08-25 NOTE — PROGRESS NOTES
Anticoagulation Summary  As of 8/25/2023      INR goal:  2.0-3.0   TTR:  81.2 % (7.2 y)   INR used for dosing:  3.60 (8/25/2023)   Warfarin maintenance plan:  10 mg (5 mg x 2) every Wed; 7.5 mg (5 mg x 1.5) all other days   Weekly warfarin total:  55 mg   Plan last modified:  Matthew SanonD (7/14/2023)   Next INR check:  9/8/2023   Target end date:  Indefinite    Indications    Permanent atrial fibrillation (HCC) [I48.21]  Chronic anticoagulation [Z79.01]  Secondary hypercoagulable state (HCC) [D68.69]                 Anticoagulation Episode Summary       INR check location:  Anticoagulation Clinic    Preferred lab:      Send INR reminders to:      Comments:  Indomethacin as needed, discussed risks with the patient.          Anticoagulation Care Providers       Provider Role Specialty Phone number    Cheikh Turner M.D. Referring Cardiovascular Disease (Cardiology) [phone]    [facility] Anticoagulation Services Responsible  [phone]          Refer to Patient Findings for HPI:  Patient Findings       Positives:  Upcoming invasive procedure (TURP 09/01/23)    Negatives:  Signs/symptoms of thrombosis, Signs/symptoms of bleeding, Laboratory test error suspected, Change in health, Change in alcohol use, Change in activity, Emergency department visit, Upcoming dental procedure, Missed doses, Extra doses, Change in medications, Change in diet/appetite, Hospital admission, Bruising, Other complaints            There were no vitals filed for this visit.  pt declined vitals    Verified current warfarin dosing schedule.    Medications reconciled: Yes  Pt is not on antiplatelet therapy      A/P   INR is supratherapeutic at 3.6     Warfarin dosing recommendation: Patient has an upcoming procedure TURP Transurethral resection of the prostate 09/01/2023. He is here to discuss bridging.    [id number] - 4 (HTN, DMII, 77 y/o) without a hx of CVA.     According to the 2022 Perioperative Management of  Antithrombotic Therapy-  An [employer] Clinical Practice Guideline      Patient at low risk. Bridging with Lovenox not recommended.    Patient is to take decreased doses of 5 mg X 2 days then is to begin Holding warfarin 08/27/2023 for 5 days.  He is to resume warfarin day of procedure and is to FU 09/08/23    Pt educated to contact our clinic with any changes in medications or s/s of bleeding or thrombosis. Pt is aware to seek immediate medical attention for falls, head injury or deep cuts.    Follow up appointment in 2 week(s).    Cliff Granados, MatthewD

## 2023-09-01 ENCOUNTER — PHARMACY VISIT (OUTPATIENT)
Dept: PHARMACY | Facility: MEDICAL CENTER | Age: 78
End: 2023-09-01
Payer: COMMERCIAL

## 2023-09-01 ENCOUNTER — APPOINTMENT (OUTPATIENT)
Dept: VASCULAR LAB | Facility: MEDICAL CENTER | Age: 78
End: 2023-09-01
Payer: MEDICARE

## 2023-09-01 PROCEDURE — RXMED WILLOW AMBULATORY MEDICATION CHARGE: Performed by: UROLOGY

## 2023-09-01 RX ORDER — HYDROCODONE BITARTRATE AND ACETAMINOPHEN 5; 325 MG/1; MG/1
TABLET ORAL
Qty: 5 TABLET | Refills: 0 | OUTPATIENT
Start: 2023-09-01 | End: 2024-02-01

## 2023-09-02 ENCOUNTER — HOSPITAL ENCOUNTER (EMERGENCY)
Facility: MEDICAL CENTER | Age: 78
End: 2023-09-02
Attending: EMERGENCY MEDICINE
Payer: MEDICARE

## 2023-09-02 VITALS
DIASTOLIC BLOOD PRESSURE: 79 MMHG | OXYGEN SATURATION: 95 % | BODY MASS INDEX: 26.89 KG/M2 | HEART RATE: 75 BPM | SYSTOLIC BLOOD PRESSURE: 122 MMHG | WEIGHT: 227.74 LBS | TEMPERATURE: 97.2 F | RESPIRATION RATE: 16 BRPM | HEIGHT: 77 IN

## 2023-09-02 DIAGNOSIS — T83.9XXA PROBLEM WITH FOLEY CATHETER, INITIAL ENCOUNTER (HCC): ICD-10-CM

## 2023-09-02 PROCEDURE — 51702 INSERT TEMP BLADDER CATH: CPT

## 2023-09-02 PROCEDURE — 99284 EMERGENCY DEPT VISIT MOD MDM: CPT

## 2023-09-02 PROCEDURE — 303105 HCHG CATHETER EXTRA

## 2023-09-02 ASSESSMENT — FIBROSIS 4 INDEX: FIB4 SCORE: 1.801693214555616375

## 2023-09-02 NOTE — DISCHARGE INSTRUCTIONS
Follow-up with your urologist as scheduled.  Return to the emergency department if you have any concerns sooner

## 2023-09-02 NOTE — ED TRIAGE NOTES
"Chief Complaint   Patient presents with    Other     Pt c/o leaking around ding catheter, started this am  S/P TURP yesterday       /85   Pulse 76   Temp 36.7 °C (98 °F) (Temporal)   Resp 14   Ht 1.956 m (6' 5\")   Wt 103 kg (227 lb 11.8 oz)   SpO2 94%   BMI 27.01 kg/m²     Pt ambulated to ED using walked w/ visitor for c/o draining around ding catheter placed yesterday s/p TURP.  Pt stopped taking Coumadin 5 days prior and will hold for two days after procedure as ordered.    "

## 2023-09-02 NOTE — ED PROVIDER NOTES
ED Provider Note    CHIEF COMPLAINT  Chief Complaint   Patient presents with    Other     Pt c/o leaking around ding catheter, started this am  S/P TURP yesterday         EXTERNAL RECORDS REVIEWED  Outpatient Notes primary office visit for follow-up with diabetes, spinal stenosis on April 2023    HPI/ROS  LIMITATION TO HISTORY   Select: : None  OUTSIDE HISTORIAN(S):  Family at bedside for discussion history and symptoms    Cheikh Perez is a 78 y.o. male who presents with possible blockage of Ding catheter.  He had prostate surgery yesterday with placement of Ding catheter.  He noticed small amount of blood in the urine yesterday evening.  At 1:30 AM this morning he awoke to change the bag stating things were well.  When he awoke this morning, he has a pressure-like sensation that he had to urinate, then noticed leakage around the bag itself.  He stated the bleeding had stopped at that point.  No abdominal pain.  No headache.  No fever or chills.  No flank pain.    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    SURGICAL HISTORY   has a past surgical history that includes lumbar laminectomy diskectomy (7/2006); repair, knee, acl (12/2005); knee arthroplasty total (3/2009); knee arthroplasty total (12/1/2009); recovery (7/11/2013); other (); cystoscopy stent placement (1/13/2014); ureteroscopy (1/13/2014); lasertripsy (1/13/2014); orif, fracture, femur (Left, 2/24/2016); and orif, fracture, femur.    FAMILY HISTORY  Family History   Problem Relation Age of Onset  "   Heart Attack Father 59    Heart Disease Father     Hypertension Brother     Diabetes Brother     Arthritis Brother     Thyroid Mother 50    Breast Cancer Sister     No Known Problems Maternal Grandmother     No Known Problems Maternal Grandfather     No Known Problems Paternal Grandmother     No Known Problems Paternal Grandfather     Arrythmia Brother     No Known Problems Brother     No Known Problems Daughter        SOCIAL HISTORY  Social History     Tobacco Use    Smoking status: Never    Smokeless tobacco: Never   Vaping Use    Vaping Use: Never used   Substance and Sexual Activity    Alcohol use: Yes     Alcohol/week: 6.0 oz     Types: 10 Glasses of wine per week    Drug use: No    Sexual activity: Never     Partners: Female       CURRENT MEDICATIONS  Home Medications    **Home medications have not yet been reviewed for this encounter**         ALLERGIES  Allergies   Allergen Reactions    Nkda [No Known Drug Allergy]        PHYSICAL EXAM  VITAL SIGNS: /85   Pulse 76   Temp 36.7 °C (98 °F) (Temporal)   Resp 14   Ht 1.956 m (6' 5\")   Wt 103 kg (227 lb 11.8 oz)   SpO2 94%   BMI 27.01 kg/m²    Constitutional: Well-nourished, no acute distress  GI: Abdomen is soft and nontender  Genitourinary: Normal male genitalia, Croft catheter in place  Muscle skeletal: No flank tenderness  Cardiac: Regular rate and rhythm    DIAGNOSTIC STUDIES / PROCEDURES          COURSE & MEDICAL DECISION MAKING    ED Observation Status? No; Patient does not meet criteria for ED Observation.     INITIAL ASSESSMENT, COURSE AND PLAN  Care Narrative: Patient presents with leakage around the Croft catheter, attempts to irrigate the catheter by nursing staff were unsuccessful.  The catheter was removed finding proteinaceous material plugging the lumen.  New Croft catheter placed to similar caliber, now with clear urine drainage via the catheter without problem.  Patient is drained 200 cc of slightly pink-tinged urine.  He is " otherwise comfortable at this time.  No suprapubic tenderness, no flank tenderness.  No further work-up involved at this time.  The blood noted in the urine is likely secondary to recent prostate surgery.  He was given return precautions and advised to follow-up with his urologist as soon as possible.        DISPOSITION AND DISCUSSIONS    Escalation of care considered, and ultimately not performed:blood analysis and diagnostic imaging      Decision tools and prescription drugs considered including, but not limited to: Medication modification no change in medications at this time, will continue usual prescriptions .    FINAL DIAGNOSIS  1. Problem with Croft catheter, initial encounter (HCC)           Electronically signed by: Meek Lema M.D., 9/2/2023 10:29 AM

## 2023-09-02 NOTE — ED NOTES
Leg bag placed, clear yellow output  D/c pt home, no rx given . Pt aware of f/u instructions , aware to return for any changes or concerns. No further questions upon d/c home from ed   
Pt states he had a folley catheter placed yesterday  and now it is leaking with blood.   
Unable to clear obstruction for good flow in ding.  Old ding removed  20 fr placed , good return  Pt re evaluated by md.  Leg bag placed , pt to be d/c home  
Yes

## 2023-09-07 ENCOUNTER — TELEPHONE (OUTPATIENT)
Dept: PHARMACY | Facility: MEDICAL CENTER | Age: 78
End: 2023-09-07
Payer: MEDICARE

## 2023-09-07 PROCEDURE — RXMED WILLOW AMBULATORY MEDICATION CHARGE: Performed by: INTERNAL MEDICINE

## 2023-09-07 NOTE — TELEPHONE ENCOUNTER
I received a message in [employer] regarding this patient's co-pay increasing to an unaffordable cost. Upon checking his co-pay with [place], it has increased substantially from $82/100 days to $329.22 / 60 days. He is going into the donut hole with this fill so it will only increase from there. I called and LVM @ [phone] for Rajiv to call me back directly. I want to see if he may qualify for financial assistance through the , but need to verify his income and gain consent from patient to start the process. Waiting to hear back.

## 2023-09-07 NOTE — TELEPHONE ENCOUNTER
Contact:     [id number]    Rjaiv Perez    Phone number: [phone]    Name of person spoken with and relationship to patient: Rajiv-self   Patient’s Adherence:            How patient is doing on medication: well    How many missed doses and reason: 0    Any new medications: no    Any new conditions: no    Any new allergies: no    Any new side effects: no     Any new diagnoses: no     How many doses remainin    Did patient want to speak with pharmacist: no  Delivery:            Delivery date and method:   ship out USPS (approx 3-5 business days)    Needs by Date:      Signature required:  no    Any additional details for :  elva  Teach Appointment Date:  na  Shipping Address:  [address] [place] [address], [facility] [address]  Medication(name, strength and dose):  enalapril 10 MG Tabs qd,  metoprolol tartrate 25 MG Tabs bid, Jardiance 25 MG Tabs qd  Copay: $337.82  Payment Method: ccof  Supplies:  na  Additional info: YOHANA Vance. He stated he is doing well on the medication, but the cost is getting a little bit much for him. He only had #70 tabs left on profile vs #100, so he filled for #60/60ds. He asked if he could get some assistance for future fills on Jardiance. Reached out to [facility] and [facility]ris to f/u

## 2023-09-08 ENCOUNTER — PHARMACY VISIT (OUTPATIENT)
Dept: PHARMACY | Facility: MEDICAL CENTER | Age: 78
End: 2023-09-08
Payer: COMMERCIAL

## 2023-09-08 ENCOUNTER — APPOINTMENT (OUTPATIENT)
Dept: VASCULAR LAB | Facility: MEDICAL CENTER | Age: 78
End: 2023-09-08
Attending: INTERNAL MEDICINE
Payer: MEDICARE

## 2023-09-08 ENCOUNTER — APPOINTMENT (OUTPATIENT)
Dept: MEDICAL GROUP | Facility: PHYSICIAN GROUP | Age: 78
End: 2023-09-08
Payer: MEDICARE

## 2023-09-11 ENCOUNTER — ANTICOAGULATION VISIT (OUTPATIENT)
Dept: VASCULAR LAB | Facility: MEDICAL CENTER | Age: 78
End: 2023-09-11
Attending: INTERNAL MEDICINE
Payer: MEDICARE

## 2023-09-11 DIAGNOSIS — I48.21 PERMANENT ATRIAL FIBRILLATION (HCC): Chronic | ICD-10-CM

## 2023-09-11 DIAGNOSIS — Z79.01 CHRONIC ANTICOAGULATION: ICD-10-CM

## 2023-09-11 DIAGNOSIS — D68.69 SECONDARY HYPERCOAGULABLE STATE (HCC): ICD-10-CM

## 2023-09-11 LAB — INR PPP: 3.7 (ref 2–3.5)

## 2023-09-11 PROCEDURE — 85610 PROTHROMBIN TIME: CPT

## 2023-09-11 PROCEDURE — 99212 OFFICE O/P EST SF 10 MIN: CPT

## 2023-09-11 NOTE — PROGRESS NOTES
Anticoagulation Summary  As of 9/11/2023      INR goal:  2.0-3.0   TTR:  80.6 % (7.3 y)   INR used for dosing:  3.70 (9/11/2023)   Warfarin maintenance plan:  10 mg (5 mg x 2) every Wed; 7.5 mg (5 mg x 1.5) all other days   Weekly warfarin total:  55 mg   Plan last modified:  Cliff Granados, PharmD (7/14/2023)   Next INR check:  9/25/2023   Target end date:  Indefinite    Indications    Permanent atrial fibrillation (HCC) [I48.21]  Chronic anticoagulation [Z79.01]  Secondary hypercoagulable state (HCC) [D68.69]                 Anticoagulation Episode Summary       INR check location:  Anticoagulation Clinic    Preferred lab:      Send INR reminders to:      Comments:  Indomethacin as needed, discussed risks with the patient.          Anticoagulation Care Providers       Provider Role Specialty Phone number    Cheikh Turner M.D. Referring Cardiovascular Disease (Cardiology) [phone]    [facility] Anticoagulation Services Responsible  [phone]             Refer to Patient Findings for HPI:  Patient Findings       Positives:  Emergency department visit (ED visit on 09/02/23 d/t ding catheter obstruction. This was replaced during ED visit. Pt states ding catheter was removed last week.), Change in medications (Pt was on fluconazole which he completed yesterday morning)    Negatives:  Signs/symptoms of thrombosis, Signs/symptoms of bleeding, Laboratory test error suspected, Change in health, Change in alcohol use, Change in activity, Upcoming invasive procedure, Upcoming dental procedure, Missed doses, Extra doses, Change in diet/appetite, Hospital admission, Bruising, Other complaints    Comments:  TURP on 09/01/23, held warfarin x 5 days prior as instructed by clinic then held x 2 days after per urologist            There were no vitals filed for this visit.  Pt declined vitals    Verified current warfarin dosing schedule.    Medications reconciled: Yes  Pt is not on antiplatelet therapy      A/P    INR is supratherapeutic likely due to DDI between warfarin and fluconazole. Patient already completed abx therapy but will hold x 1 to facilitate decrease in INR.    Warfarin dosing recommendation: Hold today, then Continue regimen as listed above.    Pt educated to contact our clinic with any changes in medications or s/s of bleeding or thrombosis. Pt is aware to seek immediate medical attention for falls, head injury or deep cuts.    Follow up appointment in 2 week(s).    Mohinder Ball, MatthewD

## 2023-09-12 DIAGNOSIS — E11.9 CONTROLLED TYPE 2 DIABETES MELLITUS WITHOUT COMPLICATION, WITHOUT LONG-TERM CURRENT USE OF INSULIN (HCC): ICD-10-CM

## 2023-09-12 DIAGNOSIS — E78.5 DYSLIPIDEMIA: ICD-10-CM

## 2023-09-12 NOTE — TELEPHONE ENCOUNTER
Is the patient due for a refill? Yes- courtesy refill    Was the patient seen the past year? No    Date of last office visit: 9/7/2022    Does the patient have an upcoming appointment?  No   If yes, When?     Provider to refill:CW    Does the patients insurance require a 100 day supply?  Yes

## 2023-09-14 RX ORDER — EMPAGLIFLOZIN 25 MG/1
1 TABLET, FILM COATED ORAL DAILY
Qty: 100 TABLET | Refills: 0 | Status: SHIPPED | OUTPATIENT
Start: 2023-09-14 | End: 2024-02-01 | Stop reason: SDUPTHER

## 2023-09-25 ENCOUNTER — ANTICOAGULATION VISIT (OUTPATIENT)
Dept: VASCULAR LAB | Facility: MEDICAL CENTER | Age: 78
End: 2023-09-25
Attending: INTERNAL MEDICINE
Payer: MEDICARE

## 2023-09-25 DIAGNOSIS — I48.21 PERMANENT ATRIAL FIBRILLATION (HCC): Chronic | ICD-10-CM

## 2023-09-25 DIAGNOSIS — Z79.01 CHRONIC ANTICOAGULATION: ICD-10-CM

## 2023-09-25 DIAGNOSIS — D68.69 SECONDARY HYPERCOAGULABLE STATE (HCC): ICD-10-CM

## 2023-09-25 LAB — INR PPP: 3.9 (ref 2–3.5)

## 2023-09-25 PROCEDURE — 85610 PROTHROMBIN TIME: CPT

## 2023-09-25 PROCEDURE — 99212 OFFICE O/P EST SF 10 MIN: CPT

## 2023-09-25 NOTE — PROGRESS NOTES
Anticoagulation Summary  As of 9/25/2023      INR goal:  2.0-3.0   TTR:  80.2 % (7.3 y)   INR used for dosing:  3.90 (9/25/2023)   Warfarin maintenance plan:  10 mg (5 mg x 2) every Wed; 7.5 mg (5 mg x 1.5) all other days   Weekly warfarin total:  55 mg   Plan last modified:  Cliff Granados, PharmD (7/14/2023)   Next INR check:  10/9/2023   Target end date:  Indefinite    Indications    Permanent atrial fibrillation (HCC) [I48.21]  Chronic anticoagulation [Z79.01]  Secondary hypercoagulable state (HCC) [D68.69]                 Anticoagulation Episode Summary       INR check location:  Anticoagulation Clinic    Preferred lab:      Send INR reminders to:      Comments:  Indomethacin as needed, discussed risks with the patient.          Anticoagulation Care Providers       Provider Role Specialty Phone number    Cheikh Turner M.D. Referring Cardiovascular Disease (Cardiology) [phone]    [facility] Anticoagulation Services Responsible  [phone]          Refer to Patient Findings for HPI:  Patient Findings       Positives:  Change in diet/appetite (had some cranberries)    Negatives:  Signs/symptoms of thrombosis, Signs/symptoms of bleeding, Laboratory test error suspected, Change in health, Change in alcohol use, Change in activity, Upcoming invasive procedure, Emergency department visit, Upcoming dental procedure, Missed doses, Extra doses, Change in medications, Hospital admission, Bruising, Other complaints          There were no vitals filed for this visit.  The pt declined vitals today     Patient seen in clinic today for follow up on anticoagulation therapy with warfarin (a high risk medication) for hx of AF  Verified current warfarin dosing schedule.  Patient denies any missed doses of warfarin.    Medications reconciled   Pt is not on antiplatelet therapy      A/P   INR is SUPRA-therapeutic today at 3.9.     Warfarin dosing recommendation: Patient will HOLD warfarin dose TONIGHT ONLY, then  will resume his current dosing regimen.   Patient will follow up again in 2 weeks.     Pt educated to contact our clinic with any changes in medications or s/s of bleeding or thrombosis. Pt is aware to seek immediate medical attention for falls, head injury or deep cuts.    Follow up appointment in 2 week(s).    Next appt: Monday, Oct 9 @ 10:15am     Kam Eastman PharmD

## 2023-10-01 DIAGNOSIS — I48.0 PAROXYSMAL ATRIAL FIBRILLATION (HCC): ICD-10-CM

## 2023-10-01 DIAGNOSIS — E78.5 DYSLIPIDEMIA: ICD-10-CM

## 2023-10-02 RX ORDER — WARFARIN SODIUM 5 MG/1
7.5-1 TABLET ORAL DAILY
Qty: 180 TABLET | Refills: 1 | Status: SHIPPED | OUTPATIENT
Start: 2023-10-02

## 2023-10-02 NOTE — TELEPHONE ENCOUNTER
Is the patient due for a refill? Yes    Was the patient seen the past year? No    Date of last office visit: 9/7/22    Does the patient have an upcoming appointment?  No    Provider to refill:CW    Does the patients insurance require a 100 day supply?  Yes

## 2023-10-06 PROCEDURE — RXMED WILLOW AMBULATORY MEDICATION CHARGE: Performed by: INTERNAL MEDICINE

## 2023-10-06 RX ORDER — ATORVASTATIN CALCIUM 10 MG/1
10 TABLET, FILM COATED ORAL DAILY
Qty: 100 TABLET | Refills: 0 | Status: SHIPPED | OUTPATIENT
Start: 2023-10-06 | End: 2024-02-01 | Stop reason: SDUPTHER

## 2023-10-09 ENCOUNTER — TELEPHONE (OUTPATIENT)
Dept: PHARMACY | Facility: MEDICAL CENTER | Age: 78
End: 2023-10-09
Payer: MEDICARE

## 2023-10-09 NOTE — TELEPHONE ENCOUNTER
Contact:  [id number]   Rajiv Perez        Phone number: [phone]    Name of person spoken with and relationship to patient: Rajiv-self   Patient’s Adherence:            How patient is doing on medication:  well    How many missed doses and reason: 0    Any new medications: no    Any new conditions: no    Any new allergies: no    Any new side effects: no     Any new diagnoses: no     How many doses remainin    Did patient want to speak with pharmacist: no  Delivery:            Delivery date and method:   10/11 (adding to prev order by other user)    Needs by Date:  10/11    Signature required:  no    Any additional details for :  na  Teach Appointment Date:  na  Shipping Address: [address] [place] [address],  [place] [address]  Medication(name, strength and dose):   atorvastatin 10 MG Tabs qd   Copay: $0  Payment Method: na  Supplies:  na  Additional info:  YOHANA Vance. He stated doing well on the medication. He asked to have this order combined to his other order he made last week.  date was set for 10/11, so was able to add. sent message to Pharmacy to inform. No further questions/concerns at this time

## 2023-10-11 ENCOUNTER — ANTICOAGULATION VISIT (OUTPATIENT)
Dept: VASCULAR LAB | Facility: MEDICAL CENTER | Age: 78
End: 2023-10-11
Attending: INTERNAL MEDICINE
Payer: MEDICARE

## 2023-10-11 ENCOUNTER — PHARMACY VISIT (OUTPATIENT)
Dept: PHARMACY | Facility: MEDICAL CENTER | Age: 78
End: 2023-10-11
Payer: COMMERCIAL

## 2023-10-11 DIAGNOSIS — I48.21 PERMANENT ATRIAL FIBRILLATION (HCC): Chronic | ICD-10-CM

## 2023-10-11 DIAGNOSIS — Z79.01 CHRONIC ANTICOAGULATION: ICD-10-CM

## 2023-10-11 DIAGNOSIS — D68.69 SECONDARY HYPERCOAGULABLE STATE (HCC): ICD-10-CM

## 2023-10-11 LAB — INR PPP: 2.2 (ref 2–3.5)

## 2023-10-11 PROCEDURE — 85610 PROTHROMBIN TIME: CPT

## 2023-10-11 PROCEDURE — 99211 OFF/OP EST MAY X REQ PHY/QHP: CPT

## 2023-10-11 NOTE — PROGRESS NOTES
Anticoagulation Summary  As of 10/11/2023      INR goal:  2.0-3.0   TTR:  80.0 % (7.4 y)   INR used for dosin.20 (10/11/2023)   Warfarin maintenance plan:  10 mg (5 mg x 2) every Wed; 7.5 mg (5 mg x 1.5) all other days   Weekly warfarin total:  55 mg   Plan last modified:  Cliff Granados, PharmD (2023)   Next INR check:  2023   Target end date:  Indefinite    Indications    Permanent atrial fibrillation (HCC) [I48.21]  Chronic anticoagulation [Z79.01]  Secondary hypercoagulable state (HCC) [D68.69]                 Anticoagulation Episode Summary       INR check location:  Anticoagulation Clinic    Preferred lab:      Send INR reminders to:      Comments:  Indomethacin as needed, discussed risks with the patient.          Anticoagulation Care Providers       Provider Role Specialty Phone number    Cheikh Turner M.D. Referring Cardiovascular Disease (Cardiology) [phone]    [facility] Anticoagulation [facility] Responsible  [phone]                  Refer to Patient Findings for HPI:  Patient Findings       Negatives:  Signs/symptoms of thrombosis, Signs/symptoms of bleeding, Laboratory test error suspected, Change in health, Change in alcohol use, Change in activity, Upcoming invasive procedure, Emergency department visit, Upcoming dental procedure, Missed doses, Extra doses, Change in medications, Change in diet/appetite, Hospital admission, Bruising, Other complaints            There were no vitals filed for this visit.    Verified current warfarin dosing schedule.    Medications reconciled: Yes  Pt is not on antiplatelet therapy      A/P   INR is therapeutic    Warfarin dosing recommendation: Instructed pt to continue on with current regimen.    Pt educated to contact our clinic with any changes in medications or s/s of bleeding or thrombosis. Pt is aware to seek immediate medical attention for falls, head injury or deep cuts.    Follow up appointment in 3 week(s).    Alejandro Bautista  PharmD, BCACP

## 2023-10-12 ENCOUNTER — TELEPHONE (OUTPATIENT)
Dept: PHARMACY | Facility: MEDICAL CENTER | Age: 78
End: 2023-10-12
Payer: MEDICARE

## 2023-10-12 ENCOUNTER — TELEPHONE (OUTPATIENT)
Dept: PHARMACY | Facility: MEDICAL CENTER | Age: 78
End: 2023-10-12

## 2023-10-12 NOTE — TELEPHONE ENCOUNTER
Contact:   SABRA BOJORQUEZ   [id number]     Phone number: [phone]     Name of person spoken with and relationship to patient: SELF   Medication: 069324/GLIMEPIRIDE 100/100DS $0, 992460/TAMSULOSIN 100/100DS $2.22, 8019627/WARFARIN 180/90DS $7.15,385099/ ALLOPURINOL 100/100DS $2.40   Patient’s Adherence:             How patient is doing on medication: well     How many missed doses and reason: 0     Any new medications: no     Any new conditions: no     Any new allergies: no     Any new side effects: no      Any new diagnoses: no      How many doses remaining: APPROX 10DS     Did patient want to speak with pharmacist: no   Delivery:             Delivery date and method:  NO SIG     Needs by Date: 10/16     Signature required: no     Any additional details for : ALISON Pedraza Appointment Date: ALISON   Shipping Address: [address] [place] [address]  [place] [address]   Medication(name, strength and dose): 427416/GLIMEPIRIDE, 248621/TAMSULOSIN, 4098166/WARFARIN,752826/ ALLOPURINOL   Copay: $ 11.67

## 2023-11-02 ENCOUNTER — ANTICOAGULATION VISIT (OUTPATIENT)
Dept: VASCULAR LAB | Facility: MEDICAL CENTER | Age: 78
End: 2023-11-02
Attending: INTERNAL MEDICINE
Payer: MEDICARE

## 2023-11-02 DIAGNOSIS — I48.21 PERMANENT ATRIAL FIBRILLATION (HCC): Chronic | ICD-10-CM

## 2023-11-02 DIAGNOSIS — Z79.01 CHRONIC ANTICOAGULATION: ICD-10-CM

## 2023-11-02 DIAGNOSIS — D68.69 SECONDARY HYPERCOAGULABLE STATE (HCC): ICD-10-CM

## 2023-11-02 LAB — INR PPP: 2.8 (ref 2–3.5)

## 2023-11-02 PROCEDURE — 99212 OFFICE O/P EST SF 10 MIN: CPT | Performed by: NURSE PRACTITIONER

## 2023-11-02 PROCEDURE — 85610 PROTHROMBIN TIME: CPT

## 2023-11-02 NOTE — PROGRESS NOTES
Anticoagulation Summary  As of 2023      INR goal:  2.0-3.0   TTR:  80.2 % (7.4 y)   INR used for dosin.80 (2023)   Warfarin maintenance plan:  7.5 mg (5 mg x 1.5) every day   Weekly warfarin total:  52.5 mg   Plan last modified:  REY Bryan (2023)   Next INR check:  2023   Target end date:  Indefinite    Indications    Permanent atrial fibrillation (HCC) [I48.21]  Chronic anticoagulation [Z79.01]  Secondary hypercoagulable state (HCC) [D68.69]                 Anticoagulation Episode Summary       INR check location:  Anticoagulation Clinic    Preferred lab:      Send INR reminders to:      Comments:  Indomethacin as needed, discussed risks with the patient.          Anticoagulation Care Providers       Provider Role Specialty Phone number    Cheikh Turner M.D. Referring Cardiovascular Disease (Cardiology) [phone]    [facility] Anticoagulation Services Responsible  [phone]                  Refer to Patient Findings for HPI:  Patient Findings       Positives:  Missed doses    Negatives:  Signs/symptoms of thrombosis, Signs/symptoms of bleeding, Laboratory test error suspected, Change in health, Change in alcohol use, Change in activity, Upcoming invasive procedure, Emergency department visit, Upcoming dental procedure, Extra doses, Change in medications, Change in diet/appetite, Hospital admission, Bruising, Other complaints            There were no vitals filed for this visit.    Verified current warfarin dosing schedule.    Medications reconciled: Yes  Pt is not on antiplatelet therapy.      A/P   INR is therapeutic. Pt believes his INR may have been high if he hadn't missed his dose last Saturday which is possible. Discussed continuing his current dose versus a small dose decrease. He prefers a dose decrease.    Warfarin dosing recommendation: Began taking 7.5 mg daily.    Pt educated to contact our clinic with any changes in medications or s/s of bleeding or  thrombosis. Pt is aware to seek immediate medical attention for falls, head injury or deep cuts.    Follow up appointment in 3 week(s) per pt's request.    PAMEAL Bryan.

## 2023-11-06 ENCOUNTER — TELEPHONE (OUTPATIENT)
Dept: PHARMACY | Facility: MEDICAL CENTER | Age: 78
End: 2023-11-06
Payer: MEDICARE

## 2023-11-06 DIAGNOSIS — E53.8 B12 DEFICIENCY: ICD-10-CM

## 2023-11-06 PROCEDURE — RXMED WILLOW AMBULATORY MEDICATION CHARGE: Performed by: INTERNAL MEDICINE

## 2023-11-06 NOTE — TELEPHONE ENCOUNTER
Contact:    [id number]   Rajiv Perez     Phone number: [phone]    Name of person spoken with and relationship to patient: Rajiv, self   Patient’s Adherence:            How patient is doing on medication: well    How many missed doses and reason: 0    Any new medications: no    Any new conditions: no    Any new allergies: no    Any new side effects: no     Any new diagnoses: no     How many doses remaining: approx 15     Did patient want to speak with pharmacist: no  Delivery:            Delivery date and method:  11/8     Needs by Date:  11/17    Signature required:  no    Any additional details for :  may leave at door if no answer  Teach Appointment Date:  na  Shipping Address:  [address] [place] [address], [facility] [address]  Medication(name, strength and dose):  Jardiance 25 MG Tabs qd, vitamin B-12 1000 MCG Tabs qd  Copay: $144.46  Payment Method: new cc added to profile  Supplies:  na  Additional info:  YOHANA Vance. He stated doing well on the medication. Filling now to avoid any delays during holiday.  No further questions/concerns at this time

## 2023-11-08 ENCOUNTER — PHARMACY VISIT (OUTPATIENT)
Dept: PHARMACY | Facility: MEDICAL CENTER | Age: 78
End: 2023-11-08
Payer: COMMERCIAL

## 2023-11-22 ENCOUNTER — ANTICOAGULATION VISIT (OUTPATIENT)
Dept: VASCULAR LAB | Facility: MEDICAL CENTER | Age: 78
End: 2023-11-22
Attending: INTERNAL MEDICINE
Payer: MEDICARE

## 2023-11-22 DIAGNOSIS — I48.21 PERMANENT ATRIAL FIBRILLATION (HCC): Chronic | ICD-10-CM

## 2023-11-22 DIAGNOSIS — D68.69 SECONDARY HYPERCOAGULABLE STATE (HCC): ICD-10-CM

## 2023-11-22 DIAGNOSIS — Z79.01 CHRONIC ANTICOAGULATION: ICD-10-CM

## 2023-11-22 LAB — INR PPP: 3.1 (ref 2–3.5)

## 2023-11-22 PROCEDURE — 99212 OFFICE O/P EST SF 10 MIN: CPT

## 2023-11-22 PROCEDURE — 85610 PROTHROMBIN TIME: CPT

## 2023-11-22 NOTE — PROGRESS NOTES
Anticoagulation Summary  As of 11/22/2023      INR goal:  2.0-3.0   TTR:  80.1 % (7.5 y)   INR used for dosing:  3.10 (11/22/2023)   Warfarin maintenance plan:  7.5 mg (5 mg x 1.5) every day   Weekly warfarin total:  52.5 mg   Plan last modified:  REY Bryan (11/2/2023)   Next INR check:  12/14/2023   Target end date:  Indefinite    Indications    Permanent atrial fibrillation (HCC) [I48.21]  Chronic anticoagulation [Z79.01]  Secondary hypercoagulable state (HCC) [D68.69]                 Anticoagulation Episode Summary       INR check location:  Anticoagulation Clinic    Preferred lab:      Send INR reminders to:      Comments:  Indomethacin as needed, discussed risks with the patient.          Anticoagulation Care Providers       Provider Role Specialty Phone number    Cheikh Turner M.D. Referring Cardiovascular Disease (Cardiology) [phone]    [facility]tico[facility] Responsible  [phone]             Refer to Patient Findings for HPI:  Patient Findings       Positives:  Change in health (Reports URI last week, but getting better at this time)    Negatives:  Signs/symptoms of thrombosis, Signs/symptoms of bleeding, Laboratory test error suspected, Change in alcohol use, Change in activity, Upcoming invasive procedure, Emergency department visit, Upcoming dental procedure, Missed doses, Extra doses, Change in medications, Change in diet/appetite, Hospital admission, Bruising, Other complaints            There were no vitals filed for this visit.  Pt declined vitals    Verified current warfarin dosing schedule.    Medications reconciled: No  Pt is not on antiplatelet therapy      A/P   INR is slightly supratherapeutic possibly d/t recent illness. Will reduce x 1 then rechallenge regimen. Of note, regimen was decreased at last visit.    Warfarin dosing recommendation: Take 5 mg today, then Continue regimen as listed above.    Pt educated to contact our clinic with any changes in  medications or s/s of bleeding or thrombosis. Pt is aware to seek immediate medical attention for falls, head injury or deep cuts.    Follow up appointment in 3 week(s).    Mohinder Ball, MatthewD

## 2023-11-27 DIAGNOSIS — E53.8 B12 DEFICIENCY: ICD-10-CM

## 2023-11-28 ENCOUNTER — HOSPITAL ENCOUNTER (OUTPATIENT)
Dept: RADIOLOGY | Facility: MEDICAL CENTER | Age: 78
End: 2023-11-28
Attending: UROLOGY
Payer: MEDICARE

## 2023-11-28 DIAGNOSIS — C61 MALIGNANT NEOPLASM OF PROSTATE (HCC): ICD-10-CM

## 2023-11-28 PROCEDURE — 72197 MRI PELVIS W/O & W/DYE: CPT

## 2023-11-28 PROCEDURE — A9579 GAD-BASE MR CONTRAST NOS,1ML: HCPCS | Performed by: UROLOGY

## 2023-11-28 PROCEDURE — 700117 HCHG RX CONTRAST REV CODE 255: Performed by: UROLOGY

## 2023-11-28 PROCEDURE — 700111 HCHG RX REV CODE 636 W/ 250 OVERRIDE (IP): Mod: JZ | Performed by: RADIOLOGY

## 2023-11-28 RX ORDER — OMEGA-3/DHA/EPA/FISH OIL 35-113.5MG
1000 TABLET,CHEWABLE ORAL
Qty: 100 TABLET | Refills: 3 | Status: SHIPPED | OUTPATIENT
Start: 2023-11-28

## 2023-11-28 RX ADMIN — GLUCAGON 1 MG: 1 INJECTION, POWDER, LYOPHILIZED, FOR SOLUTION INTRAMUSCULAR; INTRAVENOUS at 11:05

## 2023-11-28 RX ADMIN — GADOTERIDOL 20 ML: 279.3 INJECTION, SOLUTION INTRAVENOUS at 11:41

## 2023-12-05 ENCOUNTER — HOSPITAL ENCOUNTER (OUTPATIENT)
Dept: LAB | Facility: MEDICAL CENTER | Age: 78
End: 2023-12-05
Attending: UROLOGY
Payer: MEDICARE

## 2023-12-05 ENCOUNTER — OFFICE VISIT (OUTPATIENT)
Dept: MEDICAL GROUP | Facility: PHYSICIAN GROUP | Age: 78
End: 2023-12-05
Payer: MEDICARE

## 2023-12-05 VITALS
RESPIRATION RATE: 16 BRPM | HEIGHT: 77 IN | HEART RATE: 66 BPM | OXYGEN SATURATION: 98 % | WEIGHT: 235.6 LBS | DIASTOLIC BLOOD PRESSURE: 76 MMHG | BODY MASS INDEX: 27.82 KG/M2 | TEMPERATURE: 97.1 F | SYSTOLIC BLOOD PRESSURE: 120 MMHG

## 2023-12-05 DIAGNOSIS — E11.40 CONTROLLED TYPE 2 DIABETES MELLITUS WITH DIABETIC NEUROPATHY, WITHOUT LONG-TERM CURRENT USE OF INSULIN (HCC): ICD-10-CM

## 2023-12-05 DIAGNOSIS — N40.1 LOWER URINARY TRACT SYMPTOMS DUE TO BENIGN PROSTATIC HYPERPLASIA: ICD-10-CM

## 2023-12-05 DIAGNOSIS — C61 PROSTATE CANCER (HCC): ICD-10-CM

## 2023-12-05 DIAGNOSIS — Z23 NEED FOR VACCINATION: ICD-10-CM

## 2023-12-05 DIAGNOSIS — B35.1 ONYCHOMYCOSIS: ICD-10-CM

## 2023-12-05 LAB
HBA1C MFR BLD: 7.5 % (ref ?–5.8)
POCT INT CON NEG: NEGATIVE
POCT INT CON POS: POSITIVE
PSA SERPL-MCNC: 1.71 NG/ML (ref 0–4)

## 2023-12-05 PROCEDURE — 3074F SYST BP LT 130 MM HG: CPT | Performed by: INTERNAL MEDICINE

## 2023-12-05 PROCEDURE — G0008 ADMIN INFLUENZA VIRUS VAC: HCPCS | Performed by: INTERNAL MEDICINE

## 2023-12-05 PROCEDURE — 90662 IIV NO PRSV INCREASED AG IM: CPT | Performed by: INTERNAL MEDICINE

## 2023-12-05 PROCEDURE — 84153 ASSAY OF PSA TOTAL: CPT

## 2023-12-05 PROCEDURE — 36415 COLL VENOUS BLD VENIPUNCTURE: CPT

## 2023-12-05 PROCEDURE — 83036 HEMOGLOBIN GLYCOSYLATED A1C: CPT | Performed by: INTERNAL MEDICINE

## 2023-12-05 PROCEDURE — 3078F DIAST BP <80 MM HG: CPT | Performed by: INTERNAL MEDICINE

## 2023-12-05 PROCEDURE — 99214 OFFICE O/P EST MOD 30 MIN: CPT | Mod: 25 | Performed by: INTERNAL MEDICINE

## 2023-12-05 ASSESSMENT — FIBROSIS 4 INDEX: FIB4 SCORE: 1.801693214555616375

## 2023-12-06 PROCEDURE — RXMED WILLOW AMBULATORY MEDICATION CHARGE: Performed by: INTERNAL MEDICINE

## 2023-12-06 NOTE — PROGRESS NOTES
CC: Follow-up diabetes, prostate cancer.    HPI:  Cheikh presents with the following    1. Need for vaccination  Due for flu vaccine.    2. Controlled type 2 diabetes mellitus with diabetic neuropathy, without long-term current use of insulin (HCC)  Chronic, stable.  Patient is currently taking Jardiance 25 mg daily, metformin 1000 mg twice daily, glimepiride 2 mg dose.  Patient does not check his blood sugars regularly.  Hemoglobin A1c 7.5.17 in April.  Patient is very limited in his exercise routine, has a walker.  Not following a diabetic diet.  Due for monofilament.    3. Prostate cancer ([facility])  Closely followed by urology.  Diagnosed with prostate cancer 18th September, [place] 6, grade 1, low-grade.  Treatment active surveillance with MRI.  MRI showed positive mid gland peripheral lesion, negative for lymphadenopathy.  Appointment with urology in December 26.  PSA 1.17.    4. Lower urinary tract symptoms due to benign prostatic hyperplasia  Status post TURP in September 2023.    5. Onychomycosis  Patient is followed by podiatry.  Applying topical antifungal to his toenails for the last year.      Patient Active Problem List    Diagnosis Date Noted    Prostate cancer (HCC) 12/05/2023    Onychomycosis 12/05/2023    Stress 04/28/2023    Vitamin D deficiency 08/18/2022    Lower urinary tract symptoms due to benign prostatic hyperplasia 06/07/2022    Chronic gout of left hand 05/12/2022    Chronic gout of hand 03/29/2022    History of kidney stones 03/29/2022    Loss of balance 03/29/2022    Spinal stenosis of lumbar region without neurogenic claudication 03/29/2022    BMI 28.0-28.9,adult 02/17/2022    Benign prostatic hyperplasia with nocturia 02/17/2022    Secondary hypercoagulable state ([facility]) 12/13/2021    B12 deficiency 11/12/2020    Obesity (BMI 30-39.9) 03/27/2018    Controlled type 2 diabetes mellitus with diabetic neuropathy, without long-term current use of insulin ([facility]) 03/13/2017    Risk for  falls 03/13/2017    SHANNAN (obstructive sleep apnea) 12/12/2016    Dyslipidemia 12/12/2016    Left foot drop 12/12/2016    Chronic anticoagulation 09/20/2013    Essential hypertension 07/09/2013    Permanent atrial fibrillation (HCC)        Current Outpatient Medications   Medication Sig Dispense Refill    cyanocobalamin ([employer] VITAMIN B12) 1000 MCG Tab Take 1 Tablet by mouth every day. 100 Tablet 3    atorvastatin (LIPITOR) 10 MG Tab TAKE 1 TABLET BY MOUTH EVERY  Tablet 0    warfarin (COUMADIN) 5 MG Tab Take 1.5-2 Tablets by mouth every day. Take as directed by anticoag clinic 180 Tablet 1    Empagliflozin (JARDIANCE) 25 MG Tab Take 1 Tablet by mouth every day. 100 Tablet 0    HYDROcodone-acetaminophen (NORCO) 5-325 MG Tab per tablet Take 1 tablet every 4-6 hours by oral route as needed for 1 day. 5 Tablet 0    metformin (GLUCOPHAGE) 1000 MG tablet TAKE ONE TABLET BY MOUTH TWICE DAILY WITH MEALS 200 Tablet 3    cyanocobalamin ([employer] VITAMIN B12) 1000 MCG Tab Take 1 tablet by mouth every day. 100 Tablet 1    tamsulosin (FLOMAX) 0.4 MG capsule Take 1 capsule by mouth daily. 100 Capsule 3    glimepiride (AMARYL) 2 MG Tab Take 1 Tablet by mouth every morning. 100 Tablet 3    allopurinol (ZYLOPRIM) 100 MG Tab Take 1 Tablet by mouth every day. 100 Tablet 3    enalapril (VASOTEC) 10 MG Tab Take 1 Tablet by mouth every day. 100 Tablet 2    metoprolol tartrate (LOPRESSOR) 25 MG Tab Take 1 Tablet by mouth 2 times a day. 200 Tablet 2    Misc. Devices Misc Please provide the patient with a four wheel walker with a seat. M48.061 M21.372 M21.371 1 Each 0    VITAMIN D, CHOLECALCIFEROL, PO Take 5,000 Units by mouth.       No current facility-administered medications for this visit.         Allergies as of 12/05/2023 - Reviewed 12/05/2023   Allergen Reaction Noted    Nkda [no known drug allergy]  07/31/2009        Social History     Socioeconomic History    Marital status:      Spouse name: Not on file    Number of children:  Not on file    Years of education: Not on file    Highest education level: Not on file   Occupational History    Not on file   Tobacco Use    Smoking status: Never    Smokeless tobacco: Never   Vaping Use    Vaping Use: Never used   Substance and Sexual Activity    Alcohol use: Yes     Alcohol/week: 6.0 oz     Types: 10 Glasses of wine per week    Drug use: No    Sexual activity: Never     Partners: Female   Other Topics Concern    Not on file   Social History Narrative    Not on file     Social Determinants of Health     Financial Resource Strain: Not on file   Food Insecurity: Not on file   Transportation Needs: Not on file   Physical Activity: Not on file   Stress: Not on file   Social Connections: Not on file   Intimate Partner Violence: Not on file   Housing Stability: Not on file       Family History   Problem Relation Age of Onset    Heart Attack Father 59    Heart Disease Father     Hypertension Brother     Diabetes Brother     Arthritis Brother     Thyroid Mother 50    Breast Cancer Sister     No Known Problems Maternal Grandmother     No Known Problems Maternal Grandfather     No Known Problems Paternal Grandmother     No Known Problems Paternal Grandfather     Arrythmia Brother     No Known Problems Brother     No Known Problems Daughter        Past Surgical History:   Procedure Laterality Date    ORIF, FRACTURE, FEMUR Left 2/24/2016    Procedure: DISTAL FEMUR ORIF;  Surgeon: Montrell Eddy M.D.;  Location: SURGERY [facility];  Service:     CYSTOSCOPY STENT PLACEMENT  1/13/2014    Performed by Main Batista M.D. at SURGERY [facility]    URETEROSCOPY  1/13/2014    Performed by Main Batista M.D. at SURGERY [facility]    LASERTRIPSY  1/13/2014    Performed by Main Batista M.D. at SURGERY [facility]    OTHER      Surgery for kidney stones    RECOVERY  7/11/2013    Performed by Cath-Recovery Surgery at SURGERY SAME DAY [facility]    KNEE ARTHROPLASTY TOTAL  12/1/2009     "Performed by GIACOMO PETTIT at SURGERY [facility] ORS    KNEE ARTHROPLASTY TOTAL  3/2009    Left    LUMBAR LAMINECTOMY DISKECTOMY  7/2006    L1-L5/ residual left foot weaknesss     REPAIR, KNEE, ACL  12/2005    Right    ORIF, FRACTURE, FEMUR         ROS:  Denies any Headache,Chest pain,  Shortness of breath,  Abdominal pain, Changes of bowel or bladder, Lower ext edema, Fevers, Nights sweats, Weight Changes, Focal weakness or numbness.  All other systems are negative.    /76 (BP Location: Right arm, Patient Position: Sitting)   Pulse 66   Temp 36.2 °C (97.1 °F) (Temporal)   Resp 16   Ht 1.956 m (6' 5\")   Wt 107 kg (235 lb 9.6 oz)   SpO2 98%   BMI 27.94 kg/m²      Constitutional: Alert, no distress, well-groomed.  Skin: Warm, dry, good turgor, no rashes in visible areas.  Eye: Equal, round and reactive, conjunctiva clear, lids normal.  ENMT: Lips without lesions, good dentition, moist mucous membranes.  Neck: Trachea midline, no masses, no thyromegaly.  Respiratory: Unlabored respiratory effort, no cough.  Abdomen: Soft, no gross masses.  MSK: Normal gait, moves all extremities.  Neuro: Grossly non-focal. No cranial nerve deficit. Strength and sensation intact.   Psych: Alert and oriented x3, normal affect and mood.  Monofilament: Right lower extremity.  Intact sensation third toe.  Left lower extremity intact sensation great toe.    Assessment and Plan.   78 y.o. male presenting with the following.     1. Need for vaccination    - Influenza Vaccine, High Dose (65+ Only)    2. Controlled type 2 diabetes mellitus with diabetic neuropathy, without long-term current use of insulin (HCC)  Chronic.  Stable.  Hemoglobin A1c improved.  Continue current plan of care.  Counseled patient on diabetic diet as his physical activity is limited.  - POCT A1C  - Lipid Profile; Future  - Diabetic Monofilament Lower Extremity Exam  - TSH WITH REFLEX TO FT4; Future    3. Prostate cancer (HCC)  New onset.  Stable.  MRI " results reviewed with patient.  Keep follow-up appointment with urology on December 26.  Active surveillance.    4. Lower urinary tract symptoms due to benign prostatic hyperplasia  Stable.  Asymptomatic.  Flomax.    5. Onychomycosis  Follow-up with podiatry every 3 months.  Continue topical antifungal.    My total time spent caring for the patient on the day of the encounter was 32 minutes.   This does not include time spent on separately billable procedures/tests.

## 2023-12-07 DIAGNOSIS — I10 ESSENTIAL HYPERTENSION: ICD-10-CM

## 2023-12-07 RX ORDER — ENALAPRIL MALEATE 10 MG/1
10 TABLET ORAL DAILY
Qty: 100 TABLET | Refills: 0 | Status: SHIPPED | OUTPATIENT
Start: 2023-12-07 | End: 2024-03-11 | Stop reason: SDUPTHER

## 2023-12-08 ENCOUNTER — PHARMACY VISIT (OUTPATIENT)
Dept: PHARMACY | Facility: MEDICAL CENTER | Age: 78
End: 2023-12-08
Payer: COMMERCIAL

## 2023-12-11 ENCOUNTER — TELEPHONE (OUTPATIENT)
Dept: PHARMACY | Facility: MEDICAL CENTER | Age: 78
End: 2023-12-11
Payer: MEDICARE

## 2023-12-11 PROCEDURE — RXMED WILLOW AMBULATORY MEDICATION CHARGE: Performed by: INTERNAL MEDICINE

## 2023-12-11 NOTE — TELEPHONE ENCOUNTER
Contact:  [id number]     Rajiv Perez      Phone number: [phone]    Name of person spoken with and relationship to patient: Rajiv, self   Patient’s Adherence:            How patient is doing on medication:  well    How many missed doses and reason: 0    Any new medications: no    Any new conditions: no    Any new allergies: no    Any new side effects: no     Any new diagnoses: no     How many doses remaining:  approx 2 weeks on all meds    Did patient want to speak with pharmacist: no  Delivery:            Delivery date and method:  12/13      Needs by Date: 12/22     Signature required:  no    Any additional details for :  may leave at door if no answer  Teach Appointment Date:  na  Shipping Address:  [address] [place] [address], [place] [address]  Medication(name, strength and dose):  Jardiance 25 MG Tabs qd, metformin 1000 MG tabs bid with meals, metoprolol tartrate 25 MG Tabs bid  Copay: $49.81  Payment Method: ccof  Supplies:  na  Additional info:  YOHANA Vance. He stated doing well on the medication and has about 12 weeks worth on hand. I had to call pt back to get auth on ccof for copay amount. He approved. He is only wanting 10 days worth of Jardiance for this fill since in donut hole and INS will reset at beginning of year.    No further questions/concerns at this time

## 2023-12-13 ENCOUNTER — PHARMACY VISIT (OUTPATIENT)
Dept: PHARMACY | Facility: MEDICAL CENTER | Age: 78
End: 2023-12-13
Payer: COMMERCIAL

## 2023-12-14 ENCOUNTER — ANTICOAGULATION VISIT (OUTPATIENT)
Dept: VASCULAR LAB | Facility: MEDICAL CENTER | Age: 78
End: 2023-12-14
Attending: INTERNAL MEDICINE
Payer: MEDICARE

## 2023-12-14 DIAGNOSIS — I48.21 PERMANENT ATRIAL FIBRILLATION (HCC): Chronic | ICD-10-CM

## 2023-12-14 DIAGNOSIS — Z79.01 CHRONIC ANTICOAGULATION: ICD-10-CM

## 2023-12-14 DIAGNOSIS — D68.69 SECONDARY HYPERCOAGULABLE STATE (HCC): ICD-10-CM

## 2023-12-14 LAB — INR PPP: 3.4 (ref 2–3.5)

## 2023-12-14 PROCEDURE — 99212 OFFICE O/P EST SF 10 MIN: CPT

## 2023-12-14 PROCEDURE — 85610 PROTHROMBIN TIME: CPT

## 2023-12-14 NOTE — PROGRESS NOTES
Anticoagulation Summary  As of 12/14/2023      INR goal:  2.0-3.0   TTR:  79.4 % (7.5 y)   INR used for dosing:  3.40 (12/14/2023)   Warfarin maintenance plan:  5 mg (5 mg x 1) every Tue, Thu; 7.5 mg (5 mg x 1.5) all other days   Weekly warfarin total:  47.5 mg   Plan last modified:  Miguelina Baeza, PharmD (12/14/2023)   Next INR check:  1/4/2024   Target end date:  Indefinite    Indications    Permanent atrial fibrillation (HCC) [I48.21]  Chronic anticoagulation [Z79.01]  Secondary hypercoagulable state (HCC) [D68.69]                 Anticoagulation Episode Summary       INR check location:  Anticoagulation Clinic    Preferred lab:      Send INR reminders to:      Comments:  Indomethacin as needed, discussed risks with the patient.          Anticoagulation Care Providers       Provider Role Specialty Phone number    Cheikh Turner M.D. Referring Cardiovascular Disease (Cardiology) [phone]    [facility] Anticoagulation Services Responsible  [phone]                  Refer to Patient Findings for HPI:  Patient Findings       Negatives:  Signs/symptoms of thrombosis, Signs/symptoms of bleeding, Laboratory test error suspected, Change in health, Change in alcohol use, Change in activity, Upcoming invasive procedure, Emergency department visit, Upcoming dental procedure, Missed doses, Extra doses, Change in medications, Change in diet/appetite, Hospital admission, Bruising, Other complaints            There were no vitals filed for this visit.  Pt declined vitals    Verified current warfarin dosing schedule.    Medications reconciled: Yes  Pt is not on antiplatelet therapy.      A/P   INR is supratherapeutic    Warfarin dosing recommendation: Decrease to 5mg Tue/Thurs, 7.5mg AOD    Pt educated to contact our clinic with any changes in medications or s/s of bleeding or thrombosis. Pt is aware to seek immediate medical attention for falls, head injury or deep cuts.    Request pt to return in 3 week(s). Pt  agrees.    Miguelina Baeza, PharmD

## 2023-12-19 ENCOUNTER — TELEPHONE (OUTPATIENT)
Dept: PHARMACY | Facility: MEDICAL CENTER | Age: 78
End: 2023-12-19
Payer: MEDICARE

## 2023-12-20 NOTE — TELEPHONE ENCOUNTER
Spoke with Rajiv regarding refills of Enalapril, Jardiance and Warfarin. He has declined all refills at this time stating he has enough on hand. (Jardiance: enough until 01/04, Warfarin: 1 to 2 months, Enalapril: 2 weeks) He does not want to refill the Jardiance at this time as he is in the donut hole and his copay is very high. He doesn't know why he has so much Warfarin as he claims to have not missed any doses. He would like the Enalapril & Jardiance to be refilled the first week of January and scheduled a call for 01/02.

## 2024-01-02 PROCEDURE — RXMED WILLOW AMBULATORY MEDICATION CHARGE: Performed by: INTERNAL MEDICINE

## 2024-01-03 ENCOUNTER — PHARMACY VISIT (OUTPATIENT)
Dept: PHARMACY | Facility: MEDICAL CENTER | Age: 79
End: 2024-01-03
Payer: COMMERCIAL

## 2024-01-04 ENCOUNTER — OFFICE VISIT (OUTPATIENT)
Dept: URGENT CARE | Facility: CLINIC | Age: 79
End: 2024-01-04
Payer: MEDICARE

## 2024-01-04 ENCOUNTER — PHARMACY VISIT (OUTPATIENT)
Dept: PHARMACY | Facility: MEDICAL CENTER | Age: 79
End: 2024-01-04
Payer: COMMERCIAL

## 2024-01-04 ENCOUNTER — ANTICOAGULATION VISIT (OUTPATIENT)
Dept: VASCULAR LAB | Facility: MEDICAL CENTER | Age: 79
End: 2024-01-04
Attending: INTERNAL MEDICINE
Payer: MEDICARE

## 2024-01-04 VITALS
HEART RATE: 68 BPM | DIASTOLIC BLOOD PRESSURE: 88 MMHG | OXYGEN SATURATION: 98 % | TEMPERATURE: 97.8 F | WEIGHT: 235 LBS | RESPIRATION RATE: 16 BRPM | HEIGHT: 77 IN | BODY MASS INDEX: 27.75 KG/M2 | SYSTOLIC BLOOD PRESSURE: 128 MMHG

## 2024-01-04 DIAGNOSIS — E11.40 CONTROLLED TYPE 2 DIABETES MELLITUS WITH DIABETIC NEUROPATHY, WITHOUT LONG-TERM CURRENT USE OF INSULIN (HCC): ICD-10-CM

## 2024-01-04 DIAGNOSIS — L08.9 WOUND INFECTION: ICD-10-CM

## 2024-01-04 DIAGNOSIS — L03.90 CELLULITIS, UNSPECIFIED CELLULITIS SITE: ICD-10-CM

## 2024-01-04 DIAGNOSIS — Z79.01 CHRONIC ANTICOAGULATION: ICD-10-CM

## 2024-01-04 DIAGNOSIS — I48.21 PERMANENT ATRIAL FIBRILLATION (HCC): Chronic | ICD-10-CM

## 2024-01-04 DIAGNOSIS — D68.69 SECONDARY HYPERCOAGULABLE STATE (HCC): ICD-10-CM

## 2024-01-04 DIAGNOSIS — T14.8XXA WOUND INFECTION: ICD-10-CM

## 2024-01-04 LAB — INR PPP: 3.4 (ref 2–3.5)

## 2024-01-04 PROCEDURE — 99214 OFFICE O/P EST MOD 30 MIN: CPT | Performed by: PHYSICIAN ASSISTANT

## 2024-01-04 PROCEDURE — 85610 PROTHROMBIN TIME: CPT

## 2024-01-04 PROCEDURE — 3079F DIAST BP 80-89 MM HG: CPT | Performed by: PHYSICIAN ASSISTANT

## 2024-01-04 PROCEDURE — 99212 OFFICE O/P EST SF 10 MIN: CPT | Performed by: NURSE PRACTITIONER

## 2024-01-04 PROCEDURE — RXMED WILLOW AMBULATORY MEDICATION CHARGE: Performed by: PHYSICIAN ASSISTANT

## 2024-01-04 PROCEDURE — 3074F SYST BP LT 130 MM HG: CPT | Performed by: PHYSICIAN ASSISTANT

## 2024-01-04 RX ORDER — AMOXICILLIN AND CLAVULANATE POTASSIUM 875; 125 MG/1; MG/1
1 TABLET, FILM COATED ORAL 2 TIMES DAILY
Qty: 14 TABLET | Refills: 0 | Status: SHIPPED | OUTPATIENT
Start: 2024-01-04 | End: 2024-01-11

## 2024-01-04 RX ORDER — DOXYCYCLINE HYCLATE 100 MG
100 TABLET ORAL 2 TIMES DAILY
Qty: 14 TABLET | Refills: 0 | Status: SHIPPED | OUTPATIENT
Start: 2024-01-04 | End: 2024-01-11

## 2024-01-04 ASSESSMENT — FIBROSIS 4 INDEX: FIB4 SCORE: 1.801693214555616375

## 2024-01-04 NOTE — PROGRESS NOTES
Anticoagulation Summary  As of 1/4/2024      INR goal:  2.0-3.0   TTR:  78.8 % (7.6 y)   INR used for dosing:  3.40 (1/4/2024)   Warfarin maintenance plan:  5 mg (5 mg x 1) every Tue, Thu, Sat; 7.5 mg (5 mg x 1.5) all other days   Weekly warfarin total:  45 mg   Plan last modified:  Amanda Guzman A.P.NDenzel (1/4/2024)   Next INR check:  1/18/2024   Target end date:  Indefinite    Indications    Permanent atrial fibrillation (HCC) [I48.21]  Chronic anticoagulation [Z79.01]  Secondary hypercoagulable state (HCC) [D68.69]                 Anticoagulation Episode Summary       INR check location:  Anticoagulation Clinic    Preferred lab:      Send INR reminders to:      Comments:  Indomethacin as needed, discussed risks with the patient.          Anticoagulation Care Providers       Provider Role Specialty Phone number    Cheikh Turner M.D. Referring Cardiovascular Disease (Cardiology) [phone]    [facility] Anticoagulation Services Responsible  [phone]                  Refer to Patient Findings for HPI:  Patient Findings       Positives:  Change in health    Negatives:  Signs/symptoms of thrombosis, Signs/symptoms of bleeding, Laboratory test error suspected, Change in alcohol use, Change in activity, Upcoming invasive procedure, Emergency department visit, Upcoming dental procedure, Missed doses, Extra doses, Change in medications, Change in diet/appetite, Hospital admission, Bruising, Other complaints    Comments:  He reports a small wound on his leg that appears to have reddened. Has an appt at urgent care today to be evaluated. Also, recently diagnosed with prostate cancer. Planning to have a prostate biopsy in the coming weeks. No date currently scheduled.            There were no vitals filed for this visit.  Pt declined vitals    Verified current warfarin dosing schedule.    Medications reconciled: Yes  Pt is not on antiplatelet therapy.      A/P   INR is supratherapeutic today despite a dose decrease  last visit. Will reduce his regimen further.    I will review his urgent care note today to see if he will be placed on an antibiotic to check for DDI.    Warfarin dosing recommendation: Take 2.5 mg tonight, then began reduced regimen as outlined on calendar.    Pt educated to contact our clinic with any changes in medications or s/s of bleeding or thrombosis. Pt is aware to seek immediate medical attention for falls, head injury or deep cuts.    Request pt to return in 2 week(s). Pt agrees.    Amanda Guzman, A.P.N.    Addendum (4055) - pt on augmentin x 7 days. Continue warfarin dosing as instructed above.

## 2024-01-04 NOTE — PROGRESS NOTES
Subjective:   Cheikh Perez is a 78 y.o. male who presents for Ankle Injury (X 2 days, right ankle, sore, red, swollen, open wound )        Patient presents with concerns of open wound on his right shin that he first noticed 2 to 3 days ago.  He believes that he may have cut the shin on one of his orthoses.  For the past 24 hours he noticed worsening redness, swelling as well as some pain.  No discharge or oozing from the wound.  No nausea, vomiting, fevers, chills.  He is concerned about a possible infection.  He is diabetic-blood sugars have been well-controlled.  He does take Coumadin paroxysmal atrial fibrillation.  He was at the anticoagulation clinic earlier today and he is expecting a call from the nurse to advise him of necessary medication adjustments.      ROS    PMH:  has a past medical history of Arrhythmia (07/2013), Arthritis, Chronic gout of hand (03/29/2022), Chronic gout of left hand (05/12/2022), Diabetes, History of kidney stones (03/29/2022), Hypertension, Hypertension, Loss of balance (03/29/2022), Onychomycosis (12/05/2023), Pain ( ), Paroxysmal atrial fibrillation (HCC) (06/06/2013), Prostate cancer (HCC) (12/05/2023), Sleep apnea, Snoring, Spinal stenosis of lumbar region without neurogenic claudication (03/29/2022), Stress (04/28/2023), and Vitamin D deficiency (08/18/2022).  MEDS:   Current Outpatient Medications:     amoxicillin-clavulanate (AUGMENTIN) 875-125 MG Tab, Take 1 Tablet by mouth 2 times a day for 7 days., Disp: 14 Tablet, Rfl: 0    doxycycline (VIBRAMYCIN) 100 MG Tab, Take 1 Tablet by mouth 2 times a day for 7 days., Disp: 14 Tablet, Rfl: 0    enalapril (VASOTEC) 10 MG Tab, Take 1 Tablet by mouth every day., Disp: 100 Tablet, Rfl: 0    metoprolol tartrate (LOPRESSOR) 25 MG Tab, Take 1 Tablet by mouth 2 times a day., Disp: 200 Tablet, Rfl: 0    cyanocobalamin ([employer] VITAMIN B12) 1000 MCG Tab, Take 1 Tablet by mouth every day., Disp: 100 Tablet, Rfl: 3     atorvastatin (LIPITOR) 10 MG Tab, TAKE 1 TABLET BY MOUTH EVERY DAY, Disp: 100 Tablet, Rfl: 0    warfarin (COUMADIN) 5 MG Tab, Take 1.5-2 Tablets by mouth every day. Take as directed by anticoag clinic, Disp: 180 Tablet, Rfl: 1    Empagliflozin (JARDIANCE) 25 MG Tab, Take 1 Tablet by mouth every day., Disp: 100 Tablet, Rfl: 0    HYDROcodone-acetaminophen (NORCO) 5-325 MG Tab per tablet, Take 1 tablet every 4-6 hours by oral route as needed for 1 day., Disp: 5 Tablet, Rfl: 0    metformin (GLUCOPHAGE) 1000 MG tablet, TAKE ONE TABLET BY MOUTH TWICE DAILY WITH MEALS, Disp: 200 Tablet, Rfl: 3    cyanocobalamin ([employer] VITAMIN B12) 1000 MCG Tab, Take 1 tablet by mouth every day., Disp: 100 Tablet, Rfl: 1    tamsulosin (FLOMAX) 0.4 MG capsule, Take 1 capsule by mouth daily., Disp: 100 Capsule, Rfl: 3    glimepiride (AMARYL) 2 MG Tab, Take 1 Tablet by mouth every morning., Disp: 100 Tablet, Rfl: 3    allopurinol (ZYLOPRIM) 100 MG Tab, Take 1 Tablet by mouth every day., Disp: 100 Tablet, Rfl: 3    Misc. Devices Misc, Please provide the patient with a four wheel walker with a seat. M48.061 M21.372 M21.371, Disp: 1 Each, Rfl: 0    VITAMIN D, CHOLECALCIFEROL, PO, Take 5,000 Units by mouth., Disp: , Rfl:   ALLERGIES:   Allergies   Allergen Reactions    Nkda [No Known Drug Allergy]      SURGHX:   Past Surgical History:   Procedure Laterality Date    ORIF, FRACTURE, FEMUR Left 2/24/2016    Procedure: DISTAL FEMUR ORIF;  Surgeon: Montrell Eddy M.D.;  Location: [facility];  Service:     CYSTOSCOPY STENT PLACEMENT  1/13/2014    Performed by Main Batista M.D. at [facility]    URETEROSCOPY  1/13/2014    Performed by Main Batista M.D. at [facility]    LASERTRIPSY  1/13/2014    Performed by Main Batista M.D. at [facility]    OTHER      Surgery for kidney stones    RECOVERY  7/11/2013    Performed by Cath-Recovery Surgery at SURGERY SAME DAY [facility]    KNEE ARTHROPLASTY  "TOTAL  12/1/2009    Performed by GIACOMO PETTIT at SURGERY [facility] ORS    KNEE ARTHROPLASTY TOTAL  3/2009    Left    LUMBAR LAMINECTOMY DISKECTOMY  7/2006    L1-L5/ residual left foot weaknesss     REPAIR, KNEE, ACL  12/2005    Right    ORIF, FRACTURE, FEMUR       SOCHX:  reports that he has never smoked. He has never used smokeless tobacco. He reports current alcohol use of about 6.0 oz of alcohol per week. He reports that he does not use drugs.  FH: Family history was reviewed, no pertinent findings to report   Objective:   /88   Pulse 68   Temp 36.6 °C (97.8 °F) (Temporal)   Resp 16   Ht 1.956 m (6' 5\")   Wt 107 kg (235 lb)   SpO2 98%   BMI 27.87 kg/m²   Physical Exam  Vitals reviewed.   Constitutional:       General: He is not in acute distress.     Appearance: Normal appearance. He is well-developed. He is not toxic-appearing.   HENT:      Head: Normocephalic and atraumatic.      Right Ear: External ear normal.      Left Ear: External ear normal.      Nose: Nose normal.   Cardiovascular:      Rate and Rhythm: Normal rate.   Pulmonary:      Effort: Pulmonary effort is normal. No respiratory distress.      Breath sounds: No stridor.   Skin:     General: Skin is dry.      Comments: 2 cm ovoid partial-thickness abrasion on the central pretibial area of the right lower leg.  Several centimeters of surrounding erythema and mild edema.  No discharge.  No lymphangitis.  Distal pulses are intact.  No tenderness with palpation of the posterior calf.   Neurological:      Comments: Alert and oriented.    Psychiatric:         Speech: Speech normal.         Behavior: Behavior normal.           Assessment/Plan:   1. Wound infection  - amoxicillin-clavulanate (AUGMENTIN) 875-125 MG Tab; Take 1 Tablet by mouth 2 times a day for 7 days.  Dispense: 14 Tablet; Refill: 0  - doxycycline (VIBRAMYCIN) 100 MG Tab; Take 1 Tablet by mouth 2 times a day for 7 days.  Dispense: 14 Tablet; Refill: 0    2. Cellulitis, " unspecified cellulitis site  - amoxicillin-clavulanate (AUGMENTIN) 875-125 MG Tab; Take 1 Tablet by mouth 2 times a day for 7 days.  Dispense: 14 Tablet; Refill: 0  - doxycycline (VIBRAMYCIN) 100 MG Tab; Take 1 Tablet by mouth 2 times a day for 7 days.  Dispense: 14 Tablet; Refill: 0    3. Controlled type 2 diabetes mellitus with diabetic neuropathy, without long-term current use of insulin ([facility])    4. Chronic anticoagulation    Exam today consistent with wound infection with concomitant cellulitis.  Patient started on Augmentin and doxycycline.  Patient advised the doxycycline and will increase his INR and we will need to monitor more closely.  He will let his nurse at the anticoagulation clinic know of this medication change later today when he speaks with her and arrange close follow-up.    Wound care instructions reviewed.  If no improvement within 48 hours, new symptoms develop at any point or symptoms worsen return to clinic or see PCP for reevaluation.  Patient advised that we are here all weekend.  If he develops any signs of rapidly progressing infection or signs of systemic infection-to ED for reevaluation.

## 2024-01-05 ENCOUNTER — TELEPHONE (OUTPATIENT)
Dept: CARDIOLOGY | Facility: MEDICAL CENTER | Age: 79
End: 2024-01-05
Payer: MEDICARE

## 2024-01-05 ENCOUNTER — TELEPHONE (OUTPATIENT)
Dept: PHARMACY | Facility: MEDICAL CENTER | Age: 79
End: 2024-01-05
Payer: MEDICARE

## 2024-01-05 PROCEDURE — RXMED WILLOW AMBULATORY MEDICATION CHARGE: Performed by: INTERNAL MEDICINE

## 2024-01-05 NOTE — TELEPHONE ENCOUNTER
"Last OV: 09/07/2022  Proposed Surgery: PROSTATE Bx  Surgery Date: TBD  Requesting Office Name: Leslye Marshall  Fax Number: [phone]  Preference of Location (default is surgery center unless specified by Cardiologist or VALERIE)  Prior Clearance Addressed: No      Anticoags/Antiplatelets: Warfarin  Anticoags/Antiplatelet managed by Cardiology? YES    Outstanding Cardiac Imaging : No  Stent, Cardiac Devices, or Catheterization: No  Ablation, TAVR/Valve (including open heart), Cardioversion: No  Recent Cardiac Hospitalization: No            When: N/A  History (cardiac history):   Past Medical History:   Diagnosis Date    Arrhythmia 07/2013    AFIB, cardioversion    Arthritis     Chronic gout of hand 03/29/2022    Chronic gout of left hand 05/12/2022    Diabetes     Diet,oral meds    History of kidney stones 03/29/2022    Hypertension     Hypertension     Loss of balance 03/29/2022    Onychomycosis 12/05/2023    Pain      \"stiff\" from arthritis, 2/10    Paroxysmal atrial fibrillation (HCC) 06/06/2013    Prostate cancer (HCC) 12/05/2023    Sleep apnea     CPAP    Snoring     Spinal stenosis of lumbar region without neurogenic claudication 03/29/2022    Stress 04/28/2023    Vitamin D deficiency 08/18/2022             Surgical Clearance Letter Sent: YES   **Scan clearance request letter into [employer].**   "

## 2024-01-05 NOTE — LETTER
PROCEDURE/SURGERY CLEARANCE FORM      Encounter Date: 1/5/2024    Patient: Cheikh Perez  YOB: 1945    CARDIOLOGIST:  Cheikh Turner M.D.    REFERRING DOCTOR:  No ref. provider found  The above patient is cleared to have the following procedure/surgery: Prostate Bx    PROCEDURE/SURGERY CLEARANCE FORM    Date: 1/5/2024   Patient Name: Cheikh Perez    Dear Surgeon or Proceduralist,      Thank you for your request for cardiac stratification of our mutual patient Cheikh Perez 1945. We have reviewed their [facility] records; and to the best of our understanding this patient has not had stenting, ablation, cardiothoracic surgery or hospitalization for cardiovascular reasons in the past 6 months.  Cheikh Perez has been seen within the past 18 months and is considered to have non-modifiable cardiac risk for this low-risk procedure/surgery. They may proceed from a cardiovascular standpoint and may hold their antiplatelet/anticoagulation as briefly as possible. Please have patient resume this medication when hemodynamically stable to do so.     Aspirin or Prasugrel   - hold 7 days prior to procedure/surgery, resume when hemodynamically stable      Clopidrogrel or Ticagrelor  - hold 7 days for all neurological procedures, hold 5 days prior to all other procedure/surgery,  resume when hemodynamically stable     Warfarin - hold 7 days for all neurological procedures, hold 5 days prior to all other procedure/surgery and coordinate with [facility] Anticoagulation [facility] ([phone]) INR testing and dose management.      Pradaxa/Xarelto/Eliquis/Savesya - hold 1 day prior to procedure for low bleeding risk procedure, 2 days for high bleeding risk procedure, or consider holding 3 days or longer for patients with reduced kidney function (CrCl <30mL/min) or spinal/cranial surgeries/procedures.      If they have a mechanical heart valve, please  coordinate with [facility] Anticoagulation Service ([phone]) the proper management of their anticoagulant in the periprocedural or perioperative period.      Some patients have higher risk for cardiovascular complications or holding medication. If our patient has had prior complications of holding antiplatelet or anticoagulants in the past and we have seen them after these events, we have addressed these concerns with the patient. They are at an unknown degree of increased risk for recurrent complication.  You may hold anticoagulation/antiplatelets for the procedure or surgery if the benefits of the procedure or surgery outweigh this nonmodifiable risk.      If Cheikh Perez 1945 has new symptoms of heart failure decompensation, unstable arrythmia, or angina please reach out and we will assess the patient.      If you have other patient-specific concerns, please feel free to reach out to the patient's cardiologist directly at [phone].     Thank you,       [facility]

## 2024-01-05 NOTE — TELEPHONE ENCOUNTER
Contact: [phone]  Rajiv Perez          Phone number: [phone]     Name of person spoken with and relationship to patient: Rajiv. self   Patient’s Adherence:            How patient is doing on medication:  well    How many missed doses and reason: 0    Any new medications: no    Any new conditions: no    Any new allergies: no    Any new side effects: no     Any new diagnoses: no     How many doses remainin     Did patient want to speak with pharmacist: no  Delivery:            Delivery date and method:    (for Enalapril),      Needs by Date:      Signature required:  no    Any additional details for :  may leave at door if no answer  Teach Appointment Date:  na  Shipping Address:  [address] [place] [address], [place] [address]  Medication(name, strength and dose):  enalapril 10 MG Tabs qd,   Copay: $0  Payment Method: na  Supplies:  na  Additional info:  YOHANA Rajiv. stated doing well on the medication. Completed adherence questions for Jardiance and adjusted next call back date (filled on 1/3). He stated he has been taking enalapril for a long time and no issues. He goes in next  for his INR. He recently has a knee infection and is being treated with antibiotics. (profile shows on Augmentin) No further questions/concerns at this time

## 2024-01-09 ENCOUNTER — PHARMACY VISIT (OUTPATIENT)
Dept: PHARMACY | Facility: MEDICAL CENTER | Age: 79
End: 2024-01-09
Payer: COMMERCIAL

## 2024-01-09 ENCOUNTER — ANTICOAGULATION VISIT (OUTPATIENT)
Dept: VASCULAR LAB | Facility: MEDICAL CENTER | Age: 79
End: 2024-01-09
Attending: INTERNAL MEDICINE
Payer: MEDICARE

## 2024-01-09 VITALS — SYSTOLIC BLOOD PRESSURE: 121 MMHG | HEART RATE: 79 BPM | DIASTOLIC BLOOD PRESSURE: 80 MMHG

## 2024-01-09 DIAGNOSIS — D68.69 SECONDARY HYPERCOAGULABLE STATE (HCC): ICD-10-CM

## 2024-01-09 DIAGNOSIS — I48.21 PERMANENT ATRIAL FIBRILLATION (HCC): Chronic | ICD-10-CM

## 2024-01-09 DIAGNOSIS — Z79.01 CHRONIC ANTICOAGULATION: ICD-10-CM

## 2024-01-09 LAB — INR PPP: 1.8 (ref 2–3.5)

## 2024-01-09 PROCEDURE — 85610 PROTHROMBIN TIME: CPT

## 2024-01-09 PROCEDURE — 99211 OFF/OP EST MAY X REQ PHY/QHP: CPT

## 2024-01-09 NOTE — PROGRESS NOTES
Anticoagulation Summary  As of 2024      INR goal:  2.0-3.0   TTR:  78.8 % (7.6 y)   INR used for dosin.80 (2024)   Warfarin maintenance plan:  5 mg (5 mg x 1) every Tue, Thu, Sat; 7.5 mg (5 mg x 1.5) all other days   Weekly warfarin total:  45 mg   Plan last modified:  Amanda Guzman ADenzelPDenzelNDenzel (2024)   Next INR check:  2024   Target end date:  Indefinite    Indications    Permanent atrial fibrillation (HCC) [I48.21]  Chronic anticoagulation [Z79.01]  Secondary hypercoagulable state (HCC) [D68.69]                 Anticoagulation Episode Summary       INR check location:  Anticoagulation Clinic    Preferred lab:      Send INR reminders to:      Comments:  Indomethacin as needed, discussed risks with the patient.          Anticoagulation Care Providers       Provider Role Specialty Phone number    Cheikh Turner M.D. Referring Cardiovascular Disease (Cardiology) [phone]    [facility] Anticoagulation Services Responsible  [phone]          Refer to Patient Findings for HPI:  Patient Findings       Positives:  Change in medications (pt currently taking doxycycline and augmentin for leg wound)    Negatives:  Signs/symptoms of thrombosis, Signs/symptoms of bleeding, Laboratory test error suspected, Change in health, Change in alcohol use, Change in activity, Upcoming invasive procedure, Emergency department visit, Upcoming dental procedure, Missed doses, Extra doses, Change in diet/appetite, Hospital admission, Bruising, Other complaints          Vitals:    24 0915   BP: 121/80   Pulse: 79     Patient seen in clinic today for follow up on anticoagulation therapy with warfarin (a high risk medication) for hx of AF  Verified current warfarin dosing schedule.  Patient denies any missed doses of warfarin.    Medications reconciled   Pt is not on antiplatelet therapy      A/P   INR is SUB-therapeutic today at 1.8.     Warfarin dosing recommendation: Patient been having elevated readings and  now on Abx with two days left. Will have him continue with the current dosing regimen.   Patient will follow up again in 1 week.    Pt educated to contact our clinic with any changes in medications or s/s of bleeding or thrombosis. Pt is aware to seek immediate medical attention for falls, head injury or deep cuts.    Follow up appointment in 1 week(s).    Next appt: Thurs, Jan 18 @ 10:30am     Kam Eastman PharmD

## 2024-01-11 DIAGNOSIS — E78.5 DYSLIPIDEMIA: ICD-10-CM

## 2024-01-11 RX ORDER — ATORVASTATIN CALCIUM 10 MG/1
10 TABLET, FILM COATED ORAL DAILY
Qty: 100 TABLET | Refills: 0 | OUTPATIENT
Start: 2024-01-11

## 2024-01-12 ENCOUNTER — PHARMACY VISIT (OUTPATIENT)
Dept: PHARMACY | Facility: MEDICAL CENTER | Age: 79
End: 2024-01-12
Payer: COMMERCIAL

## 2024-01-12 ENCOUNTER — OFFICE VISIT (OUTPATIENT)
Dept: URGENT CARE | Facility: CLINIC | Age: 79
End: 2024-01-12
Payer: MEDICARE

## 2024-01-12 ENCOUNTER — HOSPITAL ENCOUNTER (OUTPATIENT)
Dept: RADIOLOGY | Facility: MEDICAL CENTER | Age: 79
End: 2024-01-12
Attending: PHYSICIAN ASSISTANT
Payer: MEDICARE

## 2024-01-12 VITALS
OXYGEN SATURATION: 97 % | HEIGHT: 77 IN | RESPIRATION RATE: 20 BRPM | BODY MASS INDEX: 27.96 KG/M2 | DIASTOLIC BLOOD PRESSURE: 80 MMHG | SYSTOLIC BLOOD PRESSURE: 122 MMHG | TEMPERATURE: 97.4 F | WEIGHT: 236.8 LBS | HEART RATE: 74 BPM

## 2024-01-12 DIAGNOSIS — M79.89 PAIN AND SWELLING OF RIGHT LOWER LEG: ICD-10-CM

## 2024-01-12 DIAGNOSIS — M79.661 PAIN AND SWELLING OF RIGHT LOWER LEG: ICD-10-CM

## 2024-01-12 DIAGNOSIS — L03.115 CELLULITIS OF RIGHT LOWER EXTREMITY: ICD-10-CM

## 2024-01-12 PROCEDURE — 3074F SYST BP LT 130 MM HG: CPT | Performed by: PHYSICIAN ASSISTANT

## 2024-01-12 PROCEDURE — RXMED WILLOW AMBULATORY MEDICATION CHARGE: Performed by: PHYSICIAN ASSISTANT

## 2024-01-12 PROCEDURE — 3079F DIAST BP 80-89 MM HG: CPT | Performed by: PHYSICIAN ASSISTANT

## 2024-01-12 PROCEDURE — 99214 OFFICE O/P EST MOD 30 MIN: CPT | Performed by: PHYSICIAN ASSISTANT

## 2024-01-12 PROCEDURE — 93971 EXTREMITY STUDY: CPT | Mod: RT

## 2024-01-12 RX ORDER — CEPHALEXIN 500 MG/1
500 CAPSULE ORAL 4 TIMES DAILY
Qty: 28 CAPSULE | Refills: 0 | Status: SHIPPED | OUTPATIENT
Start: 2024-01-12 | End: 2024-02-01

## 2024-01-12 ASSESSMENT — ENCOUNTER SYMPTOMS
PALPITATIONS: 0
RESPIRATORY NEGATIVE: 1
CONSTITUTIONAL NEGATIVE: 1
NEUROLOGICAL NEGATIVE: 1
GASTROINTESTINAL NEGATIVE: 1

## 2024-01-12 ASSESSMENT — FIBROSIS 4 INDEX: FIB4 SCORE: 1.801693214555616375

## 2024-01-12 NOTE — PROGRESS NOTES
Subjective     Rajiv Perez is a very pleasant 78 y.o. male who presents with Leg Swelling (Follow up )            HPI  Patient presenting for reevaluation of his ongoing right lower anterior wound with cellulitis.  He was seen on 1/4/2024 and diagnosed with cellulitis, started on Augmentin and doxycycline which she has finished.  He believes that it was a abrasion or friction injury to his orthoses.  He states the redness, pain and swelling have improved but there is still open crusty wound with surrounding erythema and edema.  He now has diffuse right lower extremity swelling.  He is unable to wear his orthoses due to the swelling.  Patient does have paroxysmal atrial fibrillation on Coumadin.  Most recent INR 1.8.  No history of DVT/PE.  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  MEDS:   Current Outpatient Medications:     cephALEXin (KEFLEX) 500 MG Cap, Take 1 Capsule by mouth 4 times a day., Disp: 28 Capsule, Rfl: 0    enalapril (VASOTEC) 10 MG Tab, Take 1 Tablet by mouth every day., Disp: 100 Tablet, Rfl: 0    metoprolol tartrate (LOPRESSOR) 25 MG Tab, Take 1 Tablet by mouth 2 times a day., Disp: 200 Tablet, Rfl: 0    cyanocobalamin ([employer] VITAMIN B12) 1000 MCG Tab, Take 1 Tablet by mouth every day., Disp: 100 Tablet, Rfl: 3    atorvastatin (LIPITOR) 10 MG Tab, TAKE 1 TABLET BY MOUTH EVERY DAY, Disp:  100 Tablet, Rfl: 0    warfarin (COUMADIN) 5 MG Tab, Take 1.5-2 Tablets by mouth every day. Take as directed by anticoag clinic, Disp: 180 Tablet, Rfl: 1    Empagliflozin (JARDIANCE) 25 MG Tab, Take 1 Tablet by mouth every day., Disp: 100 Tablet, Rfl: 0    metformin (GLUCOPHAGE) 1000 MG tablet, TAKE ONE TABLET BY MOUTH TWICE DAILY WITH MEALS, Disp: 200 Tablet, Rfl: 3    cyanocobalamin ([employer] VITAMIN B12) 1000 MCG Tab, Take 1 tablet by mouth every day., Disp: 100 Tablet, Rfl: 1    tamsulosin (FLOMAX) 0.4 MG capsule, Take 1 capsule by mouth daily., Disp: 100 Capsule, Rfl: 3    glimepiride (AMARYL) 2 MG Tab, Take 1 Tablet by mouth every morning., Disp: 100 Tablet, Rfl: 3    allopurinol (ZYLOPRIM) 100 MG Tab, Take 1 Tablet by mouth every day., Disp: 100 Tablet, Rfl: 3    Misc. Devices Misc, Please provide the patient with a four wheel walker with a seat. M48.061 M21.372 M21.371, Disp: 1 Each, Rfl: 0    VITAMIN D, CHOLECALCIFEROL, PO, Take 5,000 Units by mouth., Disp: , Rfl:     HYDROcodone-acetaminophen (NORCO) 5-325 MG Tab per tablet, Take 1 tablet every 4-6 hours by oral route as needed for 1 day. (Patient not taking: Reported on 1/12/2024), Disp: 5 Tablet, Rfl: 0  ALLERGIES:   Allergies   Allergen Reactions    Nkda [No Known Drug Allergy]      SURGHX:   Past Surgical History:   Procedure Laterality Date    ORIF, FRACTURE, FEMUR Left 2/24/2016    Procedure: DISTAL FEMUR ORIF;  Surgeon: Montrell Eddy M.D.;  Location: [facility];  Service:     CYSTOSCOPY STENT PLACEMENT  1/13/2014    Performed by Main Batista M.D. at [facility]    URETEROSCOPY  1/13/2014    Performed by Main Batista M.D. at [facility]    LASERTRIPSY  1/13/2014    Performed by Main Batista M.D. at [facility]    OTHER      Surgery for kidney stones    RECOVERY  7/11/2013    Performed by Cath-Recovery Surgery at SURGERY SAME DAY [facility]    KNEE ARTHROPLASTY TOTAL  12/1/2009    Performed by  "GIACOMO PETTIT at SURGERY [facility]    KNEE ARTHROPLASTY TOTAL  3/2009    Left    LUMBAR LAMINECTOMY DISKECTOMY  7/2006    L1-L5/ residual left foot weaknesss     REPAIR, KNEE, ACL  12/2005    Right    ORIF, FRACTURE, FEMUR       SOCHX:  reports that he has never smoked. He has never used smokeless tobacco. He reports current alcohol use of about 6.0 oz of alcohol per week. He reports that he does not use drugs.  FH: family history includes Arrythmia in his brother; Arthritis in his brother; Breast Cancer in his sister; Diabetes in his brother; Heart Attack (age of onset: 59) in his father; Heart Disease in his father; Hypertension in his brother; No Known Problems in his brother, daughter, maternal grandfather, maternal grandmother, paternal grandfather, and paternal grandmother; Thyroid (age of onset: 50) in his mother.        Review of Systems   Constitutional: Negative.    Respiratory: Negative.     Cardiovascular:  Positive for leg swelling. Negative for chest pain and palpitations.   Gastrointestinal: Negative.    Skin:         Wound of the anterior right lower leg with surrounding erythema and warmth.   Neurological: Negative.        Medications, Allergies, and current problem list reviewed today in Epic           Objective     /80   Pulse 74   Temp 36.3 °C (97.4 °F) (Temporal)   Resp 20   Ht 1.956 m (6' 5\")   Wt 107 kg (236 lb 12.8 oz)   SpO2 97%   BMI 28.08 kg/m²      Physical Exam  Vitals and nursing note reviewed.   Constitutional:       General: He is not in acute distress.     Appearance: Normal appearance. He is well-developed. He is not diaphoretic.   HENT:      Head: Normocephalic.      Right Ear: Hearing and external ear normal.      Left Ear: Hearing and external ear normal.      Nose: Nose normal.      Mouth/Throat:      Mouth: Mucous membranes are moist.   Eyes:      General:         Right eye: No discharge.         Left eye: No discharge.      Conjunctiva/sclera: Conjunctivae " normal.   Cardiovascular:      Rate and Rhythm: Normal rate and regular rhythm.   Pulmonary:      Effort: Pulmonary effort is normal. No respiratory distress.      Breath sounds: Normal breath sounds. No stridor. No wheezing, rhonchi or rales.   Musculoskeletal:      Cervical back: Normal range of motion and neck supple.   Skin:     General: Skin is warm and dry.             Comments: Open wound to anterior right lower leg which has scabbed over.  There is surrounding erythema edema and warmth.  No significant tenderness.  No red streaking.  He has diffuse 1+ pitting edema of his entire right lower leg.  There is no calf tenderness, deformity, mass.  His distal neurovascular is intact.  No joint pain or bony tenderness.   Neurological:      General: No focal deficit present.      Mental Status: He is alert and oriented to person, place, and time. Mental status is at baseline.   Psychiatric:         Mood and Affect: Mood normal.         Behavior: Behavior normal.         Thought Content: Thought content normal.         Judgment: Judgment normal.               1/12/2024 10:32 AM     HISTORY/REASON FOR EXAM:  Swelling of Limb  Right lower extremity swelling     TECHNIQUE/EXAM DESCRIPTION:  Using both color and pulsed-wave Doppler imaging, multiple images were obtained of the right lower extremity from the common femoral vein origin distally through the popliteal trifurcation.  Graded compression was used to demonstrate a patent lumen.     COMPARISON:  None.     FINDINGS:     REAL-TIME GRAY-SCALE IMAGING:  Real-time gray-scale imaging reveals no evidence of focal wall thickening.     COLOR AND DUPLEX DOPPLER IMAGING:  There is no evidence of luminal thrombus.  There is normal augmentation to flow and respiratory variation.     IMPRESSION:     No evidence of right lower extremity deep venous thrombosis.         Assessment & Plan     This is a very pleasant 78-year-old male presenting for reevaluation of his anterior  right lower leg cellulitis.  He was seen on 1/4/2024 and started on Augmentin and doxycycline which she has since finished.  He still has an open scabbed wound with surrounding erythema warmth and tenderness.  He does state the symptoms have improved on medication.  His main concern today is that his entire right lower leg is swollen.  He denies calf pain or tenderness.  He denies fever, chest pain, cough, shortness of breath, chest pain, vomiting or urinary symptoms.  Patient does take Coumadin for paroxysmal A-fib, most recent INR 1.5.  His vital signs are normal.  Exam shows a open healing scabbed wound of his anterior right lower leg with surrounding edema, erythema, warmth and tenderness.  No red streaking or joint pain.  He does have 1+ pitting edema with swelling of his right lower extremity but no calf tenderness, deformity, mass.  There is no joint pain or bony tenderness.  Distal neurovascular and pulses intact.  Remainder of exam unremarkable.  Given that patient is on Coumadin, potential for DVT is low however given worsening unilateral swelling a DVT ultrasound was ordered which was negative.  He will be treated for ongoing cellulitis with Keflex.  Compression, elevation, heat.  ER and red flag symptoms discussed at length.      1. Pain and swelling of right lower leg  US-EXTREMITY VENOUS LOWER UNILAT RIGHT      2. Cellulitis of right lower extremity  cephALEXin (KEFLEX) 500 MG Cap          I personally reviewed prior external notes and test results pertinent to today's visit. Return to clinic or go to ED if symptoms worsen or persist. Red flag symptoms and indications for ED discussed at length. Patient/Parent/Guardian voices understanding.  AVS with post-visit instructions provided or given verbally.  Follow-up with your primary care provider in 3-5 days. All side effects and potential interactions of prescribed medication discussed including allergic response, GI upset, tendon injury, rash, sedation,  OCP effectiveness, etc.    Please note that this dictation was created using voice recognition software. I have made every reasonable attempt to correct obvious errors, but I expect that there are errors of grammar and possibly content that I did not discover before finalizing the note.

## 2024-01-18 ENCOUNTER — PHARMACY VISIT (OUTPATIENT)
Dept: PHARMACY | Facility: MEDICAL CENTER | Age: 79
End: 2024-01-18
Payer: COMMERCIAL

## 2024-01-18 ENCOUNTER — ANTICOAGULATION VISIT (OUTPATIENT)
Dept: VASCULAR LAB | Facility: MEDICAL CENTER | Age: 79
End: 2024-01-18
Attending: INTERNAL MEDICINE
Payer: MEDICARE

## 2024-01-18 DIAGNOSIS — Z79.01 CHRONIC ANTICOAGULATION: ICD-10-CM

## 2024-01-18 DIAGNOSIS — D68.69 SECONDARY HYPERCOAGULABLE STATE (HCC): ICD-10-CM

## 2024-01-18 DIAGNOSIS — I48.21 PERMANENT ATRIAL FIBRILLATION (HCC): Chronic | ICD-10-CM

## 2024-01-18 LAB — INR PPP: 2.6 (ref 2–3.5)

## 2024-01-18 PROCEDURE — 85610 PROTHROMBIN TIME: CPT

## 2024-01-18 PROCEDURE — 99211 OFF/OP EST MAY X REQ PHY/QHP: CPT | Performed by: NURSE PRACTITIONER

## 2024-01-18 PROCEDURE — RXMED WILLOW AMBULATORY MEDICATION CHARGE: Performed by: OPHTHALMOLOGY

## 2024-01-18 RX ORDER — MOXIFLOXACIN 5 MG/ML
1 SOLUTION/ DROPS OPHTHALMIC 4 TIMES DAILY
Qty: 3 ML | Refills: 2 | OUTPATIENT
Start: 2024-01-10

## 2024-01-18 RX ORDER — PREDNISOLONE ACETATE 10 MG/ML
1 SUSPENSION/ DROPS OPHTHALMIC 4 TIMES DAILY
Qty: 10 ML | Refills: 2 | OUTPATIENT
Start: 2024-01-10

## 2024-01-18 NOTE — PROGRESS NOTES
Anticoagulation Summary  As of 2024      INR goal:  2.0-3.0   TTR:  78.8 % (7.6 y)   INR used for dosin.60 (2024)   Warfarin maintenance plan:  5 mg (5 mg x 1) every Tue, Thu, Sat; 7.5 mg (5 mg x 1.5) all other days   Weekly warfarin total:  45 mg   Plan last modified:  Amanda Guzman ADenzelP.NDenzel (2024)   Next INR check:  2024   Target end date:  Indefinite    Indications    Permanent atrial fibrillation (HCC) [I48.21]  Chronic anticoagulation [Z79.01]  Secondary hypercoagulable state (HCC) [D68.69]                 Anticoagulation Episode Summary       INR check location:  Anticoagulation Clinic    Preferred lab:      Send INR reminders to:      Comments:  Indomethacin as needed, discussed risks with the patient.          Anticoagulation Care Providers       Provider Role Specialty Phone number    Cheikh Turner M.D. Referring Cardiovascular Disease (Cardiology) [phone]    [facility] Anticoagulation Services Responsible  [phone]                  Refer to Patient Findings for HPI:  Patient Findings       Positives:  Change in medications    Negatives:  Signs/symptoms of thrombosis, Signs/symptoms of bleeding, Laboratory test error suspected, Change in health, Change in alcohol use, Change in activity, Upcoming invasive procedure, Emergency department visit, Upcoming dental procedure, Missed doses, Extra doses, Change in diet/appetite, Hospital admission, Bruising, Other complaints    Comments:  On day 6/7 of cephalexin and will finish tomorrow.            Verified current warfarin dosing schedule.    Medications reconciled: Yes  Pt is not on antiplatelet therapy.      A/P   INR is therapeutic. INR up from 1.8 last week. Will have pt continue his current regimen.    Warfarin dosing recommendation: Continue regimen as listed above.    Pt educated to contact our clinic with any changes in medications or s/s of bleeding or thrombosis. Pt is aware to seek immediate medical attention for  falls, head injury or deep cuts.    Request pt to return in 2 week(s). Pt agrees.    PAMELA Bryan.

## 2024-01-24 PROCEDURE — RXMED WILLOW AMBULATORY MEDICATION CHARGE: Performed by: INTERNAL MEDICINE

## 2024-01-25 ENCOUNTER — TELEPHONE (OUTPATIENT)
Dept: PHARMACY | Facility: MEDICAL CENTER | Age: 79
End: 2024-01-25
Payer: MEDICARE

## 2024-01-25 NOTE — TELEPHONE ENCOUNTER
Contact:  [phone]     Rajiv Perez    Phone number: [phone]    Name of person spoken with and relationship to patient: Rajiv., self   Patient’s Adherence:            How patient is doing on medication:  well    How many missed doses and reason: 0    Any new medications: no    Any new conditions: no    Any new allergies: no    Any new side effects: no     Any new diagnoses: no     How many doses remainin-14 (depending on which med)    Did patient want to speak with pharmacist: no  Delivery:            Delivery date and method:       Needs by Date:   (d/t Glimepiride)    Signature required:  no    Any additional details for :  may leave at door if no answer  Teach Appointment Date:  na  Shipping Address:  [address] [place] [address], [place] [address]  Medication(name, strength and dose):  allopurinol 100 MG Tabs qd, glimepiride 2 MG Tabs qd am, tamsulosin 0.4 MG caps qd  Copay: $16.76  Payment Method: ccof  Supplies:  na  Additional info:  YOHANA Vance. He stated doing well on the medication. He had 2 months of Warfarin on hand. updated next call back date accordingly. Informed him of needing to make an appt to get refills for atorvastatin. will postpone until next week along with his Jardiance and f/u at next call. He is aware will be calling next week sometime. No further questions/concerns at this time

## 2024-01-26 ENCOUNTER — PHARMACY VISIT (OUTPATIENT)
Dept: PHARMACY | Facility: MEDICAL CENTER | Age: 79
End: 2024-01-26
Payer: COMMERCIAL

## 2024-01-29 ENCOUNTER — PHARMACY VISIT (OUTPATIENT)
Dept: PHARMACY | Facility: MEDICAL CENTER | Age: 79
End: 2024-01-29
Payer: COMMERCIAL

## 2024-01-29 PROCEDURE — RXMED WILLOW AMBULATORY MEDICATION CHARGE: Performed by: OPHTHALMOLOGY

## 2024-02-01 ENCOUNTER — ANTICOAGULATION VISIT (OUTPATIENT)
Dept: VASCULAR LAB | Facility: MEDICAL CENTER | Age: 79
End: 2024-02-01
Attending: INTERNAL MEDICINE
Payer: MEDICARE

## 2024-02-01 ENCOUNTER — TELEPHONE (OUTPATIENT)
Dept: PHARMACY | Facility: MEDICAL CENTER | Age: 79
End: 2024-02-01

## 2024-02-01 VITALS — DIASTOLIC BLOOD PRESSURE: 72 MMHG | HEART RATE: 67 BPM | SYSTOLIC BLOOD PRESSURE: 118 MMHG

## 2024-02-01 DIAGNOSIS — E78.5 DYSLIPIDEMIA: ICD-10-CM

## 2024-02-01 DIAGNOSIS — D68.69 SECONDARY HYPERCOAGULABLE STATE (HCC): ICD-10-CM

## 2024-02-01 DIAGNOSIS — E11.9 CONTROLLED TYPE 2 DIABETES MELLITUS WITHOUT COMPLICATION, WITHOUT LONG-TERM CURRENT USE OF INSULIN (HCC): ICD-10-CM

## 2024-02-01 DIAGNOSIS — I48.0 PAROXYSMAL ATRIAL FIBRILLATION (HCC): ICD-10-CM

## 2024-02-01 DIAGNOSIS — Z79.01 CHRONIC ANTICOAGULATION: ICD-10-CM

## 2024-02-01 DIAGNOSIS — I48.21 PERMANENT ATRIAL FIBRILLATION (HCC): Chronic | ICD-10-CM

## 2024-02-01 LAB — INR PPP: 3.3 (ref 2–3.5)

## 2024-02-01 PROCEDURE — 99212 OFFICE O/P EST SF 10 MIN: CPT | Performed by: NURSE PRACTITIONER

## 2024-02-01 PROCEDURE — 85610 PROTHROMBIN TIME: CPT

## 2024-02-01 PROCEDURE — RXMED WILLOW AMBULATORY MEDICATION CHARGE: Performed by: INTERNAL MEDICINE

## 2024-02-01 RX ORDER — EMPAGLIFLOZIN 25 MG/1
1 TABLET, FILM COATED ORAL DAILY
Qty: 100 TABLET | Refills: 1 | Status: SHIPPED | OUTPATIENT
Start: 2024-02-01

## 2024-02-01 RX ORDER — EMPAGLIFLOZIN 25 MG/1
1 TABLET, FILM COATED ORAL DAILY
Qty: 100 TABLET | Refills: 3 | Status: CANCELLED | OUTPATIENT
Start: 2024-02-01

## 2024-02-01 NOTE — PROGRESS NOTES
Anticoagulation Summary  As of 2/1/2024      INR goal:  2.0-3.0   TTR:  78.7 % (7.7 y)   INR used for dosing:  3.30 (2/1/2024)   Warfarin maintenance plan:  5 mg (5 mg x 1) every Tue, Thu, Sat; 7.5 mg (5 mg x 1.5) all other days   Weekly warfarin total:  45 mg   Plan last modified:  Amanda Guzman A.P.NDenzel (1/4/2024)   Next INR check:  2/15/2024   Target end date:  Indefinite    Indications    Permanent atrial fibrillation (HCC) [I48.21]  Chronic anticoagulation [Z79.01]  Secondary hypercoagulable state (HCC) [D68.69]                 Anticoagulation Episode Summary       INR check location:  Anticoagulation Clinic    Preferred lab:      Send INR reminders to:      Comments:  Indomethacin as needed, discussed risks with the patient.          Anticoagulation Care Providers       Provider Role Specialty Phone number    Cheikh Turner M.D. Referring Cardiovascular Disease (Cardiology) [phone]    [facility] Anticoagulation Services Responsible  [phone]                  Refer to Patient Findings for HPI:  Patient Findings       Positives:  Change in health, Change in alcohol use    Negatives:  Signs/symptoms of thrombosis, Signs/symptoms of bleeding, Laboratory test error suspected, Change in activity, Upcoming invasive procedure, Emergency department visit, Upcoming dental procedure, Missed doses, Extra doses, Change in medications, Change in diet/appetite, Hospital admission, Bruising, Other complaints    Comments:  Recently had karan cataract sx. Had an additional serving of etoh this week.            Vitals:    02/01/24 1046   BP: 118/72   Pulse: 67       Verified current warfarin dosing schedule.    Medications reconciled: Yes  Pt is not on antiplatelet therapy.      A/P   INR is supratherapeutic. Will have pt take a one time dose decrease.    Warfarin dosing recommendation: Take 2.5 mg tonight, then resume your previous regimen.    Pt educated to contact our clinic with any changes in medications or s/s of  bleeding or thrombosis. Pt is aware to seek immediate medical attention for falls, head injury or deep cuts.    Request pt to return in 2 week(s). Pt agrees.    PAMELA Bryan.

## 2024-02-01 NOTE — TELEPHONE ENCOUNTER
Good afternoon Dr. Jimenes,      My name is Iris, and I am an Rx Coordinator with [facility] and [facility] clinic. This is a mutual patient of ours that is requesting for a renewal of his Jardiance 25mg tab prescription for a 100 day supply to our [facility], please.    His last OV with our Cardiology clinic was 09/2022, and is not able to be seen here until his upcoming March appointment for the Cardiology clinic to renew the script.     He currently has 30 days left on his current prescription on file with [facility]. However, he is requesting that he have a 100 day prescription filled due to the cost savings he receives when he fills 100 days at a time through his insurance instead of a 30 day refill.     He currently only has 1 tablet left of the Jardiance 25mg tabs, and will need a prescription by tomorrow. Can you please authorize 1 prescription renewal for 100 days for him to last until he is seen with our office?    Thank you, and I hope you have a wonderful day!    Iris Ortiz  Rx Coordinator  [phone]

## 2024-02-01 NOTE — TELEPHONE ENCOUNTER
Received request via: Pharmacy    Was the patient seen in the last year in this department? Yes    Does the patient have an active prescription (recently filled or refills available) for medication(s) requested? No    Pharmacy Name: [facility]    Does the patient have nursing home Plus and need 100 day supply (blood pressure, diabetes and cholesterol meds only)? Yes, quantity updated to 100 days

## 2024-02-01 NOTE — TELEPHONE ENCOUNTER
Contact: [phone]  Rajiv Perez          Phone number: [phone]    Name of person spoken with and relationship to patient: Rajiv, self   Patient’s Adherence:            How patient is doing on medication:  well    How many missed doses and reason: 0    Any new medications: no    Any new conditions: no    Any new allergies: no    Any new side effects: no     Any new diagnoses: no     How many doses remainin    Did patient want to speak with pharmacist: no  Delivery:            Delivery date and method:    [place] location    Needs by Date:      Signature required:  no    Any additional details for :  na  Teach Appointment Date:  na  Shipping Address: [address] [place] [address] [place], [place] [address]   Medication(name, strength and dose):   Jardiance 25 MG Tabs qd  Copay: $94  Payment Method: pay upon   Supplies:  na  Additional info:  SW Rajiv  earlier this morning and he stated he was wanting 90ds for his Jardiance. Not enough on hand. Patient has 1 tablet left. Reached out to Dr Turner and also reached out to Liadewey Simmons for help in contacting Dr Jimenes (PCP) since he recently saw her at an appt. Dr Jimenes responded back and so I called patient back to set up delivery. On previous call went over all adherence questions, but did a brief summary with him again. He stated doing well on the medication. On earlier call,  His atorvastatin was previously denied d/t needing an appt. He has since made an appt but can't get in until 3/22. Resent RR to Dr Turner, but patient is okay if he doesn't get filled until his appt since his numbers are showing great. If it gets approved by tomorrow when he picks up the Jardiance he will get at that time.  No further questions/concerns at this time

## 2024-02-02 ENCOUNTER — PHARMACY VISIT (OUTPATIENT)
Dept: PHARMACY | Facility: MEDICAL CENTER | Age: 79
End: 2024-02-02
Payer: COMMERCIAL

## 2024-02-02 RX ORDER — ATORVASTATIN CALCIUM 10 MG/1
10 TABLET, FILM COATED ORAL DAILY
Qty: 100 TABLET | Refills: 0 | Status: SHIPPED | OUTPATIENT
Start: 2024-02-02

## 2024-02-02 NOTE — TELEPHONE ENCOUNTER
Is the patient due for a refill? Yes    Was the patient seen the past year? No    Date of last office visit: 09/07/2022    Does the patient have an upcoming appointment?  Yes   If yes, When? 03/22/2024    Provider to refill:MARGARITO    Does the patients insurance require a 100 day supply?  Yes

## 2024-02-08 PROCEDURE — RXMED WILLOW AMBULATORY MEDICATION CHARGE: Performed by: OPHTHALMOLOGY

## 2024-02-13 ENCOUNTER — PHARMACY VISIT (OUTPATIENT)
Dept: PHARMACY | Facility: MEDICAL CENTER | Age: 79
End: 2024-02-13
Payer: COMMERCIAL

## 2024-02-15 ENCOUNTER — ANTICOAGULATION VISIT (OUTPATIENT)
Dept: VASCULAR LAB | Facility: MEDICAL CENTER | Age: 79
End: 2024-02-15
Attending: INTERNAL MEDICINE
Payer: MEDICARE

## 2024-02-15 DIAGNOSIS — D68.69 SECONDARY HYPERCOAGULABLE STATE (HCC): ICD-10-CM

## 2024-02-15 DIAGNOSIS — Z79.01 CHRONIC ANTICOAGULATION: ICD-10-CM

## 2024-02-15 DIAGNOSIS — I48.21 PERMANENT ATRIAL FIBRILLATION (HCC): Chronic | ICD-10-CM

## 2024-02-15 LAB — INR PPP: 2.2 (ref 2–3.5)

## 2024-02-15 PROCEDURE — 99211 OFF/OP EST MAY X REQ PHY/QHP: CPT | Performed by: NURSE PRACTITIONER

## 2024-02-15 PROCEDURE — 85610 PROTHROMBIN TIME: CPT

## 2024-02-15 NOTE — PROGRESS NOTES
Anticoagulation Summary  As of 2/15/2024      INR goal:  2.0-3.0   TTR:  78.7% (7.7 y)   INR used for dosin.20 (2/15/2024)   Warfarin maintenance plan:  5 mg (5 mg x 1) every Tue, Thu, Sat; 7.5 mg (5 mg x 1.5) all other days   Weekly warfarin total:  45 mg   Plan last modified:  Amanda Guzman A.P.NDenzel (2024)   Next INR check:  3/7/2024   Target end date:  Indefinite    Indications    Permanent atrial fibrillation (HCC) [I48.21]  Chronic anticoagulation [Z79.01]  Secondary hypercoagulable state (HCC) [D68.69]                 Anticoagulation Episode Summary       INR check location:  Anticoagulation Clinic    Preferred lab:      Send INR reminders to:      Comments:  Indomethacin as needed, discussed risks with the patient.          Anticoagulation Care Providers       Provider Role Specialty Phone number    Cheikh Turner M.D. Referring Cardiovascular Disease (Cardiology) [phone]    [facility] Anticoagulation Services Responsible  [phone]                  Refer to Patient Findings for HPI:  Patient Findings       Negatives:  Signs/symptoms of thrombosis, Signs/symptoms of bleeding, Laboratory test error suspected, Change in health, Change in alcohol use, Change in activity, Upcoming invasive procedure, Emergency department visit, Upcoming dental procedure, Missed doses, Extra doses, Change in medications, Change in diet/appetite, Hospital admission, Bruising, Other complaints            There were no vitals filed for this visit.  Pt declined vitals    Verified current warfarin dosing schedule.    Medications reconciled: Yes  Pt is not on antiplatelet therapy.    His wife takes Eliquis but he prefers warfarin because the cost is low.    A/P   INR is therapeutic    Warfarin dosing recommendation: Continue regimen as listed above.    Pt educated to contact our clinic with any changes in medications or s/s of bleeding or thrombosis. Pt is aware to seek immediate medical attention for falls, head  injury or deep cuts.    Request pt to return in 3 week(s). Pt agrees.    PAMELA Bryan.

## 2024-03-07 ENCOUNTER — ANTICOAGULATION VISIT (OUTPATIENT)
Dept: VASCULAR LAB | Facility: MEDICAL CENTER | Age: 79
End: 2024-03-07
Attending: INTERNAL MEDICINE
Payer: MEDICARE

## 2024-03-07 DIAGNOSIS — I48.21 PERMANENT ATRIAL FIBRILLATION (HCC): Chronic | ICD-10-CM

## 2024-03-07 DIAGNOSIS — D68.69 SECONDARY HYPERCOAGULABLE STATE (HCC): ICD-10-CM

## 2024-03-07 DIAGNOSIS — Z79.01 CHRONIC ANTICOAGULATION: ICD-10-CM

## 2024-03-07 LAB — INR PPP: 3.2 (ref 2–3.5)

## 2024-03-07 PROCEDURE — 99212 OFFICE O/P EST SF 10 MIN: CPT | Performed by: NURSE PRACTITIONER

## 2024-03-07 PROCEDURE — 85610 PROTHROMBIN TIME: CPT

## 2024-03-07 NOTE — PROGRESS NOTES
Anticoagulation Summary  As of 3/7/2024      INR goal:  2.0-3.0   TTR:  78.7% (7.8 y)   INR used for dosing:  3.20 (3/7/2024)   Warfarin maintenance plan:  5 mg (5 mg x 1) every Tue, Thu, Sat; 7.5 mg (5 mg x 1.5) all other days   Weekly warfarin total:  45 mg   Plan last modified:  Amanda Guzman A.P.NDenzel (1/4/2024)   Next INR check:  3/28/2024   Target end date:  Indefinite    Indications    Permanent atrial fibrillation (HCC) [I48.21]  Chronic anticoagulation [Z79.01]  Secondary hypercoagulable state (HCC) [D68.69]                 Anticoagulation Episode Summary       INR check location:  Anticoagulation Clinic    Preferred lab:      Send INR reminders to:      Comments:  Indomethacin as needed, discussed risks with the patient.          Anticoagulation Care Providers       Provider Role Specialty Phone number    Cheikh Turner M.D. Referring Cardiovascular Disease (Cardiology) [phone]    [facility] Anticoagulation Services Responsible  [phone]                  Refer to Patient Findings for HPI:  Patient Findings       Negatives:  Signs/symptoms of thrombosis, Signs/symptoms of bleeding, Laboratory test error suspected, Change in health, Change in alcohol use, Change in activity, Upcoming invasive procedure, Emergency department visit, Upcoming dental procedure, Missed doses, Extra doses, Change in medications, Change in diet/appetite, Hospital admission, Bruising, Other complaints          Verified current warfarin dosing schedule.    Medications reconciled: Yes  Pt is not on antiplatelet therapy.      A/P   INR is supratherapeutic. Will have pt take a one time dose decrease.    Warfarin dosing recommendation: Take 2.5 mg tonight, then resume your previous regimen.    Pt educated to contact our clinic with any changes in medications or s/s of bleeding or thrombosis. Pt is aware to seek immediate medical attention for falls, head injury or deep cuts.    Request pt to return in 2 week(s). Pt would like  to return in 3 weeks.    PAMELA Bryan.

## 2024-03-11 DIAGNOSIS — I48.21 PERMANENT ATRIAL FIBRILLATION (HCC): ICD-10-CM

## 2024-03-11 DIAGNOSIS — I10 ESSENTIAL HYPERTENSION: ICD-10-CM

## 2024-03-12 RX ORDER — GLIMEPIRIDE 2 MG/1
2 TABLET ORAL EVERY MORNING
Qty: 100 TABLET | Refills: 3 | Status: SHIPPED | OUTPATIENT
Start: 2024-03-12

## 2024-03-12 RX ORDER — TAMSULOSIN HYDROCHLORIDE 0.4 MG/1
CAPSULE ORAL
Qty: 100 CAPSULE | Refills: 3 | Status: SHIPPED | OUTPATIENT
Start: 2024-03-12

## 2024-03-12 RX ORDER — ALLOPURINOL 100 MG/1
100 TABLET ORAL DAILY
Qty: 100 TABLET | Refills: 3 | Status: SHIPPED | OUTPATIENT
Start: 2024-03-12

## 2024-03-13 PROCEDURE — RXMED WILLOW AMBULATORY MEDICATION CHARGE: Performed by: INTERNAL MEDICINE

## 2024-03-13 RX ORDER — ENALAPRIL MALEATE 10 MG/1
10 TABLET ORAL DAILY
Qty: 100 TABLET | Refills: 0 | Status: SHIPPED | OUTPATIENT
Start: 2024-03-13

## 2024-03-13 NOTE — TELEPHONE ENCOUNTER
Is the patient due for a refill? Yes    Was the patient seen the past year? No    Date of last office visit:     Does the patient have an upcoming appointment?  Yes   If yes, When? 03/22/2024    Provider to refill:MARGARITO    Does the patients insurance require a 100 day supply?  Yes

## 2024-03-18 ENCOUNTER — PHARMACY VISIT (OUTPATIENT)
Dept: PHARMACY | Facility: MEDICAL CENTER | Age: 79
End: 2024-03-18
Payer: COMMERCIAL

## 2024-03-19 PROCEDURE — RXMED WILLOW AMBULATORY MEDICATION CHARGE: Performed by: INTERNAL MEDICINE

## 2024-03-20 ENCOUNTER — PHARMACY VISIT (OUTPATIENT)
Dept: PHARMACY | Facility: MEDICAL CENTER | Age: 79
End: 2024-03-20
Payer: COMMERCIAL

## 2024-03-26 ENCOUNTER — OFFICE VISIT (OUTPATIENT)
Dept: MEDICAL GROUP | Facility: PHYSICIAN GROUP | Age: 79
End: 2024-03-26
Payer: MEDICARE

## 2024-03-26 VITALS
HEIGHT: 77 IN | WEIGHT: 235.8 LBS | DIASTOLIC BLOOD PRESSURE: 72 MMHG | HEART RATE: 84 BPM | OXYGEN SATURATION: 98 % | SYSTOLIC BLOOD PRESSURE: 120 MMHG | RESPIRATION RATE: 18 BRPM | TEMPERATURE: 97.5 F | BODY MASS INDEX: 27.84 KG/M2

## 2024-03-26 DIAGNOSIS — M1A.0420 CHRONIC GOUT OF LEFT HAND, UNSPECIFIED CAUSE: ICD-10-CM

## 2024-03-26 DIAGNOSIS — E11.40 CONTROLLED TYPE 2 DIABETES MELLITUS WITH DIABETIC NEUROPATHY, WITHOUT LONG-TERM CURRENT USE OF INSULIN (HCC): ICD-10-CM

## 2024-03-26 DIAGNOSIS — I48.21 PERMANENT ATRIAL FIBRILLATION (HCC): Chronic | ICD-10-CM

## 2024-03-26 DIAGNOSIS — C61 PROSTATE CANCER (HCC): ICD-10-CM

## 2024-03-26 LAB
HBA1C MFR BLD: 7.6 % (ref ?–5.8)
POCT INT CON NEG: NEGATIVE
POCT INT CON POS: POSITIVE

## 2024-03-26 PROCEDURE — RXMED WILLOW AMBULATORY MEDICATION CHARGE: Performed by: INTERNAL MEDICINE

## 2024-03-26 RX ORDER — COLCHICINE 0.6 MG/1
TABLET ORAL
Qty: 20 TABLET | Refills: 1 | Status: SHIPPED | OUTPATIENT
Start: 2024-03-26

## 2024-03-26 ASSESSMENT — FIBROSIS 4 INDEX: FIB4 SCORE: 1.82479184551145761

## 2024-03-26 ASSESSMENT — PATIENT HEALTH QUESTIONNAIRE - PHQ9: CLINICAL INTERPRETATION OF PHQ2 SCORE: 0

## 2024-03-26 NOTE — PROGRESS NOTES
CC: Follow-up prostate cancer, gout, diabetes    HPI:  Cheikh presents with the following    1. Controlled type 2 diabetes mellitus with diabetic neuropathy, without long-term current use of insulin ([facility])  12/5/2023: Chronic, stable.  Patient is currently taking Jardiance 25 mg daily, metformin 1000 mg twice daily, glimepiride 2 mg dose.  Patient does not check his blood sugars regularly.  Hemoglobin A1c 7.5.17 in April.  Patient is very limited in his exercise routine, has a walker.  Not following a diabetic diet.  Due for monofilament.    3/26/2024:.  Patient continues his Amaryl 2 mg daily, Jardiance 25 mg daily and metformin 1000 mg twice daily.  Patient does not check his blood sugars.  No change in diet.  He has become more sedentary given the winter months and chronic issues.  Hemoglobin A1c 7.6, up from 7.5 in December.    2. Prostate cancer ([facility])  12/5/2022:Closely followed by urology.  Diagnosed with prostate cancer 18th September, Tiffanie 6, grade 1, low-grade.  Treatment active surveillance with MRI.  MRI showed positive mid gland peripheral lesion, negative for lymphadenopathy.  Appointment with urology in December 26.  PSA 1.17.  Status post TURP September 2023    3/26/2023:.  Patient followed up with urology and completed prostate MRI in November 2023.  Noted left peripheral mid gland suspicious lesion.  No pelvic lymphadenopathy or suspicious osseous lesions.  PI-RADS score 4- high.  Patient is scheduled for prostate biopsy in early April.    3. Chronic gout of left hand, unspecified cause  Patient with PMH of gout is on low-dose allopurinol 100 mg daily.  Patient has not had a gout attack in over 2 years and wishes to discontinue this medication.    4. Permanent atrial fibrillation (HCC)  Chronic.  Stable.  Patient continues on warfarin and will have a follow-up appointment with cardiology in May.      Patient Active Problem List    Diagnosis Date Noted    Prostate cancer (HCC) 12/05/2023     Onychomycosis 12/05/2023    Stress 04/28/2023    Vitamin D deficiency 08/18/2022    Lower urinary tract symptoms due to benign prostatic hyperplasia 06/07/2022    Chronic gout of left hand 05/12/2022    Chronic gout of hand 03/29/2022    History of kidney stones 03/29/2022    Loss of balance 03/29/2022    Spinal stenosis of lumbar region without neurogenic claudication 03/29/2022    BMI 28.0-28.9,adult 02/17/2022    Benign prostatic hyperplasia with nocturia 02/17/2022    Secondary hypercoagulable state (HCC) 12/13/2021    B12 deficiency 11/12/2020    Obesity (BMI 30-39.9) 03/27/2018    Controlled type 2 diabetes mellitus with diabetic neuropathy, without long-term current use of insulin (HCC) 03/13/2017    Risk for falls 03/13/2017    SHANNAN (obstructive sleep apnea) 12/12/2016    Dyslipidemia 12/12/2016    Left foot drop 12/12/2016    Chronic anticoagulation 09/20/2013    Essential hypertension 07/09/2013    Permanent atrial fibrillation ([facility])        Current Outpatient Medications   Medication Sig Dispense Refill    colchicine (COLCRYS) 0.6 MG Tab Take 1 tablet at onset of acute gout attack, may repeat in 1 hour. 20 Tablet 1    enalapril (VASOTEC) 10 MG Tab Take 1 Tablet by mouth every day. 100 Tablet 0    metoprolol tartrate (LOPRESSOR) 25 MG Tab Take 1 Tablet by mouth 2 times a day. 200 Tablet 0    tamsulosin (FLOMAX) 0.4 MG capsule Take 1 capsule by mouth daily. 100 Capsule 3    glimepiride (AMARYL) 2 MG Tab Take 1 Tablet by mouth every morning. 100 Tablet 3    allopurinol (ZYLOPRIM) 100 MG Tab Take 1 Tablet by mouth every day. 100 Tablet 3    atorvastatin (LIPITOR) 10 MG Tab TAKE 1 TABLET BY MOUTH EVERY  Tablet 0    Empagliflozin (JARDIANCE) 25 MG Tab Take 1 Tablet by mouth every day. 100 Tablet 1    moxifloxacin (VIGAMOX) 0.5 % Solution Administer 1 Drop into the right eye 4 times a day. 3 mL 2    prednisoLONE acetate (PRED FORTE) 1 % Suspension Administer 1 Drop into the right eye 4 times a day. 10 mL  2    cyanocobalamin ([employer] VITAMIN B12) 1000 MCG Tab Take 1 Tablet by mouth every day. 100 Tablet 3    warfarin (COUMADIN) 5 MG Tab Take 1.5-2 Tablets by mouth every day. Take as directed by anticoag clinic 180 Tablet 1    metformin (GLUCOPHAGE) 1000 MG tablet TAKE ONE TABLET BY MOUTH TWICE DAILY WITH MEALS 200 Tablet 3    cyanocobalamin ([employer] VITAMIN B12) 1000 MCG Tab Take 1 tablet by mouth every day. 100 Tablet 1    Misc. Devices Misc Please provide the patient with a four wheel walker with a seat. M48.061 M21.372 M21.371 1 Each 0    VITAMIN D, CHOLECALCIFEROL, PO Take 5,000 Units by mouth.       No current facility-administered medications for this visit.         Allergies as of 03/26/2024 - Reviewed 03/26/2024   Allergen Reaction Noted    Nkda [no known drug allergy]  07/31/2009        Social History     Socioeconomic History    Marital status:      Spouse name: Not on file    Number of children: Not on file    Years of education: Not on file    Highest education level: Not on file   Occupational History    Not on file   Tobacco Use    Smoking status: Never    Smokeless tobacco: Never   Vaping Use    Vaping Use: Never used   Substance and Sexual Activity    Alcohol use: Yes     Alcohol/week: 6.0 oz     Types: 10 Glasses of wine per week    Drug use: No    Sexual activity: Never     Partners: Female   Other Topics Concern    Not on file   Social History Narrative    Not on file     Social Determinants of Health     Financial Resource Strain: Not on file   Food Insecurity: Not on file   Transportation Needs: Not on file   Physical Activity: Not on file   Stress: Not on file   Social Connections: Not on file   Intimate Partner Violence: Not on file   Housing Stability: Not on file       Family History   Problem Relation Age of Onset    Heart Attack Father 59    Heart Disease Father     Hypertension Brother     Diabetes Brother     Arthritis Brother     Thyroid Mother 50    Breast Cancer Sister     No Known  "Problems Maternal Grandmother     No Known Problems Maternal Grandfather     No Known Problems Paternal Grandmother     No Known Problems Paternal Grandfather     Arrythmia Brother     No Known Problems Brother     No Known Problems Daughter        Past Surgical History:   Procedure Laterality Date    ORIF, FRACTURE, FEMUR Left 2/24/2016    Procedure: DISTAL FEMUR ORIF;  Surgeon: Montrell Eddy M.D.;  Location: SURGERY [facility];  Service:     CYSTOSCOPY STENT PLACEMENT  1/13/2014    Performed by Main Batista M.D. at SURGERY [facility]    URETEROSCOPY  1/13/2014    Performed by Main Batista M.D. at SURGERY [facility]    LASERTRIPSY  1/13/2014    Performed by Main Batista M.D. at SURGERY [facility]    OTHER      Surgery for kidney stones    RECOVERY  7/11/2013    Performed by Cath-Recovery Surgery at SURGERY SAME DAY [facility]    KNEE ARTHROPLASTY TOTAL  12/1/2009    Performed by GIACOMO PETTIT at SURGERY [facility]    KNEE ARTHROPLASTY TOTAL  3/2009    Left    LUMBAR LAMINECTOMY DISKECTOMY  7/2006    L1-L5/ residual left foot weaknesss     REPAIR, KNEE, ACL  12/2005    Right    ORIF, FRACTURE, FEMUR         ROS:  Denies any Headache,Chest pain,  Shortness of breath,  Abdominal pain, Changes of bowel or bladder, Lower ext edema, Fevers, Nights sweats, Weight Changes, Focal weakness or numbness.  All other systems are negative.    /72 (BP Location: Right arm, Patient Position: Sitting)   Pulse 84   Temp 36.4 °C (97.5 °F) (Temporal)   Resp 18   Ht 1.956 m (6' 5\")   Wt 107 kg (235 lb 12.8 oz)   SpO2 98%   BMI 27.96 kg/m²      Constitutional: Alert, no distress, well-groomed.  Skin: Warm, dry, good turgor, no rashes in visible areas.  Eye: Equal, round and reactive, conjunctiva clear, lids normal.  ENMT: Lips without lesions, good dentition, moist mucous membranes.  Neck: Trachea midline, no masses, no thyromegaly.  Respiratory: Unlabored respiratory effort, no " cough.  Abdomen: Soft, no gross masses.  MSK: Normal gait, moves all extremities.  Neuro: Grossly non-focal. No cranial nerve deficit. Strength and sensation intact.   Psych: Alert and oriented x3, normal affect and mood.      Assessment and Plan.   79 y.o. male presenting with the following.     1. Controlled type 2 diabetes mellitus with diabetic neuropathy, without long-term current use of insulin (HCC)  Chronic.  Stable.  Patient wishes to continue with his current medication doses.  Patient will make diet lifestyle changes and follow-up with a hemoglobin A1c in 4 months.    - POCT  A1C    2. Prostate cancer (HCC)  Chronic.  Stable.  Prostate MRI reviewed with patient.  Complete biopsy and follow-up with urology.    3. Chronic gout of left hand, unspecified cause  Patient will discontinue allopurinol.  Prescription for colchicine provided if patient experiences a gout attack.  Avoid Indocin given concurrent use of warfarin.  Instructed patient on a gout diet.  Keep well-hydrated.  Patient will also consume cherry juice.    4. Permanent atrial fibrillation (HCC)  Chronic.  Stable.  Continue plan of care per cardiology.    My total time spent caring for the patient on the day of the encounter was 33 minutes.   This does not include time spent on separately billable procedures/tests.

## 2024-03-28 ENCOUNTER — PHARMACY VISIT (OUTPATIENT)
Dept: PHARMACY | Facility: MEDICAL CENTER | Age: 79
End: 2024-03-28
Payer: COMMERCIAL

## 2024-03-28 ENCOUNTER — ANTICOAGULATION VISIT (OUTPATIENT)
Dept: VASCULAR LAB | Facility: MEDICAL CENTER | Age: 79
End: 2024-03-28
Attending: INTERNAL MEDICINE
Payer: MEDICARE

## 2024-03-28 DIAGNOSIS — Z79.01 CHRONIC ANTICOAGULATION: ICD-10-CM

## 2024-03-28 DIAGNOSIS — D68.69 SECONDARY HYPERCOAGULABLE STATE (HCC): ICD-10-CM

## 2024-03-28 DIAGNOSIS — I48.21 PERMANENT ATRIAL FIBRILLATION (HCC): Chronic | ICD-10-CM

## 2024-03-28 LAB — INR PPP: 2.1 (ref 2–3.5)

## 2024-03-28 PROCEDURE — 85610 PROTHROMBIN TIME: CPT

## 2024-03-28 PROCEDURE — 99212 OFFICE O/P EST SF 10 MIN: CPT

## 2024-03-28 NOTE — PROGRESS NOTES
Anticoagulation Summary  As of 3/28/2024      INR goal:  2.0-3.0   TTR:  78.7% (7.8 y)   INR used for dosin.10 (3/28/2024)   Warfarin maintenance plan:  5 mg (5 mg x 1) every e, u, Sat; 7.5 mg (5 mg x 1.5) all other days   Weekly warfarin total:  45 mg   Plan last modified:  Amanda Guzman A.P.NDenzel (2024)   Next INR check:  2024   Target end date:  Indefinite    Indications    Permanent atrial fibrillation (HCC) [I48.21]  Chronic anticoagulation [Z79.01]  Secondary hypercoagulable state (HCC) [D68.69]                 Anticoagulation Episode Summary       INR check location:  Anticoagulation Clinic    Preferred lab:      Send INR reminders to:      Comments:  Indomethacin as needed, discussed risks with the patient.          Anticoagulation Care Providers       Provider Role Specialty Phone number    Cheikh Turner M.D. Referring Cardiovascular Disease (Cardiology) [phone]    [facility] Anticoagulation Services Responsible  [phone]             Refer to Patient Findings for HPI:  Patient Findings       Positives:  Missed doses (Tuesday)    Negatives:  Signs/symptoms of thrombosis, Signs/symptoms of bleeding, Laboratory test error suspected, Change in health, Change in alcohol use, Change in activity, Upcoming invasive procedure, Emergency department visit, Upcoming dental procedure, Extra doses, Change in medications, Change in diet/appetite, Hospital admission, Bruising, Other complaints            There were no vitals filed for this visit.  Pt declined vitals    Verified current warfarin dosing schedule.    Medications reconciled: No  Pt is not on antiplatelet therapy      A/P   INR is therapeutic, but will likely continue to trend down due to missed dose therefore will bolus x 1.    Warfarin dosing recommendation: Bolus with 7.5 mg today, then Continue regimen as listed above.    Pt educated to contact our clinic with any changes in medications or s/s of bleeding or thrombosis. Pt is  aware to seek immediate medical attention for falls, head injury or deep cuts.    Follow up appointment in 3 week(s).    Mohinder Ball, MatthewD

## 2024-04-18 ENCOUNTER — ANTICOAGULATION VISIT (OUTPATIENT)
Dept: VASCULAR LAB | Facility: MEDICAL CENTER | Age: 79
End: 2024-04-18
Attending: INTERNAL MEDICINE
Payer: MEDICARE

## 2024-04-18 VITALS — DIASTOLIC BLOOD PRESSURE: 74 MMHG | SYSTOLIC BLOOD PRESSURE: 124 MMHG | HEART RATE: 63 BPM

## 2024-04-18 DIAGNOSIS — D68.69 SECONDARY HYPERCOAGULABLE STATE (HCC): ICD-10-CM

## 2024-04-18 DIAGNOSIS — Z79.01 CHRONIC ANTICOAGULATION: ICD-10-CM

## 2024-04-18 DIAGNOSIS — I48.21 PERMANENT ATRIAL FIBRILLATION (HCC): Chronic | ICD-10-CM

## 2024-04-18 LAB — INR PPP: 2.3 (ref 2–3.5)

## 2024-04-18 PROCEDURE — 85610 PROTHROMBIN TIME: CPT

## 2024-04-18 PROCEDURE — 99211 OFF/OP EST MAY X REQ PHY/QHP: CPT | Performed by: NURSE PRACTITIONER

## 2024-04-18 NOTE — PROGRESS NOTES
Anticoagulation Summary  As of 2024      INR goal:  2.0-3.0   TTR:  78.8% (7.9 y)   INR used for dosin.30 (2024)   Warfarin maintenance plan:  5 mg (5 mg x 1) every Tue, Thu, Sat; 7.5 mg (5 mg x 1.5) all other days   Weekly warfarin total:  45 mg   Plan last modified:  Amanda Guzman A.P.NDenzel (2024)   Next INR check:  2024   Target end date:  Indefinite    Indications    Permanent atrial fibrillation (HCC) [I48.21]  Chronic anticoagulation [Z79.01]  Secondary hypercoagulable state (HCC) [D68.69]                 Anticoagulation Episode Summary       INR check location:  Anticoagulation Clinic    Preferred lab:      Send INR reminders to:      Comments:  Indomethacin as needed, discussed risks with the patient.          Anticoagulation Care Providers       Provider Role Specialty Phone number    Cheikh Turner M.D. Referring Cardiovascular Disease (Cardiology) [phone]    [facility] Anticoagulation Services Responsible  [phone]                  Refer to Patient Findings for HPI:  Patient Findings       Positives:  Change in health    Negatives:  Signs/symptoms of thrombosis, Signs/symptoms of bleeding, Laboratory test error suspected, Change in alcohol use, Change in activity, Upcoming invasive procedure, Emergency department visit, Upcoming dental procedure, Missed doses, Extra doses, Change in medications, Change in diet/appetite, Hospital admission, Bruising, Other complaints    Comments:  He had a prostate biopsy on 24. Held warfarin 5 days prior and resume the evening of the procedure.            Vitals:    24 1133   BP: 124/74   Pulse: 63     Verified current warfarin dosing schedule.    Medications reconciled: Yes  Pt is not on antiplatelet therapy.      A/P   INR is therapeutic    Warfarin dosing recommendation: Continue regimen as listed above.    Pt educated to contact our clinic with any changes in medications or s/s of bleeding or thrombosis. Pt is aware to seek  immediate medical attention for falls, head injury or deep cuts.    Request pt to return in 4 week(s). Pt agrees.    PAMELA Bryan.

## 2024-05-07 PROCEDURE — RXMED WILLOW AMBULATORY MEDICATION CHARGE: Performed by: INTERNAL MEDICINE

## 2024-05-08 ENCOUNTER — OFFICE VISIT (OUTPATIENT)
Dept: CARDIOLOGY | Facility: MEDICAL CENTER | Age: 79
End: 2024-05-08
Attending: INTERNAL MEDICINE
Payer: MEDICARE

## 2024-05-08 VITALS
RESPIRATION RATE: 14 BRPM | SYSTOLIC BLOOD PRESSURE: 126 MMHG | HEART RATE: 78 BPM | OXYGEN SATURATION: 98 % | BODY MASS INDEX: 26.57 KG/M2 | DIASTOLIC BLOOD PRESSURE: 78 MMHG | HEIGHT: 77 IN | WEIGHT: 225 LBS

## 2024-05-08 DIAGNOSIS — I10 ESSENTIAL HYPERTENSION: ICD-10-CM

## 2024-05-08 DIAGNOSIS — E78.5 DYSLIPIDEMIA: ICD-10-CM

## 2024-05-08 DIAGNOSIS — I48.21 HYPERCOAGULABLE STATE DUE TO PERMANENT ATRIAL FIBRILLATION (HCC): Chronic | ICD-10-CM

## 2024-05-08 DIAGNOSIS — I48.21 PERMANENT ATRIAL FIBRILLATION (HCC): Chronic | ICD-10-CM

## 2024-05-08 DIAGNOSIS — G47.33 OSA (OBSTRUCTIVE SLEEP APNEA): ICD-10-CM

## 2024-05-08 DIAGNOSIS — D68.69 HYPERCOAGULABLE STATE DUE TO PERMANENT ATRIAL FIBRILLATION (HCC): Chronic | ICD-10-CM

## 2024-05-08 RX ORDER — ENALAPRIL MALEATE 10 MG/1
10 TABLET ORAL DAILY
Qty: 100 TABLET | Refills: 3 | Status: SHIPPED | OUTPATIENT
Start: 2024-05-08

## 2024-05-08 RX ORDER — ATORVASTATIN CALCIUM 10 MG/1
10 TABLET, FILM COATED ORAL DAILY
Qty: 100 TABLET | Refills: 3 | Status: SHIPPED | OUTPATIENT
Start: 2024-05-08

## 2024-05-08 ASSESSMENT — FIBROSIS 4 INDEX: FIB4 SCORE: 1.82479184551145761

## 2024-05-08 NOTE — PROGRESS NOTES
"Chief Complaint   Patient presents with    Follow-Up     F/V Dx: Chronic anticoagulation    Hypertension     F/V Dx: Essential hypertension         Subjective     Rajiv Perez is a 79 y.o. male who presents today for follow-up of his history of mitral regurgitation chronic anticoagulation     Doing well jardiance plus NOAC is too expensive    Past Medical History:   Diagnosis Date    Arrhythmia 07/2013    AFIB, cardioversion    Arthritis     Chronic gout of hand 03/29/2022    Chronic gout of left hand 05/12/2022    Diabetes     Diet,oral meds    History of kidney stones 03/29/2022    Hypertension     Hypertension     Loss of balance 03/29/2022    Onychomycosis 12/05/2023    Pain      \"stiff\" from arthritis, 2/10    Paroxysmal atrial fibrillation (HCC) 06/06/2013    Prostate cancer (HCC) 12/05/2023    Sleep apnea     CPAP    Snoring     Spinal stenosis of lumbar region without neurogenic claudication 03/29/2022    Stress 04/28/2023    Vitamin D deficiency 08/18/2022     Past Surgical History:   Procedure Laterality Date    ORIF, FRACTURE, FEMUR Left 2/24/2016    Procedure: DISTAL FEMUR ORIF;  Surgeon: Montrell Edyd M.D.;  Location: [facility];  Service:     CYSTOSCOPY STENT PLACEMENT  1/13/2014    Performed by Main Batista M.D. at SURGERY [facility]    URETEROSCOPY  1/13/2014    Performed by Main Batista M.D. at [facility]    LASERTRIPSY  1/13/2014    Performed by Main Batista M.D. at [facility]    OTHER      Surgery for kidney stones    RECOVERY  7/11/2013    Performed by Cath-Recovery Surgery at SURGERY SAME DAY [facility]    KNEE ARTHROPLASTY TOTAL  12/1/2009    Performed by GIACOMO PETTIT at SURGERY [facility]    KNEE ARTHROPLASTY TOTAL  3/2009    Left    LUMBAR LAMINECTOMY DISKECTOMY  7/2006    L1-L5/ residual left foot weaknesss     REPAIR, KNEE, ACL  12/2005    Right    ORIF, FRACTURE, FEMUR       Family History   Problem Relation Age " of Onset    Heart Attack Father 59    Heart Disease Father     Hypertension Brother     Diabetes Brother     Arthritis Brother     Thyroid Mother 50    Breast Cancer Sister     No Known Problems Maternal Grandmother     No Known Problems Maternal Grandfather     No Known Problems Paternal Grandmother     No Known Problems Paternal Grandfather     Arrythmia Brother     No Known Problems Brother     No Known Problems Daughter      Social History     Socioeconomic History    Marital status:      Spouse name: Not on file    Number of children: Not on file    Years of education: Not on file    Highest education level: Not on file   Occupational History    Not on file   Tobacco Use    Smoking status: Never    Smokeless tobacco: Never   Vaping Use    Vaping Use: Never used   Substance and Sexual Activity    Alcohol use: Yes     Alcohol/week: 6.0 oz     Types: 10 Glasses of wine per week    Drug use: No    Sexual activity: Never     Partners: Female   Other Topics Concern    Not on file   Social History Narrative    Not on file     Social Determinants of Health     Financial Resource Strain: Not on file   Food Insecurity: Not on file   Transportation Needs: Not on file   Physical Activity: Not on file   Stress: Not on file   Social Connections: Not on file   Intimate Partner Violence: Not on file   Housing Stability: Not on file     Allergies   Allergen Reactions    Nkda [No Known Drug Allergy]      Outpatient Encounter Medications as of 5/8/2024   Medication Sig Dispense Refill    colchicine (COLCRYS) 0.6 MG Tab Take 1 tablet by mouth at onset of acute gout attack, may repeat in 1 hour. 20 Tablet 1    enalapril (VASOTEC) 10 MG Tab Take 1 Tablet by mouth every day. 100 Tablet 0    metoprolol tartrate (LOPRESSOR) 25 MG Tab Take 1 Tablet by mouth 2 times a day. 200 Tablet 0    glimepiride (AMARYL) 2 MG Tab Take 1 Tablet by mouth every morning. 100 Tablet 3    atorvastatin (LIPITOR) 10 MG Tab TAKE 1 TABLET BY MOUTH  "EVERY  Tablet 0    Empagliflozin (JARDIANCE) 25 MG Tab Take 1 Tablet by mouth every day. 100 Tablet 1    moxifloxacin (VIGAMOX) 0.5 % Solution Administer 1 Drop into the right eye 4 times a day. 3 mL 2    prednisoLONE acetate (PRED FORTE) 1 % Suspension Administer 1 Drop into the right eye 4 times a day. 10 mL 2    cyanocobalamin ([employer] VITAMIN B12) 1000 MCG Tab Take 1 Tablet by mouth every day. 100 Tablet 3    warfarin (COUMADIN) 5 MG Tab Take 1.5-2 Tablets by mouth every day. Take as directed by anticoag clinic 180 Tablet 1    metformin (GLUCOPHAGE) 1000 MG tablet TAKE ONE TABLET BY MOUTH TWICE DAILY WITH MEALS 200 Tablet 3    cyanocobalamin ([employer]S VITAMIN B12) 1000 MCG Tab Take 1 tablet by mouth every day. 100 Tablet 1    Misc. Devices Misc Please provide the patient with a four wheel walker with a seat. M48.061 M21.372 M21.371 1 Each 0    VITAMIN D, CHOLECALCIFEROL, PO Take 5,000 Units by mouth.      [DISCONTINUED] tamsulosin (FLOMAX) 0.4 MG capsule Take 1 capsule by mouth daily. (Patient not taking: Reported on 5/8/2024) 100 Capsule 3    [DISCONTINUED] allopurinol (ZYLOPRIM) 100 MG Tab Take 1 Tablet by mouth every day. (Patient not taking: Reported on 5/8/2024) 100 Tablet 3     No facility-administered encounter medications on file as of 5/8/2024.     ROS           Objective     /78 (BP Location: Left arm, Patient Position: Sitting, BP Cuff Size: Adult)   Pulse 78   Resp 14   Ht 1.956 m (6' 5\")   Wt 102 kg (225 lb)   SpO2 98%   BMI 26.68 kg/m²     Physical Exam  Constitutional:       General: He is not in acute distress.     Appearance: He is not diaphoretic.   Eyes:      General: No scleral icterus.  Neck:      Vascular: No JVD.   Cardiovascular:      Rate and Rhythm: Normal rate. Rhythm irregular.      Heart sounds: Normal heart sounds. No murmur heard.     No friction rub. No gallop.   Pulmonary:      Effort: No respiratory distress.      Breath sounds: No wheezing or rales.   Abdominal:      " General: Bowel sounds are normal.      Palpations: Abdomen is soft.   Musculoskeletal:      Right lower leg: No edema.      Left lower leg: No edema.   Skin:     Findings: No rash.   Neurological:      Mental Status: He is alert. Mental status is at baseline.   Psychiatric:         Mood and Affect: Mood normal.            We reviewed in person the most recent labs  Recent Results (from the past 5040 hour(s))   POCT Protime    Collection Time: 11/02/23 12:00 AM   Result Value Ref Range    INR 2.80 2 - 3.5   POCT Protime    Collection Time: 11/22/23 12:00 AM   Result Value Ref Range    INR 3.10 2 - 3.5   PROSTATE SPECIFIC AG SCREENING    Collection Time: 12/05/23  6:25 AM   Result Value Ref Range    Prostatic Specific Antigen Tot 1.71 0.00 - 4.00 ng/mL   POCT A1C    Collection Time: 12/05/23  5:29 PM   Result Value Ref Range    Glycohemoglobin 7.5 (A) 5.8 %    Internal Control Positive Positive     Internal Control Negative Negative    POCT Protime    Collection Time: 12/14/23 12:00 AM   Result Value Ref Range    INR 3.40 2 - 3.5   POCT Protime    Collection Time: 01/04/24 12:00 AM   Result Value Ref Range    INR 3.40 2 - 3.5   POCT Protime    Collection Time: 01/09/24 12:00 AM   Result Value Ref Range    INR 1.80 (A) 2 - 3.5   POCT Protime    Collection Time: 01/18/24 12:00 AM   Result Value Ref Range    INR 2.60 2 - 3.5   POCT Protime    Collection Time: 02/01/24 12:00 AM   Result Value Ref Range    INR 3.30 2 - 3.5   POCT Protime    Collection Time: 02/15/24 12:00 AM   Result Value Ref Range    INR 2.20 2 - 3.5   POCT Protime    Collection Time: 03/07/24 12:00 AM   Result Value Ref Range    INR 3.20 2 - 3.5   POCT  A1C    Collection Time: 03/26/24  3:45 PM   Result Value Ref Range    Glycohemoglobin 7.6 (A) 5.8 %    Internal Control Positive Positive     Internal Control Negative Negative    POCT Protime    Collection Time: 03/28/24 12:00 AM   Result Value Ref Range    INR 2.10 2 - 3.5   POCT Protime    Collection  Time: 04/18/24 12:00 AM   Result Value Ref Range    INR 2.30 2 - 3.5         Assessment & Plan     1. Permanent atrial fibrillation (HCC)            Medical Decision Making: Today's Assessment/Status/Plan:        It was my pleasure to meet with Mr. Perez.    We addressed the management of hypertension at today's visit. Blood pressure is well controlled.  We specifically assessed the labs on hypertension treatment    We addressed the management of dyslipidemia and atherosclerosis at today's visit. He is on appropriate statin.    We addressed the management of atrial fibrillation.  He is on proper anticoagulation cholesterol management and rate or rhythm control as appropriate.  We addressed the potential side effects and laboratory follow-up for these medications.    We addressed the management of chronic anticoagulation at today's visit. He understands the risks and benefits of chronic anticoagulation.  We reviewed and verified the results of annual testing for anemia and kidney function.    I will see Mr. Perez back in 1 year time and encouraged him to follow up with us over the phone or electronically using my MyChart as issues arise.    It is my pleasure to participate in the care of Mr. Perez.  Please do not hesitate to contact me with questions or concerns.    Cheikh Turner MD PhD FAC  Cardiologist [facility]    Please note that this dictation was created using voice recognition software. There may be errors I did not discover before finalizing the note.     () Today's E/M visit is associated with medical care services that serve as the continuing focal point for all needed health care services and/or with medical care services that  are part of ongoing care related to a patient's single, serious condition, or a complex condition: This includes  furnishing services to patients on an ongoing basis that result in care that is personalized  to the  patient. The services result in a comprehensive, longitudinal, and continuous  relationship with the patient and involve delivery of team-based care that is accessible, coordinated with other practitioners and providers, and integrated with the broader health  care landscape.

## 2024-05-08 NOTE — PATIENT INSTRUCTIONS
OPTIONS FOR BLOOD THINNERS TO DRAMATICALLY REDUCE YOUR RISK OF STROKE WITH ATRIAL FIBRILLATION  (REDUCES RISK BY ~2/3) (in order of preference):    Eliquis (apixaban) typically 5 mg twice a day  Generic not available so may cost $20-$600 per month copay.    Eliquis is preferred because it is the least likely to cause GI Bleeding (20% less likely 3.3%/yr vs 3.7%/yr than dabigitran  (Pradaxa)*, may also have 28% less risk of stroke (1.1% vs 1.4% in 100 person years) than dabigitran (Pradaxa)**  No great antidote   Preferred in patients with severe kidney disease, elderly underweight or morbidly obese    Dabigatran (Pradaxa) typically 150 mg twice a day   Now generic   antidote available in a few Hospitals   Should be taken with full glass of water, otherwise can cause acid indigestion    Warfarin plus blood testing typically monthly   Generic so very affordable  antidote available in all hospitals  NEED TO MONITOR VITAMIN K FOODS SUCH AS GREEN VEGETABLES  Interacts with multiple medicine requiring more frequent blood checks    Saveysa (Edoxaban) 60-90 mg daily   Generic not available so may cost $20-$600 copay.   Newest medication and not commonly prescribed   No great antidote    Xarelto (rivaroxaban) 15-20 mg once a day with food   Generic not available so may cost $20-$600 copay.   Preferred for those that once daily medicine without frequent lab draws and diet monitoring   No great antidote    For all blood thinners, unless you have a history of stroke when you have stopped them in the past, we will provide guidance to your surgeon on how to safely hold medications around the time of surgery.  If you have a history of stroke when off blood thinners in the past please be sure to inform us if you have to stop blood thinners.    IF YOU HIT YOUR HEAD GET EMERGENCY CHECK IN THE ER.  IF YOU HAVE A MAJOR INJURY AND PAIN/DIZZINESS GET EMERGENCY CHECK    You should check lab work at least yearly for anemia and metabolic  panel on all blood thinners, sooner for symptoms of anemia such as blood in the stool or progressive shortness of breath  You are not supposed to have grapefruit with any of the medications.    You should avoid excessive alcohol on blood thinners  You should avoid other natural blood thinners such as fish oil or other supplements while on prescription blood thinners  You are not supposed to take NSAIDS with anticoagulants  In most cases antiplatelets like aspirin and clopidrogrel are not advised unless your cardiologist specifically says to take together    *Based on large observational study of 500,000 patients  Comparative Effectiveness and Safety Between Apixaban, Dabigatran, Edoxaban, and Rivaroxaban Among Patients With Atrial Fibrillation A Multinational Population-Based Cohort Study   Jose L Shay, PhD*, Sharyn Romero, MSc*, et al.  Annals of Internal Medicine Original Research November 2022  https://[email or web address]  ** Based on large observation study 37,000 patients in the   Effectiveness and Safety of Oral Anticoagulants Among Nonvalvular Atrial Fibrillation Patients The ARIOPHANES Study  Yeyo Delatorre et al. Stroke. 2018;49:7178-1711  [email or web address]

## 2024-05-16 ENCOUNTER — APPOINTMENT (OUTPATIENT)
Dept: VASCULAR LAB | Facility: MEDICAL CENTER | Age: 79
End: 2024-05-16
Attending: INTERNAL MEDICINE
Payer: MEDICARE

## 2024-05-16 DIAGNOSIS — I48.21 PERMANENT ATRIAL FIBRILLATION (HCC): Chronic | ICD-10-CM

## 2024-05-16 DIAGNOSIS — I48.21 HYPERCOAGULABLE STATE DUE TO PERMANENT ATRIAL FIBRILLATION (HCC): Chronic | ICD-10-CM

## 2024-05-16 DIAGNOSIS — Z79.01 CHRONIC ANTICOAGULATION: ICD-10-CM

## 2024-05-16 DIAGNOSIS — D68.69 HYPERCOAGULABLE STATE DUE TO PERMANENT ATRIAL FIBRILLATION (HCC): Chronic | ICD-10-CM

## 2024-05-16 LAB — INR PPP: 1.9 (ref 2–3.5)

## 2024-05-16 NOTE — PROGRESS NOTES
Anticoagulation Summary  As of 2024      INR goal:  2.0-3.0   TTR:  78.8% (8 y)   INR used for dosin.90 (2024)   Warfarin maintenance plan:  5 mg (5 mg x 1) every Tue, Thu, Sat; 7.5 mg (5 mg x 1.5) all other days   Weekly warfarin total:  45 mg   Plan last modified:  MARTI BryanPDenzelNDenzel (2024)   Next INR check:  2024   Target end date:  Indefinite    Indications    Permanent atrial fibrillation (HCC) [I48.21]  Chronic anticoagulation [Z79.01]  Hypercoagulable state due to permanent atrial fibrillation (HCC) [D68.69  I48.21]                 Anticoagulation Episode Summary       INR check location:  Anticoagulation Clinic    Preferred lab:      Send INR reminders to:      Comments:  Indomethacin as needed, discussed risks with the patient.          Anticoagulation Care Providers       Provider Role Specialty Phone number    Cheikh Turner M.D. Referring Cardiovascular Disease (Cardiology) [phone]    [facility] Anticoagulation Services Responsible  [phone]                  Refer to Patient Findings for HPI:  Patient Findings       Positives:  Missed doses    Negatives:  Signs/symptoms of thrombosis, Signs/symptoms of bleeding, Laboratory test error suspected, Change in health, Change in alcohol use, Change in activity, Upcoming invasive procedure, Emergency department visit, Upcoming dental procedure, Extra doses, Change in medications, Change in diet/appetite, Hospital admission, Bruising, Other complaints            There were no vitals filed for this visit.    Verified current warfarin dosing schedule.    Medications reconciled: Yes  Pt is not on antiplatelet therapy      A/P   INR is subtherapeutic    Warfarin dosing recommendation: Instructed pt to BOLUS x 1 dose w/ 7.5 mg and to then continue on w/ his current regimen.    Pt educated to contact our clinic with any changes in medications or s/s of bleeding or thrombosis. Pt is aware to seek immediate medical attention for  falls, head injury or deep cuts.    Follow up appointment in 2 week(s).    Alejandro Bautista, PharmD, BCACP

## 2024-05-17 ENCOUNTER — PHARMACY VISIT (OUTPATIENT)
Dept: PHARMACY | Facility: MEDICAL CENTER | Age: 79
End: 2024-05-17
Payer: COMMERCIAL

## 2024-05-17 PROCEDURE — RXMED WILLOW AMBULATORY MEDICATION CHARGE: Performed by: INTERNAL MEDICINE

## 2024-05-20 DIAGNOSIS — I48.0 PAROXYSMAL ATRIAL FIBRILLATION (HCC): ICD-10-CM

## 2024-05-20 DIAGNOSIS — E11.9 CONTROLLED TYPE 2 DIABETES MELLITUS WITHOUT COMPLICATION, WITHOUT LONG-TERM CURRENT USE OF INSULIN (HCC): ICD-10-CM

## 2024-05-20 DIAGNOSIS — E78.5 DYSLIPIDEMIA: ICD-10-CM

## 2024-05-20 RX ORDER — WARFARIN SODIUM 5 MG/1
7.5-1 TABLET ORAL DAILY
Qty: 180 TABLET | Refills: 1 | Status: SHIPPED | OUTPATIENT
Start: 2024-05-20

## 2024-05-20 NOTE — TELEPHONE ENCOUNTER
Received request via: Pharmacy    Was the patient seen in the last year in this department? Yes    Does the patient have an active prescription (recently filled or refills available) for medication(s) requested? No    Pharmacy Name: [facility]    Does the patient have correction Plus and need 100 day supply (blood pressure, diabetes and cholesterol meds only)? Yes, quantity updated to 100 days

## 2024-05-21 RX ORDER — EMPAGLIFLOZIN 25 MG/1
1 TABLET, FILM COATED ORAL DAILY
Qty: 100 TABLET | Refills: 3 | Status: SHIPPED | OUTPATIENT
Start: 2024-05-21

## 2024-05-30 ENCOUNTER — ANTICOAGULATION VISIT (OUTPATIENT)
Dept: VASCULAR LAB | Facility: MEDICAL CENTER | Age: 79
End: 2024-05-30
Attending: INTERNAL MEDICINE
Payer: MEDICARE

## 2024-05-30 DIAGNOSIS — Z79.01 CHRONIC ANTICOAGULATION: ICD-10-CM

## 2024-05-30 DIAGNOSIS — I48.21 HYPERCOAGULABLE STATE DUE TO PERMANENT ATRIAL FIBRILLATION (HCC): Chronic | ICD-10-CM

## 2024-05-30 DIAGNOSIS — I48.21 PERMANENT ATRIAL FIBRILLATION (HCC): Chronic | ICD-10-CM

## 2024-05-30 DIAGNOSIS — D68.69 HYPERCOAGULABLE STATE DUE TO PERMANENT ATRIAL FIBRILLATION (HCC): Chronic | ICD-10-CM

## 2024-05-30 LAB — INR PPP: 3.2 (ref 2–3.5)

## 2024-05-30 NOTE — PROGRESS NOTES
Anticoagulation Summary  As of 5/30/2024      INR goal:  2.0-3.0   TTR:  78.8% (8 y)   INR used for dosing:  3.20 (5/30/2024)   Warfarin maintenance plan:  5 mg (5 mg x 1) every Tue, Thu, Sat; 7.5 mg (5 mg x 1.5) all other days   Weekly warfarin total:  45 mg   Plan last modified:  MARTI BryanPDenzelNDenzel (1/4/2024)   Next INR check:  6/20/2024   Target end date:  Indefinite    Indications    Permanent atrial fibrillation (HCC) [I48.21]  Chronic anticoagulation [Z79.01]  Hypercoagulable state due to permanent atrial fibrillation (HCC) [D68.69  I48.21]                 Anticoagulation Episode Summary       INR check location:  Anticoagulation Clinic    Preferred lab:      Send INR reminders to:      Comments:  Indomethacin as needed, discussed risks with the patient.          Anticoagulation Care Providers       Provider Role Specialty Phone number    Cheikh Turner M.D. Referring Cardiovascular Disease (Cardiology) [phone]    [facility] Anticoagulation Services Responsible  [phone]                  Refer to Patient Findings for HPI:  Patient Findings       Positives:  Change in diet/appetite    Negatives:  Signs/symptoms of thrombosis, Signs/symptoms of bleeding, Laboratory test error suspected, Change in health, Change in alcohol use, Change in activity, Upcoming invasive procedure, Emergency department visit, Upcoming dental procedure, Missed doses, Extra doses, Change in medications, Hospital admission, Bruising, Other complaints    Comments:  He just returned from an out of town trip and his diet was different.            Verified current warfarin dosing schedule.    Medications reconciled: Yes  Pt is not on antiplatelet therapy.      A/P   INR is slightly supratherapeutic    Warfarin dosing recommendation: Take 2.5  mg tonight, then resume your previous regimen.    Pt educated to contact our clinic with any changes in medications or s/s of bleeding or thrombosis. Pt is aware to seek immediate medical  attention for falls, head injury or deep cuts.    Request pt to return in 2 week(s). Pt requests to follow up in 3 weeks.    PAMELA Bryan.

## 2024-06-03 PROCEDURE — RXMED WILLOW AMBULATORY MEDICATION CHARGE: Performed by: INTERNAL MEDICINE

## 2024-06-04 PROCEDURE — RXMED WILLOW AMBULATORY MEDICATION CHARGE: Performed by: INTERNAL MEDICINE

## 2024-06-05 ENCOUNTER — PHARMACY VISIT (OUTPATIENT)
Dept: PHARMACY | Facility: MEDICAL CENTER | Age: 79
End: 2024-06-05
Payer: COMMERCIAL

## 2024-06-20 ENCOUNTER — ANTICOAGULATION VISIT (OUTPATIENT)
Dept: VASCULAR LAB | Facility: MEDICAL CENTER | Age: 79
End: 2024-06-20
Attending: INTERNAL MEDICINE
Payer: MEDICARE

## 2024-06-20 DIAGNOSIS — D68.69 HYPERCOAGULABLE STATE DUE TO PERMANENT ATRIAL FIBRILLATION (HCC): Chronic | ICD-10-CM

## 2024-06-20 DIAGNOSIS — Z79.01 CHRONIC ANTICOAGULATION: ICD-10-CM

## 2024-06-20 DIAGNOSIS — I48.21 HYPERCOAGULABLE STATE DUE TO PERMANENT ATRIAL FIBRILLATION (HCC): Chronic | ICD-10-CM

## 2024-06-20 DIAGNOSIS — I48.21 PERMANENT ATRIAL FIBRILLATION (HCC): Chronic | ICD-10-CM

## 2024-06-20 LAB — INR PPP: 2.8 (ref 2–3.5)

## 2024-06-20 PROCEDURE — 99211 OFF/OP EST MAY X REQ PHY/QHP: CPT | Performed by: NURSE PRACTITIONER

## 2024-06-20 PROCEDURE — 85610 PROTHROMBIN TIME: CPT

## 2024-06-20 NOTE — PROGRESS NOTES
Anticoagulation Summary  As of 2024      INR goal:  2.0-3.0   TTR:  78.6% (8 y)   INR used for dosin.80 (2024)   Warfarin maintenance plan:  5 mg (5 mg x 1) every Tue, Thu, Sat; 7.5 mg (5 mg x 1.5) all other days   Weekly warfarin total:  45 mg   Plan last modified:  Amanda Guzman ADenzelP.NDenzel (2024)   Next INR check:  2024   Target end date:  Indefinite    Indications    Permanent atrial fibrillation (HCC) [I48.21]  Chronic anticoagulation [Z79.01]  Hypercoagulable state due to permanent atrial fibrillation (HCC) [D68.69  I48.21]                 Anticoagulation Episode Summary       INR check location:  Anticoagulation Clinic    Preferred lab:      Send INR reminders to:      Comments:  Indomethacin as needed, discussed risks with the patient.          Anticoagulation Care Providers       Provider Role Specialty Phone number    Cheikh Turner M.D. Referring Cardiovascular Disease (Cardiology) [phone]    [facility] Anticoagulation Services Responsible  [phone]                  Refer to Patient Findings for HPI:  Patient Findings       Negatives:  Signs/symptoms of thrombosis, Signs/symptoms of bleeding, Laboratory test error suspected, Change in health, Change in alcohol use, Change in activity, Upcoming invasive procedure, Emergency department visit, Upcoming dental procedure, Missed doses, Extra doses, Change in medications, Change in diet/appetite, Hospital admission, Bruising, Other complaints            There were no vitals filed for this visit.  Pt declined vitals    Verified current warfarin dosing schedule.    Medications reconciled: Yes  Pt is not on antiplatelet therapy.      A/P   INR is therapeutic    Warfarin dosing recommendation: Continue regimen as listed above.    Pt educated to contact our clinic with any changes in medications or s/s of bleeding or thrombosis. Pt is aware to seek immediate medical attention for falls, head injury or deep cuts.    Request pt to  return in 3 week(s). He would like to f/u in 4 weeks.    PAMELA Bryan.

## 2024-07-07 DIAGNOSIS — E11.40 CONTROLLED TYPE 2 DIABETES MELLITUS WITH DIABETIC NEUROPATHY, WITHOUT LONG-TERM CURRENT USE OF INSULIN (HCC): ICD-10-CM

## 2024-07-08 PROCEDURE — RXMED WILLOW AMBULATORY MEDICATION CHARGE: Performed by: FAMILY MEDICINE

## 2024-07-10 ENCOUNTER — PHARMACY VISIT (OUTPATIENT)
Dept: PHARMACY | Facility: MEDICAL CENTER | Age: 79
End: 2024-07-10
Payer: COMMERCIAL

## 2024-07-15 ENCOUNTER — PHARMACY VISIT (OUTPATIENT)
Dept: PHARMACY | Facility: MEDICAL CENTER | Age: 79
End: 2024-07-15
Payer: COMMERCIAL

## 2024-07-15 PROCEDURE — RXMED WILLOW AMBULATORY MEDICATION CHARGE: Performed by: INTERNAL MEDICINE

## 2024-07-18 ENCOUNTER — ANTICOAGULATION VISIT (OUTPATIENT)
Dept: VASCULAR LAB | Facility: MEDICAL CENTER | Age: 79
End: 2024-07-18
Attending: INTERNAL MEDICINE
Payer: MEDICARE

## 2024-07-18 DIAGNOSIS — I48.21 HYPERCOAGULABLE STATE DUE TO PERMANENT ATRIAL FIBRILLATION (HCC): Chronic | ICD-10-CM

## 2024-07-18 DIAGNOSIS — Z79.01 CHRONIC ANTICOAGULATION: ICD-10-CM

## 2024-07-18 DIAGNOSIS — D68.69 HYPERCOAGULABLE STATE DUE TO PERMANENT ATRIAL FIBRILLATION (HCC): Chronic | ICD-10-CM

## 2024-07-18 DIAGNOSIS — I48.0 PAROXYSMAL ATRIAL FIBRILLATION (HCC): ICD-10-CM

## 2024-07-18 DIAGNOSIS — I48.21 PERMANENT ATRIAL FIBRILLATION (HCC): Chronic | ICD-10-CM

## 2024-07-18 LAB — INR PPP: 2.6 (ref 2–3.5)

## 2024-07-18 PROCEDURE — 99211 OFF/OP EST MAY X REQ PHY/QHP: CPT | Performed by: NURSE PRACTITIONER

## 2024-07-18 PROCEDURE — 85610 PROTHROMBIN TIME: CPT

## 2024-07-18 RX ORDER — WARFARIN SODIUM 5 MG/1
TABLET ORAL
Qty: 120 TABLET | Refills: 1 | Status: SHIPPED | OUTPATIENT
Start: 2024-07-18

## 2024-07-19 ENCOUNTER — TELEPHONE (OUTPATIENT)
Dept: HEALTH INFORMATION MANAGEMENT | Facility: OTHER | Age: 79
End: 2024-07-19
Payer: MEDICARE

## 2024-07-22 PROCEDURE — RXMED WILLOW AMBULATORY MEDICATION CHARGE: Performed by: INTERNAL MEDICINE

## 2024-07-25 ENCOUNTER — PHARMACY VISIT (OUTPATIENT)
Dept: PHARMACY | Facility: MEDICAL CENTER | Age: 79
End: 2024-07-25
Payer: COMMERCIAL

## 2024-07-31 ENCOUNTER — APPOINTMENT (OUTPATIENT)
Dept: MEDICAL GROUP | Facility: PHYSICIAN GROUP | Age: 79
End: 2024-07-31
Payer: MEDICARE

## 2024-07-31 VITALS
SYSTOLIC BLOOD PRESSURE: 120 MMHG | HEART RATE: 72 BPM | OXYGEN SATURATION: 97 % | TEMPERATURE: 97.8 F | DIASTOLIC BLOOD PRESSURE: 64 MMHG | RESPIRATION RATE: 14 BRPM | BODY MASS INDEX: 26.85 KG/M2 | HEIGHT: 77 IN | WEIGHT: 227.4 LBS

## 2024-07-31 DIAGNOSIS — E53.8 B12 DEFICIENCY: ICD-10-CM

## 2024-07-31 DIAGNOSIS — C61 PROSTATE CANCER (HCC): ICD-10-CM

## 2024-07-31 DIAGNOSIS — Z13.220 SCREENING CHOLESTEROL LEVEL: ICD-10-CM

## 2024-07-31 DIAGNOSIS — E55.9 VITAMIN D DEFICIENCY: ICD-10-CM

## 2024-07-31 DIAGNOSIS — I10 ESSENTIAL HYPERTENSION: ICD-10-CM

## 2024-07-31 DIAGNOSIS — E11.40 CONTROLLED TYPE 2 DIABETES MELLITUS WITH DIABETIC NEUROPATHY, WITHOUT LONG-TERM CURRENT USE OF INSULIN (HCC): ICD-10-CM

## 2024-07-31 DIAGNOSIS — R53.82 CHRONIC FATIGUE: ICD-10-CM

## 2024-07-31 DIAGNOSIS — R19.7 DIARRHEA, UNSPECIFIED TYPE: ICD-10-CM

## 2024-07-31 PROBLEM — F43.9 STRESS: Status: RESOLVED | Noted: 2023-04-28 | Resolved: 2024-07-31

## 2024-08-06 ENCOUNTER — HOSPITAL ENCOUNTER (OUTPATIENT)
Dept: LAB | Facility: MEDICAL CENTER | Age: 79
End: 2024-08-06
Attending: INTERNAL MEDICINE
Payer: MEDICARE

## 2024-08-06 DIAGNOSIS — E55.9 VITAMIN D DEFICIENCY: ICD-10-CM

## 2024-08-06 DIAGNOSIS — Z13.220 SCREENING CHOLESTEROL LEVEL: ICD-10-CM

## 2024-08-06 DIAGNOSIS — R53.82 CHRONIC FATIGUE: ICD-10-CM

## 2024-08-06 DIAGNOSIS — E53.8 B12 DEFICIENCY: ICD-10-CM

## 2024-08-06 DIAGNOSIS — E11.40 CONTROLLED TYPE 2 DIABETES MELLITUS WITH DIABETIC NEUROPATHY, WITHOUT LONG-TERM CURRENT USE OF INSULIN (HCC): ICD-10-CM

## 2024-08-06 LAB
25(OH)D3 SERPL-MCNC: 54 NG/ML (ref 30–100)
ALBUMIN SERPL BCP-MCNC: 4.5 G/DL (ref 3.2–4.9)
ALBUMIN/GLOB SERPL: 1.6 G/DL
ALP SERPL-CCNC: 72 U/L (ref 30–99)
ALT SERPL-CCNC: 18 U/L (ref 2–50)
ANION GAP SERPL CALC-SCNC: 13 MMOL/L (ref 7–16)
AST SERPL-CCNC: 20 U/L (ref 12–45)
BASOPHILS # BLD AUTO: 1 % (ref 0–1.8)
BASOPHILS # BLD: 0.06 K/UL (ref 0–0.12)
BILIRUB SERPL-MCNC: 1.1 MG/DL (ref 0.1–1.5)
BUN SERPL-MCNC: 19 MG/DL (ref 8–22)
CALCIUM ALBUM COR SERPL-MCNC: 9.3 MG/DL (ref 8.5–10.5)
CALCIUM SERPL-MCNC: 9.7 MG/DL (ref 8.5–10.5)
CHLORIDE SERPL-SCNC: 104 MMOL/L (ref 96–112)
CHOLEST SERPL-MCNC: 123 MG/DL (ref 100–199)
CO2 SERPL-SCNC: 23 MMOL/L (ref 20–33)
CREAT SERPL-MCNC: 0.82 MG/DL (ref 0.5–1.4)
CREAT UR-MCNC: 95.84 MG/DL
EOSINOPHIL # BLD AUTO: 0.18 K/UL (ref 0–0.51)
EOSINOPHIL NFR BLD: 3 % (ref 0–6.9)
ERYTHROCYTE [DISTWIDTH] IN BLOOD BY AUTOMATED COUNT: 45.3 FL (ref 35.9–50)
EST. AVERAGE GLUCOSE BLD GHB EST-MCNC: 151 MG/DL
GFR SERPLBLD CREATININE-BSD FMLA CKD-EPI: 89 ML/MIN/1.73 M 2
GLOBULIN SER CALC-MCNC: 2.9 G/DL (ref 1.9–3.5)
GLUCOSE SERPL-MCNC: 129 MG/DL (ref 65–99)
HBA1C MFR BLD: 6.9 % (ref 4–5.6)
HCT VFR BLD AUTO: 50.6 % (ref 42–52)
HDLC SERPL-MCNC: 52 MG/DL
HGB BLD-MCNC: 17.1 G/DL (ref 14–18)
IMM GRANULOCYTES # BLD AUTO: 0.01 K/UL (ref 0–0.11)
IMM GRANULOCYTES NFR BLD AUTO: 0.2 % (ref 0–0.9)
LDLC SERPL CALC-MCNC: 59 MG/DL
LYMPHOCYTES # BLD AUTO: 1.64 K/UL (ref 1–4.8)
LYMPHOCYTES NFR BLD: 27.3 % (ref 22–41)
MCH RBC QN AUTO: 30.8 PG (ref 27–33)
MCHC RBC AUTO-ENTMCNC: 33.8 G/DL (ref 32.3–36.5)
MCV RBC AUTO: 91 FL (ref 81.4–97.8)
MICROALBUMIN UR-MCNC: 8.4 MG/DL
MICROALBUMIN/CREAT UR: 88 MG/G (ref 0–30)
MONOCYTES # BLD AUTO: 0.56 K/UL (ref 0–0.85)
MONOCYTES NFR BLD AUTO: 9.3 % (ref 0–13.4)
NEUTROPHILS # BLD AUTO: 3.55 K/UL (ref 1.82–7.42)
NEUTROPHILS NFR BLD: 59.2 % (ref 44–72)
NRBC # BLD AUTO: 0 K/UL
NRBC BLD-RTO: 0 /100 WBC (ref 0–0.2)
PLATELET # BLD AUTO: 160 K/UL (ref 164–446)
PMV BLD AUTO: 12.8 FL (ref 9–12.9)
POTASSIUM SERPL-SCNC: 4.4 MMOL/L (ref 3.6–5.5)
PROT SERPL-MCNC: 7.4 G/DL (ref 6–8.2)
RBC # BLD AUTO: 5.56 M/UL (ref 4.7–6.1)
SODIUM SERPL-SCNC: 140 MMOL/L (ref 135–145)
TRIGL SERPL-MCNC: 62 MG/DL (ref 0–149)
TSH SERPL DL<=0.005 MIU/L-ACNC: 3.87 UIU/ML (ref 0.38–5.33)
VIT B12 SERPL-MCNC: 615 PG/ML (ref 211–911)
WBC # BLD AUTO: 6 K/UL (ref 4.8–10.8)

## 2024-08-06 PROCEDURE — 85025 COMPLETE CBC W/AUTO DIFF WBC: CPT

## 2024-08-06 PROCEDURE — 82570 ASSAY OF URINE CREATININE: CPT

## 2024-08-06 PROCEDURE — 82043 UR ALBUMIN QUANTITATIVE: CPT

## 2024-08-06 PROCEDURE — 80053 COMPREHEN METABOLIC PANEL: CPT

## 2024-08-06 PROCEDURE — 82306 VITAMIN D 25 HYDROXY: CPT

## 2024-08-06 PROCEDURE — 82607 VITAMIN B-12: CPT

## 2024-08-06 PROCEDURE — 80061 LIPID PANEL: CPT

## 2024-08-06 PROCEDURE — 84443 ASSAY THYROID STIM HORMONE: CPT

## 2024-08-06 PROCEDURE — 83036 HEMOGLOBIN GLYCOSYLATED A1C: CPT

## 2024-08-06 PROCEDURE — 36415 COLL VENOUS BLD VENIPUNCTURE: CPT

## 2024-08-20 ENCOUNTER — OFFICE VISIT (OUTPATIENT)
Dept: MEDICAL GROUP | Facility: PHYSICIAN GROUP | Age: 79
End: 2024-08-20
Payer: MEDICARE

## 2024-08-20 VITALS
TEMPERATURE: 97.9 F | RESPIRATION RATE: 16 BRPM | BODY MASS INDEX: 27.11 KG/M2 | DIASTOLIC BLOOD PRESSURE: 80 MMHG | WEIGHT: 229.6 LBS | HEART RATE: 69 BPM | OXYGEN SATURATION: 97 % | HEIGHT: 77 IN | SYSTOLIC BLOOD PRESSURE: 122 MMHG

## 2024-08-20 DIAGNOSIS — F34.1 DYSTHYMIA: ICD-10-CM

## 2024-08-20 DIAGNOSIS — I48.21 PERMANENT ATRIAL FIBRILLATION (HCC): Chronic | ICD-10-CM

## 2024-08-20 DIAGNOSIS — C61 PROSTATE CANCER (HCC): ICD-10-CM

## 2024-08-20 DIAGNOSIS — E11.40 CONTROLLED TYPE 2 DIABETES MELLITUS WITH DIABETIC NEUROPATHY, WITHOUT LONG-TERM CURRENT USE OF INSULIN (HCC): ICD-10-CM

## 2024-08-20 PROCEDURE — 3079F DIAST BP 80-89 MM HG: CPT | Performed by: INTERNAL MEDICINE

## 2024-08-20 PROCEDURE — 99214 OFFICE O/P EST MOD 30 MIN: CPT | Performed by: INTERNAL MEDICINE

## 2024-08-20 PROCEDURE — 3074F SYST BP LT 130 MM HG: CPT | Performed by: INTERNAL MEDICINE

## 2024-08-20 ASSESSMENT — FIBROSIS 4 INDEX: FIB4 SCORE: 2.33

## 2024-08-20 NOTE — PROGRESS NOTES
CC: Follow-up lab work    HPI:  Cheikh presents with the following    1. Controlled type 2 diabetes mellitus with diabetic neuropathy, without long-term current use of insulin ([facility])  12/5/2023: Chronic, stable.  Patient is currently taking Jardiance 25 mg daily, metformin 1000 mg twice daily, glimepiride 2 mg dose.  Patient does not check his blood sugars regularly.  Hemoglobin A1c 7.5.17 in April.  Patient is very limited in his exercise routine, has a walker.  Not following a diabetic diet.  Due for monofilament.     3/26/2024:.  Patient continues his Amaryl 2 mg daily, Jardiance 25 mg daily and metformin 1000 mg twice daily.  Patient does not check his blood sugars.  No change in diet.  He has become more sedentary given the winter months and chronic issues.  Hemoglobin A1c 7.6, up from 7.5 in December.     8/20/2024:.  Patient continues on metformin, Jardiance 25 mg daily, glimepiride 2 mg daily.  Hemoglobin A1c 6.9, down from 7.6.  Patient is concerned as he is falling into the donut hole for his coverage for Jardiance.    2. Prostate cancer ([facility])  12/5/2022:Closely followed by urology.  Diagnosed with prostate cancer 18th September, [place] 6, grade 1, low-grade.  Treatment active surveillance with MRI.  MRI showed positive mid gland peripheral lesion, negative for lymphadenopathy.  Appointment with urology in December 26.  PSA 1.17.  Status post TURP September 2023     3/26/2023:.  Patient followed up with urology and completed prostate MRI in November 2023.  Noted left peripheral mid gland suspicious lesion.  No pelvic lymphadenopathy or suspicious osseous lesions.  PI-RADS score 4- high.  Patient is scheduled for prostate biopsy in early April.     7/31/2024:.  Patient was seen by Dr. Batista with PSA levels every 6 months.  Repeat MRI in 2026.  In April.  Plan is to follow-up patient was sent over to [facility] for a radiation consult.  Treatment options discussed.  Patient will consider.   Follow-up with urology in October with PSA level.    8/20/2024:.  Patient has a follow-up with oncology for consult regarding treatment options for his prostate cancer.    3. Permanent atrial fibrillation (HCC)  Chronic.  Stable.  Followed by vascular medicine.  Patient taking Coumadin.  Patient is rate controlled.  Lipid management.  Followed by cardiology yearly.    4. Dysthymia  Patient feeling a bit low today.  2 of his close brothers passed away within the last few months.      Patient Active Problem List    Diagnosis Date Noted    Dysthymia 08/20/2024    Prostate cancer (HCC) 12/05/2023    Onychomycosis 12/05/2023    Vitamin D deficiency 08/18/2022    Lower urinary tract symptoms due to benign prostatic hyperplasia 06/07/2022    Chronic gout of left hand 05/12/2022    Chronic gout of hand 03/29/2022    History of kidney stones 03/29/2022    Loss of balance 03/29/2022    Spinal stenosis of lumbar region without neurogenic claudication 03/29/2022    BMI 28.0-28.9,adult 02/17/2022    Benign prostatic hyperplasia with nocturia 02/17/2022    Hypercoagulable state due to permanent atrial fibrillation (HCC)     B12 deficiency 11/12/2020    Obesity (BMI 30-39.9) 03/27/2018    Controlled type 2 diabetes mellitus with diabetic neuropathy, without long-term current use of insulin (HCC) 03/13/2017    Risk for falls 03/13/2017    SHANNAN (obstructive sleep apnea) 12/12/2016    Dyslipidemia 12/12/2016    Left foot drop 12/12/2016    Chronic anticoagulation 09/20/2013    Essential hypertension 07/09/2013    Permanent atrial fibrillation (HCC)        Current Outpatient Medications   Medication Sig Dispense Refill    warfarin (COUMADIN) 5 MG Tab Take 1-1.5 tablets by mouth daily or as directed by [facility] 120 Tablet 1    metformin (GLUCOPHAGE) 1000 MG tablet TAKE ONE TABLET BY MOUTH TWICE DAILY WITH MEALS 200 Tablet 0    Empagliflozin (JARDIANCE) 25 MG Tab Take 1 Tablet by mouth every day. 100 Tablet 3     atorvastatin (LIPITOR) 10 MG Tab TAKE 1 TABLET BY MOUTH EVERY  Tablet 3    enalapril (VASOTEC) 10 MG Tab Take 1 Tablet by mouth every day. 100 Tablet 3    metoprolol tartrate (LOPRESSOR) 25 MG Tab Take 1 Tablet by mouth 2 times a day. 200 Tablet 3    colchicine (COLCRYS) 0.6 MG Tab Take 1 tablet by mouth at onset of acute gout attack, may repeat in 1 hour. 20 Tablet 1    glimepiride (AMARYL) 2 MG Tab Take 1 Tablet by mouth every morning. 100 Tablet 3    moxifloxacin (VIGAMOX) 0.5 % Solution Administer 1 Drop into the right eye 4 times a day. 3 mL 2    prednisoLONE acetate (PRED FORTE) 1 % Suspension Administer 1 Drop into the right eye 4 times a day. 10 mL 2    cyanocobalamin ([employer] VITAMIN B12) 1000 MCG Tab Take 1 Tablet by mouth every day. 100 Tablet 3    cyanocobalamin ([employer] VITAMIN B12) 1000 MCG Tab Take 1 tablet by mouth every day. 100 Tablet 1    Misc. Devices Misc Please provide the patient with a four wheel walker with a seat. M48.061 M21.372 M21.371 1 Each 0    VITAMIN D, CHOLECALCIFEROL, PO Take 5,000 Units by mouth.       No current facility-administered medications for this visit.         Allergies as of 08/20/2024 - Reviewed 08/20/2024   Allergen Reaction Noted    Nkda [no known drug allergy]  07/31/2009        Social History     Socioeconomic History    Marital status:      Spouse name: Not on file    Number of children: Not on file    Years of education: Not on file    Highest education level: Not on file   Occupational History    Not on file   Tobacco Use    Smoking status: Never    Smokeless tobacco: Never   Vaping Use    Vaping status: Never Used   Substance and Sexual Activity    Alcohol use: Yes     Alcohol/week: 6.0 oz     Types: 10 Glasses of wine per week    Drug use: No    Sexual activity: Never     Partners: Female   Other Topics Concern    Not on file   Social History Narrative    Not on file     Social Determinants of Health     Financial Resource Strain: Not on file   Food  Insecurity: Not on file   Transportation Needs: Not on file   Physical Activity: Not on file   Stress: Not on file   Social Connections: Not on file   Intimate Partner Violence: Not on file   Housing Stability: Not on file       Family History   Problem Relation Age of Onset    Heart Attack Father 59    Heart Disease Father     Hypertension Brother     Diabetes Brother     Arthritis Brother     Thyroid Mother 50    Breast Cancer Sister     No Known Problems Maternal Grandmother     No Known Problems Maternal Grandfather     No Known Problems Paternal Grandmother     No Known Problems Paternal Grandfather     Arrythmia Brother     No Known Problems Brother     No Known Problems Daughter        Past Surgical History:   Procedure Laterality Date    ORIF, FRACTURE, FEMUR Left 2/24/2016    Procedure: DISTAL FEMUR ORIF;  Surgeon: Montrell Eddy M.D.;  Location: SURGERY [facility];  Service:     CYSTOSCOPY STENT PLACEMENT  1/13/2014    Performed by Main Batista M.D. at SURGERY [facility]    URETEROSCOPY  1/13/2014    Performed by Main Batista M.D. at SURGERY [facility]    LASERTRIPSY  1/13/2014    Performed by Main Batista M.D. at SURGERY [facility]    OTHER      Surgery for kidney stones    RECOVERY  7/11/2013    Performed by Cath-Recovery Surgery at SURGERY SAME DAY [facility]    KNEE ARTHROPLASTY TOTAL  12/1/2009    Performed by GIACOMO PETTIT at SURGERY [facility]    KNEE ARTHROPLASTY TOTAL  3/2009    Left    LUMBAR LAMINECTOMY DISKECTOMY  7/2006    L1-L5/ residual left foot weaknesss     REPAIR, KNEE, ACL  12/2005    Right    ORIF, FRACTURE, FEMUR         ROS:  Denies any Headache,Chest pain,  Shortness of breath,  Abdominal pain, Changes of bowel or bladder, Lower ext edema, Fevers, Nights sweats, Weight Changes, Focal weakness or numbness.  All other systems are negative.    /80 (BP Location: Right arm, Patient Position: Sitting)   Pulse 69   Temp 36.6 °C (97.9 °F)  "(Temporal)   Resp 16   Ht 1.956 m (6' 5\")   Wt 104 kg (229 lb 9.6 oz)   SpO2 97%   BMI 27.23 kg/m²      Constitutional: Alert, no distress, well-groomed.  Using four-wheel walker.  Skin: Warm, dry, good turgor, no rashes in visible areas.  Eye: Equal, round and reactive, conjunctiva clear, lids normal.  ENMT: Lips without lesions, good dentition, moist mucous membranes.  Neck: Trachea midline, no masses, no thyromegaly.  Respiratory: Unlabored respiratory effort, no cough.  Abdomen: Soft, no gross masses.  MSK: Normal gait, moves all extremities.  Neuro: Grossly non-focal. No cranial nerve deficit. Strength and sensation intact.   Psych: Alert and oriented x3, normal affect and mood.      Assessment and Plan.   79 y.o. male presenting with the following.     1. Controlled type 2 diabetes mellitus with diabetic neuropathy, without long-term current use of insulin (HCC)  Chronic.  Stable.  Continue current plan of care at this time.  Patient will consult with pharmacy as an alternative to Jardiance while patient hits the donut hole.  Option is also to increase glimepiride.    2. Prostate cancer (HCC)    - PROSTATE SPECIFIC AG SCREENING; Future    3. Permanent atrial fibrillation (HCC)  Chronic.  Stable.  Follow-up with vascular medicine, Coumadin, INR check.    4. Dysthymia  Offered grief counseling.  Grieving.    My total time spent caring for the patient on the day of the encounter was 30 minutes.   This does not include time spent on separately billable procedures/tests.      "

## 2024-08-22 ENCOUNTER — ANTICOAGULATION VISIT (OUTPATIENT)
Dept: VASCULAR LAB | Facility: MEDICAL CENTER | Age: 79
End: 2024-08-22
Attending: INTERNAL MEDICINE
Payer: MEDICARE

## 2024-08-22 DIAGNOSIS — I48.21 HYPERCOAGULABLE STATE DUE TO PERMANENT ATRIAL FIBRILLATION (HCC): Chronic | ICD-10-CM

## 2024-08-22 DIAGNOSIS — Z79.01 CHRONIC ANTICOAGULATION: ICD-10-CM

## 2024-08-22 DIAGNOSIS — D68.69 HYPERCOAGULABLE STATE DUE TO PERMANENT ATRIAL FIBRILLATION (HCC): Chronic | ICD-10-CM

## 2024-08-22 DIAGNOSIS — I48.21 PERMANENT ATRIAL FIBRILLATION (HCC): Chronic | ICD-10-CM

## 2024-08-22 LAB — INR PPP: 2.9 (ref 2–3.5)

## 2024-08-22 PROCEDURE — 99211 OFF/OP EST MAY X REQ PHY/QHP: CPT | Performed by: NURSE PRACTITIONER

## 2024-08-22 PROCEDURE — 85610 PROTHROMBIN TIME: CPT

## 2024-08-22 NOTE — PROGRESS NOTES
Anticoagulation Summary  As of 2024      INR goal:  2.0-3.0   TTR:  79.0% (8.2 y)   INR used for dosin.90 (2024)   Warfarin maintenance plan:  5 mg (5 mg x 1) every Tue, Thu, Sat; 7.5 mg (5 mg x 1.5) all other days   Weekly warfarin total:  45 mg   Plan last modified:  Amanda Guzman A.P.NDenzel (2024)   Next INR check:  10/3/2024   Target end date:  Indefinite    Indications    Permanent atrial fibrillation (HCC) [I48.21]  Chronic anticoagulation [Z79.01]  Hypercoagulable state due to permanent atrial fibrillation (HCC) [D68.69  I48.21]                 Anticoagulation Episode Summary       INR check location:  Anticoagulation Clinic    Preferred lab:  --    Send INR reminders to:  --    Comments:  Indomethacin as needed, discussed risks with the patient.          Anticoagulation Care Providers       Provider Role Specialty Phone number    Cheikh Turner M.D. Referring Cardiovascular Disease (Cardiology) [phone]    [facility] Anticoagulation Services Responsible  [phone]                  Refer to Patient Findings for HPI:  Patient Findings       Negatives:  Signs/symptoms of thrombosis, Signs/symptoms of bleeding, Laboratory test error suspected, Change in health, Change in alcohol use, Change in activity, Upcoming invasive procedure, Emergency department visit, Upcoming dental procedure, Missed doses, Extra doses, Change in medications, Change in diet/appetite, Hospital admission, Bruising, Other complaints            There were no vitals filed for this visit.    Verified current warfarin dosing schedule.    Medications reconciled.  Pt is not on antiplatelet therapy.      A/P   INR is therapeutic  Reason(s) for out of range INR today: N/A      Warfarin dosing recommendation: Continue regimen as listed above.    Pt educated to contact our clinic with any changes in medications or s/s of bleeding or thrombosis. Pt is aware to seek immediate medical attention for falls, head injury or deep  cuts.    Request pt to return in 6 week(s). Pt agrees.    PAMELA Bryan.

## 2024-08-27 PROCEDURE — RXMED WILLOW AMBULATORY MEDICATION CHARGE: Performed by: INTERNAL MEDICINE

## 2024-08-29 ENCOUNTER — PHARMACY VISIT (OUTPATIENT)
Dept: PHARMACY | Facility: MEDICAL CENTER | Age: 79
End: 2024-08-29
Payer: COMMERCIAL

## 2024-09-09 PROCEDURE — RXMED WILLOW AMBULATORY MEDICATION CHARGE: Performed by: INTERNAL MEDICINE

## 2024-09-10 PROCEDURE — RXMED WILLOW AMBULATORY MEDICATION CHARGE: Performed by: INTERNAL MEDICINE

## 2024-09-12 ENCOUNTER — PHARMACY VISIT (OUTPATIENT)
Dept: PHARMACY | Facility: MEDICAL CENTER | Age: 79
End: 2024-09-12
Payer: COMMERCIAL

## 2024-09-25 ENCOUNTER — HOSPITAL ENCOUNTER (OUTPATIENT)
Dept: LAB | Facility: MEDICAL CENTER | Age: 79
End: 2024-09-25
Attending: INTERNAL MEDICINE
Payer: MEDICARE

## 2024-09-25 DIAGNOSIS — E11.40 CONTROLLED TYPE 2 DIABETES MELLITUS WITH DIABETIC NEUROPATHY, WITHOUT LONG-TERM CURRENT USE OF INSULIN (HCC): ICD-10-CM

## 2024-09-25 DIAGNOSIS — C61 PROSTATE CANCER (HCC): ICD-10-CM

## 2024-09-25 LAB
CHOLEST SERPL-MCNC: 133 MG/DL (ref 100–199)
FASTING STATUS PATIENT QL REPORTED: NORMAL
HDLC SERPL-MCNC: 59 MG/DL
LDLC SERPL CALC-MCNC: 57 MG/DL
PSA SERPL-MCNC: 1.63 NG/ML (ref 0–4)
TRIGL SERPL-MCNC: 87 MG/DL (ref 0–149)
TSH SERPL DL<=0.005 MIU/L-ACNC: 4.21 UIU/ML (ref 0.38–5.33)

## 2024-09-25 PROCEDURE — 84443 ASSAY THYROID STIM HORMONE: CPT

## 2024-09-25 PROCEDURE — 80061 LIPID PANEL: CPT

## 2024-09-25 PROCEDURE — 36415 COLL VENOUS BLD VENIPUNCTURE: CPT

## 2024-09-25 PROCEDURE — 84153 ASSAY OF PSA TOTAL: CPT

## 2024-10-03 ENCOUNTER — DOCUMENTATION (OUTPATIENT)
Dept: VASCULAR LAB | Facility: MEDICAL CENTER | Age: 79
End: 2024-10-03
Payer: MEDICARE

## 2024-10-10 ENCOUNTER — ANTICOAGULATION VISIT (OUTPATIENT)
Dept: VASCULAR LAB | Facility: MEDICAL CENTER | Age: 79
End: 2024-10-10
Attending: INTERNAL MEDICINE
Payer: MEDICARE

## 2024-10-10 DIAGNOSIS — Z79.01 CHRONIC ANTICOAGULATION: ICD-10-CM

## 2024-10-10 DIAGNOSIS — I48.21 HYPERCOAGULABLE STATE DUE TO PERMANENT ATRIAL FIBRILLATION (HCC): Chronic | ICD-10-CM

## 2024-10-10 DIAGNOSIS — I48.21 PERMANENT ATRIAL FIBRILLATION (HCC): Chronic | ICD-10-CM

## 2024-10-10 DIAGNOSIS — D68.69 HYPERCOAGULABLE STATE DUE TO PERMANENT ATRIAL FIBRILLATION (HCC): Chronic | ICD-10-CM

## 2024-10-10 LAB — INR PPP: 3.1 (ref 2–3.5)

## 2024-10-10 PROCEDURE — 85610 PROTHROMBIN TIME: CPT

## 2024-10-10 PROCEDURE — RXMED WILLOW AMBULATORY MEDICATION CHARGE: Performed by: INTERNAL MEDICINE

## 2024-10-10 PROCEDURE — 99212 OFFICE O/P EST SF 10 MIN: CPT

## 2024-10-11 ENCOUNTER — PHARMACY VISIT (OUTPATIENT)
Dept: PHARMACY | Facility: MEDICAL CENTER | Age: 79
End: 2024-10-11
Payer: COMMERCIAL

## 2024-10-21 DIAGNOSIS — E11.40 CONTROLLED TYPE 2 DIABETES MELLITUS WITH DIABETIC NEUROPATHY, WITHOUT LONG-TERM CURRENT USE OF INSULIN (HCC): ICD-10-CM

## 2024-10-21 PROCEDURE — RXMED WILLOW AMBULATORY MEDICATION CHARGE: Performed by: INTERNAL MEDICINE

## 2024-10-22 ENCOUNTER — PHARMACY VISIT (OUTPATIENT)
Dept: PHARMACY | Facility: MEDICAL CENTER | Age: 79
End: 2024-10-22
Payer: COMMERCIAL

## 2024-11-07 ENCOUNTER — ANTICOAGULATION VISIT (OUTPATIENT)
Dept: VASCULAR LAB | Facility: MEDICAL CENTER | Age: 79
End: 2024-11-07
Attending: INTERNAL MEDICINE
Payer: MEDICARE

## 2024-11-07 DIAGNOSIS — D68.69 HYPERCOAGULABLE STATE DUE TO PERMANENT ATRIAL FIBRILLATION (HCC): Chronic | ICD-10-CM

## 2024-11-07 DIAGNOSIS — I48.21 PERMANENT ATRIAL FIBRILLATION (HCC): Chronic | ICD-10-CM

## 2024-11-07 DIAGNOSIS — I48.21 HYPERCOAGULABLE STATE DUE TO PERMANENT ATRIAL FIBRILLATION (HCC): Chronic | ICD-10-CM

## 2024-11-07 DIAGNOSIS — Z79.01 CHRONIC ANTICOAGULATION: ICD-10-CM

## 2024-11-07 LAB — INR PPP: 2.7 (ref 2–3.5)

## 2024-11-07 PROCEDURE — 85610 PROTHROMBIN TIME: CPT

## 2024-11-07 PROCEDURE — 99211 OFF/OP EST MAY X REQ PHY/QHP: CPT | Performed by: NURSE PRACTITIONER

## 2024-11-07 NOTE — PROGRESS NOTES
Anticoagulation Summary  As of 2024      INR goal:  2.0-3.0   TTR:  78.5% (8.4 y)   INR used for dosin.70 (2024)   Warfarin maintenance plan:  5 mg (5 mg x 1) every Tue, Thu, Sat; 7.5 mg (5 mg x 1.5) all other days   Weekly warfarin total:  45 mg   Plan last modified:  Amanda Guzman A.P.NDenzel (2024)   Next INR check:  2024   Target end date:  Indefinite    Indications    Permanent atrial fibrillation (HCC) [I48.21]  Chronic anticoagulation [Z79.01]  Hypercoagulable state due to permanent atrial fibrillation (HCC) [D68.69  I48.21]                 Anticoagulation Episode Summary       INR check location:  Anticoagulation Clinic    Preferred lab:  --    Send INR reminders to:  --    Comments:  Indomethacin as needed, discussed risks with the patient.          Anticoagulation Care Providers       Provider Role Specialty Phone number    Cheikh Turner M.D. Referring Cardiovascular Disease (Cardiology) [phone]    [facility] Anticoagulation Services Responsible  [phone]                  Refer to Patient Findings for HPI:  Patient Findings       Negatives:  Signs/symptoms of thrombosis, Signs/symptoms of bleeding, Laboratory test error suspected, Change in health, Change in alcohol use, Change in activity, Upcoming invasive procedure, Emergency department visit, Upcoming dental procedure, Missed doses, Extra doses, Change in medications, Change in diet/appetite, Hospital admission, Bruising, Other complaints            There were no vitals filed for this visit.    Verified current warfarin dosing schedule.    Medications reconciled.  Pt is not on antiplatelet therapy.      A/P   INR is therapeutic  Reason(s) for out of range INR today: N/A      Warfarin dosing recommendation: Continue regimen as listed above.    Pt educated to contact our clinic with any changes in medications or s/s of bleeding or thrombosis. Pt is aware to seek immediate medical attention for falls, head injury or deep  cuts.    Request pt to return in 5 week(s). Pt agrees.    PAMELA Bryan.

## 2024-11-12 PROCEDURE — RXMED WILLOW AMBULATORY MEDICATION CHARGE: Performed by: INTERNAL MEDICINE

## 2024-11-13 ENCOUNTER — PHARMACY VISIT (OUTPATIENT)
Dept: PHARMACY | Facility: MEDICAL CENTER | Age: 79
End: 2024-11-13
Payer: COMMERCIAL

## 2024-11-17 PROCEDURE — RXMED WILLOW AMBULATORY MEDICATION CHARGE: Performed by: INTERNAL MEDICINE

## 2024-11-19 ENCOUNTER — PHARMACY VISIT (OUTPATIENT)
Dept: PHARMACY | Facility: MEDICAL CENTER | Age: 79
End: 2024-11-19
Payer: COMMERCIAL

## 2024-11-20 ENCOUNTER — OFFICE VISIT (OUTPATIENT)
Dept: MEDICAL GROUP | Facility: PHYSICIAN GROUP | Age: 79
End: 2024-11-20
Payer: MEDICARE

## 2024-11-20 VITALS
TEMPERATURE: 97.8 F | WEIGHT: 229.5 LBS | HEIGHT: 77 IN | OXYGEN SATURATION: 99 % | BODY MASS INDEX: 27.1 KG/M2 | HEART RATE: 68 BPM | DIASTOLIC BLOOD PRESSURE: 62 MMHG | SYSTOLIC BLOOD PRESSURE: 122 MMHG | RESPIRATION RATE: 16 BRPM

## 2024-11-20 DIAGNOSIS — D68.69 HYPERCOAGULABLE STATE DUE TO PERMANENT ATRIAL FIBRILLATION (HCC): Chronic | ICD-10-CM

## 2024-11-20 DIAGNOSIS — H61.23 BILATERAL IMPACTED CERUMEN: ICD-10-CM

## 2024-11-20 DIAGNOSIS — C61 PROSTATE CANCER (HCC): ICD-10-CM

## 2024-11-20 DIAGNOSIS — E11.40 CONTROLLED TYPE 2 DIABETES MELLITUS WITH DIABETIC NEUROPATHY, WITHOUT LONG-TERM CURRENT USE OF INSULIN (HCC): ICD-10-CM

## 2024-11-20 DIAGNOSIS — I48.21 HYPERCOAGULABLE STATE DUE TO PERMANENT ATRIAL FIBRILLATION (HCC): Chronic | ICD-10-CM

## 2024-11-20 DIAGNOSIS — Z23 NEED FOR VACCINATION: ICD-10-CM

## 2024-11-20 PROCEDURE — 90662 IIV NO PRSV INCREASED AG IM: CPT | Performed by: INTERNAL MEDICINE

## 2024-11-20 PROCEDURE — 3074F SYST BP LT 130 MM HG: CPT | Performed by: INTERNAL MEDICINE

## 2024-11-20 PROCEDURE — 3078F DIAST BP <80 MM HG: CPT | Performed by: INTERNAL MEDICINE

## 2024-11-20 PROCEDURE — 99214 OFFICE O/P EST MOD 30 MIN: CPT | Mod: 25 | Performed by: INTERNAL MEDICINE

## 2024-11-20 PROCEDURE — G0008 ADMIN INFLUENZA VIRUS VAC: HCPCS | Performed by: INTERNAL MEDICINE

## 2024-11-20 ASSESSMENT — PATIENT HEALTH QUESTIONNAIRE - PHQ9
7. TROUBLE CONCENTRATING ON THINGS, SUCH AS READING THE NEWSPAPER OR WATCHING TELEVISION: NOT AT ALL
4. FEELING TIRED OR HAVING LITTLE ENERGY: MORE THAN HALF THE DAYS
6. FEELING BAD ABOUT YOURSELF - OR THAT YOU ARE A FAILURE OR HAVE LET YOURSELF OR YOUR FAMILY DOWN: NOT AL ALL
9. THOUGHTS THAT YOU WOULD BE BETTER OFF DEAD, OR OF HURTING YOURSELF: NOT AT ALL
1. LITTLE INTEREST OR PLEASURE IN DOING THINGS: NOT AT ALL
SUM OF ALL RESPONSES TO PHQ9 QUESTIONS 1 AND 2: 0
5. POOR APPETITE OR OVEREATING: NOT AT ALL
3. TROUBLE FALLING OR STAYING ASLEEP OR SLEEPING TOO MUCH: MORE THAN HALF THE DAYS
SUM OF ALL RESPONSES TO PHQ QUESTIONS 1-9: 4
2. FEELING DOWN, DEPRESSED, IRRITABLE, OR HOPELESS: NOT AT ALL
8. MOVING OR SPEAKING SO SLOWLY THAT OTHER PEOPLE COULD HAVE NOTICED. OR THE OPPOSITE, BEING SO FIGETY OR RESTLESS THAT YOU HAVE BEEN MOVING AROUND A LOT MORE THAN USUAL: NOT AT ALL

## 2024-11-20 ASSESSMENT — FIBROSIS 4 INDEX: FIB4 SCORE: 2.33

## 2024-11-20 NOTE — PROGRESS NOTES
CC: Diabetes, flu vaccine, earwax    HPI:  Cheikh presents with the following    1. Need for vaccination  Due for flu vaccine    2. Controlled type 2 diabetes mellitus with diabetic neuropathy, without long-term current use of insulin ([facility])  12/5/2023: Chronic, stable.  Patient is currently taking Jardiance 25 mg daily, metformin 1000 mg twice daily, glimepiride 2 mg dose.  Patient does not check his blood sugars regularly.  Hemoglobin A1c 7.5.17 in April.  Patient is very limited in his exercise routine, has a walker.  Not following a diabetic diet.  Due for monofilament.     3/26/2024:.  Patient continues his Amaryl 2 mg daily, Jardiance 25 mg daily and metformin 1000 mg twice daily.  Patient does not check his blood sugars.  No change in diet.  He has become more sedentary given the winter months and chronic issues.  Hemoglobin A1c 7.6, up from 7.5 in December.     8/20/2024:.  Patient continues on metformin, Jardiance 25 mg daily, glimepiride 2 mg daily.  Hemoglobin A1c 6.9, down from 7.6.  Patient is concerned as he is falling into the donut hole for his coverage for Jardiance.    11/20/2024:.  Patient was able to continue receiving his Jardiance, glimepiride and metformin without additional cost.  Patient does not check his blood sugars.  Patient has no complaints.    3. Prostate cancer ([facility])  12/5/2022:Closely followed by urology.  Diagnosed with prostate cancer 18th September, [place] 6, grade 1, low-grade.  Treatment active surveillance with MRI.  MRI showed positive mid gland peripheral lesion, negative for lymphadenopathy.  Appointment with urology in December 26.  PSA 1.17.  Status post TURP September 2023     3/26/2023:.  Patient followed up with urology and completed prostate MRI in November 2023.  Noted left peripheral mid gland suspicious lesion.  No pelvic lymphadenopathy or suspicious osseous lesions.  PI-RADS score 4- high.  Patient is scheduled for prostate biopsy in early April.      7/31/2024:.  Patient was seen by Dr. Batista with PSA levels every 6 months.  Repeat MRI in 2026.  In April.  Plan is to follow-up patient was sent over to [facility] for a radiation consult.  Treatment options discussed.  Patient will consider.  Follow-up with urology in October with PSA level.     8/20/2024:.  Patient has a follow-up with oncology for consult regarding treatment options for his prostate cancer.      11/20/24:.  Patient opted for PSA monitoring every 6 months.  Most recent PSA level 1.63 and stable.  Patient denies any pain, weight loss, fatigue.    4. Bilateral impacted cerumen  Patient with a history of cerumen impaction has complaints of ear fullness bilaterally.    5. Hypercoagulable state due to permanent atrial fibrillation (HCC)  Chronic. Stable. Followed by vascular medicine. Patient taking Coumadin. Patient is rate controlled. Lipid management. Followed by cardiology yearly.       Patient Active Problem List    Diagnosis Date Noted    Dysthymia 08/20/2024    Prostate cancer (HCC) 12/05/2023    Onychomycosis 12/05/2023    Vitamin D deficiency 08/18/2022    Lower urinary tract symptoms due to benign prostatic hyperplasia 06/07/2022    Chronic gout of left hand 05/12/2022    Chronic gout of hand 03/29/2022    History of kidney stones 03/29/2022    Loss of balance 03/29/2022    Spinal stenosis of lumbar region without neurogenic claudication 03/29/2022    BMI 28.0-28.9,adult 02/17/2022    Benign prostatic hyperplasia with nocturia 02/17/2022    Hypercoagulable state due to permanent atrial fibrillation (HCC)     B12 deficiency 11/12/2020    Obesity (BMI 30-39.9) 03/27/2018    Controlled type 2 diabetes mellitus with diabetic neuropathy, without long-term current use of insulin (HCC) 03/13/2017    Risk for falls 03/13/2017    SHANNAN (obstructive sleep apnea) 12/12/2016    Dyslipidemia 12/12/2016    Left foot drop 12/12/2016    Chronic anticoagulation 09/20/2013    Essential hypertension  07/09/2013    Permanent atrial fibrillation (HCC)        Current Outpatient Medications   Medication Sig Dispense Refill    metformin (GLUCOPHAGE) 1000 MG tablet TAKE ONE TABLET BY MOUTH TWICE DAILY WITH MEALS 200 Tablet 3    warfarin (COUMADIN) 5 MG Tab Take 1-1.5 tablets by mouth daily or [facility] Tablet 1    Empagliflozin (JARDIANCE) 25 MG Tab Take 1 Tablet by mouth every day. 100 Tablet 3    atorvastatin (LIPITOR) 10 MG Tab TAKE 1 TABLET BY MOUTH EVERY  Tablet 3    enalapril (VASOTEC) 10 MG Tab Take 1 Tablet by mouth every day. 100 Tablet 3    metoprolol tartrate (LOPRESSOR) 25 MG Tab Take 1 Tablet by mouth 2 times a day. 200 Tablet 3    colchicine (COLCRYS) 0.6 MG Tab Take 1 tablet by mouth at onset of acute gout attack, may repeat in 1 hour. 20 Tablet 1    glimepiride (AMARYL) 2 MG Tab Take 1 Tablet by mouth every morning. 100 Tablet 3    moxifloxacin (VIGAMOX) 0.5 % Solution Administer 1 Drop into the right eye 4 times a day. 3 mL 2    prednisoLONE acetate (PRED FORTE) 1 % Suspension Administer 1 Drop into the right eye 4 times a day. 10 mL 2    cyanocobalamin ([employer] VITAMIN B12) 1000 MCG Tab Take 1 Tablet by mouth every day. 100 Tablet 3    cyanocobalamin ([employer] VITAMIN B12) 1000 MCG Tab Take 1 tablet by mouth every day. 100 Tablet 1    Misc. Devices Misc Please provide the patient with a four wheel walker with a seat. M48.061 M21.372 M21.371 1 Each 0    VITAMIN D, CHOLECALCIFEROL, PO Take 5,000 Units by mouth.       No current facility-administered medications for this visit.         Allergies as of 11/20/2024 - Reviewed 11/20/2024   Allergen Reaction Noted    Nkda [no known drug allergy]  07/31/2009        Social History     Socioeconomic History    Marital status:      Spouse name: Not on file    Number of children: Not on file    Years of education: Not on file    Highest education level: Not on file   Occupational History    Not on file   Tobacco Use     Smoking status: Never    Smokeless tobacco: Never   Vaping Use    Vaping status: Never Used   Substance and Sexual Activity    Alcohol use: Yes     Alcohol/week: 6.0 oz     Types: 10 Glasses of wine per week    Drug use: No    Sexual activity: Never     Partners: Female   Other Topics Concern    Not on file   Social History Narrative    Not on file     Social Drivers of Health     Financial Resource Strain: Not on file   Food Insecurity: Not on file   Transportation Needs: Not on file   Physical Activity: Not on file   Stress: Not on file   Social Connections: Not on file   Intimate Partner Violence: Not on file   Housing Stability: Not on file       Family History   Problem Relation Age of Onset    Heart Attack Father 59    Heart Disease Father     Hypertension Brother     Diabetes Brother     Arthritis Brother     Thyroid Mother 50    Breast Cancer Sister     No Known Problems Maternal Grandmother     No Known Problems Maternal Grandfather     No Known Problems Paternal Grandmother     No Known Problems Paternal Grandfather     Arrythmia Brother     No Known Problems Brother     No Known Problems Daughter        Past Surgical History:   Procedure Laterality Date    ORIF, FRACTURE, FEMUR Left 2/24/2016    Procedure: DISTAL FEMUR ORIF;  Surgeon: Montrell Eddy M.D.;  Location: SURGERY [facility];  Service:     CYSTOSCOPY STENT PLACEMENT  1/13/2014    Performed by Main Batista M.D. at SURGERY [facility]    URETEROSCOPY  1/13/2014    Performed by Main Batista M.D. at SURGERY [facility]    LASERTRIPSY  1/13/2014    Performed by Main Batista M.D. at SURGERY [facility]    OTHER      Surgery for kidney stones    RECOVERY  7/11/2013    Performed by Cath-Recovery Surgery at SURGERY [facility] DAY [facility]    KNEE ARTHROPLASTY TOTAL  12/1/2009    Performed by GIACOMO PETTIT at SURGERY [facility]    KNEE ARTHROPLASTY TOTAL  3/2009    Left    LUMBAR LAMINECTOMY DISKECTOMY  7/2006    L1-L5/  "residual left foot weaknesss     REPAIR, KNEE, ACL  12/2005    Right    ORIF, FRACTURE, FEMUR         ROS:  Denies any Headache,Chest pain,  Shortness of breath,  Abdominal pain, Changes of bowel or bladder, Lower ext edema, Fevers, Nights sweats, Weight Changes, Focal weakness or numbness.  All other systems are negative.    /62 (BP Location: Right arm, Patient Position: Sitting)   Pulse 68   Temp 36.6 °C (97.8 °F) (Temporal)   Resp 16   Ht 1.956 m (6' 5\")   Wt 104 kg (229 lb 8 oz)   SpO2 99%   BMI 27.21 kg/m²      Constitutional: Alert, no distress, well-groomed.  Skin: Warm, dry, good turgor, no rashes in visible areas.  Eye: Equal, round and reactive, conjunctiva clear, lids normal.  ENMT: Lips without lesions, good dentition, moist mucous membranes.  Neck: Trachea midline, no masses, no thyromegaly.  Respiratory: Unlabored respiratory effort, no cough.  Abdomen: Soft, no gross masses.  MSK: Normal gait, moves all extremities.  Neuro: Grossly non-focal. No cranial nerve deficit. Strength and sensation intact.   Psych: Alert and oriented x3, normal affect and mood.      Assessment and Plan.   79 y.o. male presenting with the following.     1. Need for vaccination    - Influenza Vaccine, High Dose (65+ Only)    2. Controlled type 2 diabetes mellitus with diabetic neuropathy, without long-term current use of insulin (HCC)  Chronic.  Stable.  Continue current plan of care.  Patient will be due for monofilament and A1c on next visit.  Lipid panel, urine microalbumin up-to-date.  - HEMOGLOBIN A1C; Future    3. Prostate cancer (HCC)  Chronic.  Stable.  Continue to monitor PSA and follow-up with urology.  - PROSTATE SPECIFIC AG SCREENING; Future    4. Bilateral impacted cerumen  MA visit for ear lavage.    5. Hypercoagulable state due to permanent atrial fibrillation (HCC)  Chronic.  Stable.  Continue current management per cardiology.  Asymptomatic.    "

## 2024-11-27 ENCOUNTER — NON-PROVIDER VISIT (OUTPATIENT)
Dept: MEDICAL GROUP | Facility: PHYSICIAN GROUP | Age: 79
End: 2024-11-27
Payer: MEDICARE

## 2024-11-27 NOTE — PROGRESS NOTES
Rajiv Perez is a 79 y.o. male here for a non-provider visit for right ear lavage    If abnormal was an in office provider notified today (if so, indicate provider)? Yes    Routed to PCP? No

## 2024-12-05 ENCOUNTER — TELEPHONE (OUTPATIENT)
Dept: CARDIOLOGY | Facility: MEDICAL CENTER | Age: 79
End: 2024-12-05
Payer: MEDICARE

## 2024-12-05 DIAGNOSIS — E11.9 CONTROLLED TYPE 2 DIABETES MELLITUS WITHOUT COMPLICATION, WITHOUT LONG-TERM CURRENT USE OF INSULIN (HCC): ICD-10-CM

## 2024-12-05 DIAGNOSIS — E78.5 DYSLIPIDEMIA: ICD-10-CM

## 2024-12-05 RX ORDER — EMPAGLIFLOZIN 25 MG/1
1 TABLET, FILM COATED ORAL DAILY
Qty: 100 TABLET | Refills: 1 | Status: SHIPPED | OUTPATIENT
Start: 2024-12-05

## 2024-12-05 NOTE — TELEPHONE ENCOUNTER
Edna Ceja I please have a rx for Jardiance 25 mg written by Cheikh Turner. So I may get the pt a lower copay. Thank you

## 2024-12-08 PROCEDURE — RXMED WILLOW AMBULATORY MEDICATION CHARGE: Performed by: INTERNAL MEDICINE

## 2024-12-09 ENCOUNTER — PHARMACY VISIT (OUTPATIENT)
Dept: PHARMACY | Facility: MEDICAL CENTER | Age: 79
End: 2024-12-09
Payer: COMMERCIAL

## 2024-12-11 PROCEDURE — RXMED WILLOW AMBULATORY MEDICATION CHARGE: Performed by: INTERNAL MEDICINE

## 2024-12-12 ENCOUNTER — ANTICOAGULATION VISIT (OUTPATIENT)
Dept: VASCULAR LAB | Facility: MEDICAL CENTER | Age: 79
End: 2024-12-12
Attending: INTERNAL MEDICINE
Payer: MEDICARE

## 2024-12-12 VITALS — SYSTOLIC BLOOD PRESSURE: 128 MMHG | DIASTOLIC BLOOD PRESSURE: 81 MMHG | HEART RATE: 83 BPM

## 2024-12-12 DIAGNOSIS — I48.21 HYPERCOAGULABLE STATE DUE TO PERMANENT ATRIAL FIBRILLATION (HCC): Chronic | ICD-10-CM

## 2024-12-12 DIAGNOSIS — Z79.01 CHRONIC ANTICOAGULATION: ICD-10-CM

## 2024-12-12 DIAGNOSIS — D68.69 HYPERCOAGULABLE STATE DUE TO PERMANENT ATRIAL FIBRILLATION (HCC): Chronic | ICD-10-CM

## 2024-12-12 DIAGNOSIS — I48.0 PAROXYSMAL ATRIAL FIBRILLATION (HCC): ICD-10-CM

## 2024-12-12 DIAGNOSIS — I48.21 PERMANENT ATRIAL FIBRILLATION (HCC): Chronic | ICD-10-CM

## 2024-12-12 LAB — INR PPP: 1.8 (ref 2–3.5)

## 2024-12-12 PROCEDURE — 85610 PROTHROMBIN TIME: CPT

## 2024-12-12 PROCEDURE — 99212 OFFICE O/P EST SF 10 MIN: CPT | Performed by: NURSE PRACTITIONER

## 2024-12-12 RX ORDER — WARFARIN SODIUM 5 MG/1
TABLET ORAL
Qty: 120 TABLET | Refills: 1 | Status: SHIPPED | OUTPATIENT
Start: 2024-12-12

## 2024-12-12 NOTE — PROGRESS NOTES
Anticoagulation Summary  As of 2024      INR goal:  2.0-3.0   TTR:  78.5% (8.5 y)   INR used for dosin.80 (2024)   Warfarin maintenance plan:  5 mg (5 mg x 1) every Tue, Thu, Sat; 7.5 mg (5 mg x 1.5) all other days   Weekly warfarin total:  45 mg   Plan last modified:  Amanda Guzman A.P.NDenzel (2024)   Next INR check:  2025   Target end date:  Indefinite    Indications    Permanent atrial fibrillation (HCC) [I48.21]  Chronic anticoagulation [Z79.01]  Hypercoagulable state due to permanent atrial fibrillation (HCC) [D68.69  I48.21]                 Anticoagulation Episode Summary       INR check location:  Anticoagulation Clinic    Preferred lab:  --    Send INR reminders to:  --    Comments:  Indomethacin as needed, discussed risks with the patient.          Anticoagulation Care Providers       Provider Role Specialty Phone number    Cheikh Turner M.D. Referring Cardiovascular Disease (Cardiology) [phone]    [facility]ticoagulation Services Responsible  [phone]                  Refer to Patient Findings for HPI:  Patient Findings       Positives:  Missed doses    Negatives:  Signs/symptoms of thrombosis, Signs/symptoms of bleeding, Laboratory test error suspected, Change in health, Change in alcohol use, Change in activity, Upcoming invasive procedure, Emergency department visit, Upcoming dental procedure, Extra doses, Change in medications, Change in diet/appetite, Hospital admission, Bruising, Other complaints            Vitals:    24 1110   BP: 128/81   Pulse: 83     Verified current warfarin dosing schedule.    Medications reconciled.  Pt is not on antiplatelet therapy.      A/P   INR is subtherapeutic  Reason(s) for out of range INR today: Missed dose(s)      Warfarin dosing recommendation: Take 7.5 mg tonight, then resume your previous regimen.    Pt educated to contact our clinic with any changes in medications or s/s of bleeding or thrombosis. Pt is aware to seek  immediate medical attention for falls, head injury or deep cuts.    Request pt to return in 2 week(s). Pt declines despite warning of extending their follow up interval. Pt opts to RTC in 4 week(s).    PAMELA Bryan.

## 2024-12-13 ENCOUNTER — PHARMACY VISIT (OUTPATIENT)
Dept: PHARMACY | Facility: MEDICAL CENTER | Age: 79
End: 2024-12-13
Payer: COMMERCIAL

## 2024-12-29 PROCEDURE — RXMED WILLOW AMBULATORY MEDICATION CHARGE: Performed by: INTERNAL MEDICINE

## 2024-12-30 ENCOUNTER — PHARMACY VISIT (OUTPATIENT)
Dept: PHARMACY | Facility: MEDICAL CENTER | Age: 79
End: 2024-12-30
Payer: COMMERCIAL

## 2025-01-10 PROCEDURE — RXMED WILLOW AMBULATORY MEDICATION CHARGE: Performed by: INTERNAL MEDICINE

## 2025-01-14 ENCOUNTER — PHARMACY VISIT (OUTPATIENT)
Dept: PHARMACY | Facility: MEDICAL CENTER | Age: 80
End: 2025-01-14
Payer: COMMERCIAL

## 2025-01-23 ENCOUNTER — APPOINTMENT (OUTPATIENT)
Dept: VASCULAR LAB | Facility: MEDICAL CENTER | Age: 80
End: 2025-01-23
Attending: INTERNAL MEDICINE
Payer: MEDICARE

## 2025-01-30 ENCOUNTER — ANTICOAGULATION VISIT (OUTPATIENT)
Dept: VASCULAR LAB | Facility: MEDICAL CENTER | Age: 80
End: 2025-01-30
Attending: INTERNAL MEDICINE
Payer: MEDICARE

## 2025-01-30 DIAGNOSIS — D68.69 HYPERCOAGULABLE STATE DUE TO PERMANENT ATRIAL FIBRILLATION (HCC): Chronic | ICD-10-CM

## 2025-01-30 DIAGNOSIS — I48.21 HYPERCOAGULABLE STATE DUE TO PERMANENT ATRIAL FIBRILLATION (HCC): Chronic | ICD-10-CM

## 2025-01-30 DIAGNOSIS — Z79.01 CHRONIC ANTICOAGULATION: ICD-10-CM

## 2025-01-30 DIAGNOSIS — I48.0 PAROXYSMAL ATRIAL FIBRILLATION (HCC): ICD-10-CM

## 2025-01-30 DIAGNOSIS — I48.21 PERMANENT ATRIAL FIBRILLATION (HCC): Chronic | ICD-10-CM

## 2025-01-30 LAB — INR PPP: 2.7 (ref 2–3.5)

## 2025-01-30 PROCEDURE — 99211 OFF/OP EST MAY X REQ PHY/QHP: CPT

## 2025-01-30 PROCEDURE — 85610 PROTHROMBIN TIME: CPT

## 2025-01-30 RX ORDER — WARFARIN SODIUM 5 MG/1
TABLET ORAL
Qty: 135 TABLET | Refills: 3 | Status: SHIPPED | OUTPATIENT
Start: 2025-01-30

## 2025-01-30 NOTE — PROGRESS NOTES
Anticoagulation Summary  As of 2025      INR goal:  2.0-3.0   TTR:  78.5% (8.7 y)   INR used for dosin.70 (2025)   Warfarin maintenance plan:  5 mg (5 mg x 1) every Tue, Thu, Sat; 7.5 mg (5 mg x 1.5) all other days   Weekly warfarin total:  45 mg   Plan last modified:  Amanda Guzman A.P.NDenzel (2024)   Next INR check:  3/13/2025   Target end date:  Indefinite    Indications    Permanent atrial fibrillation (HCC) [I48.21]  Chronic anticoagulation [Z79.01]  Hypercoagulable state due to permanent atrial fibrillation (HCC) [D68.69  I48.21]                 Anticoagulation Episode Summary       INR check location:  Anticoagulation Clinic    Preferred lab:  --    Send INR reminders to:  --    Comments:  Indomethacin as needed, discussed risks with the patient.          Anticoagulation Care Providers       Provider Role Specialty Phone number    Cheikh Turner M.D. Referring Cardiovascular Disease (Cardiology) [phone]    [facility]ticoagulation Services Responsible  [phone]                  Refer to Patient Findings for HPI:  Patient Findings       Negatives:  Signs/symptoms of thrombosis, Signs/symptoms of bleeding, Laboratory test error suspected, Change in health, Change in alcohol use, Change in activity, Upcoming invasive procedure, Emergency department visit, Upcoming dental procedure, Missed doses, Extra doses, Change in medications, Change in diet/appetite, Hospital admission, Bruising, Other complaints            There were no vitals filed for this visit.  Pt declined vitals    Verified current warfarin dosing schedule.    Medications reconciled.  Pt is not on antiplatelet therapy.      A/P   INR is therapeutic  Reason(s) for out of range INR today: N/A      Warfarin dosing recommendation: Continue regimen as listed above.    Pt educated to contact our clinic with any changes in medications or s/s of bleeding or thrombosis. Pt is aware to seek immediate medical attention for falls,  head injury or deep cuts.    Request pt to return in 6 week(s). Pt agrees.    Mohinder Ball, MatthewD

## 2025-02-01 PROCEDURE — RXMED WILLOW AMBULATORY MEDICATION CHARGE: Performed by: INTERNAL MEDICINE

## 2025-02-03 PROCEDURE — RXMED WILLOW AMBULATORY MEDICATION CHARGE: Performed by: NURSE PRACTITIONER

## 2025-02-04 ENCOUNTER — PHARMACY VISIT (OUTPATIENT)
Dept: PHARMACY | Facility: MEDICAL CENTER | Age: 80
End: 2025-02-04
Payer: COMMERCIAL

## 2025-02-13 ENCOUNTER — HOSPITAL ENCOUNTER (OUTPATIENT)
Dept: LAB | Facility: MEDICAL CENTER | Age: 80
End: 2025-02-13
Attending: INTERNAL MEDICINE
Payer: MEDICARE

## 2025-02-13 DIAGNOSIS — C61 PROSTATE CANCER (HCC): ICD-10-CM

## 2025-02-13 DIAGNOSIS — E11.40 CONTROLLED TYPE 2 DIABETES MELLITUS WITH DIABETIC NEUROPATHY, WITHOUT LONG-TERM CURRENT USE OF INSULIN (HCC): ICD-10-CM

## 2025-02-13 LAB
EST. AVERAGE GLUCOSE BLD GHB EST-MCNC: 197 MG/DL
HBA1C MFR BLD: 8.5 % (ref 4–5.6)
PSA SERPL DL<=0.01 NG/ML-MCNC: 1.78 NG/ML (ref 0–4)

## 2025-02-13 PROCEDURE — 84153 ASSAY OF PSA TOTAL: CPT

## 2025-02-13 PROCEDURE — 83036 HEMOGLOBIN GLYCOSYLATED A1C: CPT

## 2025-02-13 PROCEDURE — 36415 COLL VENOUS BLD VENIPUNCTURE: CPT

## 2025-02-20 ENCOUNTER — OFFICE VISIT (OUTPATIENT)
Dept: MEDICAL GROUP | Facility: PHYSICIAN GROUP | Age: 80
End: 2025-02-20
Payer: MEDICARE

## 2025-02-20 VITALS
HEIGHT: 77 IN | TEMPERATURE: 97.1 F | WEIGHT: 234.5 LBS | BODY MASS INDEX: 27.69 KG/M2 | OXYGEN SATURATION: 98 % | HEART RATE: 70 BPM | SYSTOLIC BLOOD PRESSURE: 116 MMHG | RESPIRATION RATE: 16 BRPM | DIASTOLIC BLOOD PRESSURE: 70 MMHG

## 2025-02-20 DIAGNOSIS — M48.061 SPINAL STENOSIS OF LUMBAR REGION WITHOUT NEUROGENIC CLAUDICATION: ICD-10-CM

## 2025-02-20 DIAGNOSIS — E11.40 CONTROLLED TYPE 2 DIABETES MELLITUS WITH DIABETIC NEUROPATHY, WITHOUT LONG-TERM CURRENT USE OF INSULIN (HCC): ICD-10-CM

## 2025-02-20 DIAGNOSIS — E55.9 VITAMIN D DEFICIENCY: ICD-10-CM

## 2025-02-20 DIAGNOSIS — R53.82 CHRONIC FATIGUE: ICD-10-CM

## 2025-02-20 DIAGNOSIS — M79.10 MYALGIA: ICD-10-CM

## 2025-02-20 DIAGNOSIS — C61 PROSTATE CANCER (HCC): ICD-10-CM

## 2025-02-20 DIAGNOSIS — M25.50 ARTHRALGIA, UNSPECIFIED JOINT: ICD-10-CM

## 2025-02-20 PROCEDURE — 99214 OFFICE O/P EST MOD 30 MIN: CPT | Performed by: INTERNAL MEDICINE

## 2025-02-20 PROCEDURE — 3074F SYST BP LT 130 MM HG: CPT | Performed by: INTERNAL MEDICINE

## 2025-02-20 PROCEDURE — 3078F DIAST BP <80 MM HG: CPT | Performed by: INTERNAL MEDICINE

## 2025-02-20 ASSESSMENT — FIBROSIS 4 INDEX: FIB4 SCORE: 2.33

## 2025-02-20 ASSESSMENT — PATIENT HEALTH QUESTIONNAIRE - PHQ9: CLINICAL INTERPRETATION OF PHQ2 SCORE: 0

## 2025-02-20 NOTE — PROGRESS NOTES
CC:     HPI:  Cheikh presents with the following    1. Controlled type 2 diabetes mellitus with diabetic neuropathy, without long-term current use of insulin (Formerly [facility])  12/5/2023: Chronic, stable.  Patient is currently taking Jardiance 25 mg daily, metformin 1000 mg twice daily, glimepiride 2 mg dose.  Patient does not check his blood sugars regularly.  Hemoglobin A1c 7.5.17 in April.  Patient is very limited in his exercise routine, has a walker.  Not following a diabetic diet.  Due for monofilament.     3/26/2024:.  Patient continues his Amaryl 2 mg daily, Jardiance 25 mg daily and metformin 1000 mg twice daily.  Patient does not check his blood sugars.  No change in diet.  He has become more sedentary given the winter months and chronic issues.  Hemoglobin A1c 7.6, up from 7.5 in December.     8/20/2024:.  Patient continues on metformin, Jardiance 25 mg daily, glimepiride 2 mg daily.  Hemoglobin A1c 6.9, down from 7.6.  Patient is concerned as he is falling into the donut hole for his coverage for Jardiance.     11/20/2024:.  Patient was able to continue receiving his Jardiance, glimepiride and metformin without additional cost.  Patient does not check his blood sugars.  Patient has no complaints.    2/20/2025: Chronic.  Stable.  Patient continues on Jardiance, glimepiride, metformin at current doses.  No medication dose changes.  No change in diet however patient has been fatigued and has not been physically active.  Gained 5 pounds.  Hemoglobin A1c 8.5, up from 6.9.    2. Prostate cancer (Formerly [facility])  12/5/2022:Closely followed by urology.  Diagnosed with prostate cancer 18th September, [place] 6, grade 1, low-grade.  Treatment active surveillance with MRI.  MRI showed positive mid gland peripheral lesion, negative for lymphadenopathy.  Appointment with urology in December 26.  PSA 1.17.  Status post TURP September 2023     3/26/2023:.  Patient followed up with urology and completed prostate MRI in November 2023.   Noted left peripheral mid gland suspicious lesion.  No pelvic lymphadenopathy or suspicious osseous lesions.  PI-RADS score 4- high.  Patient is scheduled for prostate biopsy in early April.     7/31/2024:.  Patient was seen by Dr. Batista with PSA levels every 6 months.  Repeat MRI in 2026.  In April.  Plan is to follow-up patient was sent over to [facility] for a radiation consult.  Treatment options discussed.  Patient will consider.  Follow-up with urology in October with PSA level.     8/20/2024:.  Patient has a follow-up with oncology for consult regarding treatment options for his prostate cancer.       11/20/24:.  Patient opted for PSA monitoring every 6 months.  Most recent PSA level 1.63 and stable.  Patient denies any pain, weight loss, fatigue.    2/20/2025:.  Patient's follow-up PSA level is 1.7.  He does not have an appointment with urology at this time.  Monitoring every 6 months.    3. Spinal stenosis of lumbar region without neurogenic claudication  Stable.  Status post lumbar laminectomy in 2008.  Using a walker.    4. Chronic fatigue  Patient has complaints of feeling excessively fatigued over the last few months.  Patient has gained weight due to inactivity, 5 pounds.  No changes in medications.  Patient is monitored by cardiology    5. Myalgia  Patient noticed pains in his upper extremities and lower extremities and his big muscles.  Describes it as achy.  Sometimes difficulty reaching overhead.    6. Arthralgia, unspecified joint  Patient has noticed joint pains located in his right knee, left shoulder which can be intermittent.  No injury or fall.          Patient Active Problem List    Diagnosis Date Noted    Dysthymia 08/20/2024    Prostate cancer (HCC) 12/05/2023    Onychomycosis 12/05/2023    Vitamin D deficiency 08/18/2022    Lower urinary tract symptoms due to benign prostatic hyperplasia 06/07/2022    Chronic gout of left hand 05/12/2022    Chronic gout of hand 03/29/2022    History  of kidney stones 03/29/2022    Loss of balance 03/29/2022    Spinal stenosis of lumbar region without neurogenic claudication 03/29/2022    BMI 28.0-28.9,adult 02/17/2022    Benign prostatic hyperplasia with nocturia 02/17/2022    Hypercoagulable state due to permanent atrial fibrillation (HCC)     B12 deficiency 11/12/2020    Obesity (BMI 30-39.9) 03/27/2018    Controlled type 2 diabetes mellitus with diabetic neuropathy, without long-term current use of insulin (HCC) 03/13/2017    Risk for falls 03/13/2017    SHANNAN (obstructive sleep apnea) 12/12/2016    Dyslipidemia 12/12/2016    Left foot drop 12/12/2016    Chronic anticoagulation 09/20/2013    Essential hypertension 07/09/2013    Permanent atrial fibrillation (HCC)        Current Outpatient Medications   Medication Sig Dispense Refill    warfarin (COUMADIN) 5 MG Tab Take 1-1.5 tablets by mouth daily or as directed by [facility] 135 Tablet 3    Empagliflozin (JARDIANCE) 25 MG Tab Take 1 Tablet by mouth every day. 100 Tablet 1    metformin (GLUCOPHAGE) 1000 MG tablet TAKE ONE TABLET BY MOUTH TWICE DAILY WITH MEALS 200 Tablet 3    atorvastatin (LIPITOR) 10 MG Tab TAKE 1 TABLET BY MOUTH EVERY  Tablet 3    enalapril (VASOTEC) 10 MG Tab Take 1 Tablet by mouth every day. 100 Tablet 3    metoprolol tartrate (LOPRESSOR) 25 MG Tab Take 1 Tablet by mouth 2 times a day. 200 Tablet 3    colchicine (COLCRYS) 0.6 MG Tab Take 1 tablet by mouth at onset of acute gout attack, may repeat in 1 hour. 20 Tablet 1    glimepiride (AMARYL) 2 MG Tab Take 1 Tablet by mouth every morning. 100 Tablet 3    moxifloxacin (VIGAMOX) 0.5 % Solution Administer 1 Drop into the right eye 4 times a day. 3 mL 2    prednisoLONE acetate (PRED FORTE) 1 % Suspension Administer 1 Drop into the right eye 4 times a day. 10 mL 2    cyanocobalamin ([employer] VITAMIN B12) 1000 MCG Tab Take 1 Tablet by mouth every day. 100 Tablet 3    cyanocobalamin ([employer] VITAMIN B12) 1000 MCG Tab Take 1 tablet  by mouth every day. 100 Tablet 1    Misc. Devices Misc Please provide the patient with a four wheel walker with a seat. M48.061 M21.372 M21.371 1 Each 0    VITAMIN D, CHOLECALCIFEROL, PO Take 5,000 Units by mouth.       No current facility-administered medications for this visit.         Allergies as of 02/20/2025 - Reviewed 02/20/2025   Allergen Reaction Noted    Nkda [no known drug allergy]  07/31/2009        Social History     Socioeconomic History    Marital status:      Spouse name: Not on file    Number of children: Not on file    Years of education: Not on file    Highest education level: Not on file   Occupational History    Not on file   Tobacco Use    Smoking status: Never    Smokeless tobacco: Never   Vaping Use    Vaping status: Never Used   Substance and Sexual Activity    Alcohol use: Yes     Alcohol/week: 6.0 oz     Types: 10 Glasses of wine per week    Drug use: No    Sexual activity: Never     Partners: Female   Other Topics Concern    Not on file   Social History Narrative    Not on file     Social Drivers of Health     Financial Resource Strain: Not on file   Food Insecurity: Not on file   Transportation Needs: Not on file   Physical Activity: Not on file   Stress: Not on file   Social Connections: Not on file   Intimate Partner Violence: Not on file   Housing Stability: Not on file       Family History   Problem Relation Age of Onset    Heart Attack Father 59    Heart Disease Father     Hypertension Brother     Diabetes Brother     Arthritis Brother     Thyroid Mother 50    Breast Cancer Sister     No Known Problems Maternal Grandmother     No Known Problems Maternal Grandfather     No Known Problems Paternal Grandmother     No Known Problems Paternal Grandfather     Arrythmia Brother     No Known Problems Brother     No Known Problems Daughter        Past Surgical History:   Procedure Laterality Date    ORIF, FRACTURE, FEMUR Left 2/24/2016    Procedure: DISTAL FEMUR ORIF;  Surgeon:  "Montrell Eddy M.D.;  Location: SURGERY [facility];  Service:     CYSTOSCOPY STENT PLACEMENT  1/13/2014    Performed by Main Batista M.D. at SURGERY [facility]    URETEROSCOPY  1/13/2014    Performed by Main Batista M.D. at SURGERY [facility]    LASERTRIPSY  1/13/2014    Performed by Main Batista M.D. at SURGERY [facility]    OTHER      Surgery for kidney stones    RECOVERY  7/11/2013    Performed by Cath-Recovery Surgery at SURGERY SAME DAY [facility]    KNEE ARTHROPLASTY TOTAL  12/1/2009    Performed by GIACOMO PETTIT at SURGERY [facility]    KNEE ARTHROPLASTY TOTAL  3/2009    Left    LUMBAR LAMINECTOMY DISKECTOMY  7/2006    L1-L5/ residual left foot weaknesss     REPAIR, KNEE, ACL  12/2005    Right    ORIF, FRACTURE, FEMUR         ROS: Positive ROS per HPI.  Positive ROS per HPI.  Denies any Headache,Chest pain,  Shortness of breath,  Abdominal pain, Changes of bowel or bladder, Lower ext edema, Fevers, Nights sweats, Weight Changes, Focal weakness or numbness.  All other systems are negative.    /70 (BP Location: Right arm, Patient Position: Sitting)   Pulse 70   Temp 36.2 °C (97.1 °F) (Temporal)   Resp 16   Ht 1.956 m (6' 5\")   Wt 106 kg (234 lb 8 oz)   SpO2 98%   BMI 27.81 kg/m²      Physical Exam:  Gen:         Alert and oriented, No apparent distress.  HEENT:   P[place], TM clear,  Oralpharynx no erythema or exudates.  Neck:       No Jugular venous distension, Lymphadenopathy, Thyromegaly, Bruits.  Lungs:     Clear to auscultation bilaterally, no wheezing rhonchi or crackles  CV:          Regular rate.  Irregular rhythm. No murmurs, rubs or gallops.  Abd:         Soft non tender, non distended. Normal active bowel sounds.             Ext:          No clubbing, cyanosis, edema.  Monofilament:     Assessment and Plan.   79 y.o. male presenting with the following.     1. Controlled type 2 diabetes mellitus with diabetic neuropathy, without long-term current " use of insulin (HCC)    - Diabetic Monofilament LE Exam  - HEMOGLOBIN A1C; Future    2. Prostate cancer (HCC)  Chronic.  Stable.  Patient will make appointment with urology.  PSA is stable.    3. Spinal stenosis of lumbar region without neurogenic claudication  Stable.    4. Chronic fatigue    - TSH WITH REFLEX TO FT4; Future  - CBC WITH DIFFERENTIAL; Future  - Comp Metabolic Panel; Future  - VITAMIN B12; Future  - FOLATE; Future    5. Myalgia    - CREATINE KINASE; Future  - ALDOLASE; Future  - Sed Rate; Future  - CRP QUANTITIVE (NON-CARDIAC); Future    6. Arthralgia, unspecified joint    - Sed Rate; Future  - CRP QUANTITIVE (NON-CARDIAC); Future  - POONAM REFLEXIVE PROFILE; Future    7. Vitamin D deficiency    - VITAMIN D,25 HYDROXY (DEFICIENCY); Future

## 2025-02-24 ENCOUNTER — PHARMACY VISIT (OUTPATIENT)
Dept: PHARMACY | Facility: MEDICAL CENTER | Age: 80
End: 2025-02-24
Payer: COMMERCIAL

## 2025-02-24 PROCEDURE — RXMED WILLOW AMBULATORY MEDICATION CHARGE: Performed by: INTERNAL MEDICINE

## 2025-03-13 ENCOUNTER — ANTICOAGULATION VISIT (OUTPATIENT)
Dept: VASCULAR LAB | Facility: MEDICAL CENTER | Age: 80
End: 2025-03-13
Attending: INTERNAL MEDICINE
Payer: MEDICARE

## 2025-03-13 DIAGNOSIS — D68.69 HYPERCOAGULABLE STATE DUE TO PERMANENT ATRIAL FIBRILLATION (HCC): Chronic | ICD-10-CM

## 2025-03-13 DIAGNOSIS — I48.21 HYPERCOAGULABLE STATE DUE TO PERMANENT ATRIAL FIBRILLATION (HCC): Chronic | ICD-10-CM

## 2025-03-13 DIAGNOSIS — Z79.01 CHRONIC ANTICOAGULATION: ICD-10-CM

## 2025-03-13 DIAGNOSIS — I48.21 PERMANENT ATRIAL FIBRILLATION (HCC): Chronic | ICD-10-CM

## 2025-03-13 LAB — INR PPP: 3.8 (ref 2–3.5)

## 2025-03-13 PROCEDURE — 85610 PROTHROMBIN TIME: CPT

## 2025-03-13 PROCEDURE — 99212 OFFICE O/P EST SF 10 MIN: CPT

## 2025-03-13 NOTE — PROGRESS NOTES
Anticoagulation Summary  As of 3/13/2025      INR goal:  2.0-3.0   TTR:  77.8% (8.8 y)   INR used for dosing:  3.80 (3/13/2025)   Warfarin maintenance plan:  5 mg (5 mg x 1) every Tue, Thu, Sat; 7.5 mg (5 mg x 1.5) all other days   Weekly warfarin total:  45 mg   Plan last modified:  Amanda Guzman A.P.NDenzel (1/4/2024)   Next INR check:  3/27/2025   Target end date:  Indefinite    Indications    Permanent atrial fibrillation (HCC) [I48.21]  Chronic anticoagulation [Z79.01]  Hypercoagulable state due to permanent atrial fibrillation (HCC) [D68.69  I48.21]                 Anticoagulation Episode Summary       INR check location:  Anticoagulation Clinic    Preferred lab:  --    Send INR reminders to:  --    Comments:  Indomethacin as needed, discussed risks with the patient.          Anticoagulation Care Providers       Provider Role Specialty Phone number    Cheikh Turner M.D. Referring Cardiovascular Disease (Cardiology) [phone]    [facility] Responsible  [phone]                Refer to Patient Findings for HPI:  Patient Findings       Negatives:  Signs/symptoms of thrombosis, Signs/symptoms of bleeding, Laboratory test error suspected, Change in health, Change in alcohol use, Change in activity, Upcoming invasive procedure, Emergency department visit, Upcoming dental procedure, Missed doses, Extra doses, Change in medications, Change in diet/appetite, Hospital admission, Bruising, Other complaints            Date Referral Placed: 3/13/2025      Vitals:  There were no vitals filed for this visit.  Pt declined vitals    Verified current warfarin dosing schedule.    Medications reconciled.  Pt is not on antiplatelet therapy.      A/P   INR is supratherapeutic  Reason(s) for out of range INR today: No obvious causes      Warfarin dosing recommendation: Hold today, then Continue regimen as listed above.    Pt educated to contact our clinic with any changes in medications or s/s of  bleeding or thrombosis. Pt is aware to seek immediate medical attention for falls, head injury or deep cuts.    Request pt to return in 2 week(s). Pt agrees.    Mohinder Ball, MatthewD

## 2025-03-17 ENCOUNTER — TELEPHONE (OUTPATIENT)
Dept: MEDICAL GROUP | Facility: PHYSICIAN GROUP | Age: 80
End: 2025-03-17
Payer: MEDICARE

## 2025-03-20 ENCOUNTER — HOSPITAL ENCOUNTER (OUTPATIENT)
Dept: LAB | Facility: MEDICAL CENTER | Age: 80
End: 2025-03-20
Attending: INTERNAL MEDICINE
Payer: MEDICARE

## 2025-03-20 DIAGNOSIS — E55.9 VITAMIN D DEFICIENCY: ICD-10-CM

## 2025-03-20 DIAGNOSIS — R53.82 CHRONIC FATIGUE: ICD-10-CM

## 2025-03-20 DIAGNOSIS — M25.50 ARTHRALGIA, UNSPECIFIED JOINT: ICD-10-CM

## 2025-03-20 DIAGNOSIS — E11.40 CONTROLLED TYPE 2 DIABETES MELLITUS WITH DIABETIC NEUROPATHY, WITHOUT LONG-TERM CURRENT USE OF INSULIN (HCC): ICD-10-CM

## 2025-03-20 DIAGNOSIS — M79.10 MYALGIA: ICD-10-CM

## 2025-03-20 LAB
25(OH)D3 SERPL-MCNC: 60 NG/ML (ref 30–100)
ALBUMIN SERPL BCP-MCNC: 4.4 G/DL (ref 3.2–4.9)
ALBUMIN/GLOB SERPL: 1.3 G/DL
ALP SERPL-CCNC: 74 U/L (ref 30–99)
ALT SERPL-CCNC: 22 U/L (ref 2–50)
ANION GAP SERPL CALC-SCNC: 13 MMOL/L (ref 7–16)
AST SERPL-CCNC: 25 U/L (ref 12–45)
BASOPHILS # BLD AUTO: 1 % (ref 0–1.8)
BASOPHILS # BLD: 0.07 K/UL (ref 0–0.12)
BILIRUB SERPL-MCNC: 0.7 MG/DL (ref 0.1–1.5)
BUN SERPL-MCNC: 20 MG/DL (ref 8–22)
CALCIUM ALBUM COR SERPL-MCNC: 9.6 MG/DL (ref 8.5–10.5)
CALCIUM SERPL-MCNC: 9.9 MG/DL (ref 8.5–10.5)
CHLORIDE SERPL-SCNC: 104 MMOL/L (ref 96–112)
CK SERPL-CCNC: 115 U/L (ref 0–154)
CO2 SERPL-SCNC: 25 MMOL/L (ref 20–33)
CREAT SERPL-MCNC: 0.97 MG/DL (ref 0.5–1.4)
CRP SERPL HS-MCNC: <0.3 MG/DL (ref 0–0.75)
EOSINOPHIL # BLD AUTO: 0.17 K/UL (ref 0–0.51)
EOSINOPHIL NFR BLD: 2.5 % (ref 0–6.9)
ERYTHROCYTE [DISTWIDTH] IN BLOOD BY AUTOMATED COUNT: 45.7 FL (ref 35.9–50)
ERYTHROCYTE [SEDIMENTATION RATE] IN BLOOD BY WESTERGREN METHOD: 4 MM/HOUR (ref 0–20)
EST. AVERAGE GLUCOSE BLD GHB EST-MCNC: 177 MG/DL
FOLATE SERPL-MCNC: 17.6 NG/ML
GFR SERPLBLD CREATININE-BSD FMLA CKD-EPI: 79 ML/MIN/1.73 M 2
GLOBULIN SER CALC-MCNC: 3.3 G/DL (ref 1.9–3.5)
GLUCOSE SERPL-MCNC: 157 MG/DL (ref 65–99)
HBA1C MFR BLD: 7.8 % (ref 4–5.6)
HCT VFR BLD AUTO: 51.4 % (ref 42–52)
HGB BLD-MCNC: 16.7 G/DL (ref 14–18)
IMM GRANULOCYTES # BLD AUTO: 0.02 K/UL (ref 0–0.11)
IMM GRANULOCYTES NFR BLD AUTO: 0.3 % (ref 0–0.9)
LYMPHOCYTES # BLD AUTO: 1.75 K/UL (ref 1–4.8)
LYMPHOCYTES NFR BLD: 26.1 % (ref 22–41)
MCH RBC QN AUTO: 30.4 PG (ref 27–33)
MCHC RBC AUTO-ENTMCNC: 32.5 G/DL (ref 32.3–36.5)
MCV RBC AUTO: 93.6 FL (ref 81.4–97.8)
MONOCYTES # BLD AUTO: 0.66 K/UL (ref 0–0.85)
MONOCYTES NFR BLD AUTO: 9.9 % (ref 0–13.4)
NEUTROPHILS # BLD AUTO: 4.03 K/UL (ref 1.82–7.42)
NEUTROPHILS NFR BLD: 60.2 % (ref 44–72)
NRBC # BLD AUTO: 0 K/UL
NRBC BLD-RTO: 0 /100 WBC (ref 0–0.2)
PLATELET # BLD AUTO: 172 K/UL (ref 164–446)
PMV BLD AUTO: 12.6 FL (ref 9–12.9)
POTASSIUM SERPL-SCNC: 4.8 MMOL/L (ref 3.6–5.5)
PROT SERPL-MCNC: 7.7 G/DL (ref 6–8.2)
RBC # BLD AUTO: 5.49 M/UL (ref 4.7–6.1)
SODIUM SERPL-SCNC: 142 MMOL/L (ref 135–145)
TSH SERPL DL<=0.005 MIU/L-ACNC: 4.51 UIU/ML (ref 0.38–5.33)
VIT B12 SERPL-MCNC: 877 PG/ML (ref 211–911)
WBC # BLD AUTO: 6.7 K/UL (ref 4.8–10.8)

## 2025-03-20 PROCEDURE — 82746 ASSAY OF FOLIC ACID SERUM: CPT

## 2025-03-20 PROCEDURE — 83036 HEMOGLOBIN GLYCOSYLATED A1C: CPT

## 2025-03-20 PROCEDURE — 82607 VITAMIN B-12: CPT

## 2025-03-20 PROCEDURE — 85025 COMPLETE CBC W/AUTO DIFF WBC: CPT

## 2025-03-20 PROCEDURE — 82085 ASSAY OF ALDOLASE: CPT

## 2025-03-20 PROCEDURE — 82550 ASSAY OF CK (CPK): CPT

## 2025-03-20 PROCEDURE — 82306 VITAMIN D 25 HYDROXY: CPT

## 2025-03-20 PROCEDURE — 84443 ASSAY THYROID STIM HORMONE: CPT

## 2025-03-20 PROCEDURE — 86140 C-REACTIVE PROTEIN: CPT

## 2025-03-20 PROCEDURE — 80053 COMPREHEN METABOLIC PANEL: CPT

## 2025-03-20 PROCEDURE — 86038 ANTINUCLEAR ANTIBODIES: CPT

## 2025-03-20 PROCEDURE — 36415 COLL VENOUS BLD VENIPUNCTURE: CPT

## 2025-03-20 PROCEDURE — 85652 RBC SED RATE AUTOMATED: CPT

## 2025-03-21 ENCOUNTER — TELEPHONE (OUTPATIENT)
Dept: CARDIOLOGY | Facility: MEDICAL CENTER | Age: 80
End: 2025-03-21
Payer: MEDICARE

## 2025-03-21 NOTE — TELEPHONE ENCOUNTER
Called pt to [facility]v. Spoke w/ wife Gloria, she will have pt call back to [facility] appt. /cg 03/21/25

## 2025-03-21 NOTE — TELEPHONE ENCOUNTER
Last OV 5/8/24.  Latest lab results 3/20/25 in file.    To Scheduling. Please contact patient to schedule next visit for further refills. Thank you very much!

## 2025-03-22 LAB — NUCLEAR IGG SER QL IA: NORMAL

## 2025-03-23 LAB — ALDOLASE SERPL-CCNC: 2.8 U/L (ref 1.2–7.6)

## 2025-03-27 ENCOUNTER — OFFICE VISIT (OUTPATIENT)
Dept: MEDICAL GROUP | Facility: PHYSICIAN GROUP | Age: 80
End: 2025-03-27
Payer: MEDICARE

## 2025-03-27 ENCOUNTER — ANTICOAGULATION VISIT (OUTPATIENT)
Dept: VASCULAR LAB | Facility: MEDICAL CENTER | Age: 80
End: 2025-03-27
Attending: INTERNAL MEDICINE
Payer: MEDICARE

## 2025-03-27 VITALS
WEIGHT: 235.9 LBS | SYSTOLIC BLOOD PRESSURE: 122 MMHG | OXYGEN SATURATION: 99 % | TEMPERATURE: 97.1 F | RESPIRATION RATE: 16 BRPM | BODY MASS INDEX: 27.85 KG/M2 | HEART RATE: 80 BPM | HEIGHT: 77 IN | DIASTOLIC BLOOD PRESSURE: 78 MMHG

## 2025-03-27 DIAGNOSIS — D68.69 HYPERCOAGULABLE STATE DUE TO PERMANENT ATRIAL FIBRILLATION (HCC): Chronic | ICD-10-CM

## 2025-03-27 DIAGNOSIS — C61 PROSTATE CANCER (HCC): ICD-10-CM

## 2025-03-27 DIAGNOSIS — Z79.01 CHRONIC ANTICOAGULATION: ICD-10-CM

## 2025-03-27 DIAGNOSIS — E11.40 CONTROLLED TYPE 2 DIABETES MELLITUS WITH DIABETIC NEUROPATHY, WITHOUT LONG-TERM CURRENT USE OF INSULIN (HCC): ICD-10-CM

## 2025-03-27 DIAGNOSIS — I48.21 PERMANENT ATRIAL FIBRILLATION (HCC): Chronic | ICD-10-CM

## 2025-03-27 DIAGNOSIS — R53.1 WEAKNESS: ICD-10-CM

## 2025-03-27 DIAGNOSIS — I48.21 HYPERCOAGULABLE STATE DUE TO PERMANENT ATRIAL FIBRILLATION (HCC): Chronic | ICD-10-CM

## 2025-03-27 LAB — INR PPP: 2.6 (ref 2–3.5)

## 2025-03-27 PROCEDURE — 85610 PROTHROMBIN TIME: CPT

## 2025-03-27 PROCEDURE — 99211 OFF/OP EST MAY X REQ PHY/QHP: CPT | Performed by: NURSE PRACTITIONER

## 2025-03-27 PROCEDURE — RXMED WILLOW AMBULATORY MEDICATION CHARGE: Performed by: INTERNAL MEDICINE

## 2025-03-27 ASSESSMENT — FIBROSIS 4 INDEX: FIB4 SCORE: 2.48

## 2025-03-27 NOTE — PROGRESS NOTES
CC: Follow-up lab work, diabetes.    HPI:  Cheikh presents with the following    1. Prostate cancer ([facility])  12/5/2022:Closely followed by urology.  Diagnosed with prostate cancer 18th September, [place] 6, grade 1, low-grade.  Treatment active surveillance with MRI.  MRI showed positive mid gland peripheral lesion, negative for lymphadenopathy.  Appointment with urology in December 26.  PSA 1.17.  Status post TURP September 2023     3/26/2023:.  Patient followed up with urology and completed prostate MRI in November 2023.  Noted left peripheral mid gland suspicious lesion.  No pelvic lymphadenopathy or suspicious osseous lesions.  PI-RADS score 4- high.  Patient is scheduled for prostate biopsy in early April.     7/31/2024:.  Patient was seen by Dr. Batista with PSA levels every 6 months.  Repeat MRI in 2026.  In April.  Plan is to follow-up patient was sent over to [facility] for a radiation consult.  Treatment options discussed.  Patient will consider.  Follow-up with urology in October with PSA level.     8/20/2024:.  Patient has a follow-up with oncology for consult regarding treatment options for his prostate cancer.       11/20/24:.  Patient opted for PSA monitoring every 6 months.  Most recent PSA level 1.63 and stable.  Patient denies any pain, weight loss, fatigue.     2/20/2025:.  Patient's follow-up PSA level is 1.7.  He does not have an appointment with urology at this time.  Monitoring every 6 months.    3/27/2025:.  Patient contacted urology and was told he needs only yearly follow-up appointments with PSA monitoring.    2. Controlled type 2 diabetes mellitus with diabetic neuropathy, without long-term current use of insulin ([facility])  12/5/2023: Chronic, stable.  Patient is currently taking Jardiance 25 mg daily, metformin 1000 mg twice daily, glimepiride 2 mg dose.  Patient does not check his blood sugars regularly.  Hemoglobin A1c 7.5.17 in April.  Patient is very limited in his exercise  routine, has a walker.  Not following a diabetic diet.  Due for monofilament.     3/26/2024:.  Patient continues his Amaryl 2 mg daily, Jardiance 25 mg daily and metformin 1000 mg twice daily.  Patient does not check his blood sugars.  No change in diet.  He has become more sedentary given the winter months and chronic issues.  Hemoglobin A1c 7.6, up from 7.5 in December.     8/20/2024:.  Patient continues on metformin, Jardiance 25 mg daily, glimepiride 2 mg daily.  Hemoglobin A1c 6.9, down from 7.6.  Patient is concerned as he is falling into the donut hole for his coverage for Jardiance.     11/20/2024:.  Patient was able to continue receiving his Jardiance, glimepiride and metformin without additional cost.  Patient does not check his blood sugars.  Patient has no complaints.     2/20/2025: Chronic.  Stable.  Patient continues on Jardiance, glimepiride, metformin at current doses.  No medication dose changes.  No change in diet however patient has been fatigued and has not been physically active.  Gained 5 pounds.  Hemoglobin A1c 8.5, up from 6.9.    3/27/2025:.  Repeat hemoglobin 7.8, down from 8.5.  No changes in his medications but has been more physically active.    3. Weakness  On patient's last visit in February he complained of weakness, arthralgias, myalgias and feeling very tired.  He was concerned about PMR as he was having upper extremity aches as well as his big muscle groups in his legs.  They felt achy.  CRP, ESR, CPK, aldolase and POONAM within normal limits.  Vitamin B12, folate within normal limits.  TSH within normal limits.  Patient has since started working in his backyard and walking regularly due to better weather.  He exercises on his stationary bike 3 times a week and would like to return to the gym at some point.  Feels much improved.    Patient Active Problem List    Diagnosis Date Noted    Dysthymia 08/20/2024    Prostate cancer (HCC) 12/05/2023    Onychomycosis 12/05/2023    Vitamin D  deficiency 08/18/2022    Lower urinary tract symptoms due to benign prostatic hyperplasia 06/07/2022    Chronic gout of left hand 05/12/2022    Chronic gout of hand 03/29/2022    History of kidney stones 03/29/2022    Loss of balance 03/29/2022    Spinal stenosis of lumbar region without neurogenic claudication 03/29/2022    BMI 28.0-28.9,adult 02/17/2022    Benign prostatic hyperplasia with nocturia 02/17/2022    Hypercoagulable state due to permanent atrial fibrillation (HCC)     B12 deficiency 11/12/2020    Obesity (BMI 30-39.9) 03/27/2018    Controlled type 2 diabetes mellitus with diabetic neuropathy, without long-term current use of insulin (HCC) 03/13/2017    Risk for falls 03/13/2017    SHANNAN (obstructive sleep apnea) 12/12/2016    Dyslipidemia 12/12/2016    Left foot drop 12/12/2016    Chronic anticoagulation 09/20/2013    Essential hypertension 07/09/2013    Permanent atrial fibrillation (HCC)        Current Outpatient Medications   Medication Sig Dispense Refill    warfarin (COUMADIN) 5 MG Tab Take 1-1.5 tablets by mouth daily or as directed by [facility] 135 Tablet 3    Empagliflozin (JARDIANCE) 25 MG Tab Take 1 Tablet by mouth every day. 100 Tablet 1    metformin (GLUCOPHAGE) 1000 MG tablet TAKE ONE TABLET BY MOUTH TWICE DAILY WITH MEALS 200 Tablet 3    atorvastatin (LIPITOR) 10 MG Tab TAKE 1 TABLET BY MOUTH EVERY  Tablet 3    enalapril (VASOTEC) 10 MG Tab Take 1 Tablet by mouth every day. 100 Tablet 3    metoprolol tartrate (LOPRESSOR) 25 MG Tab Take 1 Tablet by mouth 2 times a day. 200 Tablet 3    colchicine (COLCRYS) 0.6 MG Tab Take 1 tablet by mouth at onset of acute gout attack, may repeat in 1 hour. 20 Tablet 1    glimepiride (AMARYL) 2 MG Tab Take 1 Tablet by mouth every morning. 100 Tablet 3    moxifloxacin (VIGAMOX) 0.5 % Solution Administer 1 Drop into the right eye 4 times a day. 3 mL 2    prednisoLONE acetate (PRED FORTE) 1 % Suspension Administer 1 Drop into the  right eye 4 times a day. 10 mL 2    cyanocobalamin ([employer] VITAMIN B12) 1000 MCG Tab Take 1 Tablet by mouth every day. 100 Tablet 3    cyanocobalamin ([employer] VITAMIN B12) 1000 MCG Tab Take 1 tablet by mouth every day. 100 Tablet 1    Misc. Devices Misc Please provide the patient with a four wheel walker with a seat. M48.061 M21.372 M21.371 1 Each 0    VITAMIN D, CHOLECALCIFEROL, PO Take 5,000 Units by mouth.       No current facility-administered medications for this visit.         Allergies as of 03/27/2025 - Reviewed 03/27/2025   Allergen Reaction Noted    Nkda [no known drug allergy]  07/31/2009        Social History     Socioeconomic History    Marital status:      Spouse name: Not on file    Number of children: Not on file    Years of education: Not on file    Highest education level: Not on file   Occupational History    Not on file   Tobacco Use    Smoking status: Never    Smokeless tobacco: Never   Vaping Use    Vaping status: Never Used   Substance and Sexual Activity    Alcohol use: Yes     Alcohol/week: 6.0 oz     Types: 10 Glasses of wine per week    Drug use: No    Sexual activity: Never     Partners: Female   Other Topics Concern    Not on file   Social History Narrative    Not on file     Social Drivers of Health     Financial Resource Strain: Not on file   Food Insecurity: Not on file   Transportation Needs: Not on file   Physical Activity: Not on file   Stress: Not on file   Social Connections: Not on file   Intimate Partner Violence: Not on file   Housing Stability: Not on file       Family History   Problem Relation Age of Onset    Heart Attack Father 59    Heart Disease Father     Hypertension Brother     Diabetes Brother     Arthritis Brother     Thyroid Mother 50    Breast Cancer Sister     No Known Problems Maternal Grandmother     No Known Problems Maternal Grandfather     No Known Problems Paternal Grandmother     No Known Problems Paternal Grandfather     Arrythmia Brother     No Known  "Problems Brother     No Known Problems Daughter        Past Surgical History:   Procedure Laterality Date    ORIF, FRACTURE, FEMUR Left 2/24/2016    Procedure: DISTAL FEMUR ORIF;  Surgeon: Montrell Eddy M.D.;  Location: SURGERY [facility];  Service:     CYSTOSCOPY STENT PLACEMENT  1/13/2014    Performed by Main Batista M.D. at SURGERY [facility]    URETEROSCOPY  1/13/2014    Performed by Main Batista M.D. at SURGERY [facility]    LASERTRIPSY  1/13/2014    Performed by Main Batista M.D. at SURGERY [facility]    OTHER      Surgery for kidney stones    RECOVERY  7/11/2013    Performed by Cath-Recovery Surgery at SURGERY SAME DAY [facility]    KNEE ARTHROPLASTY TOTAL  12/1/2009    Performed by GIACOMO PETTIT at SURGERY [facility]    KNEE ARTHROPLASTY TOTAL  3/2009    Left    LUMBAR LAMINECTOMY DISKECTOMY  7/2006    L1-L5/ residual left foot weaknesss     REPAIR, KNEE, ACL  12/2005    Right    ORIF, FRACTURE, FEMUR         ROS:  Denies any Headache,Chest pain,  Shortness of breath,  Abdominal pain, Changes of bowel or bladder, Lower ext edema, Fevers, Nights sweats, Weight Changes, Focal weakness or numbness.  All other systems are negative.    /78 (BP Location: Right arm, Patient Position: Sitting)   Pulse 80   Temp 36.2 °C (97.1 °F) (Temporal)   Resp 16   Ht 1.956 m (6' 5\")   Wt 107 kg (235 lb 14.4 oz)   SpO2 99%   BMI 27.97 kg/m²      Constitutional: Alert, no distress, well-groomed.  Skin: Warm, dry, good turgor, no rashes in visible areas.  Eye: Equal, round and reactive, conjunctiva clear, lids normal.  ENMT: Lips without lesions, good dentition, moist mucous membranes.  Neck: Trachea midline, no masses, no thyromegaly.  Respiratory: Unlabored respiratory effort, no cough.  Abdomen: Soft, no gross masses.  MSK: Normal gait, moves all extremities.  Neuro: Grossly non-focal. No cranial nerve deficit. Strength and sensation intact.   Psych: Alert and oriented x3, " normal affect and mood.        Assessment and Plan.   80 y.o. male presenting with the following.     1. Prostate cancer (HCC)  Chronic.  Stable.  Monitoring per urology yearly with PSA every 6 months.    2. Controlled type 2 diabetes mellitus with diabetic neuropathy, without long-term current use of insulin (HCC)  Chronic.  Stable.  Continue current plan of care.  Increase physical activity.  Monitor A1c.    3. Weakness  Stable.  Labs reviewed.  Increase physical activity as tolerated.  HEP discussed.

## 2025-03-27 NOTE — PROGRESS NOTES
Anticoagulation Summary  As of 3/27/2025      INR goal:  2.0-3.0   TTR:  77.7% (8.8 y)   INR used for dosin.60 (3/27/2025)   Warfarin maintenance plan:  5 mg (5 mg x 1) every Tue, Thu, Sat; 7.5 mg (5 mg x 1.5) all other days   Weekly warfarin total:  45 mg   Plan last modified:  Amanda Guzman A.P.NDenzel (2024)   Next INR check:  2025   Target end date:  Indefinite    Indications    Permanent atrial fibrillation (HCC) [I48.21]  Chronic anticoagulation [Z79.01]  Hypercoagulable state due to permanent atrial fibrillation (HCC) [D68.69  I48.21]                 Anticoagulation Episode Summary       INR check location:  Anticoagulation Clinic    Preferred lab:  --    Send INR reminders to:  --    Comments:  Indomethacin as needed, discussed risks with the patient.          Anticoagulation Care Providers       Provider Role Specialty Phone number    Cheikh Turner M.D. Referring Cardiovascular Disease (Cardiology) [phone]    [facility]ticoagulation [facility] Responsible  [phone]                Refer to Patient Findings for HPI:  Patient Findings       Negatives:  Signs/symptoms of thrombosis, Signs/symptoms of bleeding, Laboratory test error suspected, Change in health, Change in alcohol use, Change in activity, Upcoming invasive procedure, Emergency department visit, Upcoming dental procedure, Missed doses, Extra doses, Change in medications, Change in diet/appetite, Hospital admission, Bruising, Other complaints            Date Referral Placed: 3/13/2025      Vitals:  There were no vitals filed for this visit.  Pt declined vitals    Verified current warfarin dosing schedule.    Medications reconciled.  Pt is not on antiplatelet therapy.      A/P   INR is therapeutic today at 2.6. INR down from 3.8 last visit with a one time dose hold.  Reason(s) for out of range INR today: No obvious causes      Warfarin dosing recommendation: Continue current regimen as outlined above.    Pt educated to contact  our clinic with any changes in medications or s/s of bleeding or thrombosis. Pt is aware to seek immediate medical attention for falls, head injury or deep cuts.    Request pt to return in 3 week(s). He would like to return in 4 weeks since INRs generally stable.    PAMELA Bryan.

## 2025-03-31 ENCOUNTER — PHARMACY VISIT (OUTPATIENT)
Dept: PHARMACY | Facility: MEDICAL CENTER | Age: 80
End: 2025-03-31
Payer: COMMERCIAL

## 2025-04-08 DIAGNOSIS — E78.5 DYSLIPIDEMIA: ICD-10-CM

## 2025-04-08 DIAGNOSIS — E11.9 CONTROLLED TYPE 2 DIABETES MELLITUS WITHOUT COMPLICATION, WITHOUT LONG-TERM CURRENT USE OF INSULIN (HCC): ICD-10-CM

## 2025-04-08 RX ORDER — EMPAGLIFLOZIN 25 MG/1
1 TABLET, FILM COATED ORAL DAILY
Qty: 100 TABLET | Refills: 1 | OUTPATIENT
Start: 2025-04-08

## 2025-04-15 PROCEDURE — RXMED WILLOW AMBULATORY MEDICATION CHARGE: Performed by: INTERNAL MEDICINE

## 2025-04-15 RX ORDER — GLIMEPIRIDE 2 MG/1
2 TABLET ORAL EVERY MORNING
Qty: 100 TABLET | Refills: 3 | Status: SHIPPED | OUTPATIENT
Start: 2025-04-15

## 2025-04-16 ENCOUNTER — PHARMACY VISIT (OUTPATIENT)
Dept: PHARMACY | Facility: MEDICAL CENTER | Age: 80
End: 2025-04-16
Payer: COMMERCIAL

## 2025-04-24 ENCOUNTER — ANTICOAGULATION VISIT (OUTPATIENT)
Dept: VASCULAR LAB | Facility: MEDICAL CENTER | Age: 80
End: 2025-04-24
Attending: INTERNAL MEDICINE
Payer: MEDICARE

## 2025-04-24 DIAGNOSIS — I48.21 HYPERCOAGULABLE STATE DUE TO PERMANENT ATRIAL FIBRILLATION (HCC): Chronic | ICD-10-CM

## 2025-04-24 DIAGNOSIS — I48.21 PERMANENT ATRIAL FIBRILLATION (HCC): Chronic | ICD-10-CM

## 2025-04-24 DIAGNOSIS — Z79.01 CHRONIC ANTICOAGULATION: ICD-10-CM

## 2025-04-24 DIAGNOSIS — D68.69 HYPERCOAGULABLE STATE DUE TO PERMANENT ATRIAL FIBRILLATION (HCC): Chronic | ICD-10-CM

## 2025-04-24 LAB — INR PPP: 2.4 (ref 2–3.5)

## 2025-04-24 PROCEDURE — 99211 OFF/OP EST MAY X REQ PHY/QHP: CPT | Performed by: NURSE PRACTITIONER

## 2025-04-24 PROCEDURE — 85610 PROTHROMBIN TIME: CPT

## 2025-04-24 NOTE — PROGRESS NOTES
Anticoagulation Summary  As of 2025      INR goal:  2.0-3.0   TTR:  77.8% (8.9 y)   INR used for dosin.40 (2025)   Warfarin maintenance plan:  5 mg (5 mg x 1) every Tue, Thu, Sat; 7.5 mg (5 mg x 1.5) all other days   Weekly warfarin total:  45 mg   Plan last modified:  Amanda Guzman A.P.NDenzel (2024)   Next INR check:  2025   Target end date:  Indefinite    Indications    Permanent atrial fibrillation (HCC) [I48.21]  Chronic anticoagulation [Z79.01]  Hypercoagulable state due to permanent atrial fibrillation (HCC) [D68.69  I48.21]                 Anticoagulation Episode Summary       INR check location:  Anticoagulation Clinic    Preferred lab:  --    Send INR reminders to:  --    Comments:  Indomethacin as needed, discussed risks with the patient.          Anticoagulation Care Providers       Provider Role Specialty Phone number    Cheikh Turner M.D. Referring Cardiovascular Disease (Cardiology) [phone]    [facility]ticoagulation [facility] Responsible  [phone]                Refer to Patient Findings for HPI:  Patient Findings       Negatives:  Signs/symptoms of thrombosis, Signs/symptoms of bleeding, Laboratory test error suspected, Change in health, Change in alcohol use, Change in activity, Upcoming invasive procedure, Emergency department visit, Upcoming dental procedure, Missed doses, Extra doses, Change in medications, Change in diet/appetite, Hospital admission, Bruising, Other complaints              Date Referral Placed: 3/13/2025      Vitals:  There were no vitals filed for this visit.  Pt declined vitals    Verified current warfarin dosing schedule.    Medications reconciled.  Pt is not on antiplatelet therapy.      A/P   INR is therapeutic today.  Reason(s) for out of range INR today: N/a    Warfarin dosing recommendation: Continue current regimen as outlined above.    Pt educated to contact our clinic with any changes in medications or s/s of bleeding or thrombosis.  Pt is aware to seek immediate medical attention for falls, head injury or deep cuts.    Request pt to return in 5 week(s). He would like to return in 6 weeks due to pt traveling.    PAMELA Bryan.

## 2025-04-28 ENCOUNTER — OFFICE VISIT (OUTPATIENT)
Dept: CARDIOLOGY | Facility: MEDICAL CENTER | Age: 80
End: 2025-04-28
Attending: INTERNAL MEDICINE
Payer: MEDICARE

## 2025-04-28 VITALS
OXYGEN SATURATION: 99 % | BODY MASS INDEX: 27.63 KG/M2 | WEIGHT: 234 LBS | HEART RATE: 80 BPM | DIASTOLIC BLOOD PRESSURE: 62 MMHG | SYSTOLIC BLOOD PRESSURE: 106 MMHG | HEIGHT: 77 IN | RESPIRATION RATE: 16 BRPM

## 2025-04-28 DIAGNOSIS — D68.69 HYPERCOAGULABLE STATE DUE TO PERMANENT ATRIAL FIBRILLATION (HCC): Chronic | ICD-10-CM

## 2025-04-28 DIAGNOSIS — I10 ESSENTIAL HYPERTENSION: ICD-10-CM

## 2025-04-28 DIAGNOSIS — E78.5 DYSLIPIDEMIA: ICD-10-CM

## 2025-04-28 DIAGNOSIS — E11.9 CONTROLLED TYPE 2 DIABETES MELLITUS WITHOUT COMPLICATION, WITHOUT LONG-TERM CURRENT USE OF INSULIN (HCC): ICD-10-CM

## 2025-04-28 DIAGNOSIS — I48.21 HYPERCOAGULABLE STATE DUE TO PERMANENT ATRIAL FIBRILLATION (HCC): Chronic | ICD-10-CM

## 2025-04-28 DIAGNOSIS — I34.0 NONRHEUMATIC MITRAL VALVE REGURGITATION: ICD-10-CM

## 2025-04-28 DIAGNOSIS — I48.21 PERMANENT ATRIAL FIBRILLATION (HCC): Chronic | ICD-10-CM

## 2025-04-28 PROCEDURE — 99213 OFFICE O/P EST LOW 20 MIN: CPT | Performed by: INTERNAL MEDICINE

## 2025-04-28 PROCEDURE — RXMED WILLOW AMBULATORY MEDICATION CHARGE: Performed by: INTERNAL MEDICINE

## 2025-04-28 RX ORDER — METOPROLOL TARTRATE 25 MG/1
25 TABLET, FILM COATED ORAL 2 TIMES DAILY
Qty: 200 TABLET | Refills: 3 | Status: SHIPPED | OUTPATIENT
Start: 2025-04-28

## 2025-04-28 RX ORDER — ATORVASTATIN CALCIUM 10 MG/1
10 TABLET, FILM COATED ORAL DAILY
Qty: 100 TABLET | Refills: 3 | Status: SHIPPED | OUTPATIENT
Start: 2025-04-28

## 2025-04-28 RX ORDER — ENALAPRIL MALEATE 10 MG/1
10 TABLET ORAL DAILY
Qty: 100 TABLET | Refills: 3 | Status: SHIPPED | OUTPATIENT
Start: 2025-04-28

## 2025-04-28 RX ORDER — EMPAGLIFLOZIN 25 MG/1
1 TABLET, FILM COATED ORAL DAILY
Qty: 100 TABLET | Refills: 3 | Status: SHIPPED | OUTPATIENT
Start: 2025-04-28

## 2025-04-28 ASSESSMENT — FIBROSIS 4 INDEX: FIB4 SCORE: 2.48

## 2025-04-28 NOTE — PROGRESS NOTES
"Chief Complaint   Patient presents with    Atrial Fibrillation     F/V DX: Permanent atrial fibrillation (HCC)    Anticoagulation       Subjective     Rajiv Perez is a 80 y.o. 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/29/2022    Chronic gout of left hand 05/12/2022    Diabetes     Diet,oral meds    Dysthymia 08/20/2024    History of kidney stones 03/29/2022    Hypertension     Hypertension     Loss of balance 03/29/2022    Onychomycosis 12/05/2023    Pain      \"stiff\" from arthritis, 2/10    Paroxysmal atrial fibrillation (HCC) 06/06/2013    Prostate cancer (HCC) 12/05/2023    Sleep apnea     CPAP    Snoring     Spinal stenosis of lumbar region without neurogenic claudication 03/29/2022    Stress 04/28/2023    Vitamin D deficiency 08/18/2022     Past Surgical History:   Procedure Laterality Date    ORIF, FRACTURE, FEMUR Left 2/24/2016    Procedure: DISTAL FEMUR ORIF;  Surgeon: Montrell Eddy M.D.;  Location: SURGERY [facility];  Service:     CYSTOSCOPY STENT PLACEMENT  1/13/2014    Performed by Main Batista M.D. at SURGERY [facility]    URETEROSCOPY  1/13/2014    Performed by Main Batista M.D. at [facility]    LASERTRIPSY  1/13/2014    Performed by Main Batista M.D. at SURGERY [facility]    OTHER      Surgery for kidney stones    RECOVERY  7/11/2013    Performed by Cath-Recovery Surgery at SURGERY SAME DAY [facility]    KNEE ARTHROPLASTY TOTAL  12/1/2009    Performed by GIACOMO PETTIT at SURGERY [facility]    KNEE ARTHROPLASTY TOTAL  3/2009    Left    LUMBAR LAMINECTOMY DISKECTOMY  7/2006    L1-L5/ residual left foot weaknesss     REPAIR, KNEE, ACL  12/2005    Right    ORIF, FRACTURE, FEMUR       Family History   Problem " Relation Age of Onset    Heart Attack Father 59    Heart Disease Father     Hypertension Brother     Diabetes Brother     Arthritis Brother     Thyroid Mother 50    Breast Cancer Sister     No Known Problems Maternal Grandmother     No Known Problems Maternal Grandfather     No Known Problems Paternal Grandmother     No Known Problems Paternal Grandfather     Arrythmia Brother     No Known Problems Brother     No Known Problems Daughter      Social History     Socioeconomic History    Marital status:      Spouse name: Not on file    Number of children: Not on file    Years of education: Not on file    Highest education level: Not on file   Occupational History    Not on file   Tobacco Use    Smoking status: Never    Smokeless tobacco: Never   Vaping Use    Vaping status: Never Used   Substance and Sexual Activity    Alcohol use: Yes     Alcohol/week: 6.0 oz     Types: 10 Glasses of wine per week    Drug use: No    Sexual activity: Never     Partners: Female   Other Topics Concern    Not on file   Social History Narrative    Not on file     Social Drivers of Health     Financial Resource Strain: Not on file   Food Insecurity: Not on file   Transportation Needs: Not on file   Physical Activity: Not on file   Stress: Not on file   Social Connections: Not on file   Intimate Partner Violence: Not on file   Housing Stability: Not on file     Allergies   Allergen Reactions    Nkda [No Known Drug Allergy]      Outpatient Encounter Medications as of 4/28/2025   Medication Sig Dispense Refill    glimepiride (AMARYL) 2 MG Tab Take 1 Tablet by mouth every morning. 100 Tablet 3    warfarin (COUMADIN) 5 MG Tab Take 1-1.5 tablets by mouth daily or as directed by [facility] 135 Tablet 3    Empagliflozin (JARDIANCE) 25 MG Tab Take 1 Tablet by mouth every day. 100 Tablet 1    metformin (GLUCOPHAGE) 1000 MG tablet TAKE ONE TABLET BY MOUTH TWICE DAILY WITH MEALS 200 Tablet 3    atorvastatin (LIPITOR) 10 MG Tab  "TAKE 1 TABLET BY MOUTH EVERY  Tablet 3    enalapril (VASOTEC) 10 MG Tab Take 1 Tablet by mouth every day. 100 Tablet 3    metoprolol tartrate (LOPRESSOR) 25 MG Tab Take 1 Tablet by mouth 2 times a day. 200 Tablet 3    cyanocobalamin ([employer] VITAMIN B12) 1000 MCG Tab Take 1 Tablet by mouth every day. 100 Tablet 3    cyanocobalamin ([employer] VITAMIN B12) 1000 MCG Tab Take 1 tablet by mouth every day. 100 Tablet 1    Misc. Devices Misc Please provide the patient with a four wheel walker with a seat. M48.061 M21.372 M21.371 1 Each 0    VITAMIN D, CHOLECALCIFEROL, PO Take 5,000 Units by mouth.      colchicine (COLCRYS) 0.6 MG Tab Take 1 tablet by mouth at onset of acute gout attack, may repeat in 1 hour. (Patient not taking: Reported on 4/28/2025) 20 Tablet 1    moxifloxacin (VIGAMOX) 0.5 % Solution Administer 1 Drop into the right eye 4 times a day. (Patient not taking: Reported on 4/28/2025) 3 mL 2    prednisoLONE acetate (PRED FORTE) 1 % Suspension Administer 1 Drop into the right eye 4 times a day. (Patient not taking: Reported on 4/28/2025) 10 mL 2     No facility-administered encounter medications on file as of 4/28/2025.     ROS           Objective     /62 (BP Location: Left arm, Patient Position: Sitting, BP Cuff Size: Adult)   Pulse 80   Resp 16   Ht 1.956 m (6' 5\")   Wt 106 kg (234 lb)   SpO2 99%   BMI 27.75 kg/m²     Physical Exam  Constitutional:       General: He is not in acute distress.     Appearance: He is not diaphoretic.   Eyes:      General: No scleral icterus.  Neck:      Vascular: No JVD.   Cardiovascular:      Rate and Rhythm: Normal rate. Rhythm irregular.      Heart sounds: Normal heart sounds. No murmur heard.     No friction rub. No gallop.   Pulmonary:      Effort: No respiratory distress.      Breath sounds: No wheezing or rales.   Abdominal:      General: Bowel sounds are normal.      Palpations: Abdomen is soft.   Musculoskeletal:      Right lower leg: No edema.      Left lower " leg: No edema.   Skin:     Findings: No rash.   Neurological:      Mental Status: He is alert. Mental status is at baseline.   Psychiatric:         Mood and Affect: Mood normal.            We reviewed in person the most recent labs  Recent Results (from the past 30 weeks)   POCT Protime    Collection Time: 10/10/24 12:00 AM   Result Value Ref Range    INR 3.10 2.00 - 3.50   POCT Protime    Collection Time: 11/07/24 12:00 AM   Result Value Ref Range    INR 2.70 2.00 - 3.50   POCT Protime    Collection Time: 12/12/24 12:00 AM   Result Value Ref Range    INR 1.80 (A) 2.00 - 3.50   POCT Protime    Collection Time: 01/30/25 12:00 AM   Result Value Ref Range    INR 2.70 2.00 - 3.50   PROSTATE SPECIFIC AG SCREENING    Collection Time: 02/13/25  6:19 AM   Result Value Ref Range    Prostatic Specific Antigen Tot 1.78 0.00 - 4.00 ng/mL   HEMOGLOBIN A1C    Collection Time: 02/13/25  6:19 AM   Result Value Ref Range    Glycohemoglobin 8.5 (H) 4.0 - 5.6 %    Est Avg Glucose 197 mg/dL   POCT Protime    Collection Time: 03/13/25 11:12 AM   Result Value Ref Range    INR 3.80 (A) 2.00 - 3.50   TSH WITH REFLEX TO FT4    Collection Time: 03/20/25  6:07 AM   Result Value Ref Range    TSH 4.510 0.380 - 5.330 uIU/mL   VITAMIN D,25 HYDROXY (DEFICIENCY)    Collection Time: 03/20/25  6:07 AM   Result Value Ref Range    25-Hydroxy   Vitamin D 25 60 30 - 100 ng/mL   CBC WITH DIFFERENTIAL    Collection Time: 03/20/25  6:07 AM   Result Value Ref Range    WBC 6.7 4.8 - 10.8 K/uL    RBC 5.49 4.70 - 6.10 M/uL    Hemoglobin 16.7 14.0 - 18.0 g/dL    Hematocrit 51.4 42.0 - 52.0 %    MCV 93.6 81.4 - 97.8 fL    MCH 30.4 27.0 - 33.0 pg    MCHC 32.5 32.3 - 36.5 g/dL    RDW 45.7 35.9 - 50.0 fL    Platelet Count 172 164 - 446 K/uL    MPV 12.6 9.0 - 12.9 fL    Neutrophils-Polys 60.20 44.00 - 72.00 %    Lymphocytes 26.10 22.00 - 41.00 %    Monocytes 9.90 0.00 - 13.40 %    Eosinophils 2.50 0.00 - 6.90 %    Basophils 1.00 0.00 - 1.80 %    Immature Granulocytes  0.30 0.00 - 0.90 %    Nucleated RBC 0.00 0.00 - 0.20 /100 WBC    Neutrophils (Absolute) 4.03 1.82 - 7.42 K/uL    Lymphs (Absolute) 1.75 1.00 - 4.80 K/uL    Monos (Absolute) 0.66 0.00 - 0.85 K/uL    Eos (Absolute) 0.17 0.00 - 0.51 K/uL    Baso (Absolute) 0.07 0.00 - 0.12 K/uL    Immature Granulocytes (abs) 0.02 0.00 - 0.11 K/uL    NRBC (Absolute) 0.00 K/uL   Comp Metabolic Panel    Collection Time: 03/20/25  6:07 AM   Result Value Ref Range    Sodium 142 135 - 145 mmol/L    Potassium 4.8 3.6 - 5.5 mmol/L    Chloride 104 96 - 112 mmol/L    Co2 25 20 - 33 mmol/L    Anion Gap 13.0 7.0 - 16.0    Glucose 157 (H) 65 - 99 mg/dL    Bun 20 8 - 22 mg/dL    Creatinine 0.97 0.50 - 1.40 mg/dL    Calcium 9.9 8.5 - 10.5 mg/dL    Correct Calcium 9.6 8.5 - 10.5 mg/dL    AST(SGOT) 25 12 - 45 U/L    ALT(SGPT) 22 2 - 50 U/L    Alkaline Phosphatase 74 30 - 99 U/L    Total Bilirubin 0.7 0.1 - 1.5 mg/dL    Albumin 4.4 3.2 - 4.9 g/dL    Total Protein 7.7 6.0 - 8.2 g/dL    Globulin 3.3 1.9 - 3.5 g/dL    A-G Ratio 1.3 g/dL   CREATINE KINASE    Collection Time: 03/20/25  6:07 AM   Result Value Ref Range    CPK Total 115 0 - 154 U/L   ALDOLASE    Collection Time: 03/20/25  6:07 AM   Result Value Ref Range    Aldolase 2.8 1.2 - 7.6 U/L   Sed Rate    Collection Time: 03/20/25  6:07 AM   Result Value Ref Range    Sed Rate Westergren 4 0 - 20 mm/hour   CRP QUANTITIVE (NON-CARDIAC)    Collection Time: 03/20/25  6:07 AM   Result Value Ref Range    Stat C-Reactive Protein <0.30 0.00 - 0.75 mg/dL   VITAMIN B12    Collection Time: 03/20/25  6:07 AM   Result Value Ref Range    Vitamin B12 -True Cobalamin 877 211 - 911 pg/mL   FOLATE    Collection Time: 03/20/25  6:07 AM   Result Value Ref Range    Folate -Folic Acid 17.6 >4.0 ng/mL   POONAM REFLEXIVE PROFILE    Collection Time: 03/20/25  6:07 AM   Result Value Ref Range    Antinuclear Antibody None Detected None Detected   HEMOGLOBIN A1C    Collection Time: 03/20/25  6:07 AM   Result Value Ref Range     Glycohemoglobin 7.8 (H) 4.0 - 5.6 %    Est Avg Glucose 177 mg/dL   ESTIMATED GFR    Collection Time: 03/20/25  6:07 AM   Result Value Ref Range    GFR (CKD-EPI) 79 >60 mL/min/1.73 m 2   POCT Protime    Collection Time: 03/27/25  9:48 AM   Result Value Ref Range    INR 2.60 2.00 - 3.50   POCT Protime    Collection Time: 04/24/25 11:20 AM   Result Value Ref Range    INR 2.40 2.00 - 3.50         Assessment & Plan     1. Permanent atrial fibrillation (HCC)  EC-ECHOCARDIOGRAM COMPLETE W/O CONT      2. Essential hypertension  metoprolol tartrate (LOPRESSOR) 25 MG Tab    enalapril (VASOTEC) 10 MG Tab      3. Hypercoagulable state due to permanent atrial fibrillation (HCC)        4. Dyslipidemia  atorvastatin (LIPITOR) 10 MG Tab    Empagliflozin (JARDIANCE) 25 MG Tab      5. Controlled type 2 diabetes mellitus without complication, without long-term current use of insulin (HCC)  Empagliflozin (JARDIANCE) 25 MG Tab      6. Nonrheumatic mitral valve regurgitation  EC-ECHOCARDIOGRAM COMPLETE W/O CONT          Medical Decision Making: Today's Assessment/Status/Plan:      It was my pleasure to meet with Mr. Perez.    We addressed the management of hypertension at today's visit. Blood pressure is well controlled.  We specifically assessed the labs on hypertension treatment    We addressed the management of dyslipidemia at today's visit. He is on appropriate lipid lowering medication.        We addressed the management of atrial fibrillation.  He is on proper anticoagulation and rate or rhythm control as appropriate.  We addressed the potential side effects and laboratory follow-up for these medications.    We addressed the management of chronic anticoagulation at today's visit. He understands the risks and benefits of chronic anticoagulation.  We reviewed and verified the results of annual testing for anemia and kidney function.    I will see Mr. Perez back in 1 year time and encouraged him to follow up with us over the  phone or electronically using my [employer] as issues arise.    It is my pleasure to participate in the care of Mr. Perez.  Please do not hesitate to contact me with questions or concerns.    Cheikh Turner MD PhD [facility]  Cardiologist [facility]    Please note that this dictation was created using voice recognition software. There may be errors I did not discover before finalizing the note.     () Today's E/M visit is associated with medical care services that serve as the continuing focal point for all needed health care services and/or with medical care services that  are part of ongoing care related to a patient's single, serious condition, or a complex condition: This includes  furnishing services to patients on an ongoing basis that result in care that is personalized  to the patient. The services result in a comprehensive, longitudinal, and continuous  relationship with the patient and involve delivery of team-based care that is accessible, coordinated with other practitioners and providers, and integrated with the broader health  care landscape.

## 2025-05-01 ENCOUNTER — PHARMACY VISIT (OUTPATIENT)
Dept: PHARMACY | Facility: MEDICAL CENTER | Age: 80
End: 2025-05-01
Payer: COMMERCIAL

## 2025-05-12 PROCEDURE — RXMED WILLOW AMBULATORY MEDICATION CHARGE: Performed by: INTERNAL MEDICINE

## 2025-05-13 ENCOUNTER — PHARMACY VISIT (OUTPATIENT)
Dept: PHARMACY | Facility: MEDICAL CENTER | Age: 80
End: 2025-05-13
Payer: COMMERCIAL

## 2025-06-02 ENCOUNTER — PHARMACY VISIT (OUTPATIENT)
Dept: PHARMACY | Facility: MEDICAL CENTER | Age: 80
End: 2025-06-02
Payer: COMMERCIAL

## 2025-06-02 PROCEDURE — RXMED WILLOW AMBULATORY MEDICATION CHARGE: Performed by: NURSE PRACTITIONER

## 2025-06-05 ENCOUNTER — ANTICOAGULATION VISIT (OUTPATIENT)
Dept: VASCULAR LAB | Facility: MEDICAL CENTER | Age: 80
End: 2025-06-05
Attending: INTERNAL MEDICINE
Payer: MEDICARE

## 2025-06-05 DIAGNOSIS — I48.21 PERMANENT ATRIAL FIBRILLATION (HCC): Primary | Chronic | ICD-10-CM

## 2025-06-05 DIAGNOSIS — D68.69 HYPERCOAGULABLE STATE DUE TO PERMANENT ATRIAL FIBRILLATION (HCC): Chronic | ICD-10-CM

## 2025-06-05 DIAGNOSIS — I48.21 HYPERCOAGULABLE STATE DUE TO PERMANENT ATRIAL FIBRILLATION (HCC): Chronic | ICD-10-CM

## 2025-06-05 LAB — INR PPP: 2 (ref 2–3.5)

## 2025-06-05 PROCEDURE — 85610 PROTHROMBIN TIME: CPT

## 2025-06-05 PROCEDURE — 99211 OFF/OP EST MAY X REQ PHY/QHP: CPT | Performed by: NURSE PRACTITIONER

## 2025-06-05 NOTE — PROGRESS NOTES
Anticoagulation Summary  As of 2025      INR goal:  2.0-3.0   TTR:  78.1% (9 y)   INR used for dosin.00 (2025)   Warfarin maintenance plan:  5 mg (5 mg x 1) every Tue, Thu, Sat; 7.5 mg (5 mg x 1.5) all other days   Weekly warfarin total:  45 mg   Plan last modified:  MARTI BryanPDenzelNDenzel (2024)   Next INR check:  2025   Target end date:  Indefinite    Indications    Permanent atrial fibrillation (HCC) [I48.21]  Hypercoagulable state due to permanent atrial fibrillation (HCC) [D68.69  I48.21]                 Anticoagulation Episode Summary       INR check location:  Anticoagulation Clinic    Preferred lab:  --    Send INR reminders to:  --    Comments:  Indomethacin as needed, discussed risks with the patient.          Anticoagulation Care Providers       Provider Role Specialty Phone number    Cheikh Turner M.D. Referring Cardiovascular Disease (Cardiology) [phone]    [facility] Anticoagulation Services Responsible  [phone]                Refer to Patient Findings for HPI:  Patient Findings       Positives:  Change in diet/appetite    Negatives:  Signs/symptoms of thrombosis, Signs/symptoms of bleeding, Laboratory test error suspected, Change in health, Change in alcohol use, Change in activity, Upcoming invasive procedure, Emergency department visit, Upcoming dental procedure, Missed doses, Extra doses, Change in medications, Hospital admission, Bruising, Other complaints    Comments:  Recently returned from a week long trip and his diet was a little off.                Date Referral Placed: 3/13/2025      Vitals:  There were no vitals filed for this visit.  Pt declined vitals    Verified current warfarin dosing schedule.    Medications reconciled.  Pt is not on antiplatelet therapy.      A/P   INR is therapeutic today.  Reason(s) for out of range INR today: N/a    Warfarin dosing recommendation: Continue current regimen as outlined above.    Pt educated to contact our clinic  with any changes in medications or s/s of bleeding or thrombosis. Pt is aware to seek immediate medical attention for falls, head injury or deep cuts.    Request pt to return in 7 week(s). Pt agrees.    PAMELA Bryan.

## 2025-06-13 ENCOUNTER — PHARMACY VISIT (OUTPATIENT)
Dept: PHARMACY | Facility: MEDICAL CENTER | Age: 80
End: 2025-06-13
Payer: COMMERCIAL

## 2025-06-13 PROCEDURE — RXMED WILLOW AMBULATORY MEDICATION CHARGE: Performed by: INTERNAL MEDICINE

## 2025-07-01 ENCOUNTER — APPOINTMENT (OUTPATIENT)
Dept: MEDICAL GROUP | Facility: PHYSICIAN GROUP | Age: 80
End: 2025-07-01
Payer: MEDICARE

## 2025-07-01 VITALS
SYSTOLIC BLOOD PRESSURE: 130 MMHG | DIASTOLIC BLOOD PRESSURE: 70 MMHG | HEIGHT: 77 IN | OXYGEN SATURATION: 97 % | WEIGHT: 229.4 LBS | HEART RATE: 65 BPM | BODY MASS INDEX: 27.09 KG/M2 | TEMPERATURE: 97.6 F

## 2025-07-01 DIAGNOSIS — E11.40 CONTROLLED TYPE 2 DIABETES MELLITUS WITH DIABETIC NEUROPATHY, WITHOUT LONG-TERM CURRENT USE OF INSULIN (HCC): Primary | ICD-10-CM

## 2025-07-01 DIAGNOSIS — D68.69 HYPERCOAGULABLE STATE DUE TO PERMANENT ATRIAL FIBRILLATION (HCC): Chronic | ICD-10-CM

## 2025-07-01 DIAGNOSIS — R63.4 WEIGHT LOSS: ICD-10-CM

## 2025-07-01 DIAGNOSIS — I48.21 HYPERCOAGULABLE STATE DUE TO PERMANENT ATRIAL FIBRILLATION (HCC): Chronic | ICD-10-CM

## 2025-07-01 DIAGNOSIS — C61 PROSTATE CANCER (HCC): ICD-10-CM

## 2025-07-01 LAB
HBA1C MFR BLD: 7.4 % (ref ?–5.8)
POCT INT CON NEG: NEGATIVE
POCT INT CON POS: POSITIVE

## 2025-07-01 PROCEDURE — 99214 OFFICE O/P EST MOD 30 MIN: CPT | Performed by: INTERNAL MEDICINE

## 2025-07-01 PROCEDURE — 3075F SYST BP GE 130 - 139MM HG: CPT | Performed by: INTERNAL MEDICINE

## 2025-07-01 PROCEDURE — 83036 HEMOGLOBIN GLYCOSYLATED A1C: CPT | Performed by: INTERNAL MEDICINE

## 2025-07-01 PROCEDURE — 3078F DIAST BP <80 MM HG: CPT | Performed by: INTERNAL MEDICINE

## 2025-07-01 ASSESSMENT — FIBROSIS 4 INDEX: FIB4 SCORE: 2.48

## 2025-07-01 NOTE — PROGRESS NOTES
CC: 3-month follow-up, diabetes.    HPI:  Cheikh presents with the following    1. Controlled type 2 diabetes mellitus with diabetic neuropathy, without long-term current use of insulin ([facility])  12/5/2023: Chronic, stable.  Patient is currently taking Jardiance 25 mg daily, metformin 1000 mg twice daily, glimepiride 2 mg dose.  Patient does not check his blood sugars regularly.  Hemoglobin A1c 7.5.17 in April.  Patient is very limited in his exercise routine, has a walker.  Not following a diabetic diet.  Due for monofilament.     3/26/2024:.  Patient continues his Amaryl 2 mg daily, Jardiance 25 mg daily and metformin 1000 mg twice daily.  Patient does not check his blood sugars.  No change in diet.  He has become more sedentary given the winter months and chronic issues.  Hemoglobin A1c 7.6, up from 7.5 in December.     8/20/2024:.  Patient continues on metformin, Jardiance 25 mg daily, glimepiride 2 mg daily.  Hemoglobin A1c 6.9, down from 7.6.  Patient is concerned as he is falling into the donut hole for his coverage for Jardiance.     11/20/2024:.  Patient was able to continue receiving his Jardiance, glimepiride and metformin without additional cost.  Patient does not check his blood sugars.  Patient has no complaints.     2/20/2025: Chronic.  Stable.  Patient continues on Jardiance, glimepiride, metformin at current doses.  No medication dose changes.  No change in diet however patient has been fatigued and has not been physically active.  Gained 5 pounds.  Hemoglobin A1c 8.5, up from 6.9.     3/27/2025:.  Repeat hemoglobin 7.8, down from 8.5.  No changes in his medications but has been more physically active.     7/1/25: Patient continues on current dose of Jardiance, glimepiride, metformin.  No medication dose changes.  Patient has lost 5 pounds since his last visit.  Hemoglobin A1c 7.4, down from 7.8.    2. Hypercoagulable state due to permanent atrial fibrillation (HCC)  Patient with PMH of atrial  fibrillation, recently saw cardiology in May.  Patient's blood pressure well-controlled on Lopressor 25 mg dose.  Follows with vascular medicine, treated with Coumadin.  Echocardiogram ordered by cardiology.  1 year follow-up recommended.    3. Weight loss  Patient has been a bit more physically active.  Current weight is 229 pounds, down from 234 pounds.    4. Prostate cancer (HCC)  12/5/2022:Closely followed by urology.  Diagnosed with prostate cancer 18th September, [place] 6, grade 1, low-grade.  Treatment active surveillance with MRI.  MRI showed positive mid gland peripheral lesion, negative for lymphadenopathy.  Appointment with urology in December 26.  PSA 1.17.  Status post TURP September 2023     3/26/2023:.  Patient followed up with urology and completed prostate MRI in November 2023.  Noted left peripheral mid gland suspicious lesion.  No pelvic lymphadenopathy or suspicious osseous lesions.  PI-RADS score 4- high.  Patient is scheduled for prostate biopsy in early April.     7/31/2024:.  Patient was seen by Dr. Batista with PSA levels every 6 months.  Repeat MRI in 2026.  In April.  Plan is to follow-up patient was sent over to [facility] for a radiation consult.  Treatment options discussed.  Patient will consider.  Follow-up with urology in October with PSA level.     8/20/2024:.  Patient has a follow-up with oncology for consult regarding treatment options for his prostate cancer.       11/20/24:.  Patient opted for PSA monitoring every 6 months.  Most recent PSA level 1.63 and stable.  Patient denies any pain, weight loss, fatigue.     2/20/2025:.  Patient's follow-up PSA level is 1.7.  He does not have an appointment with urology at this time.  Monitoring every 6 months.     3/27/2025:.  Patient contacted urology and was told he needs only yearly follow-up appointments with PSA monitoring.    7/1/2025:.  Patient received a phone call from urology, recommending her follow-up appointment.  PSA  level 1.7.      Patient Active Problem List    Diagnosis Date Noted    Dysthymia 08/20/2024    Prostate cancer (HCC) 12/05/2023    Onychomycosis 12/05/2023    Vitamin D deficiency 08/18/2022    Lower urinary tract symptoms due to benign prostatic hyperplasia 06/07/2022    Chronic gout of left hand 05/12/2022    Chronic gout of hand 03/29/2022    History of kidney stones 03/29/2022    Loss of balance 03/29/2022    Spinal stenosis of lumbar region without neurogenic claudication 03/29/2022    BMI 28.0-28.9,adult 02/17/2022    Benign prostatic hyperplasia with nocturia 02/17/2022    B12 deficiency 11/12/2020    Obesity (BMI 30-39.9) 03/27/2018    Controlled type 2 diabetes mellitus with diabetic neuropathy, without long-term current use of insulin (HCC) 03/13/2017    Risk for falls 03/13/2017    SHANNAN (obstructive sleep apnea) 12/12/2016    Dyslipidemia 12/12/2016    Left foot drop 12/12/2016    Hypercoagulable state due to permanent atrial fibrillation (HCC) 09/20/2013    Essential hypertension 07/09/2013    Permanent atrial fibrillation ([facility])        Current Medications[1]      Allergies as of 07/01/2025 - Reviewed 07/01/2025   Allergen Reaction Noted    Nkda [no known drug allergy]  07/31/2009        Social History     Socioeconomic History    Marital status:      Spouse name: Not on file    Number of children: Not on file    Years of education: Not on file    Highest education level: Not on file   Occupational History    Not on file   Tobacco Use    Smoking status: Never    Smokeless tobacco: Never   Vaping Use    Vaping status: Never Used   Substance and Sexual Activity    Alcohol use: Yes     Alcohol/week: 6.0 oz     Types: 10 Glasses of wine per week    Drug use: No    Sexual activity: Never     Partners: Female   Other Topics Concern    Not on file   Social History Narrative    Not on file     Social Drivers of Health     Financial Resource Strain: Not on file   Food Insecurity: Not on file   Transportation  "Needs: Not on file   Physical Activity: Not on file   Stress: Not on file   Social Connections: Not on file   Intimate Partner Violence: Not on file   Housing Stability: Not on file       Family History   Problem Relation Age of Onset    Heart Attack Father 59    Heart Disease Father     Hypertension Brother     Diabetes Brother     Arthritis Brother     Thyroid Mother 50    Breast Cancer Sister     No Known Problems Maternal Grandmother     No Known Problems Maternal Grandfather     No Known Problems Paternal Grandmother     No Known Problems Paternal Grandfather     Arrythmia Brother     No Known Problems Brother     No Known Problems Daughter        Past Surgical History[2]    ROS:  Denies any Headache,Chest pain,  Shortness of breath,  Abdominal pain, Changes of bowel or bladder, Lower ext edema, Fevers, Nights sweats, Weight Changes, Focal weakness or numbness.  All other systems are negative.    /70   Pulse 65   Temp 36.4 °C (97.6 °F) (Temporal)   Ht 1.956 m (6' 5\")   Wt 104 kg (229 lb 6.4 oz)   SpO2 97%   BMI 27.20 kg/m²      Constitutional: Alert, no distress, well-groomed.  Skin: Warm, dry, good turgor, no rashes in visible areas.  Eye: Equal, round and reactive, conjunctiva clear, lids normal.  ENMT: Lips without lesions, good dentition, moist mucous membranes.  Neck: Trachea midline, no masses, no thyromegaly.  Respiratory: Unlabored respiratory effort, no cough.  Abdomen: Soft, no gross masses.  MSK: Normal gait, moves all extremities.  Neuro: Grossly non-focal. No cranial nerve deficit. Strength and sensation intact.   Psych: Alert and oriented x3, normal affect and mood.    Assessment and Plan.   80 y.o. male presenting with the following.     1. Controlled type 2 diabetes mellitus with diabetic neuropathy, without long-term current use of insulin (HCC) (Primary)  Chronic.  Stable.  Continue current plan of care.  - POCT Hemoglobin A1C    2. Hypercoagulable state due to permanent atrial " fibrillation (HCC)  Chronic.  Stable.  Continue plan of care per cardiology.  Echocardiogram will be scheduled.  Monitoring for mitral regurg.  - POCT Hemoglobin A1C    3. Weight loss  Continue diet and lifestyle modifications.  - POCT Hemoglobin A1C    4. Prostate cancer (HCC)  Chronic.  Stable.  Follow-up with urology.  PSA 1.7.         [1]   Current Outpatient Medications   Medication Sig Dispense Refill    metoprolol tartrate (LOPRESSOR) 25 MG Tab Take 1 Tablet by mouth 2 times a day. 200 Tablet 3    enalapril (VASOTEC) 10 MG Tab Take 1 Tablet by mouth every day. 100 Tablet 3    atorvastatin (LIPITOR) 10 MG Tab TAKE 1 TABLET BY MOUTH EVERY  Tablet 3    Empagliflozin (JARDIANCE) 25 MG Tab Take 1 Tablet by mouth every day. 100 Tablet 3    glimepiride (AMARYL) 2 MG Tab Take 1 Tablet by mouth every morning. 100 Tablet 3    warfarin (COUMADIN) 5 MG Tab Take 1-1.5 tablets by mouth daily or as directed by [facility] 135 Tablet 3    metformin (GLUCOPHAGE) 1000 MG tablet TAKE ONE TABLET BY MOUTH TWICE DAILY WITH MEALS 200 Tablet 3    cyanocobalamin ([employer] VITAMIN B12) 1000 MCG Tab Take 1 Tablet by mouth every day. 100 Tablet 3    cyanocobalamin ([employer] VITAMIN B12) 1000 MCG Tab Take 1 tablet by mouth every day. 100 Tablet 1    Misc. Devices Misc Please provide the patient with a four wheel walker with a seat. M48.061 M21.372 M21.371 1 Each 0    VITAMIN D, CHOLECALCIFEROL, PO Take 5,000 Units by mouth.      colchicine (COLCRYS) 0.6 MG Tab Take 1 tablet by mouth at onset of acute gout attack, may repeat in 1 hour. (Patient not taking: Reported on 7/1/2025) 20 Tablet 1    moxifloxacin (VIGAMOX) 0.5 % Solution Administer 1 Drop into the right eye 4 times a day. (Patient not taking: Reported on 7/1/2025) 3 mL 2    prednisoLONE acetate (PRED FORTE) 1 % Suspension Administer 1 Drop into the right eye 4 times a day. (Patient not taking: Reported on 7/1/2025) 10 mL 2     No current facility-administered  medications for this visit.   [2]   Past Surgical History:  Procedure Laterality Date    ORIF, FRACTURE, FEMUR Left 2/24/2016    Procedure: DISTAL FEMUR ORIF;  Surgeon: Montrell Eddy M.D.;  Location: [facility];  Service:     CYSTOSCOPY STENT PLACEMENT  1/13/2014    Performed by Main Batista M.D. at SURGERY [facility]    URETEROSCOPY  1/13/2014    Performed by Main Batista M.D. at SURGERY [facility]    LASERTRIPSY  1/13/2014    Performed by Main Batista M.D. at [facility]    OTHER      Surgery for kidney stones    RECOVERY  7/11/2013    Performed by Cath-Recovery Surgery at SURGERY SAME DAY [facility]    KNEE ARTHROPLASTY TOTAL  12/1/2009    Performed by GIACOMO PETTIT at SURGERY [facility]    KNEE ARTHROPLASTY TOTAL  3/2009    Left    LUMBAR LAMINECTOMY DISKECTOMY  7/2006    L1-L5/ residual left foot weaknesss     REPAIR, KNEE, ACL  12/2005    Right    ORIF, FRACTURE, FEMUR

## 2025-07-06 PROCEDURE — RXMED WILLOW AMBULATORY MEDICATION CHARGE: Performed by: INTERNAL MEDICINE

## 2025-07-08 ENCOUNTER — PHARMACY VISIT (OUTPATIENT)
Dept: PHARMACY | Facility: MEDICAL CENTER | Age: 80
End: 2025-07-08
Payer: COMMERCIAL

## 2025-07-14 PROCEDURE — RXMED WILLOW AMBULATORY MEDICATION CHARGE: Performed by: INTERNAL MEDICINE

## 2025-07-15 ENCOUNTER — PHARMACY VISIT (OUTPATIENT)
Dept: PHARMACY | Facility: MEDICAL CENTER | Age: 80
End: 2025-07-15
Payer: COMMERCIAL

## 2025-07-24 ENCOUNTER — ANTICOAGULATION VISIT (OUTPATIENT)
Dept: VASCULAR LAB | Facility: MEDICAL CENTER | Age: 80
End: 2025-07-24
Attending: INTERNAL MEDICINE
Payer: MEDICARE

## 2025-07-24 DIAGNOSIS — D68.69 HYPERCOAGULABLE STATE DUE TO PERMANENT ATRIAL FIBRILLATION (HCC): Chronic | ICD-10-CM

## 2025-07-24 DIAGNOSIS — I48.21 HYPERCOAGULABLE STATE DUE TO PERMANENT ATRIAL FIBRILLATION (HCC): Chronic | ICD-10-CM

## 2025-07-24 DIAGNOSIS — I48.21 PERMANENT ATRIAL FIBRILLATION (HCC): Primary | Chronic | ICD-10-CM

## 2025-07-24 LAB — INR PPP: 2.8 (ref 2–3.5)

## 2025-07-24 PROCEDURE — 99213 OFFICE O/P EST LOW 20 MIN: CPT

## 2025-07-24 PROCEDURE — 85610 PROTHROMBIN TIME: CPT

## 2025-07-24 NOTE — PROGRESS NOTES
-- DO NOT REPLY / DO NOT REPLY ALL --  -- This inbox is not monitored. If this was sent to the wrong provider or department, reroute message to P ECO Reroute pool. --  -- Message is from Engagement Center Operations (ECO) --    Order Request  Ultrasound of Throat     Message / reason: Patient states that she called  to schedule this and that there was not an order for a follow up US.    Insurance type:    Payor: [employer] / Plan: [id number] / Product Type: Marketplace Exchange    Preferred Delivery Method  Input in Epic     Caller Information       Contact Date/Time Type Contact Phone/Fax    09/12/2024 03:13 PM CDT Phone (Incoming) Nahomy Hutchins (Self) [phone] (M)            Alternative phone number: n/a    Can a detailed message be left? Yes - Voicemail   Patient has been advised the message will be addressed within 2-3 business days.       Anticoagulation Summary  As of 2025      INR goal:  2.0-3.0   TTR:  78.5% (9.1 y)   INR used for dosin.80 (2025)   Warfarin maintenance plan:  5 mg (5 mg x 1) every Tue, Thu, Sat; 7.5 mg (5 mg x 1.5) all other days   Weekly warfarin total:  45 mg   Plan last modified:  Amanda Guzman ADenzelPDenzelNDenzel (2024)   Next INR check:  2025   Target end date:  Indefinite    Indications    Permanent atrial fibrillation (HCC) [I48.21]  Hypercoagulable state due to permanent atrial fibrillation (HCC) [D68.69  I48.21]                 Anticoagulation Episode Summary       INR check location:  Anticoagulation Clinic    Preferred lab:  --    Send INR reminders to:  RUMA ROBERTSAG POOL    Comments:  Indomethacin as needed, discussed risks with the patient.          Anticoagulation Care Providers       Provider Role Specialty Phone number    Cheikh Turner M.D. Referring Cardiovascular Disease (Cardiology) [phone]    [facility] Responsible  [phone]                  [id number] Stroke Risk Points: 4   Values used to calculate this score:    Points  Metrics       0        Has Congestive Heart Failure: No       1        Has Hypertension: Yes       2        Age: 80       1        Has Diabetes: Yes       0        Had Stroke: No                 Had TIA: No                 Had Thromboembolism: No       0        Has Vascular Disease: No       0        Clinically Relevant Sex: Male     No data recorded     Lab Results   Component Value Date/Time    CHOLSTRLTOT 133 2024 06:17 AM    LDL 57 2024 06:17 AM    HDL 59 2024 06:17 AM    TRIGLYCERIDE 87 2024 06:17 AM       Lab Results   Component Value Date/Time    SODIUM 142 2025 06:07 AM    POTASSIUM 4.8 2025 06:07 AM    CHLORIDE 104 2025 06:07 AM    CO2 25 2025 06:07 AM    GLUCOSE 157 (H) 2025 06:07 AM    BUN 20 2025 06:07 AM    CREATININE 0.97 2025 06:07 AM    CREATININE 1.0  06/06/2006 04:10 PM     Lab Results   Component Value Date/Time    ALKPHOSPHAT 74 03/20/2025 06:07 AM    ASTSGOT 25 03/20/2025 06:07 AM    ALTSGPT 22 03/20/2025 06:07 AM    TBILIRUBIN 0.7 03/20/2025 06:07 AM          Current Outpatient Medications:     metoprolol tartrate, 25 mg, Oral, BID    enalapril, 10 mg, Oral, DAILY    atorvastatin, 10 mg, Oral, DAILY    Jardiance, 1 Tablet, Oral, DAILY    glimepiride, 2 mg, Oral, QAM    warfarin, Take 1-1.5 tablets by mouth daily or as directed by [facility]    metformin, TAKE ONE TABLET BY MOUTH TWICE DAILY WITH MEALS    colchicine, Take 1 tablet by mouth at onset of acute gout attack, may repeat in 1 hour. (Patient not taking: Reported on 7/1/2025)    moxifloxacin, 1 Drop, Right Eye, 4X/DAY (Patient not taking: Reported on 7/1/2025)    prednisoLONE acetate, 1 Drop, Right Eye, 4X/DAY (Patient not taking: Reported on 7/1/2025)    CVS VITAMIN B12, 1,000 mcg, Oral, QDAY    CVS VITAMIN B12, 1,000 mcg, Oral, QDAY    Misc. Devices, Please provide the patient with a four wheel walker with a seat. M48.061 M21.372 M21.371    VITAMIN D, CHOLECALCIFEROL, PO, Take 5,000 Units by mouth.    Refer to Patient Findings for HPI:  Patient Findings       Negatives:  Signs/symptoms of thrombosis, Signs/symptoms of bleeding, Laboratory test error suspected, Change in health, Change in alcohol use, Change in activity, Upcoming invasive procedure, Emergency department visit, Upcoming dental procedure, Missed doses, Extra doses, Change in medications, Change in diet/appetite, Hospital admission, Bruising, Other complaints            There were no vitals filed for this visit.  Pt declined vitals    Verified current warfarin dosing schedule.    Medications reconciled: Yes  Pt is not on antiplatelet therapy.  Is patient on NSAID?: No       A/P   INR is therapeutic  Reason(s) for out of range INR today: N/A      Warfarin dosing recommendation: Continue regimen as listed above.     Pt  educated to contact our clinic with any changes in medications or s/s of bleeding or thrombosis. Pt is aware to seek immediate medical attention for falls, head injury or deep cuts.    Request pt to return in 8 week(s). Pt agrees.    Dolly Campo, PharmD

## 2025-07-28 PROCEDURE — RXMED WILLOW AMBULATORY MEDICATION CHARGE: Performed by: INTERNAL MEDICINE

## 2025-07-29 PROCEDURE — RXMED WILLOW AMBULATORY MEDICATION CHARGE: Performed by: INTERNAL MEDICINE

## 2025-08-01 ENCOUNTER — PHARMACY VISIT (OUTPATIENT)
Dept: PHARMACY | Facility: MEDICAL CENTER | Age: 80
End: 2025-08-01
Payer: COMMERCIAL

## 2025-08-20 ENCOUNTER — HOSPITAL ENCOUNTER (OUTPATIENT)
Dept: CARDIOLOGY | Facility: MEDICAL CENTER | Age: 80
End: 2025-08-20
Attending: INTERNAL MEDICINE
Payer: MEDICARE

## 2025-08-24 PROCEDURE — RXMED WILLOW AMBULATORY MEDICATION CHARGE: Performed by: INTERNAL MEDICINE

## 2025-08-27 ENCOUNTER — PHARMACY VISIT (OUTPATIENT)
Dept: PHARMACY | Facility: MEDICAL CENTER | Age: 80
End: 2025-08-27
Payer: COMMERCIAL